# Patient Record
Sex: MALE | Race: WHITE | NOT HISPANIC OR LATINO | Employment: FULL TIME | URBAN - METROPOLITAN AREA
[De-identification: names, ages, dates, MRNs, and addresses within clinical notes are randomized per-mention and may not be internally consistent; named-entity substitution may affect disease eponyms.]

---

## 2017-01-24 ENCOUNTER — ALLSCRIPTS OFFICE VISIT (OUTPATIENT)
Dept: OTHER | Facility: OTHER | Age: 57
End: 2017-01-24

## 2017-02-06 ENCOUNTER — ALLSCRIPTS OFFICE VISIT (OUTPATIENT)
Dept: OTHER | Facility: OTHER | Age: 57
End: 2017-02-06

## 2017-02-27 ENCOUNTER — ALLSCRIPTS OFFICE VISIT (OUTPATIENT)
Dept: OTHER | Facility: OTHER | Age: 57
End: 2017-02-27

## 2017-04-03 DIAGNOSIS — Z12.5 ENCOUNTER FOR SCREENING FOR MALIGNANT NEOPLASM OF PROSTATE: ICD-10-CM

## 2017-04-03 DIAGNOSIS — E78.5 HYPERLIPIDEMIA: ICD-10-CM

## 2017-04-03 DIAGNOSIS — R73.03 PREDIABETES: ICD-10-CM

## 2017-04-13 ENCOUNTER — GENERIC CONVERSION - ENCOUNTER (OUTPATIENT)
Dept: OTHER | Facility: OTHER | Age: 57
End: 2017-04-13

## 2017-05-24 ENCOUNTER — LAB CONVERSION - ENCOUNTER (OUTPATIENT)
Dept: OTHER | Facility: OTHER | Age: 57
End: 2017-05-24

## 2017-05-24 LAB
A/G RATIO (HISTORICAL): 1.7 (CALC) (ref 1–2.5)
ALBUMIN SERPL BCP-MCNC: 4.2 G/DL (ref 3.6–5.1)
ALP SERPL-CCNC: 49 U/L (ref 40–115)
ALT SERPL W P-5'-P-CCNC: 17 U/L (ref 9–46)
AST SERPL W P-5'-P-CCNC: 16 U/L (ref 10–35)
BASOPHILS # BLD AUTO: 0.2 %
BASOPHILS # BLD AUTO: 10 CELLS/UL (ref 0–200)
BILIRUB SERPL-MCNC: 0.8 MG/DL (ref 0.2–1.2)
BILIRUB UR QL STRIP: NEGATIVE
BUN SERPL-MCNC: 16 MG/DL (ref 7–25)
BUN/CREA RATIO (HISTORICAL): ABNORMAL (CALC) (ref 6–22)
CALCIUM SERPL-MCNC: 9.1 MG/DL (ref 8.6–10.3)
CHLORIDE SERPL-SCNC: 105 MMOL/L (ref 98–110)
CHOLEST SERPL-MCNC: 194 MG/DL (ref 125–200)
CHOLEST/HDLC SERPL: 3.1 (CALC)
CO2 SERPL-SCNC: 28 MMOL/L (ref 20–31)
COLOR UR: YELLOW
COMMENT (HISTORICAL): CLEAR
CREAT SERPL-MCNC: 1.01 MG/DL (ref 0.7–1.33)
DEPRECATED RDW RBC AUTO: 13.5 % (ref 11–15)
EGFR AFRICAN AMERICAN (HISTORICAL): 95 ML/MIN/1.73M2
EGFR-AMERICAN CALC (HISTORICAL): 82 ML/MIN/1.73M2
EOSINOPHIL # BLD AUTO: 153 CELLS/UL (ref 15–500)
EOSINOPHIL # BLD AUTO: 3 %
FECAL OCCULT BLOOD DIAGNOSTIC (HISTORICAL): NEGATIVE
GAMMA GLOBULIN (HISTORICAL): 2.5 G/DL (CALC) (ref 1.9–3.7)
GLUCOSE (HISTORICAL): 110 MG/DL (ref 65–99)
GLUCOSE (HISTORICAL): NEGATIVE
HBA1C MFR BLD HPLC: 5.7 % OF TOTAL HGB
HCT VFR BLD AUTO: 44.8 % (ref 38.5–50)
HDLC SERPL-MCNC: 63 MG/DL
HGB BLD-MCNC: 14.7 G/DL (ref 13.2–17.1)
KETONES UR STRIP-MCNC: NEGATIVE MG/DL
LDL CHOLESTEROL (HISTORICAL): 120 MG/DL (CALC)
LEUKOCYTE ESTERASE UR QL STRIP: NEGATIVE
LYMPHOCYTES # BLD AUTO: 1520 CELLS/UL (ref 850–3900)
LYMPHOCYTES # BLD AUTO: 29.8 %
MCH RBC QN AUTO: 30.1 PG (ref 27–33)
MCHC RBC AUTO-ENTMCNC: 32.8 G/DL (ref 32–36)
MCV RBC AUTO: 91.8 FL (ref 80–100)
MONOCYTES # BLD AUTO: 530 CELLS/UL (ref 200–950)
MONOCYTES (HISTORICAL): 10.4 %
NEUTROPHILS # BLD AUTO: 2887 CELLS/UL (ref 1500–7800)
NEUTROPHILS # BLD AUTO: 56.6 %
NITRITE UR QL STRIP: NEGATIVE
NON-HDL-CHOL (CHOL-HDL) (HISTORICAL): 131 MG/DL (CALC)
PH UR STRIP.AUTO: 6.5 [PH] (ref 5–8)
PLATELET # BLD AUTO: 248 THOUSAND/UL (ref 140–400)
PMV BLD AUTO: 8.4 FL (ref 7.5–12.5)
POTASSIUM SERPL-SCNC: 4.2 MMOL/L (ref 3.5–5.3)
PROSTATE SPECIFIC ANTIGEN TOTAL (HISTORICAL): 2.8 NG/ML
PROT UR STRIP-MCNC: NEGATIVE MG/DL
RBC # BLD AUTO: 4.88 MILLION/UL (ref 4.2–5.8)
SODIUM SERPL-SCNC: 138 MMOL/L (ref 135–146)
SP GR UR STRIP.AUTO: 1.02 (ref 1–1.03)
TOTAL PROTEIN (HISTORICAL): 6.7 G/DL (ref 6.1–8.1)
TRIGL SERPL-MCNC: 55 MG/DL
WBC # BLD AUTO: 5.1 THOUSAND/UL (ref 3.8–10.8)

## 2017-11-28 ENCOUNTER — ALLSCRIPTS OFFICE VISIT (OUTPATIENT)
Dept: OTHER | Facility: OTHER | Age: 57
End: 2017-11-28

## 2017-11-28 DIAGNOSIS — R41.3 OTHER AMNESIA: ICD-10-CM

## 2017-11-28 DIAGNOSIS — R53.83 OTHER FATIGUE: ICD-10-CM

## 2017-11-29 NOTE — PROGRESS NOTES
Assessment    1  Fatigue (780 79) (R53 83)   2  Short-term memory loss (780 93) (R41 3)   3  Family history of dementia (V17 2) (Z81 8) : Mother    Plan  FamHx: Family history of dementia, Short-term memory loss    · *1 - [facility]  *  Status: Active  Requested for: [id number]  Care Summary provided  : Yes  Fatigue, Short-term memory loss    · (1) CBC/PLT/DIFF; Status:Active; Requested for:28Nov2017;    · (1) COMPREHENSIVE METABOLIC PANEL; Status:Active; Requested for:28Nov2017;    · (1) RPR; Status:Active; Requested for:28Nov2017;    · (1) TESTOSTERONE; Status:Active; Requested for:28Nov2017;    · (1) TSH WITH FT4 REFLEX; Status:Active; Requested for:28Nov2017;    · (1) VITAMIN B12; Status:Active; Requested for:28Nov2017;    · (1) VITAMIN D 25-HYDROXY; Status:Active; Requested for:28Nov2017;     Discussion/Summary    readily apparent cause of his symptoms including depression, anxiety, WANDA/other sleep disorders  Check additional screening labs  Assuming this is normal, advised neurology follow-up ([address])  Call for new/worsening symptoms in the interim  total time of encounter was 40 minutes-- and-- 25 minutes was spent counseling  The treatment plan was reviewed with the patient/guardian  The patient/guardian understands and agrees with the treatment plan      Chief Complaint  Pt here c/o fatigue, memory issues and stress      History of Present Illness  HPI: with complaints of gradual onset short term memory loss + fatigue over the past 6-12 months  Sleeps sound, for the most part  Gets up once a night to urinate, but often has trouble falling back to sleep  5-7 hours sleep total (baseline x years)  Generally refreshed in the morning  No reported apnea, snoring  Memory loss is short term  Difficulty finding words, finishing sentences  Wife and co-workers have mentioned it  Decreased motivation, focus/concentration, but denies feeling down/depressed   Admits to some stress level d/t parent's health issues (mom with dementia, lives at home with dad), but denies feeling overly stressed  No recent change in appetite  Weight down 5 pounds over the past 6 months  headaches, vision  changes  Occasional brief dizziness  Intermittent sciatica no worse  No joint pains otherwise  No rashes, tick bites, weight loss  No constipation  No decrease in libido  Allergies not particularly bothersome lately  No recent/previous head injuries/concussions  No environmental issues (mold, new carpet, carbon monoxide, etc)  2-3 drinks per week  Former smoker  repeat colonoscopy 4/2017  States that it came back normal, told to repeat in 4 years  routine appointment with eye doctor in 3 weeks  Review of Systems    Over the past 2 weeks, how often have you been bothered by the following problems? 1 ) Little interest or pleasure in doing things? Not at all   2 ) Feeling down, depressed or hopeless? Not at all   3 ) Trouble falling asleep or sleeping too much? Several days  4 ) Feeling tired or having little energy? Several days  5 ) Poor appetite or overeating? Not at all   6 ) Feeling bad about yourself, or that you are a failure, or have let yourself or your family down? Not at all   7 ) Trouble concentrating on things, such as reading a newspaper or watching television? Not at all   8 ) Moving or speaking so slowly that other people could have noticed, or the opposite, moving or speaking faster than usual? Not at all   9 ) Thoughts that you would be better off dead or of hurting yourself in some way? Not at all  Score 2    Constitutional: as noted in HPI-- and-- no fever  Eyes: as noted in HPI   ENT: no sore throat  Cardiovascular: no chest pain-- and-- no palpitations  Respiratory: no shortness of breath-- and-- no cough  Gastrointestinal: no abdominal pain,-- no diarrhea-- and-- no constipation  Genitourinary: no dysuria  Musculoskeletal: no diffuse joint pain  Integumentary and Breasts: no rashes  Neurological: no headache  Psychiatric: as noted in HPI  Endocrine: no hot flashes-- and-- no night sweats  Hematologic and Lymphatic: no swollen glands  Active Problems  1  Allergic rhinitis (477 9) (J30 9)   2  Allergy To Gluten-containing Products (V15 05)   3  History of Benign colon polyp (211 3) (K63 5)   4  Encounter for routine laboratory testing (V72 62) (Z00 00)   5  Glaucoma (365 9) (H40 9)   6  History of hyperlipidemia (V12 29) (Z86 39)   7  Lactase deficiency (271 3) (E73 9)   8  History of Localized rash (782 1) (R21)   9  Prediabetes (790 29) (R73 03)   10  Family history of Prostate Cancer (V16 42)   11  Sciatica (724 3) (M54 30)   12  Screening for lipoid disorders (V77 91) (Z13 220)   13  Special screening examination for neoplasm of prostate (V76 44) (Z12 5)   14  Well adult exam (V70 0) (Z00 00)    Past Medical History  1  History of Benign colon polyp (211 3) (K63 5)   2  History of Diverticulosis (562 10) (K57 90)   3  Encounter for routine laboratory testing (V72 62) (Z00 00)   4  Fatigue (780 79) (R53 83)   5  History of diverticulitis of colon (V12 79) (Z87 19)   6  History of hyperlipidemia (V12 29) (Z86 39)   7  History of renal calculi (V13 01) (Z87 442)   8  History of Laceration Of Finger (883 0)   9  History of Localized rash (782 1) (R21)   10  Prediabetes (790 29) (R73 03)   11  Short-term memory loss (780 93) (R41 3)   12  History of Skin rash (782 1) (R21)   13  Well adult exam (V70 0) (Z00 00)  Active Problems And Past Medical History Reviewed: The active problems and past medical history were reviewed and updated today  Family History  Mother    1  Family history of dementia (V17 2) (Z81 8)   2  Family history of Glaucoma  Father    3  Family history of Glaucoma   4  Family history of Prostate Cancer (V16 42)   5  Family history of Pulmonary Embolism  Brother    6  Family history of Hypertension (V17 49)  Maternal Grandfather    7   Family history of Colon Cancer (V16 0)  Family History Reviewed: The family history was reviewed and updated today  Social History   · Alcohol Use (History)   · Occasional Beer and Wine   · Caffeine Use   · Coffee 3 to 4 cups   · Educational Level   · Some College   · Former smoker (I24 88) (Y01 786)   · Quit 1992   · Marital History - Currently    · Occupation:   ·  [employer]  The social history was reviewed and updated today  The social history was reviewed and is unchanged  Surgical History    1  History of Arthroscopy Knee Right   2  History of Complete Colonoscopy   3  History of Diagnostic Esophagogastroduodenoscopy   4  History of Inguinal Hernia Repair  Surgical History Reviewed: The surgical history was reviewed and updated today  Current Meds   1  Fluticasone Propionate 50 MCG/ACT Nasal Suspension; USE 2 SPRAYS IN EACH NOSTRIL ONCE DAILY; Therapy: 27VRP2517 to (Last Rx:12Pyc4439)  Requested for: 25Tki3463 Ordered   2  Multiple Vitamins Oral Tablet; TAKE 1 TABLET DAILY; Therapy: 54LGT4990 to (Evaluate:47Mkv2052) Recorded   3  Probiotic Oral Capsule; USE AS DIRECTED; Therapy: 46MCT5633 to Recorded    The medication list was reviewed and updated today  Allergies  1  Sulfa Drugs    Vitals   Recorded: 87HAX9314 12:56PM   Temperature 98 2 F, Tympanic   Heart Rate 89   Systolic 845   Diastolic 74   Weight 630 lb    BMI Calculated 25 7   BSA Calculated 1 9   O2 Saturation 98       Physical Exam   Constitutional  General appearance: No acute distress, well appearing and well nourished  Head and Face  Head and face: Normal    Eyes  Conjunctiva and lids: No erythema, swelling or discharge  Pupils and irises: Equal, round, reactive to light  Ears, Nose, Mouth, and Throat  Oropharynx: Normal with no erythema, edema, exudate or lesions  Neck  Neck: Supple, symmetric, trachea midline, no masses  Thyroid: Normal, no thyromegaly     Pulmonary  Respiratory effort: No increased work of breathing or signs of respiratory distress  Auscultation of lungs: Clear to auscultation  Cardiovascular  Auscultation of heart: Normal rate and rhythm, normal S1 and S2, no murmurs  Abdomen  Abdomen: Non-tender, no masses  Liver and spleen: No hepatomegaly or splenomegaly  Lymphatic  Palpation of lymph nodes in neck: No lymphadenopathy  Musculoskeletal  Gait and station: Normal    Neurologic  Reflexes: 2+ and symmetric  Psychiatric  Judgment and insight: Normal    Orientation to person, place and time: Normal    Recent and remote memory: Intact  Mood and affect: Normal        Results/Data  PHQ-2 Adult Depression Screening 28Nov2017 01:57PM Lizzy Speaker     Test Name Result Flag Reference   PHQ-2 Adult Depression Score 0       Over the last two weeks, how often have you been bothered by any of the following problems?  Little interest or pleasure in doing things: Not at all - 0 Feeling down, depressed, or hopeless: Not at all - 0   PHQ-2 Adult Depression Screening Negative           Signatures   Electronically signed by : HERBERT Holt; Nov 28 2017  1:56PM EST                       (Author)    Electronically signed by : Laila Graf DO; Nov 28 2017  1:59PM EST                       (Author)

## 2017-12-14 ENCOUNTER — LAB CONVERSION - ENCOUNTER (OUTPATIENT)
Dept: OTHER | Facility: OTHER | Age: 57
End: 2017-12-14

## 2017-12-14 LAB
25(OH)D3 SERPL-MCNC: 27 NG/ML (ref 30–100)
A/G RATIO (HISTORICAL): 1.7 (CALC) (ref 1–2.5)
ALBUMIN SERPL BCP-MCNC: 4.3 G/DL (ref 3.6–5.1)
ALP SERPL-CCNC: 63 U/L (ref 40–115)
ALT SERPL W P-5'-P-CCNC: 45 U/L (ref 9–46)
AST SERPL W P-5'-P-CCNC: 37 U/L (ref 10–35)
BASOPHILS # BLD AUTO: 0.6 %
BASOPHILS # BLD AUTO: 32 CELLS/UL (ref 0–200)
BILIRUB SERPL-MCNC: 0.3 MG/DL (ref 0.2–1.2)
BUN SERPL-MCNC: 21 MG/DL (ref 7–25)
BUN/CREA RATIO (HISTORICAL): ABNORMAL (CALC) (ref 6–22)
CALCIUM SERPL-MCNC: 9 MG/DL (ref 8.6–10.3)
CHLORIDE SERPL-SCNC: 107 MMOL/L (ref 98–110)
CO2 SERPL-SCNC: 24 MMOL/L (ref 20–31)
CREAT SERPL-MCNC: 0.9 MG/DL (ref 0.7–1.33)
DEPRECATED RDW RBC AUTO: 12 % (ref 11–15)
EGFR AFRICAN AMERICAN (HISTORICAL): 109 ML/MIN/1.73M2
EGFR-AMERICAN CALC (HISTORICAL): 94 ML/MIN/1.73M2
EOSINOPHIL # BLD AUTO: 180 CELLS/UL (ref 15–500)
EOSINOPHIL # BLD AUTO: 3.4 %
GAMMA GLOBULIN (HISTORICAL): 2.5 G/DL (CALC) (ref 1.9–3.7)
GLUCOSE (HISTORICAL): 122 MG/DL (ref 65–99)
HCT VFR BLD AUTO: 46.5 % (ref 38.5–50)
HGB BLD-MCNC: 15.5 G/DL (ref 13.2–17.1)
LYMPHOCYTES # BLD AUTO: 1738 CELLS/UL (ref 850–3900)
LYMPHOCYTES # BLD AUTO: 32.8 %
MCH RBC QN AUTO: 30.4 PG (ref 27–33)
MCHC RBC AUTO-ENTMCNC: 33.3 G/DL (ref 32–36)
MCV RBC AUTO: 91.2 FL (ref 80–100)
MONOCYTES # BLD AUTO: 594 CELLS/UL (ref 200–950)
MONOCYTES (HISTORICAL): 11.2 %
NEUTROPHILS # BLD AUTO: 2756 CELLS/UL (ref 1500–7800)
NEUTROPHILS # BLD AUTO: 52 %
PLATELET # BLD AUTO: 269 THOUSAND/UL (ref 140–400)
PMV BLD AUTO: 10.5 FL (ref 7.5–12.5)
POTASSIUM SERPL-SCNC: 4.2 MMOL/L (ref 3.5–5.3)
RBC # BLD AUTO: 5.1 MILLION/UL (ref 4.2–5.8)
RPR SCREEN (HISTORICAL): NORMAL
SODIUM SERPL-SCNC: 140 MMOL/L (ref 135–146)
TESTOSTERONE TOTAL (HISTORICAL): 378 NG/DL (ref 250–827)
TOTAL PROTEIN (HISTORICAL): 6.8 G/DL (ref 6.1–8.1)
TSH SERPL DL<=0.05 MIU/L-ACNC: 2.19 MIU/L (ref 0.4–4.5)
VIT B12 SERPL-MCNC: 499 PG/ML (ref 200–1100)
WBC # BLD AUTO: 5.3 THOUSAND/UL (ref 3.8–10.8)

## 2017-12-15 ENCOUNTER — GENERIC CONVERSION - ENCOUNTER (OUTPATIENT)
Dept: OTHER | Facility: OTHER | Age: 57
End: 2017-12-15

## 2017-12-21 ENCOUNTER — GENERIC CONVERSION - ENCOUNTER (OUTPATIENT)
Dept: OTHER | Facility: OTHER | Age: 57
End: 2017-12-21

## 2018-01-11 NOTE — RESULT NOTES
Discussion/Summary      Normal blood work results including PSA (prostate level) and pretty good cholesterol numbers--improved from previous! Kendall Wright Average blood sugar level (A1C) remains borderline elevated (= borderline "prediabetes")  Continuing to watch diet and maintain healthy weight will help with this  Let me know if you have any questions--Sukhjinder      Verified Results  (1) CBC/PLT/DIFF [id number] 07:13AM Dwain Cheatham     Test Name Result Flag Reference   WHITE BLOOD CELL COUNT 5 1 Thousand/uL  3 8-10 8   RED BLOOD CELL COUNT 4 88 Million/uL  4 20-5 80   HEMOGLOBIN 14 7 g/dL  13 2-17 1   HEMATOCRIT 44 8 %  38 5-50 0   MCV 91 8 fL  80 0-100 0   MCH 30 1 pg  27 0-33 0   MCHC 32 8 g/dL  32 0-36 0   RDW 13 5 %  11 0-15 0   PLATELET COUNT 957 Thousand/uL  140-400   ABSOLUTE NEUTROPHILS 2887 cells/uL  3509-6927   ABSOLUTE LYMPHOCYTES 1520 cells/uL  850-3900   ABSOLUTE MONOCYTES 530 cells/uL  200-950   ABSOLUTE EOSINOPHILS 153 cells/uL     ABSOLUTE BASOPHILS 10 cells/uL  0-200   NEUTROPHILS 56 6 %     LYMPHOCYTES 29 8 %     MONOCYTES 10 4 %     EOSINOPHILS 3 0 %     BASOPHILS 0 2 %     MPV 8 4 fL  7 5-12 5     (1) COMPREHENSIVE METABOLIC PANEL [id number] 05:29SC Dwain Cheatham     Test Name Result Flag Reference   GLUCOSE 110 mg/dL H 65-99   Fasting reference interval     For someone without known diabetes, a glucose value  between 100 and 125 mg/dL is consistent with  prediabetes and should be confirmed with a  follow-up test    UREA NITROGEN (BUN) 16 mg/dL  7-25   CREATININE 1 01 mg/dL  0 70-1 33   For patients >52years of age, the reference limit  for Creatinine is approximately 13% higher for people  identified as -American  eGFR NON-AFR   AMERICAN 82 mL/min/1 73m2  > OR = 60   eGFR AFRICAN AMERICAN 95 mL/min/1 73m2  > OR = 60   BUN/CREATININE RATIO   1-13   NOT APPLICABLE (calc)   SODIUM 138 mmol/L  135-146   POTASSIUM 4 2 mmol/L  3 5-5 3   CHLORIDE 105 mmol/L     CARBON DIOXIDE 28 mmol/L 20-31   CALCIUM 9 1 mg/dL  8 6-10 3   PROTEIN, TOTAL 6 7 g/dL  6 1-8 1   ALBUMIN 4 2 g/dL  3 6-5 1   GLOBULIN 2 5 g/dL (calc)  1 9-3 7   ALBUMIN/GLOBULIN RATIO 1 7 (calc)  1 0-2 5   BILIRUBIN, TOTAL 0 8 mg/dL  0 2-1 2   ALKALINE PHOSPHATASE 49 U/L     AST 16 U/L  10-35   ALT 17 U/L  9-46     (1) PSA (SCREEN) (Dx V76 44 Screen for Prostate Cancer) [id number] 07:13AM [employer]     Test Name Result Flag Reference   PSA, TOTAL 2 8 ng/mL  < OR = 4 0   This test was performed using the Siemens  chemiluminescent method  Values obtained from  different assay methods cannot be used  interchangeably  PSA levels, regardless of  value, should not be interpreted as absolute  evidence of the presence or absence of disease  (Q) LIPID PANEL WITH REFLEX TO DIRECT LDL [id number] 07:13AM [employer]     Test Name Result Flag Reference   CHOLESTEROL, TOTAL 194 mg/dL  125-200   HDL CHOLESTEROL 63 mg/dL  > OR = 40   TRIGLICERIDES 55 mg/dL  <965   LDL-CHOLESTEROL 120 mg/dL (calc)  <130   Desirable range <100 mg/dL for patients with CHD or  diabetes and <70 mg/dL for diabetic patients with  known heart disease  CHOL/HDLC RATIO 3 1 (calc)  < OR = 5 0   NON HDL CHOLESTEROL 131 mg/dL (calc)     Target for non-HDL cholesterol is 30 mg/dL higher than   LDL cholesterol target  (Q) HEMOGLOBIN A1c [id number] 07:13AM [employer]     Test Name Result Flag Reference   HEMOGLOBIN A1c 5 7 % of total Hgb H <5 7   For someone without known diabetes, a hemoglobin   A1c value between 5 7% and 6 4% is consistent with  prediabetes and should be confirmed with a   follow-up test      For someone with known diabetes, a value <7%  indicates that their diabetes is well controlled  A1c  targets should be individualized based on duration of  diabetes, age, comorbid conditions, and other  considerations  This assay result is consistent with an increased risk  of diabetes       Currently, no consensus exists regarding use of  hemoglobin A1c for diagnosis of diabetes for children       (Q) URINALYSIS REFLEX [id number] 07:13AM [facility]   REPORT COMMENT:  FASTING:YES     Test Name Result Flag Reference   COLOR YELLOW  YELLOW   APPEARANCE CLEAR  CLEAR   SPECIFIC GRAVITY 1 022  1 001-1 035   PH 6 5  5 0-8 0   GLUCOSE NEGATIVE  NEGATIVE   BILIRUBIN NEGATIVE  NEGATIVE   KETONES NEGATIVE  NEGATIVE   OCCULT BLOOD NEGATIVE  NEGATIVE   PROTEIN NEGATIVE  NEGATIVE   NITRITE NEGATIVE  NEGATIVE   LEUKOCYTE ESTERASE NEGATIVE  NEGATIVE

## 2018-01-14 VITALS
BODY MASS INDEX: 26.37 KG/M2 | WEIGHT: 174 LBS | RESPIRATION RATE: 17 BRPM | OXYGEN SATURATION: 97 % | HEIGHT: 68 IN | HEART RATE: 80 BPM | TEMPERATURE: 98.6 F | SYSTOLIC BLOOD PRESSURE: 138 MMHG | DIASTOLIC BLOOD PRESSURE: 88 MMHG

## 2018-01-14 VITALS
TEMPERATURE: 98.2 F | HEART RATE: 89 BPM | SYSTOLIC BLOOD PRESSURE: 122 MMHG | DIASTOLIC BLOOD PRESSURE: 74 MMHG | OXYGEN SATURATION: 98 % | BODY MASS INDEX: 24.96 KG/M2 | WEIGHT: 169 LBS

## 2018-01-14 VITALS
HEIGHT: 68 IN | SYSTOLIC BLOOD PRESSURE: 130 MMHG | HEART RATE: 78 BPM | BODY MASS INDEX: 26.52 KG/M2 | OXYGEN SATURATION: 98 % | RESPIRATION RATE: 16 BRPM | TEMPERATURE: 97.8 F | WEIGHT: 175 LBS | DIASTOLIC BLOOD PRESSURE: 80 MMHG

## 2018-01-15 NOTE — RESULT NOTES
Message   Can we mail copy of labs along with slip to recheck blood work  Thanks     Verified Results  (1) CBC/PLT/DIFF [id number] 06:50AM Thania Webster     Test Name Result Flag Reference   WHITE BLOOD CELL COUNT 5 4 Thousand/uL  3 8-10 8   RED BLOOD CELL COUNT 5 22 Million/uL  4 20-5 80   HEMOGLOBIN 15 8 g/dL  13 2-17 1   HEMATOCRIT 48 3 %  38 5-50 0   MCV 92 5 fL  80 0-100 0   MCH 30 3 pg  27 0-33 0   MCHC 32 8 g/dL  32 0-36 0   RDW 13 5 %  11 0-15 0   PLATELET COUNT 951 Thousand/uL  140-400   MPV 8 3 fL  7 5-11 5   ABSOLUTE NEUTROPHILS 2808 cells/uL  0184-7167   ABSOLUTE LYMPHOCYTES 1760 cells/uL  850-3900   ABSOLUTE MONOCYTES 589 cells/uL  200-950   ABSOLUTE EOSINOPHILS 221 cells/uL     ABSOLUTE BASOPHILS 22 cells/uL  0-200   NEUTROPHILS 52 0 %     LYMPHOCYTES 32 6 %     MONOCYTES 10 9 %     EOSINOPHILS 4 1 %     BASOPHILS 0 4 %       (1) COMPREHENSIVE METABOLIC PANEL [id number] 13:72LC Thania Webster     Test Name Result Flag Reference   GLUCOSE 113 mg/dL H 65-99   Fasting reference interval   UREA NITROGEN (BUN) 15 mg/dL  7-25   CREATININE 1 01 mg/dL  0 70-1 33   For patients >52years of age, the reference limit  for Creatinine is approximately 13% higher for people  identified as -American  eGFR NON-AFR   AMERICAN 83 mL/min/1 73m2  > OR = 60   eGFR AFRICAN AMERICAN 96 mL/min/1 73m2  > OR = 60   BUN/CREATININE RATIO   8-03   NOT APPLICABLE (calc)   SODIUM 140 mmol/L  135-146   POTASSIUM 4 4 mmol/L  3 5-5 3   CHLORIDE 103 mmol/L     CARBON DIOXIDE 25 mmol/L  19-30   CALCIUM 9 4 mg/dL  8 6-10 3   PROTEIN, TOTAL 6 9 g/dL  6 1-8 1   ALBUMIN 4 3 g/dL  3 6-5 1   GLOBULIN 2 6 g/dL (calc)  1 9-3 7   ALBUMIN/GLOBULIN RATIO 1 7 (calc)  1 0-2 5   BILIRUBIN, TOTAL 0 7 mg/dL  0 2-1 2   ALKALINE PHOSPHATASE 47 U/L     AST 20 U/L  10-35   ALT 27 U/L  9-46     (1) PSA (SCREEN) (Dx V76 44 Screen for Prostate Cancer) [id number] 06:50AM Thania Webster   REPORT COMMENT:  FASTING:YES     Test Name Result Flag Reference   PSA, TOTAL 2 5 ng/mL  < OR = 4 0   This test was performed using the Siemens  chemiluminescent method  Values obtained from  different assay methods cannot be used  interchangeably  PSA levels, regardless of  value, should not be interpreted as absolute  evidence of the presence or absence of disease  (1) URINALYSIS w URINE C/S REFLEX (will reflex a microscopy if leukocytes, occult blood, or nitrites are not within normal limits) [id number] 06:50AM Aung Gonzalez Dry     Test Name Result Flag Reference   COLOR YELLOW  YELLOW   APPEARANCE CLEAR  CLEAR   SPECIFIC GRAVITY 1 025  1 001-1 035   PH 5 0  5 0-8 0   GLUCOSE NEGATIVE  NEGATIVE   BILIRUBIN NEGATIVE  NEGATIVE   KETONES NEGATIVE  NEGATIVE   OCCULT BLOOD NEGATIVE  NEGATIVE   PROTEIN NEGATIVE  NEGATIVE   NITRITE NEGATIVE  NEGATIVE   LEUKOCYTE ESTERASE NEGATIVE  NEGATIVE   WBC 0-5 /HPF  < OR = 5   RBC NONE SEEN /HPF  < OR = 2   SQUAMOUS EPITHELIAL CELLS NONE SEEN /HPF  < OR = 5   BACTERIA NONE SEEN /HPF  NONE SEEN   HYALINE CAST NONE SEEN /LPF  NONE SEEN   REFLEXIVE URINE CULTURE NO CULTURE INDICATED       (Q) LIPID PANEL WITH REFLEX TO DIRECT LDL [id number] 06:50AM Jaimie Collins     Test Name Result Flag Reference   CHOLESTEROL, TOTAL 218 mg/dL H 125-200   HDL CHOLESTEROL 60 mg/dL  > OR = 40   TRIGLICERIDES 92 mg/dL  <509   LDL-CHOLESTEROL 140 mg/dL (calc) H <130   Desirable range <100 mg/dL for patients with CHD or  diabetes and <70 mg/dL for diabetic patients with  known heart disease  CHOL/HDLC RATIO 3 6 (calc)  < OR = 5 0   NON HDL CHOLESTEROL 158 mg/dL (calc)     Target for non-HDL cholesterol is 30 mg/dL higher than   LDL cholesterol target  Plan  Hyperlipidemia, Prediabetes    · (1) BASIC METABOLIC PROFILE; Status:Active; Requested for:10Oct2016;    · (1) HEMOGLOBIN A1C; Status:Active; Requested for:10Oct2016;    · (1) LIPID PANEL FASTING W DIRECT LDL REFLEX; Status:Active;  Requested  for:10Oct2016;     Discussion/Summary   Normal blood work results except for somewhat elevated total + LDL ("bad") cholesterol, up from previous  Fasting blood sugar is also elevated--"prediabetes" range  Keeping weight down, exercise, minimizing sugars/carbs/"bad" fats in diet will help with this, as will getting more fiber  Will mail slip to recheck in 6 months or so (prior to next physical)    Call if any questions--Sukhjinder

## 2018-01-16 NOTE — PROGRESS NOTES
Assessment    1  Well adult exam (V70 0) (Z00 00)   2  Encounter for routine laboratory testing (V72 62) (Z00 00)   3  Allergic rhinitis (477 9) (J30 9)   4  Benign colon polyp (211 3) (K63 5)   5  Sciatica (724 3) (M54 30)   6  Screening for lipoid disorders (V77 91) (Z13 220)   7  Special screening examination for neoplasm of prostate (V76 44) (Z12 5)    Plan  Encounter for routine laboratory testing, Screening for lipoid disorders, Special screening  examination for neoplasm of prostate    · (1) CBC/PLT/DIFF; Status:Active; Requested for:14Mar2016;    · (1) COMPREHENSIVE METABOLIC PANEL; Status:Active; Requested for:14Mar2016;    · (1) LIPID PANEL FASTING W DIRECT LDL REFLEX; Status:Active; Requested  for:14Mar2016;    · (1) PSA (SCREEN) (Dx V76 44 Screen for Prostate Cancer); Status:Active; Requested  for:14Mar2016;    · (1) URINALYSIS w URINE C/S REFLEX (will reflex a microscopy if leukocytes, occult  blood, or nitrites are not within normal limits); Status:Active; Requested for:14Mar2016;   Well adult exam    · Call [phone] if: You have any warning signs of skin cancer ; Status:Complete;    Done: 61CPC0184   · Call 911 if: You experience a new kind of chest pain (angina) or pressure ;  Status:Complete;   Done: 48JID7838   · Always use a seat belt and shoulder strap when riding or driving a motor vehicle ;  Status:Complete;   Done: 42WKJ9376   · Begin a limited exercise program ; Status:Complete;   Done: 88TKC1572   · Begin or continue regular aerobic exercise   Gradually work up to at least 3 sessions of 30  minutes of exercise a week ; Status:Complete;   Done: 18NUC4469   · Brush your teeth freq1 and floss at least once a day ; Status:Complete;   Done:  85RPW7548   · Decreasing the stress in your life may help your condition improve ; Status:Complete;    Done: 11DQT3076   · Eat a normal well-balanced diet ; Status:Complete;   Done: 55CFZ0634   · Limit your use of alcohol to 2 drinks or cans of beer a day ; Status:Complete;   Done:  54ZAH5870   · Stretch and warm up your muscles during the first 10 minutes , then cool down your  muscles for the last 10 minutes of exercise ; Status:Complete;   Done: 78PNW4648   · Use a sun block product with an SPF of 15 or more ; Status:Complete;   Done:  58TIE5993   · Vitamins can help you get daily requirements that your diet may not be giving you ;  Status:Complete;   Done: 79ARA7079   · We recommend routine visits to a dentist ; Status:Complete;   Done: 96RMF9227   · We recommend that you bring your body mass index down to 26 ; Status:Complete;    Done: 73AOC5616   · We recommend that you follow the "Mediterranean diet "; Status:Complete;   Done:  49BHF3460   · We recommend that you follow these rules for gun safety ; Status:Complete;   Done:  31TIE9229    Discussion/Summary  Impression: health maintenance visit  Currently, he eats a healthy diet  Prostate cancer screening: PSA was ordered  Testicular cancer screening: monthly self testicular exam was advised and clinical testicular exam was done today  Colorectal cancer screening: colorectal cancer screening is managed by GI  Screening lab work includes glucose, lipid profile and urinalysis  The immunizations are up to date  Advice and education were given regarding nutrition and aerobic exercise  Patient discussion: discussed with the patient  Preventative: Slip given for yearly PSA (FH prostate cancer) + lipids/fasting glucose (patient request; normal 2014)  UTD with Tdap  Declining flu shot  UTD with eye exam  Due for repeat colonoscopy (hx polyps) which he will be calling to schedule soon  Sciatica: Declines recent worsening or need for additional interventions  Allergic rhinitis: Springtime primarily  Uses Flonase PRN  RTO 1 year  Chief Complaint  Pt presents today for yearly PE      History of Present Illness  HM, Adult Male: The patient is being seen for a health maintenance evaluation   The last health maintenance visit was 1 year(s) ago  Social History: Household members include spouse and adult children  He is   Work status: working full time  The patient is a former cigarette smoker  He reports rare alcohol use  He has never used illicit drugs  General Health: The patient's health since the last visit is described as good  He has regular dental visits  He denies vision problems  He denies hearing loss  Immunizations status: up to date  Lifestyle:  He consumes a diverse and healthy diet  He does not have any weight concerns  He exercises regularly  He does not use tobacco  He denies alcohol use  He denies drug use  Reproductive health:  the patient is sexually active  Screening: cancer screening reviewed and updated  metabolic screening reviewed and updated  risk screening reviewed and updated  HPI:   Here for yearly physical  No acute complaints  Continued intermittent sciatica, L>R side  Mild weakness in legs when first standing, discomfort in lower back at times  Had MRI in the past  Has gotten somewhat better over time  Walks dog regularly, which seems to help  Spring allergies  No symptoms at present  Takes Flonase when needed  , 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  3 adult children, 3 grandchildren who live nearby  Likes his job, no immediate plans to retire  Fairly healthy diet  Lactose and gluten intolerance so needs to be careful with what he eats  Colonoscopy 2012 (Dr Johanne Pendleton)  Due for repeat this year  Hx benign polyps  Eye exam last summer  Takes drops for mild glaucoma  No recent vision changes  Had full body dermatology exam 2013 (Dr Gilberto Martínez)  No concerns  UTD with dental visits  In the process of getting new upper partials  Tdap 2012  Review of Systems    Constitutional: no fever, no recent weight gain, not feeling tired and no recent weight loss     Eyes: as noted in HPI    ENT: as noted in HPI, no sore throat and no hearing loss  Cardiovascular: no chest pain, no palpitations and no extremity edema  Respiratory: no shortness of breath and no cough  Gastrointestinal: no abdominal pain, no nausea, no vomiting, no constipation, no diarrhea and no blood in stools  Genitourinary: no dysuria, no urinary hesitancy, no incontinence and no testicular pain  Musculoskeletal: as noted in HPI  Integumentary: no skin lesions  Neurological: no headache and no dizziness  Psychiatric: no anxiety, no sleep disturbances and no depression  Endocrine: no muscle weakness  Hematologic/Lymphatic: no tendency for easy bleeding  Active Problems    1  Allergic rhinitis (477 9) (J30 9)   2  Allergy To Gluten-containing Products (V15 05)   3  Benign colon polyp (211 3) (K63 5)   4  Diverticulosis (562 10) (K57 90)   5  Glaucoma (365 9) (H40 9)   6  Lactase deficiency (271 3) (E73 9)   7  Sciatica (724 3) (M54 30)   8  Screening for lipoid disorders (V77 91) (Z13 220)   9   Special screening examination for neoplasm of prostate (V76 44) (Z12 5)    Past Medical History    · Encounter for routine laboratory testing (V72 62) (Z00 00)   · History of diverticulitis of colon (V12 79) (Z87 19)   · History of renal calculi (V13 01) (Z87 442)   · History of Laceration Of Finger (883 0)   · History of Skin rash (782 1) (R21)   · Well adult exam (V70 0) (Z00 00)    Surgical History    · History of Arthroscopy Knee Right   · History of Complete Colonoscopy   · History of Diagnostic Esophagogastroduodenoscopy   · History of Inguinal Hernia Repair    Family History    · Family history of Glaucoma    · Family history of Glaucoma   · Family history of Prostate Cancer (V16 42)   · Family history of Pulmonary Embolism    · Family history of Hypertension (V17 49)    · Family history of Colon Cancer (V16 0)    Social History    · Alcohol Use (History)   · Occasional Beer and Wine   · Caffeine Use   · Coffee 3 to 4 cups   · Educational Level   · Some College   · Former smoker (D39 42) (T47 421)   · Quit 1992   · Marital History - Currently    · Occupation:   ·  [employer]  Current Meds   1  Fluticasone Propionate 50 MCG/ACT Nasal Suspension; USE 2 SPRAYS IN EACH   NOSTRIL ONCE DAILY; Therapy: 34DJW0731 to (Last Rx:36Xzl8524)  Requested for: 67Gtx9797 Ordered   2  Lumigan 0 01 % Ophthalmic Solution; Therapy: 42KLJ1144 to (Evaluate:05Nov2014) Recorded   3  Multiple Vitamins Oral Tablet; TAKE 1 TABLET DAILY; Therapy: 01UFE0757 to (Evaluate:12Sep2012) Recorded   4  Probiotic Oral Capsule; USE AS DIRECTED; Therapy: 55BJP2143 to Recorded   5  Psyllium Husk Powder; USE AS DIRECTED; Therapy: 29KZO3027 to Recorded    Allergies    1  Sulfa Drugs    Vitals   Recorded: 14Vam5741 08:08AM   Heart Rate 72   Respiration 16   Systolic 004   Diastolic 80   Height 5 ft 8 in   Weight 175 lb    BMI Calculated 26 61   BSA Calculated 1 94     Physical Exam    Constitutional   General appearance: No acute distress, well appearing and well nourished  Head and Face   Head and face: Normal     Palpation of the face and sinuses: No sinus tenderness  Eyes   Conjunctiva and lids: No erythema, swelling or discharge  Pupils and irises: Equal, round, reactive to light  Ophthalmoscopic examination: Normal fundi and optic discs  Ears, Nose, Mouth, and Throat   External inspection of ears and nose: Normal     Otoscopic examination: Tympanic membranes translucent with normal light reflex  Canals patent without erythema  Nasal mucosa, septum, and turbinates: Normal without edema or erythema  Oropharynx: Normal with no erythema, edema, exudate or lesions  Neck   Neck: Supple, symmetric, trachea midline, no masses  Thyroid: Normal, no thyromegaly  Pulmonary   Respiratory effort: No increased work of breathing or signs of respiratory distress  Auscultation of lungs: Clear to auscultation      Cardiovascular Auscultation of heart: Normal rate and rhythm, normal S1 and S2, no murmurs  Carotid pulses: 2+ bilaterally  Pedal pulses: 2+ bilaterally  Examination of extremities for edema and/or varicosities: Normal     Abdomen   Abdomen: Non-tender, no masses  Liver and spleen: No hepatomegaly or splenomegaly  Examination for hernias: No hernias appreciated  Anus, perineum, and rectum: Normal sphincter tone, no masses, no prolapse  Stool sample for occult blood: Negative  Genitourinary   Scrotal contents: Normal testes, no masses  Penis: Normal, no lesions  Digital rectal exam of prostate: Abnormal   Prostate smooth, nontender, with mild symmetric enlargement, no nodularity  Lymphatic   Palpation of lymph nodes in neck: No lymphadenopathy  Musculoskeletal   Gait and station: Normal     Skin   Skin and subcutaneous tissue: Normal without rashes or lesions  Neurologic   Reflexes: 2+ and symmetric      Psychiatric   Orientation to person, place and time: Normal     Mood and affect: Normal        Results/Data  PHQ-2 Adult Depression Screening 21Bxf1247 09:00AM Fleming Williams     Test Name Result Flag Reference   PHQ-2 Adult Depression Score 0     Q1: 0, Q2: 0   PHQ-2 Adult Depression Screening Negative         Signatures   Electronically signed by : HERBERT Crockett; Feb 29 2016  8:59AM EST                       (Author)    Electronically signed by : HESHAM Eckert ; Feb 29 2016 10:14AM EST                       (Author)

## 2018-01-22 VITALS
TEMPERATURE: 98.2 F | OXYGEN SATURATION: 97 % | HEART RATE: 84 BPM | HEIGHT: 68 IN | SYSTOLIC BLOOD PRESSURE: 118 MMHG | DIASTOLIC BLOOD PRESSURE: 74 MMHG | BODY MASS INDEX: 26.37 KG/M2 | RESPIRATION RATE: 18 BRPM | WEIGHT: 174 LBS

## 2018-01-23 NOTE — RESULT NOTES
Discussion/Summary   NORMAL LABS except for mildly low vitamin D level, which may be contributing a bit to your fatigue, but would not explain the memory loss  Will send in Rx for weekly high dose vitamin D x 8 weeks  You should also start taking a daily OTC vitamin D supplement 4000 IU (except on days when taking weekly pill)  Would recommend seeing neurologist for further evaluation regarding your memory loss, as we discussed  I believe we gave you contact information for this  If not, call the office  Let me know if you have any questions--Sukhjinder      Verified Results  (1) COMPREHENSIVE METABOLIC PANEL 12SOT8927 99:14NK Virginia Alvares     Test Name Result Flag Reference   GLUCOSE 122 mg/dL H 65-99   Fasting reference interval     For someone without known diabetes, a glucose value  between 100 and 125 mg/dL is consistent with  prediabetes and should be confirmed with a  follow-up test    UREA NITROGEN (BUN) 21 mg/dL  7-25   CREATININE 0 90 mg/dL  0 70-1 33   For patients >52years of age, the reference limit  for Creatinine is approximately 13% higher for people  identified as -American  eGFR NON-AFR   AMERICAN 94 mL/min/1 73m2  > OR = 60   eGFR AFRICAN AMERICAN 109 mL/min/1 73m2  > OR = 60   BUN/CREATININE RATIO   2-42   NOT APPLICABLE (calc)   SODIUM 140 mmol/L  135-146   POTASSIUM 4 2 mmol/L  3 5-5 3   CHLORIDE 107 mmol/L     CARBON DIOXIDE 24 mmol/L  20-31   CALCIUM 9 0 mg/dL  8 6-10 3   PROTEIN, TOTAL 6 8 g/dL  6 1-8 1   ALBUMIN 4 3 g/dL  3 6-5 1   GLOBULIN 2 5 g/dL (calc)  1 9-3 7   ALBUMIN/GLOBULIN RATIO 1 7 (calc)  1 0-2 5   BILIRUBIN, TOTAL 0 3 mg/dL  0 2-1 2   ALKALINE PHOSPHATASE 63 U/L     AST 37 U/L H 10-35   ALT 45 U/L  9-46     (1) CBC/PLT/DIFF [id number] 07:57AM Virginia Alvares     Test Name Result Flag Reference   WHITE BLOOD CELL COUNT 5 3 Thousand/uL  3 8-10 8   RED BLOOD CELL COUNT 5 10 Million/uL  4 20-5 80   HEMOGLOBIN 15 5 g/dL  13 2-17 1   HEMATOCRIT 46 5 %  38 5-50 0   MCV 91 2 fL  80 0-100 0   MCH 30 4 pg  27 0-33 0   MCHC 33 3 g/dL  32 0-36 0   RDW 12 0 %  11 0-15 0   PLATELET COUNT 210 Thousand/uL  140-400   ABSOLUTE NEUTROPHILS 2756 cells/uL  1922-5968   ABSOLUTE LYMPHOCYTES 1738 cells/uL  850-3900   ABSOLUTE MONOCYTES 594 cells/uL  200-950   ABSOLUTE EOSINOPHILS 180 cells/uL     ABSOLUTE BASOPHILS 32 cells/uL  0-200   NEUTROPHILS 52 %     LYMPHOCYTES 32 8 %     MONOCYTES 11 2 %     EOSINOPHILS 3 4 %     BASOPHILS 0 6 %     MPV 10 5 fL  7 5-12 5     (Q) TESTOSTERONE,TOTAL,MALES [id number] 07:57AM Corey Villareal     Test Name Result Flag Reference   TESTOSTERONE,TOTAL,MALES 378 ng/dL  582-756     (1) VITAMIN B12 71QJC5609 07:57AM Corey Villareal     Test Name Result Flag Reference   VITAMIN B12 499 pg/mL  200-1100     *(Q) VITAMIN D, 25-HYDROXY, LC/MS/MS [id number] 07:57AM Corey JJS Mediasaniya     Test Name Result Flag Reference   VITAMIN D, 25-OH, TOTAL 27 ng/mL L    Vitamin D Status         25-OH Vitamin D:     Deficiency:                    <20 ng/mL  Insufficiency:             20 - 29 ng/mL  Optimal:                 > or = 30 ng/mL     For 25-OH Vitamin D testing on patients on   D2-supplementation and patients for whom quantitation   of D2 and D3 fractions is required, the QuestAssureD(TM)  25-OH VIT D, (D2,D3), LC/MS/MS is recommended: order   code 13814 (patients >2yrs)  For more information on this test, go to:  [email or web address]/faq/LXI670  (This link is being provided for   informational/educational purposes only )     (Q) TSH, 3RD GENERATION W/REFLEX TO FT4 [id number] 07:57AM Corey Villareal     Test Name Result Flag Reference   TSH W/REFLEX TO FT4 2 19 mIU/L  0 40-4 50     (Q) RPR (DX) W/REFL TITER AND CONFIRMATORY TESTING [id number] 07:57AM Corey Villareal   REPORT COMMENT:  FASTING:YES     Test Name Result Flag Reference   RPR (DX) W/REFL TITER AND$CONFIRMATORY TESTING NON-REACTIVE  NON-REACTIVE       Plan  Vitamin D deficiency    · Vitamin D (Ergocalciferol) 23057 UNIT Oral Capsule; TAKE 1 CAPSULE WEEKLY  X 8 WEEKS

## 2018-01-24 VITALS
SYSTOLIC BLOOD PRESSURE: 140 MMHG | HEIGHT: 68 IN | RESPIRATION RATE: 16 BRPM | WEIGHT: 163 LBS | TEMPERATURE: 98 F | DIASTOLIC BLOOD PRESSURE: 88 MMHG | HEART RATE: 105 BPM | BODY MASS INDEX: 24.71 KG/M2 | OXYGEN SATURATION: 98 %

## 2018-02-08 RX ORDER — ERGOCALCIFEROL 1.25 MG/1
CAPSULE ORAL
Qty: 8 CAPSULE | Refills: 0 | OUTPATIENT
Start: 2018-02-08

## 2018-02-22 ENCOUNTER — OFFICE VISIT (OUTPATIENT)
Dept: NEUROLOGY | Facility: CLINIC | Age: 58
End: 2018-02-22
Payer: COMMERCIAL

## 2018-02-22 VITALS
HEIGHT: 69 IN | RESPIRATION RATE: 14 BRPM | WEIGHT: 170 LBS | DIASTOLIC BLOOD PRESSURE: 80 MMHG | BODY MASS INDEX: 25.18 KG/M2 | SYSTOLIC BLOOD PRESSURE: 130 MMHG | HEART RATE: 80 BPM

## 2018-02-22 DIAGNOSIS — G31.84 MILD COGNITIVE IMPAIRMENT: Primary | ICD-10-CM

## 2018-02-22 DIAGNOSIS — R41.0 CONFUSION AND DISORIENTATION: ICD-10-CM

## 2018-02-22 PROCEDURE — 99245 OFF/OP CONSLTJ NEW/EST HI 55: CPT | Performed by: PSYCHIATRY & NEUROLOGY

## 2018-02-22 RX ORDER — DONEPEZIL HYDROCHLORIDE 5 MG/1
TABLET, FILM COATED ORAL
Qty: 60 TABLET | Refills: 5 | Status: SHIPPED | OUTPATIENT
Start: 2018-02-22 | End: 2018-08-24 | Stop reason: ALTCHOICE

## 2018-02-22 RX ORDER — CHOLECALCIFEROL (VITAMIN D3) 125 MCG
CAPSULE ORAL ONCE
COMMUNITY
Start: 2012-08-13

## 2018-02-22 RX ORDER — MULTIVITAMIN WITH IRON
1 TABLET ORAL DAILY
COMMUNITY
Start: 2012-08-13 | End: 2018-12-26

## 2018-02-22 RX ORDER — ERGOCALCIFEROL 1.25 MG/1
1 CAPSULE ORAL WEEKLY
COMMUNITY
Start: 2017-12-15 | End: 2018-12-26

## 2018-02-22 NOTE — PROGRESS NOTES
Patient ID: Annie Calhoun is a 61 y o  male  Assessment/Plan:       Diagnoses and all orders for this visit:    Mr Timmy Robles is a pleasant 62 yo male presenting with cognitive impairment- concern for Alzheimer's type of dementia vs pseudodementia with hx  Mild cognitive impairment  -     donepezil (ARICEPT) 5 mg tablet; Take 1 tab qhs x 1 week, then 2 tabs nightly  -     EEG awake or drowsy routine; Future  -     MRI brain NeuroQuant wo contrast; Future        -     MOCA 18/30- was nervous testing today- but reports no significant difficulty with his job and driving  Confusion and disorientation  -     donepezil (ARICEPT) 5 mg tablet; Take 1 tab qhs x 1 week, then 2 tabs nightly  -     EEG awake or drowsy routine; Future  -     MRI brain NeuroQuant wo contrast; Future    Discussed the importance of staying active physically, socially cognitively  Subjective:    HPI     Mr Beth Coulter is a pleasant 62 yo male seen in consultation for memory issues started noting it about 2-3 years ago with gradual worsening  He tells me he first noted it when he was talking to his friends and states has trouble thinking of the right word which later comes back to him  States short term memory worsening and now his friends and coworkers notice it  State his coworkers actually notified his wife who recommended he see a neurologist due to gradual worsening memory  He states he has some days where he feels foggy and some days not  States no significant mood changes, depression, anxiety  States no trouble recognizing familiar faces or friends  States no issues with driving, he does not get lost driving to a familiar place, and no issues with reaction time  No hallucinations  States no RBD or vivid dreams  States he sleeps 5-6 hours at a stretch and states does not snore or stop breathing in his sleep per his wife    States he has been working for [employer] and now gradually switching to computerized system and states he is still working and gradually adapting himself to it  Stays active walking his dog  States good balance  States he does get sinus headaches- behind his right eye, has photosensitivity- states pressure like congestion  States no nausea or vomiting  States this occurs 5-6 times a year  States he will take an ASA or aleve and this helps him  Family history of cognitive impairment or maybe dementia noted in [de-identified]  States no inciting event before this started  States no history of Ca, personally  Denies tobacco use now- was intermittent for a few years in the past  Denies history alcohol abuse or drug use  The following portions of the patient's history were reviewed and updated as appropriate: allergies, current medications, past family history, past medical history, past social history, past surgical history and problem list          Objective: There were no vitals taken for this visit  Physical Exam   Constitutional: He appears well-developed and well-nourished  HENT:   Head: Normocephalic and atraumatic  Eyes: Conjunctivae and EOM are normal  Pupils are equal, round, and reactive to light  Neck: Normal range of motion  Neck supple  Cardiovascular: Normal rate and regular rhythm  Pulmonary/Chest: Effort normal    Musculoskeletal: Normal range of motion  Neurological: He has normal strength and normal reflexes  Gait normal    Nursing note and vitals reviewed  Neurological Exam    Mental Status  The patient is alert and oriented to person, place, time, and situation  He draws an abnormal clock and an abnormal drawing  He has no dysarthria  He is able to name object, read and repeat  He has normal attention span and concentration  He follows multi-step commands  He has a normal fund of knowledge    MMSE 18/30- abnormal clock draw, impaired abstract thought with identifying similarities and impaired delayed recall     Cranial Nerves    CN II: The patient's visual acuity and visual fields are normal   CN III, IV, VI: The patient's pupils are equally round and reactive to light and ocular movements are normal   CN V: The patient has normal facial sensation  CN VII:  The patient has symmetric facial movement  CN VIII:  The patient's hearing is normal   CN IX, X: The patient has symmetric palate movement and normal gag reflex  CN XI: The patient's shoulder shrug strength is normal   CN XII: The patient's tongue is midline without atrophy or fasciculations  Motor  The patient has normal muscle bulk throughout  His overall muscle tone is normal throughout  His strength is 5/5 throughout all four extremities  Sensory  The patient's sensation is normal in all four extremities to light touch, temperature and vibration  He has no right-sided and no left-sided hemispatial neglect  Reflexes  Deep tendon reflexes are 2+ and symmetric in all four extremities with downgoing toes bilaterally  Gait and Coordination  The patient has normal gait and station  ROS:    Review of Systems   Constitutional: Positive for fatigue  Awaking at night   HENT: Negative  Eyes: Negative  Respiratory: Negative  Cardiovascular: Negative  Gastrointestinal: Negative  Endocrine: Negative  Genitourinary: Negative  Musculoskeletal: Positive for back pain  Joint pain   Skin: Negative  Allergic/Immunologic: Negative  Neurological: Positive for light-headedness  Memory problems  Confusion     Hematological: Negative  Psychiatric/Behavioral: Positive for confusion and sleep disturbance

## 2018-02-26 ENCOUNTER — TELEPHONE (OUTPATIENT)
Dept: NEUROLOGY | Facility: CLINIC | Age: 58
End: 2018-02-26

## 2018-02-26 NOTE — TELEPHONE ENCOUNTER
Amy Waterman from the Neuro diagnostic lab on [address]  Called to inform you that the pt  Did not show for his 2:00pm appt  Today  She did leave him a message

## 2018-03-05 ENCOUNTER — OFFICE VISIT (OUTPATIENT)
Dept: FAMILY MEDICINE CLINIC | Facility: OTHER | Age: 58
End: 2018-03-05
Payer: COMMERCIAL

## 2018-03-05 VITALS
SYSTOLIC BLOOD PRESSURE: 136 MMHG | DIASTOLIC BLOOD PRESSURE: 80 MMHG | OXYGEN SATURATION: 95 % | WEIGHT: 163.5 LBS | TEMPERATURE: 97.9 F | HEIGHT: 68 IN | BODY MASS INDEX: 24.78 KG/M2 | HEART RATE: 83 BPM

## 2018-03-05 DIAGNOSIS — E55.9 VITAMIN D DEFICIENCY: ICD-10-CM

## 2018-03-05 DIAGNOSIS — R41.3 SHORT-TERM MEMORY LOSS: ICD-10-CM

## 2018-03-05 DIAGNOSIS — R03.0 BORDERLINE HYPERTENSION: ICD-10-CM

## 2018-03-05 DIAGNOSIS — J30.9 ALLERGIC RHINITIS, UNSPECIFIED CHRONICITY, UNSPECIFIED SEASONALITY, UNSPECIFIED TRIGGER: ICD-10-CM

## 2018-03-05 DIAGNOSIS — H40.9 GLAUCOMA OF BOTH EYES, UNSPECIFIED GLAUCOMA TYPE: ICD-10-CM

## 2018-03-05 DIAGNOSIS — R73.03 PREDIABETES: ICD-10-CM

## 2018-03-05 DIAGNOSIS — Z13.220 SCREENING FOR HYPERLIPIDEMIA: ICD-10-CM

## 2018-03-05 DIAGNOSIS — Z00.00 WELL ADULT EXAM: Primary | ICD-10-CM

## 2018-03-05 DIAGNOSIS — G31.84 MILD COGNITIVE IMPAIRMENT: ICD-10-CM

## 2018-03-05 DIAGNOSIS — M54.30 SCIATICA, UNSPECIFIED LATERALITY: ICD-10-CM

## 2018-03-05 DIAGNOSIS — Z11.59 NEED FOR HEPATITIS C SCREENING TEST: ICD-10-CM

## 2018-03-05 DIAGNOSIS — Z12.5 SCREENING FOR PROSTATE CANCER: ICD-10-CM

## 2018-03-05 PROCEDURE — 99396 PREV VISIT EST AGE 40-64: CPT | Performed by: NURSE PRACTITIONER

## 2018-03-05 RX ORDER — BIMATOPROST 0.01 %
DROPS OPHTHALMIC (EYE)
COMMUNITY
Start: 2018-03-04

## 2018-03-05 NOTE — PATIENT INSTRUCTIONS
Wellness Visit for Adults   WHAT YOU NEED TO KNOW:   What is a wellness visit? A wellness visit is when you see your healthcare provider to get screened for health problems  You can also get advice on how to stay healthy  Write down your questions so you remember to ask them  Ask your healthcare provider how often you should have a wellness visit  What happens at a wellness visit? Your healthcare provider will ask about your health, and your family history of health problems  This includes high blood pressure, heart disease, and cancer  He or she will ask if you have symptoms that concern you, if you smoke, and about your mood  You may also be asked about your intake of medicines, supplements, food, and alcohol  Any of the following may be done:  · Your weight  will be checked  Your height may also be checked so your body mass index (BMI) can be calculated  Your BMI shows if you are at a healthy weight  · Your blood pressure  and heart rate will be checked  Your temperature may also be checked  · Blood and urine tests  may be done  Blood tests may be done to check your cholesterol levels  Abnormal cholesterol levels increase your risk for heart disease and stroke  You may also need a blood or urine test to check for diabetes if you are at increased risk  Urine tests may be done to look for signs of an infection or kidney disease  · A physical exam  includes checking your heartbeat and lungs with a stethoscope  Your healthcare provider may also check your skin to look for sun damage  · Screening tests  may be recommended  A screening test is done to check for diseases that may not cause symptoms  The screening tests you may need depend on your age, gender, family history, and lifestyle habits  For example, colorectal screening may be recommended if you are 48years old or older  What screening tests do I need if I am a woman? · A Pap smear  is used to screen for cervical cancer   Pap smears are usually done every 3 to 5 years depending on your age  You may need them more often if you have had abnormal Pap smear test results in the past  Ask your healthcare provider how often you should have a Pap smear  · A mammogram  is an x-ray of your breasts to screen for breast cancer  Experts recommend mammograms every 2 years starting at age 48 years  You may need a mammogram at age 52 years or younger if you have an increased risk for breast cancer  Talk to your healthcare provider about when you should start having mammograms and how often you need them  What vaccines might I need? · Get an influenza vaccine  every year  The influenza vaccine protects you from the flu  Several types of viruses cause the flu  The viruses change over time, so new vaccines are made each year  · Get a tetanus-diphtheria (Td) booster vaccine  every 10 years  This vaccine protects you against tetanus and diphtheria  Tetanus is a severe infection that may cause painful muscle spasms and lockjaw  Diphtheria is a severe bacterial infection that causes a thick covering in the back of your mouth and throat  · Get a human papillomavirus (HPV) vaccine  if you are female and aged 23 to 32 or male 23 to 24 and never received it  This vaccine protects you from HPV infection  HPV is the most common infection spread by sexual contact  HPV may also cause vaginal, penile, and anal cancers  · Get a pneumococcal vaccine  if you are aged 72 years or older  The pneumococcal vaccine is an injection given to protect you from pneumococcal disease  Pneumococcal disease is an infection caused by pneumococcal bacteria  The infection may cause pneumonia, meningitis, or an ear infection  · Get a shingles vaccine  if you are aged 61 or older, even if you have had shingles before  The shingles vaccine is an injection to protect you from the varicella-zoster virus  This is the same virus that causes chickenpox   Shingles is a painful rash that develops in people who had chickenpox or have been exposed to the virus  How can I eat healthy? My Plate is a model for planning healthy meals  It shows the types and amounts of foods that should go on your plate  Fruits and vegetables make up about half of your plate, and grains and protein make up the other half  A serving of dairy is included on the side of your plate  The amount of calories and serving sizes you need depends on your age, gender, weight, and height  Examples of healthy foods are listed below:  · Eat a variety of vegetables  such as dark green, red, and orange vegetables  You can also include canned vegetables low in sodium (salt) and frozen vegetables without added butter or sauces  · Eat a variety of fresh fruits , canned fruit in 100% juice, frozen fruit, and dried fruit  · Include whole grains  At least half of the grains you eat should be whole grains  Examples include whole-wheat bread, wheat pasta, brown rice, and whole-grain cereals such as oatmeal     · Eat a variety of protein foods such as seafood (fish and shellfish), lean meat, and poultry without skin (turkey and chicken)  Examples of lean meats include pork leg, shoulder, or tenderloin, and beef round, sirloin, tenderloin, and extra lean ground beef  Other protein foods include eggs and egg substitutes, beans, peas, soy products, nuts, and seeds  · Choose low-fat dairy products such as skim or 1% milk or low-fat yogurt, cheese, and cottage cheese  · Limit unhealthy fats  such as butter, hard margarine, and shortening  How much exercise do I need? Exercise at least 30 minutes per day on most days of the week  Some examples of exercise include walking, biking, dancing, and swimming  You can also fit in more physical activity by taking the stairs instead of the elevator or parking farther away from stores  Include muscle strengthening activities 2 days each week  Regular exercise provides many health benefits  It helps you manage your weight, and decreases your risk for type 2 diabetes, heart disease, stroke, and high blood pressure  Exercise can also help improve your mood  Ask your healthcare provider about the best exercise plan for you  What are some general health and safety guidelines I should follow? · Do not smoke  Nicotine and other chemicals in cigarettes and cigars can cause lung damage  Ask your healthcare provider for information if you currently smoke and need help to quit  E-cigarettes or smokeless tobacco still contain nicotine  Talk to your healthcare provider before you use these products  · Limit alcohol  A drink of alcohol is 12 ounces of beer, 5 ounces of wine, or 1½ ounces of liquor  · Lose weight, if needed  Being overweight increases your risk of certain health conditions  These include heart disease, high blood pressure, type 2 diabetes, and certain types of cancer  · Protect your skin  Do not sunbathe or use tanning beds  Use sunscreen with a SPF 15 or higher  Apply sunscreen at least 15 minutes before you go outside  Reapply sunscreen every 2 hours  Wear protective clothing, hats, and sunglasses when you are outside  · Drive safely  Always wear your seatbelt  Make sure everyone in your car wears a seatbelt  A seatbelt can save your life if you are in an accident  Do not use your cell phone when you are driving  This could distract you and cause an accident  Pull over if you need to make a call or send a text message  · Practice safe sex  Use latex condoms if are sexually active and have more than one partner  Your healthcare provider may recommend screening tests for sexually transmitted infections (STIs)  · Wear helmets, lifejackets, and protective gear  Always wear a helmet when you ride a bike or motorcycle, go skiing, or play sports that could cause a head injury  Wear protective equipment when you play sports   Wear a lifejacket when you are on a boat or doing water sports  CARE AGREEMENT:   You have the right to help plan your care  Learn about your health condition and how it may be treated  Discuss treatment options with your caregivers to decide what care you want to receive  You always have the right to refuse treatment  The above information is an  only  It is not intended as medical advice for individual conditions or treatments  Talk to your doctor, nurse or pharmacist before following any medical regimen to see if it is safe and effective for you  © 2017 [employer] Information is for End User's use only and may not be sold, redistributed or otherwise used for commercial purposes  All illustrations and images included in CareNotes® are the copyrighted property of A D A M [employer] Inc  or Chirag [employer]

## 2018-03-05 NOTE — PROGRESS NOTES
Assessment/Plan:         Diagnoses and all orders for this visit:    Well adult exam  --Slip given for yearly screening labs including PSA (family history of prostate cancer)  One time hepatitis C screening  --Discussed stress management measures  Continued regular exercise program encouraged  Declines need for counselor + Rx, but will let us know if this changes  --UTD with colonoscopy, eye exam, Tdap  --Declines flu shot  Mild cognitive impairment  --Recently seen by neuro  MRI + EEG ordered  --Started on Aricept  --Continue regular physical and mental exercises  Prediabetes (mild)  --Continue dietary measures  --HEMOGLOBIN A1C W/ EAG ESTIMATION; Fu  --Urinalysis with reflex to microscopic; Future    Borderline hypertension  --Continue dietary + lifestyle intervention including relaxation measures  --Advise purchasing home cuff for monitoring  RTO 4-6 months for recheck + review, calling if home readings consistently > 130/80 in the interim  Vitamin D deficiency  --Remains on daily supplement  Vitamin D 25 hydroxy; Future    Sciatica, unspecified laterality  --Denies recent worsening, need for additional interventions  Takes OTC NSAID infrequently  Allergic rhinitis  --Takes OTC antihistamine prn  Glaucoma of both eyes, unspecified glaucoma type  --Remains on drops per ophthalmology  UTD with eye exam      Need for hepatitis C screening test  -     Hepatitis C antibody; Future    Screening for hyperlipidemia  -     CBC and differential; Future  -     Comprehensive metabolic panel; Future  -     Lipid Panel with Direct LDL reflex; Future    Screening for prostate cancer  -     PSA; Future    Other orders  -     LUMIGAN 0 01 % ophthalmic drops;           RTO 4-6 months        Subjective:      Patient ID: Heather Varghese is a 61 y o  male  Here for routine well exam      Still notes periods of short term memory loss + clouded thinking    Not to where it's affecting his work, however  See previous notes for details  Recently seen by neurology  MRI + EEG ordered  Started on Aricept which he is tolerating fine  Not sure if it's helping yet  Remains somewhat stressed due to issues with kids  Coping adequately, however  Mildly depressed at times, but not to any significant degree  Denies need for medication, counseling  Work going fine  Co-workers supportive  Energy level improved at bit  Remains on daily vitamin D supplement  No recent cold symptoms, allergies  Still gets occasional sciatica  Improved overall  Takes OTC NSAIDs infrequently  Gets up to urinate once a night  No dysuria, incontinence, decreased flow  No ED, decreased libido  Fairly healthy diet with plenty of fruits and vegetables  Continues the walk the dog regularly  Colonoscopy 4/2017  Benign polyps  Repeat in 4 years  No recent stool changes  Eye exam last fall  Glasses UTD  No hearing issues  Tdap 2012  No flu shot  The following portions of the patient's history were reviewed and updated as appropriate: allergies, current medications, past family history, past medical history, past social history, past surgical history and problem list     Review of Systems   Constitutional: Negative for fever  HENT: Negative for sore throat  Eyes: Negative for visual disturbance  Respiratory: Negative for cough and shortness of breath  Cardiovascular: Negative for chest pain and palpitations  Gastrointestinal: Negative for abdominal pain, blood in stool, constipation, diarrhea, nausea and vomiting  Genitourinary: Negative for dysuria  Musculoskeletal: Negative for arthralgias  Skin: Negative  Neurological: Negative for dizziness and headaches     Psychiatric/Behavioral:        Per HPI         Objective:      /98 (BP Location: Right arm, Patient Position: Sitting, Cuff Size: Adult)   Pulse 83   Temp 97 9 °F (36 6 °C) (Tympanic)   Ht 5' 8" (1 727 m)   Wt 74 2 kg (163 lb 8 oz)   SpO2 95%   BMI 24 86 kg/m²          Physical Exam   Constitutional: He is oriented to person, place, and time  He appears well-developed and well-nourished  HENT:   Head: Normocephalic and atraumatic  Right Ear: External ear normal    Left Ear: External ear normal    Nose: Nose normal    Mouth/Throat: Oropharynx is clear and moist    Eyes: Conjunctivae are normal  Pupils are equal, round, and reactive to light  Neck: Normal range of motion  Neck supple  No thyromegaly present  Cardiovascular: Normal rate, regular rhythm, normal heart sounds and intact distal pulses  Pulmonary/Chest: Effort normal and breath sounds normal    Abdominal: Soft  Bowel sounds are normal  There is no tenderness  Genitourinary: Prostate normal and penis normal  Rectal exam shows guaiac negative stool  Musculoskeletal: Normal range of motion  Neurological: He is alert and oriented to person, place, and time  He has normal reflexes  Skin: Skin is warm and dry  Psychiatric: He has a normal mood and affect   His behavior is normal  Judgment and thought content normal

## 2018-03-08 ENCOUNTER — HOSPITAL ENCOUNTER (OUTPATIENT)
Dept: MRI IMAGING | Facility: HOSPITAL | Age: 58
Discharge: HOME | End: 2018-03-08
Attending: PSYCHIATRY & NEUROLOGY
Payer: COMMERCIAL

## 2018-03-08 DIAGNOSIS — G31.84 MILD COGNITIVE IMPAIRMENT: ICD-10-CM

## 2018-03-08 DIAGNOSIS — R41.0 CONFUSION AND DISORIENTATION: ICD-10-CM

## 2018-03-08 PROCEDURE — 70551 MRI BRAIN STEM W/O DYE: CPT

## 2018-03-08 PROCEDURE — 76377 3D RENDER W/INTRP POSTPROCES: CPT

## 2018-03-12 ENCOUNTER — DOCUMENTATION (OUTPATIENT)
Dept: NEUROLOGY | Facility: CLINIC | Age: 58
End: 2018-03-12

## 2018-03-12 NOTE — PROGRESS NOTES
Reviewed MRI brain- consistent with a diffuse neurodegenerative process in a colpocephalic pattern  Will notify above to patient- will continue Aricept, consider adding Namenda next visit  Called and left voicemail      ose [facility] Neurology

## 2018-03-13 ENCOUNTER — HOSPITAL ENCOUNTER (OUTPATIENT)
Dept: NEUROLOGY | Facility: AMBULATORY SURGERY CENTER | Age: 58
Discharge: HOME | End: 2018-03-13
Payer: COMMERCIAL

## 2018-03-13 DIAGNOSIS — R41.0 CONFUSION AND DISORIENTATION: ICD-10-CM

## 2018-03-13 DIAGNOSIS — G31.84 MILD COGNITIVE IMPAIRMENT: ICD-10-CM

## 2018-03-13 PROCEDURE — 95816 EEG AWAKE AND DROWSY: CPT

## 2018-03-13 PROCEDURE — 95816 EEG AWAKE AND DROWSY: CPT | Performed by: PSYCHIATRY & NEUROLOGY

## 2018-03-16 DIAGNOSIS — R94.01 ABNORMAL EEG: Primary | ICD-10-CM

## 2018-03-19 ENCOUNTER — TELEPHONE (OUTPATIENT)
Dept: NEUROLOGY | Facility: CLINIC | Age: 58
End: 2018-03-19

## 2018-03-19 NOTE — TELEPHONE ENCOUNTER
----- [facility] sent at 3/16/2018  3:31 PM EDT -----  Regarding: abnormal eeg  Please notify patient this his EEG showed left sided slowing that could contribute to his memory issues but no seizure discharges- I would like this better evaluated with a longer recording- thus 48 hour EEG    Please schedule  Mail this script in to patient    Thanks

## 2018-03-20 DIAGNOSIS — R94.01 ABNORMAL EEG: Primary | ICD-10-CM

## 2018-03-20 NOTE — TELEPHONE ENCOUNTER
Pt called the office back and was advised of his results  He is agreeable to 48hr EEG but he states he has already taken off a lot of time from work so realistically he does not know when he can have this testing done  He states he will call back when he is able to schedule

## 2018-04-04 ENCOUNTER — TELEPHONE (OUTPATIENT)
Dept: NEUROLOGY | Facility: CLINIC | Age: 58
End: 2018-04-04

## 2018-04-04 NOTE — TELEPHONE ENCOUNTER
Lmom for pt  To call back to follow up on Ambulatory EEG 48 hours that was ordered on 3-16-18  Did not see an appt  In [facility], so I called the pt   To follow up and see if he needed assistance in scheduling the test

## 2018-04-09 NOTE — TELEPHONE ENCOUNTER
Lmom for pt  To call back to follow up on the Ambulatory EEG 48 hours that was ordered and to assist in scheduling test with pt  Camila Hsieh

## 2018-04-09 NOTE — TELEPHONE ENCOUNTER
Pt calls back, Im unable to reach Charli this time, pt was transferred to  for assistance w/scheduling

## 2018-04-30 ENCOUNTER — HOSPITAL ENCOUNTER (OUTPATIENT)
Dept: NEUROLOGY | Facility: AMBULATORY SURGERY CENTER | Age: 58
Discharge: HOME | End: 2018-04-30

## 2018-04-30 DIAGNOSIS — R94.01 ABNORMAL EEG: ICD-10-CM

## 2018-05-01 ENCOUNTER — HOSPITAL ENCOUNTER (OUTPATIENT)
Dept: NEUROLOGY | Facility: AMBULATORY SURGERY CENTER | Age: 58
Discharge: HOME | End: 2018-05-01
Payer: COMMERCIAL

## 2018-05-01 DIAGNOSIS — R94.01 ABNORMAL EEG: ICD-10-CM

## 2018-05-01 PROCEDURE — 95953 PR EEG MONITORING/COMPUTER, EA 24 HOURS, UNATTENDED: CPT | Performed by: PSYCHIATRY & NEUROLOGY

## 2018-05-01 PROCEDURE — 95953 HB EEG MONITORING/COMPUTER: CPT

## 2018-05-02 ENCOUNTER — HOSPITAL ENCOUNTER (OUTPATIENT)
Dept: NEUROLOGY | Facility: AMBULATORY SURGERY CENTER | Age: 58
Discharge: HOME | End: 2018-05-02
Payer: COMMERCIAL

## 2018-05-02 DIAGNOSIS — R94.01 ABNORMAL EEG: ICD-10-CM

## 2018-05-02 PROCEDURE — 95953 PR EEG MONITORING/COMPUTER, EA 24 HOURS, UNATTENDED: CPT | Performed by: PSYCHIATRY & NEUROLOGY

## 2018-05-02 PROCEDURE — 95953 HB EEG MONITORING/COMPUTER: CPT

## 2018-05-11 ENCOUNTER — OFFICE VISIT (OUTPATIENT)
Dept: NEUROLOGY | Facility: CLINIC | Age: 58
End: 2018-05-11
Payer: COMMERCIAL

## 2018-05-11 VITALS — DIASTOLIC BLOOD PRESSURE: 80 MMHG | HEART RATE: 86 BPM | SYSTOLIC BLOOD PRESSURE: 120 MMHG

## 2018-05-11 DIAGNOSIS — M54.31 SCIATICA OF RIGHT SIDE: ICD-10-CM

## 2018-05-11 DIAGNOSIS — R94.01 ABNORMAL EEG: ICD-10-CM

## 2018-05-11 DIAGNOSIS — G31.84 MILD COGNITIVE IMPAIRMENT: ICD-10-CM

## 2018-05-11 DIAGNOSIS — G47.22 CIRCADIAN RHYTHM SLEEP DISORDER, ADVANCED SLEEP PHASE TYPE: ICD-10-CM

## 2018-05-11 DIAGNOSIS — G31.9 NEURODEGENERATIVE COGNITIVE IMPAIRMENT (HCC): Primary | ICD-10-CM

## 2018-05-11 PROCEDURE — 99215 OFFICE O/P EST HI 40 MIN: CPT | Performed by: PSYCHIATRY & NEUROLOGY

## 2018-05-11 NOTE — PATIENT INSTRUCTIONS
Neurology    Stay active physically and mentally  [employer]  com- try this for cognitive exercises  Eat a well rounded diet

## 2018-05-11 NOTE — PROGRESS NOTES
Patient ID: Delaney Lopez is a 61 y o  male  Assessment/Plan:    No problem-specific Assessment & Plan notes found for this encounter  Diagnoses and all orders for this visit:    Neurodegenerative cognitive impairment- MOCA testing 18/30 similar to last time  - His MRI neuroquant is concerning for a diffuse neurodegenerative process  - His EEG including routine and ambulatory showed left temporal slowing but no epileptiform discharges  - He continues to do ADLS with no difficulty  - States since starting Aricept has been able to  new technology at work and can function with no trouble as has been noted by his staff  - He is staying active physically socially cognitively with no difficulty with driving, bills finances or other tasks  - C/w Aricept 10 mg no a/e reported   -     Ammonia; Future  -     Protein electrophoresis, serum; Future  -     Immunoglobulin free LT chains blood; Future  -     Vitamin B1, whole blood; Future  -     HODAN Screen w/ Reflex to Titer/Pattern; Future  -     Lyme Antibody Profile with reflex to WB; Future    Mild cognitive impairment    Sciatica of right side  - Chronic  - Neuro exam non focal    Abnormal EEG  - Non epileptiform    Discussed melatonin nightly may help with his waking up early daily         We discussed CSF testing for potential AD- discussed this would help with prognostication and AD diagnosis  States if this is negative- lesser chance of AD  He states he would like to think about this    Discussed continuing staying active    Follow up in 3-4 months time  Subjective:    HPI    Mr  Savannah Rojas is a pleasant 60 yo male seen in consultation for memory issues started noting it about 2-3 years ago with gradual worsening  He tells me he first noted it when he was talking to his friends and states has trouble thinking of the right word which later comes back to him  States short term memory worsening and now his friends and coworkers notice it   State his coworkers actually notified his wife who recommended he see a neurologist due to gradual worsening memory  He states he has some days where he feels foggy and some days not  States no significant mood changes, depression, anxiety  States no trouble recognizing familiar faces or friends  States no issues with driving, he does not get lost driving to a familiar place, and no issues with reaction time  No hallucinations  States no RBD or vivid dreams  States he sleeps 5-6 hours at a stretch and states does not snore or stop breathing in his sleep per his wife  States he has been working for [employer] and now gradually switching to [email or web address] and states he is still working and gradually adapting himself to it  Stays active walking his dog  States good balance  States he does get sinus headaches- behind his right eye, has photosensitivity- states pressure like congestion  States no nausea or vomiting  States this occurs 5-6 times a year  States he will take an ASA or aleve and this helps him         Family history of cognitive impairment or maybe dementia noted in [de-identified]  States no inciting event before this started  States no history of Ca, personally  Denies tobacco use now- was intermittent for a few years in the past  Denies history alcohol abuse or drug use      Since last seen:  He tells me memory some what better  He can  new things at work better- they changed the system and he has not issues  States his friends/co-workers are not noticing this any longer  States he has some diarrhea but does have gluten insensitivity  States unsure if this is the Aricept or his gluten insensitivity, and does not want to discontinue Aricept at this time as he has found this helpful in regards to his memory  Tells me he has been tired all the time but attributes this to always waking up earlier- states his whole family wakes up early  He works for [email or web address]  States he has picked up the new system, with no issues  States when he saw me last he was going through more grief- and was in a worse place due to his mom passing away around that period  States better now  No issues with driving  Denies issues with doing his bills and finances  Denies history significant head trauma recently and denies significant hx head trauma  States mom with hx questionable dementia in [de-identified]  Takes a multivitamin daily  Take probiotic  States history alcohol use daily, potentially excessive in the past decades ago but no longer     The following portions of the patient's history were reviewed and updated as appropriate: allergies, current medications, past family history, past medical history, past social history, past surgical history and problem list          Objective:    Blood pressure 120/80, pulse 86  Physical Exam   Constitutional: He appears well-developed and well-nourished  HENT:   Head: Normocephalic and atraumatic  Eyes: Conjunctivae and EOM are normal  Pupils are equal, round, and reactive to light  Neck: Normal range of motion  Neck supple  Cardiovascular: Normal rate and regular rhythm  Pulmonary/Chest: Effort normal    Musculoskeletal: Normal range of motion  Neurological: He has normal strength and normal reflexes  Gait and coordination normal    Nursing note and vitals reviewed  Neurological Exam    Mental Status  The patient is alert and oriented to person, place, time, and situation  He draws an abnormal clock  He has no dysarthria  He is able to name object, read and repeat  He has poor concentration  He follows multi-step commands  He has a normal fund of knowledge    18/30 MOCA     Cranial Nerves    CN II: The patient's visual acuity and visual fields are normal   CN III, IV, VI: The patient's pupils are equally round and reactive to light and ocular movements are normal   CN V: The patient has normal facial sensation  CN VII:  The patient has symmetric facial movement  CN VIII:  The patient's hearing is normal   CN IX, X: The patient has symmetric palate movement and normal gag reflex  CN XI: The patient's shoulder shrug strength is normal   CN XII: The patient's tongue is midline without atrophy or fasciculations  Motor  The patient has normal muscle bulk throughout  His overall muscle tone is normal throughout  His strength is 5/5 throughout all four extremities  Sensory  The patient's sensation is normal in all four extremities to light touch, temperature and vibration  He has no right-sided and no left-sided hemispatial neglect  Reflexes  Deep tendon reflexes are 2+ and symmetric in all four extremities with downgoing toes bilaterally  Gait and Coordination  The patient has normal gait and station  He has normal tandem gait  Romberg's sign is negative  He has normal coordination bilaterally  ROS:    Review of Systems   Constitutional: Positive for fatigue  Trouble falling asleep  Waking up at night    HENT: Negative  Eyes: Negative  Respiratory: Negative  Cardiovascular: Negative  Gastrointestinal: Negative  Endocrine: Negative  Genitourinary: Negative  Musculoskeletal: Negative  Skin: Negative  Allergic/Immunologic: Negative  Neurological: Negative  Hematological: Negative  Psychiatric/Behavioral: Positive for sleep disturbance

## 2018-06-18 DIAGNOSIS — Z12.5 ENCOUNTER FOR SCREENING FOR MALIGNANT NEOPLASM OF PROSTATE: ICD-10-CM

## 2018-06-18 DIAGNOSIS — E55.9 VITAMIN D DEFICIENCY: ICD-10-CM

## 2018-06-18 DIAGNOSIS — R73.03 PREDIABETES: ICD-10-CM

## 2018-06-27 LAB
25(OH)D3 SERPL-MCNC: 47 NG/ML (ref 30–100)
ALBUMIN SERPL-MCNC: 4.1 G/DL (ref 3.6–5.1)
ALBUMIN/GLOB SERPL: 1.6 (CALC) (ref 1–2.5)
ALP SERPL-CCNC: 55 U/L (ref 40–115)
ALT SERPL-CCNC: 20 U/L (ref 9–46)
APPEARANCE UR: CLEAR
AST SERPL-CCNC: 15 U/L (ref 10–35)
BASOPHILS # BLD AUTO: 39 CELLS/UL (ref 0–200)
BASOPHILS NFR BLD AUTO: 0.7 %
BILIRUB SERPL-MCNC: 0.8 MG/DL (ref 0.2–1.2)
BILIRUB UR QL STRIP: NEGATIVE
BUN SERPL-MCNC: 13 MG/DL (ref 7–25)
BUN/CREAT SERPL: ABNORMAL (CALC) (ref 6–22)
CALCIUM SERPL-MCNC: 9.4 MG/DL (ref 8.6–10.3)
CHLORIDE SERPL-SCNC: 105 MMOL/L (ref 98–110)
CHOLEST SERPL-MCNC: 197 MG/DL
CHOLEST/HDLC SERPL: 2.8 (CALC)
CO2 SERPL-SCNC: 28 MMOL/L (ref 20–31)
COLOR UR: NORMAL
CREAT SERPL-MCNC: 0.92 MG/DL (ref 0.7–1.33)
EOSINOPHIL # BLD AUTO: 263 CELLS/UL (ref 15–500)
EOSINOPHIL NFR BLD AUTO: 4.7 %
ERYTHROCYTE [DISTWIDTH] IN BLOOD BY AUTOMATED COUNT: 12.2 % (ref 11–15)
EST. AVERAGE GLUCOSE BLD GHB EST-MCNC: 111 (CALC)
EST. AVERAGE GLUCOSE BLD GHB EST-SCNC: 6.2 (CALC)
GLOBULIN SER CALC-MCNC: 2.5 G/DL (CALC) (ref 1.9–3.7)
GLUCOSE SERPL-MCNC: 114 MG/DL (ref 65–99)
GLUCOSE UR QL STRIP: NEGATIVE
HBA1C MFR BLD: 5.5 % OF TOTAL HGB
HCT VFR BLD AUTO: 45.6 % (ref 38.5–50)
HCV AB S/CO SERPL IA: 0.01
HCV AB SERPL QL IA: NORMAL
HDLC SERPL-MCNC: 70 MG/DL
HGB BLD-MCNC: 15.1 G/DL (ref 13.2–17.1)
HGB UR QL STRIP: NEGATIVE
KETONES UR QL STRIP: NEGATIVE
LDLC SERPL CALC-MCNC: 110 MG/DL (CALC)
LEUKOCYTE ESTERASE UR QL STRIP: NEGATIVE
LYMPHOCYTES # BLD AUTO: 1798 CELLS/UL (ref 850–3900)
LYMPHOCYTES NFR BLD AUTO: 32.1 %
MCH RBC QN AUTO: 30.8 PG (ref 27–33)
MCHC RBC AUTO-ENTMCNC: 33.1 G/DL (ref 32–36)
MCV RBC AUTO: 93.1 FL (ref 80–100)
MONOCYTES # BLD AUTO: 655 CELLS/UL (ref 200–950)
MONOCYTES NFR BLD AUTO: 11.7 %
NEUTROPHILS # BLD AUTO: 2845 CELLS/UL (ref 1500–7800)
NEUTROPHILS NFR BLD AUTO: 50.8 %
NITRITE UR QL STRIP: NEGATIVE
NONHDLC SERPL-MCNC: 127 MG/DL (CALC)
PH UR STRIP: 5.5 [PH] (ref 5–8)
PLATELET # BLD AUTO: 298 THOUSAND/UL (ref 140–400)
PMV BLD REES-ECKER: 10 FL (ref 7.5–12.5)
POTASSIUM SERPL-SCNC: 4.4 MMOL/L (ref 3.5–5.3)
PROT SERPL-MCNC: 6.6 G/DL (ref 6.1–8.1)
PROT UR QL STRIP: NEGATIVE
PSA SERPL-MCNC: 3.6 NG/ML
RBC # BLD AUTO: 4.9 MILLION/UL (ref 4.2–5.8)
SL AMB EGFR AFRICAN AMERICAN: 106 ML/MIN/1.73M2
SL AMB EGFR NON AFRICAN AMERICAN: 91 ML/MIN/1.73M2
SODIUM SERPL-SCNC: 141 MMOL/L (ref 135–146)
SP GR UR STRIP: 1.03 (ref 1–1.03)
TRIGL SERPL-MCNC: 77 MG/DL
WBC # BLD AUTO: 5.6 THOUSAND/UL (ref 3.8–10.8)

## 2018-06-28 ENCOUNTER — TELEPHONE (OUTPATIENT)
Dept: FAMILY MEDICINE CLINIC | Facility: OTHER | Age: 58
End: 2018-06-28

## 2018-06-28 DIAGNOSIS — R97.20 INCREASED PROSTATE SPECIFIC ANTIGEN (PSA) VELOCITY: Primary | ICD-10-CM

## 2018-06-28 PROBLEM — E55.9 VITAMIN D DEFICIENCY: Status: RESOLVED | Noted: 2017-12-15 | Resolved: 2018-06-28

## 2018-06-28 NOTE — TELEPHONE ENCOUNTER
I left message for pt to call back     ----- Message from Ada Castrejon, [address] sent at 6/28/2018  1:03 PM EDT -----  Can we call Willie to notify of blood work results:  --Very good cholesterol numbers, average blood sugar level (A1C) is now normal (no further prediabetes)  Keep up the good work! --Vitamin D level now well within normal range  Can continue to take daily supplement as preventative, especially once we get into the fall/winter months   --PSA (prostate level) still in normal range, but increased a bit compared with a year ago  Would recommend rechecking in 6 months as a precaution (slip printed)  Call if any urinary issues in the interim       Thanks

## 2018-07-31 ENCOUNTER — TELEPHONE (OUTPATIENT)
Dept: NEUROLOGY | Facility: CLINIC | Age: 58
End: 2018-07-31

## 2018-07-31 DIAGNOSIS — G31.84 MILD COGNITIVE IMPAIRMENT: Primary | ICD-10-CM

## 2018-07-31 NOTE — TELEPHONE ENCOUNTER
Pt called re: bloodwork (ammonia)  He states that on the script it's noted that "this test can only be collected at a hospital campus"  Pt goes to [facility] for his blood work   "Can I get this done at [facility]?" He is requesting updated script so he can get this done at [address] [facility] team will mail script to pt's home address per pt's request              [phone]

## 2018-08-02 ENCOUNTER — OFFICE VISIT (OUTPATIENT)
Dept: FAMILY MEDICINE CLINIC | Facility: OTHER | Age: 58
End: 2018-08-02
Payer: COMMERCIAL

## 2018-08-02 VITALS
SYSTOLIC BLOOD PRESSURE: 118 MMHG | WEIGHT: 164.6 LBS | DIASTOLIC BLOOD PRESSURE: 72 MMHG | BODY MASS INDEX: 24.95 KG/M2 | HEIGHT: 68 IN | HEART RATE: 100 BPM | OXYGEN SATURATION: 98 % | TEMPERATURE: 97.4 F

## 2018-08-02 DIAGNOSIS — J06.9 VIRAL URI WITH COUGH: Primary | ICD-10-CM

## 2018-08-02 PROCEDURE — 99213 OFFICE O/P EST LOW 20 MIN: CPT | Performed by: FAMILY MEDICINE

## 2018-08-02 PROCEDURE — 3008F BODY MASS INDEX DOCD: CPT | Performed by: FAMILY MEDICINE

## 2018-08-02 NOTE — PROGRESS NOTES
Subjective:      Patient ID: Dixie Antonio is a 60 y o  male  Cough   This is a new problem  Episode onset: 3 days ago  The problem has been gradually worsening  The problem occurs constantly  The cough is productive of sputum  Associated symptoms include ear congestion, nasal congestion and postnasal drip  Pertinent negatives include no chest pain, chills, ear pain, eye redness, fever, headaches, heartburn, hemoptysis, myalgias, rash, rhinorrhea, sore throat, shortness of breath, sweats, weight loss or wheezing  Nothing aggravates the symptoms  He has tried OTC cough suppressant for the symptoms  The treatment provided mild relief  There is no history of asthma, bronchiectasis, bronchitis, COPD, emphysema, environmental allergies or pneumonia  The following portions of the patient's history were reviewed and updated as appropriate: allergies, current medications, past family history, past medical history, past social history, past surgical history and problem list       Current Outpatient Prescriptions:     donepezil (ARICEPT) 5 mg tablet, Take 1 tab qhs x 1 week, then 2 tabs nightly, Disp: 60 tablet, Rfl: 5    ergocalciferol (VITAMIN D2) 50,000 units, Take 1 capsule by mouth once a week, Disp: , Rfl:     Lactobacillus (PROBIOTIC ACIDOPHILUS) CAPS, Take by mouth once, Disp: , Rfl:     LUMIGAN 0 01 % ophthalmic drops, , Disp: , Rfl:     Multiple Vitamins tablet, Take 1 tablet by mouth daily, Disp: , Rfl:         Review of Systems   Constitutional: Positive for fatigue  Negative for appetite change, chills, fever and weight loss  HENT: Positive for congestion and postnasal drip  Negative for drooling, ear pain, hearing loss, rhinorrhea, sinus pain, sinus pressure, sore throat and tinnitus  Eyes: Negative for pain, discharge, redness and itching  Respiratory: Positive for cough  Negative for hemoptysis, chest tightness, shortness of breath and wheezing      Cardiovascular: Negative for chest pain, palpitations and leg swelling  Gastrointestinal: Negative for abdominal pain, diarrhea, heartburn, nausea and vomiting  Genitourinary: Negative for dysuria and flank pain  Musculoskeletal: Negative for arthralgias and myalgias  Skin: Negative for rash  Allergic/Immunologic: Negative for environmental allergies  Neurological: Negative for dizziness and headaches  Hematological: Negative for adenopathy  Psychiatric/Behavioral: Negative for confusion and dysphoric mood  The patient is not nervous/anxious  Objective:      /72 (BP Location: Left arm, Patient Position: Sitting, Cuff Size: Adult)   Pulse 100   Temp (!) 97 4 °F (36 3 °C) (Tympanic)   Ht 5' 8" (1 727 m)   Wt 74 7 kg (164 lb 9 6 oz)   SpO2 98%   BMI 25 03 kg/m²          Physical Exam   Constitutional: He is oriented to person, place, and time  He appears well-developed and well-nourished  No distress  HENT:   Head: Normocephalic and atraumatic  Right Ear: Tympanic membrane is retracted  Left Ear: Tympanic membrane is retracted  Nose: Mucosal edema present  Right sinus exhibits no maxillary sinus tenderness and no frontal sinus tenderness  Left sinus exhibits no maxillary sinus tenderness and no frontal sinus tenderness  Mouth/Throat: Uvula is midline  Oropharyngeal exudate (PND/cobblestoning) and posterior oropharyngeal erythema present  Eyes: Conjunctivae and EOM are normal  Pupils are equal, round, and reactive to light  Right eye exhibits no discharge  Left eye exhibits no discharge  No scleral icterus  Neck: Normal range of motion  Neck supple  No thyromegaly present  Cardiovascular: Normal rate and regular rhythm  No murmur heard  Pulmonary/Chest: Effort normal and breath sounds normal  No respiratory distress  He has no wheezes  Abdominal: Soft  Bowel sounds are normal  There is no tenderness  Musculoskeletal: Normal range of motion  He exhibits no edema     Lymphadenopathy:     He has no cervical adenopathy  Neurological: He is alert and oriented to person, place, and time  He has normal reflexes  Skin: Skin is warm and dry  No rash noted  Psychiatric: He has a normal mood and affect  His behavior is normal            Assessment/Plan:   Diagnoses and all orders for this visit:    Viral URI with cough        -     Recommend supportive care with saltwater gargles and saline nasal spray        -     Try OTC oral antihistamine and nasal steroid spray for symptom relief        76-year-old male presents with viral upper respiratory symptoms  Reviewed supportive care measures and over-the-counter medications that may be helpful  Patient will call on Monday or Tuesday if his symptoms do not appear to be improving--I would consider empirically treating him with an antibiotic for suspected sinusitis at that time  RTO as needed    The patient indicates understanding of these issues and agrees with the plan          Jocy Lowry DO

## 2018-08-24 ENCOUNTER — OFFICE VISIT (OUTPATIENT)
Dept: NEUROLOGY | Facility: CLINIC | Age: 58
End: 2018-08-24
Payer: COMMERCIAL

## 2018-08-24 VITALS
BODY MASS INDEX: 24.86 KG/M2 | HEART RATE: 86 BPM | RESPIRATION RATE: 14 BRPM | DIASTOLIC BLOOD PRESSURE: 78 MMHG | HEIGHT: 68 IN | WEIGHT: 164 LBS | SYSTOLIC BLOOD PRESSURE: 110 MMHG

## 2018-08-24 DIAGNOSIS — M54.16 RADICULOPATHY, LUMBAR REGION: ICD-10-CM

## 2018-08-24 DIAGNOSIS — G31.84 MILD COGNITIVE IMPAIRMENT: ICD-10-CM

## 2018-08-24 DIAGNOSIS — G30.9 ALZHEIMER'S DEMENTIA WITHOUT BEHAVIORAL DISTURBANCE, UNSPECIFIED TIMING OF DEMENTIA ONSET (HCC): Primary | ICD-10-CM

## 2018-08-24 DIAGNOSIS — R29.898 RIGHT LEG WEAKNESS: ICD-10-CM

## 2018-08-24 DIAGNOSIS — F02.80 ALZHEIMER'S DEMENTIA WITHOUT BEHAVIORAL DISTURBANCE, UNSPECIFIED TIMING OF DEMENTIA ONSET (HCC): Primary | ICD-10-CM

## 2018-08-24 PROCEDURE — 99215 OFFICE O/P EST HI 40 MIN: CPT | Performed by: PSYCHIATRY & NEUROLOGY

## 2018-08-24 RX ORDER — PREDNISONE 1 MG/1
TABLET ORAL
Qty: 12 TABLET | Refills: 0 | Status: SHIPPED | OUTPATIENT
Start: 2018-08-24 | End: 2018-12-26

## 2018-08-24 RX ORDER — RIVASTIGMINE 9.5 MG/24H
1 PATCH, EXTENDED RELEASE TRANSDERMAL DAILY
Qty: 30 PATCH | Refills: 0 | Status: SHIPPED | OUTPATIENT
Start: 2018-08-24 | End: 2018-09-24 | Stop reason: SDUPTHER

## 2018-08-24 NOTE — PROGRESS NOTES
Patient ID: Yaima Klein is a 60 y o  male  Assessment/Plan:    No problem-specific Assessment & Plan notes found for this encounter  Diagnoses and all orders for this visit:    Radiculopathy, lumbar region  -     predniSONE 5 mg tablet; Take 3 tabs x 2 days, 2 tabs x 2 days, 1 tab x 2 days, then stop  Take AM with food  -     MRI lumbar spine without contrast; Future  - Will consider PT for low back pending response to steroids  Mild cognitive impairment with MRI showing :   there is significant volume loss of the hippocampus and secondary enlargement of the lateral ventricles, there is more diffuse ventriculomegaly in a colpocephalic pattern  Findings more consistent with a   diffuse neurodegenerative process, rather than selectively affecting the inferior medial temporal lobes  with his clinical presentation concerning for AD at this time   - Neuropsych testing for further diagnostic evaluation    -     rivastigmine (EXELON) 9 5 mg/24 hr TD 24 hr patch; Place 1 patch on the skin daily, in place of aricept due to diarrhea he is experiencing     Right leg weakness  -     MRI lumbar spine without contrast; Future       Follow up in three to four months time    Subjective:    HPI         Mr  Nilson eG is a pleasant 60 yo male seen in follow up for gradual mild cognitive decline noted by friends and family with more short term memory loss, word finding difficulty and difficulty with complex tasks at work with new computer system        Family history of cognitive impairment or maybe dementia noted in [de-identified]  States no inciting event before this started  States no history of Ca, personally  Denies tobacco use now- was intermittent for a few years in the past  Denies history alcohol abuse or drug use        Follow up today:     He works for [employer]   States he has picked up the new system, with no issues  States when he saw me last he was going through more grief- and was in a worse place due to his mom passing away around that period  States better now  No issues with driving  Denies issues with doing his bills and finances  States history alcohol use daily, potentially excessive in the past decades ago but no longer    States he continues to do well, states cognitively he is doing well working and no issues with ADLs and stays physically and socially active  Denies significant depression or anxiety  Wife present here states easily flustered when someone corrects him in regards to his memory loss  He states he is doing better with this also and she agrees  Short term memory loss is his most concerning issue  States with Aricept this has definitely stabilized and he no longer has word finding issues and trouble picking up new things at work  States his coworkers are supportive  He does have diarrhea and prefers to try another alternative if able for aricept    States he is having worsening pain going down from his low back to the right lateral thigh till the knee  States has occasional numbness in the left side  States with excessive activity he is worse  States a few years ago he had this to a milder degree but now worsening  States with his work there is a lot of outdoors physical activity  States mild LE weakness more pain related with exertion but no falls  No UE symptoms    States they are going on a vacation and he is excited about this after a busy work summer  The following portions of the patient's history were reviewed and updated as appropriate: allergies, current medications, past family history, past medical history, past social history, past surgical history and problem list          Objective:    Blood pressure 110/78, pulse 86, resp  rate 14, height 5' 8" (1 727 m), weight 74 4 kg (164 lb)  Physical Exam   Constitutional: He appears well-developed and well-nourished  HENT:   Head: Normocephalic and atraumatic     Eyes: Conjunctivae and EOM are normal  Pupils are equal, round, and reactive to light  Neck: Normal range of motion  Neck supple  Cardiovascular: Normal rate and regular rhythm  Pulmonary/Chest: Effort normal    Musculoskeletal: Normal range of motion  Neurological: He has normal reflexes  Nursing note and vitals reviewed  Neurological Exam    Mental Status  The patient is alert and oriented to person, place, time, and situation  He recalls 3 of 3 objects immediately  He draws an abnormal clock  He has no dysarthria  He is able to name object, read and repeat  He has poor concentration  He is unable to perform simple calculations  Follows 2/3 complex commands  0/3 recall      Cranial Nerves    CN II: The patient's visual acuity and visual fields are normal   CN III, IV, VI: The patient's pupils are equally round and reactive to light and ocular movements are normal   CN V: The patient has normal facial sensation  CN VII:  The patient has symmetric facial movement  CN VIII:  The patient's hearing is normal   CN IX, X: The patient has symmetric palate movement and normal gag reflex  CN XI: The patient's shoulder shrug strength is normal   CN XII: The patient's tongue is midline without atrophy or fasciculations  Motor  The patient has normal muscle bulk throughout  His overall muscle tone is normal throughout  His strength is 5/5 in all four extremities except as noted  RLE 4+/5 proximal (? Pain limited)     Sensory  The patient's sensation is normal in all four extremities to light touch, temperature and vibration  He has no right-sided and no left-sided hemispatial neglect  Reflexes  Deep tendon reflexes are 2+ and symmetric in all four extremities with downgoing toes bilaterally  Gait and Coordination    Romberg's sign is negative  Antalgic gait with RLE         ROS:    Review of Systems   Constitutional: Negative  Negative for appetite change and fever  HENT: Negative    Negative for hearing loss, tinnitus, trouble swallowing and voice change  Eyes: Negative  Negative for photophobia and pain  Respiratory: Negative  Negative for shortness of breath  Cardiovascular: Negative  Negative for palpitations  Gastrointestinal: Positive for diarrhea  Negative for nausea and vomiting  Endocrine: Negative  Negative for cold intolerance and heat intolerance  Genitourinary: Negative  Negative for dysuria, frequency and urgency  Musculoskeletal: Positive for back pain  Negative for myalgias and neck pain  Pain while walking      Skin: Negative  Negative for rash  Neurological: Positive for numbness  Negative for dizziness, tremors, seizures, syncope, facial asymmetry, speech difficulty, weakness, light-headedness and headaches  Difficulty walking     Hematological: Negative  Does not bruise/bleed easily  Psychiatric/Behavioral: Negative  Negative for confusion, hallucinations and sleep disturbance

## 2018-08-29 LAB
ALBUMIN SERPL ELPH-MCNC: 4.1 G/DL (ref 3.8–4.8)
ALPHA1 GLOB SERPL ELPH-MCNC: 0.3 G/DL (ref 0.2–0.3)
ALPHA2 GLOB SERPL ELPH-MCNC: 0.8 G/DL (ref 0.5–0.9)
AMMONIA PLAS-SCNC: 47 UMOL/L
ANA SER QL IF: NEGATIVE
B BURGDOR AB SER IA-ACNC: <0.9 INDEX
BETA1 GLOB SERPL ELPH-MCNC: 0.4 G/DL (ref 0.4–0.6)
BETA2 GLOB SERPL ELPH-MCNC: 0.4 G/DL (ref 0.2–0.5)
GAMMA GLOB SERPL ELPH-MCNC: 0.9 G/DL (ref 0.8–1.7)
KAPPA LC FREE SER-MCNC: 17.2 MG/L (ref 3.3–19.4)
KAPPA LC FREE/LAMBDA FREE SER: 1.26 {RATIO} (ref 0.26–1.65)
LAMBDA LC FREE SERPL-MCNC: 13.6 MG/L (ref 5.7–26.3)
PROT PATTERN SERPL ELPH-IMP: NORMAL
PROT SERPL-MCNC: 6.7 G/DL (ref 6.1–8.1)
VIT B1 BLD-SCNC: 160 NMOL/L (ref 78–185)

## 2018-09-07 DIAGNOSIS — G31.84 MILD COGNITIVE IMPAIRMENT: ICD-10-CM

## 2018-09-07 DIAGNOSIS — R41.0 CONFUSION AND DISORIENTATION: ICD-10-CM

## 2018-09-11 RX ORDER — DONEPEZIL HYDROCHLORIDE 5 MG/1
TABLET, FILM COATED ORAL
Qty: 60 TABLET | Refills: 5 | OUTPATIENT
Start: 2018-09-11

## 2018-09-17 ENCOUNTER — HOSPITAL ENCOUNTER (OUTPATIENT)
Dept: MRI IMAGING | Facility: HOSPITAL | Age: 58
Discharge: HOME | End: 2018-09-17
Attending: PSYCHIATRY & NEUROLOGY
Payer: COMMERCIAL

## 2018-09-17 DIAGNOSIS — R29.898 RIGHT LEG WEAKNESS: ICD-10-CM

## 2018-09-17 DIAGNOSIS — M54.16 RADICULOPATHY, LUMBAR REGION: ICD-10-CM

## 2018-09-17 PROCEDURE — 72148 MRI LUMBAR SPINE W/O DYE: CPT

## 2018-09-18 ENCOUNTER — TELEPHONE (OUTPATIENT)
Dept: NEUROLOGY | Facility: CLINIC | Age: 58
End: 2018-09-18

## 2018-09-24 DIAGNOSIS — G31.84 MILD COGNITIVE IMPAIRMENT: ICD-10-CM

## 2018-09-25 RX ORDER — RIVASTIGMINE 9.5 MG/24H
PATCH, EXTENDED RELEASE TRANSDERMAL
Qty: 30 PATCH | Refills: 0 | Status: SHIPPED | OUTPATIENT
Start: 2018-09-25 | End: 2018-10-11

## 2018-09-26 ENCOUNTER — TELEPHONE (OUTPATIENT)
Dept: NEUROLOGY | Facility: CLINIC | Age: 58
End: 2018-09-26

## 2018-09-26 NOTE — TELEPHONE ENCOUNTER
----- Message from [address]ibbs, DO sent at 9/25/2018 10:50 AM EDT -----  Regarding: L spine results  Please let the patient know that he has a disc protrusion affecting L3 nerve root and significant arthritic changes L5-S1  We can certainly treat this conservatively to start  And recommend PT- more gentle maneuvers to see if this helps  I would also recommend EMG of his LE if he is okay with this to see if any active nerve damage coming from the low back in which case surgery to help that would be an option      Please let me know and I will order PT and/or EMG LE if he is okay with this    Thanks

## 2018-10-10 DIAGNOSIS — G30.0 EARLY ONSET ALZHEIMER'S DEMENTIA WITHOUT BEHAVIORAL DISTURBANCE (HCC): Primary | ICD-10-CM

## 2018-10-10 DIAGNOSIS — F02.80 EARLY ONSET ALZHEIMER'S DEMENTIA WITHOUT BEHAVIORAL DISTURBANCE (HCC): Primary | ICD-10-CM

## 2018-10-10 NOTE — TELEPHONE ENCOUNTER
ptr requesting to go back to aricept & d/c exelon patches  States patches do not stay on well  States he did c/o loose bowels w/aricept but it wasn't that bad, prefers the aricept    I did change rx to 10mg, 1tab Qam for ease of admin (previous rx was 5mg, 2tabs Qam)  If agreeable please sign off

## 2018-10-11 ENCOUNTER — TELEPHONE (OUTPATIENT)
Dept: NEUROLOGY | Facility: CLINIC | Age: 58
End: 2018-10-11

## 2018-10-11 RX ORDER — DONEPEZIL HYDROCHLORIDE 10 MG/1
10 TABLET, FILM COATED ORAL DAILY
Qty: 30 TABLET | Refills: 5 | Status: SHIPPED | OUTPATIENT
Start: 2018-10-11 | End: 2019-02-11 | Stop reason: SDUPTHER

## 2018-10-11 NOTE — TELEPHONE ENCOUNTER
Called and left a message on pt's answering machine for a call back      ----- Message from [facility], DO sent at 10/11/2018  9:23 AM EDT -----  Regarding: aricept  I have refilled his 10 mg dose  Please let him know if he has the 5mg tabs also, to start 5 mg for one week and then second week start taking the 10 mg tablets to potentially help reduce GI s/e  Take with food      If he does not have the 5mg tabs please let me know, will send him one weeks supply    Thanks

## 2018-10-25 NOTE — TELEPHONE ENCOUNTER
Pt called and advised pt of all of the below (repeated x 3)  He verbalized clear understanding of all instructions

## 2018-11-26 ENCOUNTER — OFFICE VISIT (OUTPATIENT)
Dept: NEUROLOGY | Facility: CLINIC | Age: 58
End: 2018-11-26
Payer: COMMERCIAL

## 2018-11-26 VITALS — HEART RATE: 85 BPM | SYSTOLIC BLOOD PRESSURE: 108 MMHG | DIASTOLIC BLOOD PRESSURE: 72 MMHG

## 2018-11-26 DIAGNOSIS — R29.898 BILATERAL LEG WEAKNESS: ICD-10-CM

## 2018-11-26 DIAGNOSIS — R41.3 SHORT-TERM MEMORY LOSS: ICD-10-CM

## 2018-11-26 DIAGNOSIS — G31.84 MILD COGNITIVE IMPAIRMENT: ICD-10-CM

## 2018-11-26 DIAGNOSIS — M51.37 DEGENERATIVE DISC DISEASE AT L5-S1 LEVEL: ICD-10-CM

## 2018-11-26 DIAGNOSIS — M54.16 RADICULOPATHY, LUMBAR REGION: Primary | ICD-10-CM

## 2018-11-26 PROCEDURE — 99214 OFFICE O/P EST MOD 30 MIN: CPT | Performed by: PSYCHIATRY & NEUROLOGY

## 2018-11-26 RX ORDER — GABAPENTIN 300 MG/1
CAPSULE ORAL
Qty: 30 CAPSULE | Refills: 1 | Status: SHIPPED | OUTPATIENT
Start: 2018-11-26 | End: 2019-01-02 | Stop reason: SDUPTHER

## 2018-11-26 RX ORDER — MELATONIN
4000 DAILY
COMMUNITY
End: 2021-07-29 | Stop reason: HOSPADM

## 2018-11-26 NOTE — PROGRESS NOTES
Patient ID: Tyrese Armendariz is a 60 y o  male  Assessment/Plan:    No problem-specific Assessment & Plan notes found for this encounter  Diagnoses and all orders for this visit:    Radiculopathy, lumbar region  -     gabapentin (NEURONTIN) 300 mg capsule; Take 1 tab nightly x 1 wk and increase to 1 tab BID as tolerated  Discussed potential med a/e especially with his MCI - amnestic type often seen preceding Alzheimer's dementia, discussed with him if any worse cognition overall, to let me know/ stop gabapentin  -     Ambulatory referral to Neurosurgery; Future  -     EMG 1 Limb; Future  - Will consider PM referral pending clinical course, NSx recs  Bilateral leg weakness  -     Ambulatory referral to Neurosurgery; Future  -     EMG 1 Limb; Future    Degenerative disc disease at L5-S1 level and L3 as well- reviewed MR L spine  -     EMG 1 Limb; Future    Mild cognitive impairment- amnestic type  - Stable on Aricept (okay with mild diarrhea a/e with this per him)  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  - MRI 3/2018 neuroquant with findings consistent with a diffuse neurodegenerative process  - Exelon with patch falling off on the field thus ineffective for him    Other orders  -     cholecalciferol (VITAMIN D3) 1,000 units tablet; Take 4,000 Units by mouth daily       Holding off therapy as she did this 2 years ago when symptoms less severe with no benefit/worsening  Subjective:     HPI      Mr Rashad Lam is a pleasant 61 yo male seen in follow up for gradual mild cognitive decline noted by friends and family with more short term memory loss, word finding difficulty and difficulty with complex tasks at work with new computer system          Family history of cognitive impairment or maybe dementia noted in [de-identified]  States no inciting event before this started  States no history of Ca, personally    Denies tobacco use now- was intermittent for a few years in the past  Denies history alcohol abuse or drug use         Follow up today:     Since last seen tells me back pain radiating to his RLE continues to worsen causing him to limp, sometimes worse with just moving a certain way  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 and has been noting more low back pain and weakness  States is more limited now in what he lifts and relies on his coworkers to help him  Does not lift more than 10-20 lbs and not regularly  States he has pain every day for years but much worse over several months and he is getting tired of it  States right worse than left  Short course of steroids not helpful  States saw chiropractor - Marshall Mathis and no significant benefit with maneuvers  Denies falls  Did PT about two years ago and states was not helpful then  Denies balance issues falls    He continues with Aricept with stability in his memory with no significant trouble with ADLs or daily work or driving  Delayed recall: 1/3  No significant depression or anxiety  He works for [employer]  States he has picked up the new system, with no issues  States when he saw me last he was going through more grief- and was in a worse place due to his mom passing away around that period  States better now  No issues with driving  Denies issues with doing his bills and finances  States history alcohol use daily, potentially excessive in the past decades ago but no longer            The following portions of the patient's history were reviewed and updated as appropriate: allergies, current medications, past family history, past medical history, past social history, past surgical history and problem list     MRI L spine  Left foraminal protrusion impinging on the exiting left L3 nerve root  Moderate to severe left foraminal stenosis  2   Severe right L5-S1 facet arthropathy without foraminal stenosis  3   Left central disc protrusion at T12-L1 with mild spinal stenosis      The following portions of the patient's history were reviewed and updated as appropriate: allergies, current medications, past family history, past medical history, past social history, past surgical history and problem list          Objective:    Blood pressure 108/72, pulse 85  Physical Exam   Constitutional: He appears well-developed and well-nourished  HENT:   Head: Normocephalic and atraumatic  Eyes: Pupils are equal, round, and reactive to light  Conjunctivae are normal    Neck: Normal range of motion  Neck supple  Cardiovascular: Normal rate and regular rhythm  Pulmonary/Chest: Effort normal    Musculoskeletal: Normal range of motion  Neurological: He has normal reflexes  Coordination normal    Nursing note and vitals reviewed  Neurological Exam  Mental Status   Oriented to person, place, time and situation  Recent and remote memory are intact  Recalls 3 of 3 objects immediately  At 10 minutes recalls 1 of 3 objects  no dysarthria present  Language is fluent with no aphasia  Unable to repeat  Attention and concentration are normal   Cannot add up a quarter dime and chau  However can do subtractions backwards from 100 if he takes his time/focuses  Can spell WORLD backwards  Can follow three step commands  Can tell me similarities between various objects  Cranial Nerves  CN II: Visual fields full to confrontation  CN III, IV, VI: Extraocular movements intact bilaterally  Pupils equal round and reactive to light bilaterally  CN V: Facial sensation is normal   CN VII: Full and symmetric facial movement  CN VIII: Hearing is normal   CN IX, X: Palate elevates symmetrically  Normal gag reflex  CN XI: Shoulder shrug strength is normal   CN XII: Tongue midline without atrophy or fasciculations  Motor   Normal muscle tone  Strength is 5/5 in all four extremities except as noted  4/5 RLE and 4+/5 LLE with pain limited component  Sensory  Light touch is normal in upper and lower extremities   Pinprick abnormality: Temperature abnormality: Vibration is normal in upper and lower extremities  No right-sided hemispatial neglect  No left-sided hemispatial neglect  Reduced sensation to PP/Temp RLE posterior calf, lateral calf and posterior thigh to a milder degree (L5-S1 distribution)  Reflexes  Deep tendon reflexes are 2+ and symmetric in all four extremities with downgoing toes bilaterally  Coordination  Finger-to-nose, rapid alternating movements and heel-to-shin normal bilaterally without dysmetria  Gait  Wide based antalgic gait  ROS:    Review of Systems   HENT: Negative  Eyes: Negative  Respiratory: Negative  Cardiovascular: Negative  Gastrointestinal: Negative  Endocrine: Negative  Genitourinary: Negative  Musculoskeletal: Positive for back pain and neck pain  Skin: Negative  Allergic/Immunologic: Negative  Neurological: Positive for weakness (low extremities ) and numbness  Memory problem   Hematological: Negative  Psychiatric/Behavioral: Positive for sleep disturbance

## 2018-12-26 ENCOUNTER — OFFICE VISIT (OUTPATIENT)
Dept: NEUROSURGERY | Facility: CLINIC | Age: 58
End: 2018-12-26
Payer: COMMERCIAL

## 2018-12-26 VITALS
HEART RATE: 70 BPM | SYSTOLIC BLOOD PRESSURE: 130 MMHG | HEIGHT: 68 IN | BODY MASS INDEX: 25.64 KG/M2 | WEIGHT: 169.2 LBS | DIASTOLIC BLOOD PRESSURE: 78 MMHG | TEMPERATURE: 98 F

## 2018-12-26 DIAGNOSIS — M51.26 PROTRUSION OF LUMBAR INTERVERTEBRAL DISC: ICD-10-CM

## 2018-12-26 DIAGNOSIS — M47.816 LUMBAR SPONDYLOSIS: Primary | ICD-10-CM

## 2018-12-26 DIAGNOSIS — M54.16 RADICULOPATHY, LUMBAR REGION: ICD-10-CM

## 2018-12-26 PROCEDURE — 99243 OFF/OP CNSLTJ NEW/EST LOW 30: CPT | Performed by: PHYSICIAN ASSISTANT

## 2018-12-26 RX ORDER — NAPROXEN SODIUM 220 MG
440 TABLET ORAL DAILY PRN
COMMUNITY
End: 2021-07-29 | Stop reason: HOSPADM

## 2018-12-26 NOTE — PATIENT INSTRUCTIONS
Contact our office or present to hospital Emergency Room if experience worsening or new back/leg pain, sensory or motor change, bladder or bowel dysfunction, or other neurological change

## 2018-12-26 NOTE — LETTER
December 28, 2018     Marni Anderson, [address] [address]    Patient: Annie Calhoun   YOB: 1960   Date of Visit: 12/26/2018       Dear Dr Vince Santiago:    Thank you for referring Cristina Colin to me for evaluation  Below are my notes for this consultation  If you have questions, please do not hesitate to call me  I look forward to following your patient along with you  Sincerely,        Britton Olmos PA-C        CC: HERBERT Gagnon PA-C  12/28/2018  3:54 AM  Sign at close encounter  Assessment/Plan:    Lumbar spondylosis  -     XR spine lumbar complete w bending minimum 6 views; Future  -     Ambulatory referral to Pain Management; Future    Radiculopathy, lumbar region  -     Ambulatory referral to Neurosurgery  -     XR spine lumbar complete w bending minimum 6 views; Future  -     Ambulatory referral to Pain Management; Future    Protrusion of lumbar intervertebral disc  -     XR spine lumbar complete w bending minimum 6 views; Future  -     Ambulatory referral to Pain Management; Future      Discussion / Summary: This is a 60 y o  male who presents for neurosurgical consultation with c/o of LBP and right lower extremity pain  Lumbar spine MRI ( 9/17/18) reports severe right L5-S1 facet arthropathy without foraminal stenosis  Mild disc bulge L4-L5 and L3-L4 with moderate to severe left foraminal stenosis at L3-L4  The left sided disc protrusion should not be be etiology of right leg pain  Lumbar flex/ext xray has been requested to evaluate for dynamic stability  He is to follow up in office after completion of L-spine xray  Referral to Pain Management has been provided  If his pain becomes better controlled he may tolerate course of Physical therapy  Patient was inquiring if hip etiology may contribute to his pain   He is advised to f/u with Primary Care Provider to discussed this further / discuss with PCP consideration for Orthopedic evaluation   SNEHA test of hips was negative on examination  Patient is to contact our office or present to hospital Emergency Room if experience worsening or new back pain, sensory or motor change, bladder or bowel dysfunction, or other neurological change  Patient expressed understanding and agreement     _________________________________________________________________________________    Subjective:      Patient ID: Jefm Arnav is a 60 y o  male with c/o of LBP and right lower extremity pain  His pain has worsened over the last 1 year  He is s/p L-spine MRI 9/17/18  Mr  Eloise Cuba is accompanied with wife  HPI  Patient reports history of "sciatica" diagnosed a few years ago with RLE pain and numbness distal left leg  He reports he participated in physical therapy at [place] [address]  around 2015  He reports his RLE pain eased and numbness left lower leg essentially resolved with only occasional rare occurrence  However, over the last 1 year he reports RLE pain has worsened  Wife reports she has noticed him limping ~ last 3 months although patient indicates this has been going on longer  He follows with Dr Dylan Bojorquez of Neurology and underwent L-spine MRI and was referred for EMG/NCS of RLE  He reports EMG/NCS of RLE is scheduled for 3/2019  He reports very little discomfort of his low back  Patient reports he was informed by a Chiropractor that his "hips are shot"  He reports pain that extends from right hip down lateral tight thigh to right knee region  He reports the right lower extremity pain comes and goes and waxes / wanes in degree  He currently rated RLE pain a 6/10 on pain scale  Walking aggravates his pain  He takes dog for walks  He reports walking limited to about 3 blocks  Gabapentin provides some relief  He denies numbness / tingling in RLE    He reported history of numbness in left lower leg a few years ago which essentially resolved besides an occasional rare occurrence  He reports weakness of right knee region for about 1 year  He ambulates independent  Reports he underwent physical therapy around 2015 but denies more recent PT  Patient reports he underwent chiropractic treatment weekly in September and November 2018 but indicated this aggravated his pain  Reports taking gabapentin and an occasional Aleve which which provides some pain relief  He denies seeing a pain management provider or undergoing low back injections  He denies bladder or bowel incontinence of saddle paresthesias  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  The following portions of the patient's history were reviewed and updated as appropriate: allergies, current medications, past family history, past medical history, past social history and past surgical history  Review of Systems   Constitutional: Negative for fever  Respiratory: Negative for shortness of breath  Gastrointestinal:        Denies bowel dysfunction    Genitourinary: Negative for difficulty urinating  Musculoskeletal:        See HPI   Neurological:        See HPI  Psychiatric/Behavioral: Negative for agitation  Objective:      /78 (BP Location: Right arm, Patient Position: Sitting, Cuff Size: Standard)   Pulse 70   Temp 98 °F (36 7 °C) (Temporal)   Ht 5' 8" (1 727 m)   Wt 76 7 kg (169 lb 3 2 oz)   BMI 25 73 kg/m²           Physical Exam   Constitutional: He appears well-developed and well-nourished  No distress  Eyes: Conjunctivae are normal    Pulmonary/Chest: Effort normal    Musculoskeletal:        Lumbar back: He exhibits no tenderness and no deformity  No tenderness on palpation lateral right thigh  SLR negative bilaterally  SNEHA test b/l hips negative  Neurological: He is alert  Skin: Skin is warm and dry  Psychiatric: He has a normal mood and affect   His speech is normal  Neurologic Exam     Mental Status   Speech: speech is normal   Level of consciousness: alert    Motor Exam     Motor:  Shoulder abduction 5/5 bilaterally  Elbow flexion/extension 5/5 bilaterally  Wrist flexion/extension 5/5 bilaterally  Finger  / abduction 5/5 bilaterally  Hip flexion/abduction/adduction 5/5 bilaterally (pain right knee when complete right hip flexion)  Knee flexion/extension 5/5 bilaterally  Ankle DF/PF 5/5 bilaterally  Great toe DF 5/5 bilaterally  Sensory Exam     Sensation to pinprick intact b/l upper extremities, torso, and b/l lower extremities  JPS and Vibratory sense intact b/l thumbs and great toes  Gait, Coordination, and Reflexes     Gait  Gait: (subtle right sided limp  Indpenendent )    Reflexes   Right plantar: normal  Left plantar: normal  Right ankle clonus: absent  Left ankle clonus: absent  Hyporeflexic b/l biceps, triceps, brachioradialis, patellar, and Achilles reflexes  Imaging study:    9/17/18 [facility] L-spine MRI -- per report: " MRI LUMBAR SPINE WITHOUT CONTRAST     INDICATION: M54 16: Radiculopathy, lumbar region  R29 898: Other symptoms and signs involving the musculoskeletal system  Chronic low back pain     COMPARISON:  CT abdomen and pelvis study from June 24, 2014      TECHNIQUE:  Sagittal T1, sagittal T2, sagittal inversion recovery, axial T1 and axial T2, coronal T2        IMAGE QUALITY:  Diagnostic     FINDINGS:     ALIGNMENT:  There is a mild dextroscoliotic curvature to the lumbar spine with the apex at the L4-5 level      MARROW SIGNAL:  Normal marrow signal is identified within the visualized bony structures  No discrete marrow lesion      DISTAL CORD AND CONUS:  There is mild ventral indentation on the conus  There is no associated cord signal abnormality  This is secondary to mild degenerative disease noted at the T12-L1 level    The conus ends at the superior L1 vertebral body level      PARASPINAL SOFT TISSUES: Paraspinal soft tissues are unremarkable  Partially visualized left parapelvic cysts      SACRUM:  Normal signal within the sacrum  No evidence of insufficiency or stress fracture      LOWER THORACIC DISC SPACES:  There is disc space narrowing and desiccation at the T12-L1 level  A broad left central protrusion is suspected at this level with suggestion of mild ventral indentation on the conus without conus/cord signal abnormality  Mild spinal stenosis is noted      LUMBAR DISC SPACES:  There is disc desiccation and disc height loss at L3-L4 and L4-L5      L1-L2:  Normal      L2-L3:  Normal      L3-L4:  Mild circumferential disc bulge with a left foraminal protrusion which is impinging upon the exiting left L3 nerve root  There is moderate to severe left foraminal stenosis  The right neural foramina remains patent  Mild spinal stenosis is   noted secondary to the disc bulge      L4-L5:  Circumferential disc bulge with mild ventral indentation on the thecal sac  The spinal canal and neural foramina remains patent         L5-S1:  No disc bulge or herniation to result in spinal canal stenosis  Marked right facet arthropathy      IMPRESSION:     1  Left foraminal protrusion impinging on the exiting left L3 nerve root  Moderate to severe left foraminal stenosis  2   Severe right L5-S1 facet arthropathy without foraminal stenosis  3   Left central disc protrusion at T12-L1 with mild spinal stenosis          Workstation performed: [id number] "

## 2018-12-26 NOTE — PROGRESS NOTES
Assessment/Plan:    Lumbar spondylosis  -     XR spine lumbar complete w bending minimum 6 views; Future  -     Ambulatory referral to Pain Management; Future    Radiculopathy, lumbar region  -     Ambulatory referral to Neurosurgery  -     XR spine lumbar complete w bending minimum 6 views; Future  -     Ambulatory referral to Pain Management; Future    Protrusion of lumbar intervertebral disc  -     XR spine lumbar complete w bending minimum 6 views; Future  -     Ambulatory referral to Pain Management; Future      Discussion / Summary: This is a 60 y o  male who presents for neurosurgical consultation with c/o of LBP and right lower extremity pain  Lumbar spine MRI ( 9/17/18) reports severe right L5-S1 facet arthropathy without foraminal stenosis  Mild disc bulge L4-L5 and L3-L4 with moderate to severe left foraminal stenosis at L3-L4  The left sided disc protrusion should not be be etiology of right leg pain  Lumbar flex/ext xray has been requested to evaluate for dynamic stability  He is to follow up in office after completion of L-spine xray  Referral to Pain Management has been provided  If his pain becomes better controlled he may tolerate course of Physical therapy  Patient was inquiring if hip etiology may contribute to his pain  He is advised to f/u with Primary Care Provider to discussed this further / discuss with PCP consideration for Orthopedic evaluation   SNEHA test of hips was negative on examination  Patient is to contact our office or present to hospital Emergency Room if experience worsening or new back pain, sensory or motor change, bladder or bowel dysfunction, or other neurological change  Patient expressed understanding and agreement     _________________________________________________________________________________    Subjective:      Patient ID: Tami Crain is a 60 y o  male with c/o of LBP and right lower extremity pain   His pain has worsened over the last 1 year  He is s/p L-spine MRI 9/17/18  Mr Santi Izaguirre is accompanied with wife  HPI  Patient reports history of "sciatica" diagnosed a few years ago with RLE pain and numbness distal left leg  He reports he participated in physical therapy at [place] [address]  around 2015  He reports his RLE pain eased and numbness left lower leg essentially resolved with only occasional rare occurrence  However, over the last 1 year he reports RLE pain has worsened  Wife reports she has noticed him limping ~ last 3 months although patient indicates this has been going on longer  He follows with Dr Lilibeth Akhtar of Neurology and underwent L-spine MRI and was referred for EMG/NCS of RLE  He reports EMG/NCS of RLE is scheduled for 3/2019  He reports very little discomfort of his low back  Patient reports he was informed by a Chiropractor that his "hips are shot"  He reports pain that extends from right hip down lateral tight thigh to right knee region  He reports the right lower extremity pain comes and goes and waxes / wanes in degree  He currently rated RLE pain a 6/10 on pain scale  Walking aggravates his pain  He takes dog for walks  He reports walking limited to about 3 blocks  Gabapentin provides some relief  He denies numbness / tingling in RLE  He reported history of numbness in left lower leg a few years ago which essentially resolved besides an occasional rare occurrence  He reports weakness of right knee region for about 1 year  He ambulates independent  Reports he underwent physical therapy around 2015 but denies more recent PT  Patient reports he underwent chiropractic treatment weekly in September and November 2018 but indicated this aggravated his pain  Reports taking gabapentin and an occasional Aleve which which provides some pain relief  He denies seeing a pain management provider or undergoing low back injections      He denies bladder or bowel incontinence of saddle paresthesias  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  The following portions of the patient's history were reviewed and updated as appropriate: allergies, current medications, past family history, past medical history, past social history and past surgical history  Review of Systems   Constitutional: Negative for fever  Respiratory: Negative for shortness of breath  Gastrointestinal:        Denies bowel dysfunction    Genitourinary: Negative for difficulty urinating  Musculoskeletal:        See HPI   Neurological:        See HPI  Psychiatric/Behavioral: Negative for agitation  Objective:      /78 (BP Location: Right arm, Patient Position: Sitting, Cuff Size: Standard)   Pulse 70   Temp 98 °F (36 7 °C) (Temporal)   Ht 5' 8" (1 727 m)   Wt 76 7 kg (169 lb 3 2 oz)   BMI 25 73 kg/m²          Physical Exam   Constitutional: He appears well-developed and well-nourished  No distress  Eyes: Conjunctivae are normal    Pulmonary/Chest: Effort normal    Musculoskeletal:        Lumbar back: He exhibits no tenderness and no deformity  No tenderness on palpation lateral right thigh  SLR negative bilaterally  SNEHA test b/l hips negative  Neurological: He is alert  Skin: Skin is warm and dry  Psychiatric: He has a normal mood and affect  His speech is normal        Neurologic Exam     Mental Status   Speech: speech is normal   Level of consciousness: alert    Motor Exam     Motor:  Shoulder abduction 5/5 bilaterally  Elbow flexion/extension 5/5 bilaterally  Wrist flexion/extension 5/5 bilaterally  Finger  / abduction 5/5 bilaterally  Hip flexion/abduction/adduction 5/5 bilaterally (pain right knee when complete right hip flexion)  Knee flexion/extension 5/5 bilaterally  Ankle DF/PF 5/5 bilaterally  Great toe DF 5/5 bilaterally          Sensory Exam     Sensation to pinprick intact b/l upper extremities, torso, and b/l lower extremities  JPS and Vibratory sense intact b/l thumbs and great toes  Gait, Coordination, and Reflexes     Gait  Gait: (subtle right sided limp  Indpenendent )    Reflexes   Right plantar: normal  Left plantar: normal  Right ankle clonus: absent  Left ankle clonus: absent  Hyporeflexic b/l biceps, triceps, brachioradialis, patellar, and Achilles reflexes  Imaging study:    9/17/18 [facility] L-spine MRI -- per report: " MRI LUMBAR SPINE WITHOUT CONTRAST     INDICATION: M54 16: Radiculopathy, lumbar region  R29 898: Other symptoms and signs involving the musculoskeletal system  Chronic low back pain     COMPARISON:  CT abdomen and pelvis study from June 24, 2014      TECHNIQUE:  Sagittal T1, sagittal T2, sagittal inversion recovery, axial T1 and axial T2, coronal T2        IMAGE QUALITY:  Diagnostic     FINDINGS:     ALIGNMENT:  There is a mild dextroscoliotic curvature to the lumbar spine with the apex at the L4-5 level      MARROW SIGNAL:  Normal marrow signal is identified within the visualized bony structures  No discrete marrow lesion      DISTAL CORD AND CONUS:  There is mild ventral indentation on the conus  There is no associated cord signal abnormality  This is secondary to mild degenerative disease noted at the T12-L1 level  The conus ends at the superior L1 vertebral body level      PARASPINAL SOFT TISSUES:  Paraspinal soft tissues are unremarkable  Partially visualized left parapelvic cysts      SACRUM:  Normal signal within the sacrum  No evidence of insufficiency or stress fracture      LOWER THORACIC DISC SPACES:  There is disc space narrowing and desiccation at the T12-L1 level  A broad left central protrusion is suspected at this level with suggestion of mild ventral indentation on the conus without conus/cord signal abnormality      Mild spinal stenosis is noted      LUMBAR DISC SPACES:  There is disc desiccation and disc height loss at L3-L4 and L4-L5      L1-L2: Normal      L2-L3:  Normal      L3-L4:  Mild circumferential disc bulge with a left foraminal protrusion which is impinging upon the exiting left L3 nerve root  There is moderate to severe left foraminal stenosis  The right neural foramina remains patent  Mild spinal stenosis is   noted secondary to the disc bulge      L4-L5:  Circumferential disc bulge with mild ventral indentation on the thecal sac  The spinal canal and neural foramina remains patent         L5-S1:  No disc bulge or herniation to result in spinal canal stenosis  Marked right facet arthropathy      IMPRESSION:     1  Left foraminal protrusion impinging on the exiting left L3 nerve root  Moderate to severe left foraminal stenosis  2   Severe right L5-S1 facet arthropathy without foraminal stenosis  3   Left central disc protrusion at T12-L1 with mild spinal stenosis          Workstation performed: [id number] "

## 2019-01-02 ENCOUNTER — OFFICE VISIT (OUTPATIENT)
Dept: FAMILY MEDICINE CLINIC | Facility: OTHER | Age: 59
End: 2019-01-02
Payer: COMMERCIAL

## 2019-01-02 ENCOUNTER — TELEPHONE (OUTPATIENT)
Dept: NEUROLOGY | Facility: CLINIC | Age: 59
End: 2019-01-02

## 2019-01-02 VITALS
SYSTOLIC BLOOD PRESSURE: 110 MMHG | OXYGEN SATURATION: 97 % | TEMPERATURE: 97 F | DIASTOLIC BLOOD PRESSURE: 68 MMHG | WEIGHT: 166.8 LBS | HEIGHT: 68 IN | HEART RATE: 74 BPM | BODY MASS INDEX: 25.28 KG/M2

## 2019-01-02 DIAGNOSIS — Z28.21 REFUSED INFLUENZA VACCINE: Primary | ICD-10-CM

## 2019-01-02 DIAGNOSIS — M54.16 RADICULOPATHY, LUMBAR REGION: ICD-10-CM

## 2019-01-02 DIAGNOSIS — J01.00 ACUTE NON-RECURRENT MAXILLARY SINUSITIS: ICD-10-CM

## 2019-01-02 PROCEDURE — 99214 OFFICE O/P EST MOD 30 MIN: CPT | Performed by: FAMILY MEDICINE

## 2019-01-02 RX ORDER — AZITHROMYCIN 250 MG/1
TABLET, FILM COATED ORAL
Qty: 6 TABLET | Refills: 0 | Status: SHIPPED | OUTPATIENT
Start: 2019-01-02 | End: 2019-01-06

## 2019-01-02 RX ORDER — GABAPENTIN 300 MG/1
CAPSULE ORAL
Qty: 30 CAPSULE | Refills: 1 | Status: CANCELLED | OUTPATIENT
Start: 2019-01-02

## 2019-01-02 RX ORDER — GABAPENTIN 300 MG/1
CAPSULE ORAL
Qty: 60 CAPSULE | Refills: 3 | Status: SHIPPED | OUTPATIENT
Start: 2019-01-02 | End: 2019-03-27 | Stop reason: SDUPTHER

## 2019-01-02 NOTE — PATIENT INSTRUCTIONS
Rhinosinusitis   AMBULATORY CARE:   Rhinosinusitis (RS)  is inflammation of your nose and sinuses  It commonly begins as a virus, often as a common cold  Viruses usually last 7 to 10 days and do not need treatment  When the virus does not get better on its own, you may have bacterial RS  This means that bacteria have begun to grow inside your sinuses  Acute RS lasts less than 4 weeks  Chronic RS lasts 12 weeks or more  Recurrent RS is when you have 4 or more episodes of RS in one year  Your signs and symptoms  may be worse when you lie on your back or try to sleep  You may have any of the following:  · Stuffy nose and reduced sense of smell     · Runny nose with thick yellow or green mucus     · Pressure or pain on your face or a headache     · Pain in your teeth or bad breath     · Ear pain or pressure     · Fever or cough     · Tiredness  Seek care immediately if:   · You have double vision or you cannot see  · You have a stiff neck, a fever, or a bad headache  · Your eyeball bulges out or you cannot move your eye  · Your eye and eyelid are red, swollen, and painful  · You cannot open your eye  · You are more sleepy than normal, or you notice changes in your ability to think, move, or talk  · You have swelling of your forehead or scalp  Contact your healthcare provider if:   · Your symptoms are worse or do not improve after 3 to 5 days of treatment  · You have questions or concerns about your condition or care  Treatment for rhinosinusitis  may include any of the following:  · Acetaminophen  decreases pain and fever  It is available without a doctor's order  Ask how much to take and how often to take it  Follow directions  Acetaminophen can cause liver damage if not taken correctly  · NSAIDs , such as ibuprofen, help decrease swelling, pain, and fever  This medicine is available with or without a doctor's order   NSAIDs can cause stomach bleeding or kidney problems in certain people  If you take blood thinner medicine, always ask your healthcare provider if NSAIDs are safe for you  Always read the medicine label and follow directions  · Nasal steroid sprays  decrease inflammation in your nose and sinuses  · Decongestants  reduce swelling and drain mucus in the nose and sinuses  They may help you breathe easier  · Antihistamines  dry mucus in the nose and relieve sneezing  · Antibiotics  treat a bacterial infection and may be needed if your symptoms do not improve or they get worse  · Take your medicine as directed  Contact your healthcare provider if you think your medicine is not helping or if you have side effects  Tell him or her if you are allergic to any medicine  Keep a list of the medicines, vitamins, and herbs you take  Include the amounts, and when and why you take them  Bring the list or the pill bottles to follow-up visits  Carry your medicine list with you in case of an emergency  Self-care:   · Rinse your sinuses  Use a sinus rinse device to rinse your nasal passages with a saline (salt water) solution  This will help thin the mucus in your nose and rinse away pollen and dirt  It will also help reduce swelling so you can breathe normally  Ask your healthcare provider how often to do this  · Breathe in steam   Heat a bowl of water until you see steam  Lean over the bowl and make a tent over your head with a large towel  Breathe deeply for about 20 minutes  Be careful not to get too close to the steam or burn yourself  Do this 3 times a day  You can also breathe deeply when you take a hot shower  · Sleep with your head elevated  Place an extra pillow under your head before you go to sleep to help your sinuses drain  · Drink liquids as directed  Ask your healthcare provider how much liquid to drink each day and which liquids are best for you  Liquids will thin the mucus in your nose and help it drain   Avoid drinks that contain alcohol or caffeine  · Do not smoke, and avoid secondhand smoke  Nicotine and other chemicals in cigarettes and cigars can make your symptoms worse  Ask your healthcare provider for information if you currently smoke and need help to quit  E-cigarettes or smokeless tobacco still contain nicotine  Talk to your healthcare provider before you use these products  Follow up with your healthcare provider as directed: Follow up if your symptoms are worse or not better after 3 to 5 days of treatment  Write down your questions so you remember to ask them during your visits  © 2017 [employer] Information is for End User's use only and may not be sold, redistributed or otherwise used for commercial purposes  All illustrations and images included in CareNotes® are the copyrighted property of A [employer] M [employer]  or [employer]  The above information is an  only  It is not intended as medical advice for individual conditions or treatments  Talk to your doctor, nurse or pharmacist before following any medical regimen to see if it is safe and effective for you

## 2019-01-02 NOTE — TELEPHONE ENCOUNTER
Patient called [facility] front desk requesting a refill on his gabapentin 300 mg prescription  States he only has 1 pill left   Dr Matti Watkins could you look into this please  Thank you

## 2019-01-02 NOTE — PROGRESS NOTES
Assessment/Plan:           Problem List Items Addressed This Visit     None      Visit Diagnoses     Refused influenza vaccine    -  Primary    Relevant Orders    PREFERRED: influenza vaccine, [id number], quadrivalent, recombinant, PF, 0 5 mL, for patients 18 yr+ ([facility])    Acute non-recurrent maxillary sinusitis        Relevant Medications    azithromycin (ZITHROMAX) 250 mg tablet  Take allergy medication daily  Use sinus rinse daily and use humidifier  FU if any persistence or worsening of symptoms  Subjective:      Patient ID: Becki Mclain is a 60 y o  male  Patient is here for acute visit due to cough and nasal congestion for several days  Almost one week he has been coughing on and off  The cough is worse at nights and getting worse for the past 2 nights  Today he noted he sneezed hard and had blood in the nasal discharge  He has no CP or SOB or PHAM  He is not wheezing  He feels nose is congested and feels sinuses are congested as well  Feels ear pressure as well but no hearing change  Patient is having no fever or chills and he is tolerating diet well  He is not smoker and doesn't have history of smoking in the past either  He says their house has a heating unit attached and it can make the room dry  He does have allergies but hasn't used any allergy medication lately  The following portions of the patient's history were reviewed and updated as appropriate: allergies, current medications, past family history, past medical history, past social history, past surgical history and problem list     Review of Systems   Constitutional: Negative for appetite change, chills, diaphoresis and fever  HENT: Positive for congestion, ear pain, nosebleeds, postnasal drip, rhinorrhea, sinus pressure and sneezing  Negative for hearing loss and trouble swallowing  Eyes: Negative for visual disturbance  Respiratory: Positive for cough   Negative for shortness of breath and wheezing  Cardiovascular: Negative for chest pain, palpitations and leg swelling  Gastrointestinal: Negative for abdominal pain, nausea and vomiting  Genitourinary: Negative for dysuria and flank pain  Musculoskeletal: Negative for arthralgias and myalgias  Skin: Negative for pallor and rash  Neurological: Negative for tremors, weakness and headaches  Objective:      /68 (BP Location: Left arm, Patient Position: Sitting, Cuff Size: Adult)   Pulse 74   Temp (!) 97 °F (36 1 °C) (Tympanic)   Ht 5' 7 75" (1 721 m)   Wt 75 7 kg (166 lb 12 8 oz)   SpO2 97%   BMI 25 55 kg/m²          Physical Exam   Constitutional: He is oriented to person, place, and time  He appears well-developed and well-nourished  No distress  HENT:   Head: Normocephalic and atraumatic  Right Ear: External ear normal    Left Ear: External ear normal    Mouth/Throat: Oropharynx is clear and moist  No oropharyngeal exudate  Nasal mucosa is erythematous and edematous  Eyes: Right eye exhibits no discharge  Left eye exhibits no discharge  No scleral icterus  Neck: Normal range of motion  Neck supple  Cardiovascular: Normal rate, regular rhythm, normal heart sounds and intact distal pulses  No murmur heard  Pulmonary/Chest: Effort normal and breath sounds normal  No respiratory distress  He has no wheezes  Lymphadenopathy:     He has no cervical adenopathy  Neurological: He is alert and oriented to person, place, and time  Skin: Skin is warm and dry  No rash noted  He is not diaphoretic  No erythema  No pallor  Psychiatric: He has a normal mood and affect  His behavior is normal    Nursing note and vitals reviewed

## 2019-01-03 ENCOUNTER — TELEPHONE (OUTPATIENT)
Dept: NEUROSURGERY | Facility: CLINIC | Age: 59
End: 2019-01-03

## 2019-01-07 ENCOUNTER — HOSPITAL ENCOUNTER (OUTPATIENT)
Dept: RADIOLOGY | Facility: HOSPITAL | Age: 59
Discharge: HOME | End: 2019-01-07
Payer: COMMERCIAL

## 2019-01-07 DIAGNOSIS — M51.26 PROTRUSION OF LUMBAR INTERVERTEBRAL DISC: ICD-10-CM

## 2019-01-07 DIAGNOSIS — M54.16 RADICULOPATHY, LUMBAR REGION: ICD-10-CM

## 2019-01-07 DIAGNOSIS — M47.816 LUMBAR SPONDYLOSIS: ICD-10-CM

## 2019-01-07 PROCEDURE — 72114 X-RAY EXAM L-S SPINE BENDING: CPT

## 2019-01-09 ENCOUNTER — OFFICE VISIT (OUTPATIENT)
Dept: PAIN MEDICINE | Facility: CLINIC | Age: 59
End: 2019-01-09
Payer: COMMERCIAL

## 2019-01-09 ENCOUNTER — APPOINTMENT (OUTPATIENT)
Dept: RADIOLOGY | Facility: CLINIC | Age: 59
End: 2019-01-09
Payer: COMMERCIAL

## 2019-01-09 VITALS
HEART RATE: 74 BPM | SYSTOLIC BLOOD PRESSURE: 116 MMHG | DIASTOLIC BLOOD PRESSURE: 68 MMHG | TEMPERATURE: 98.1 F | WEIGHT: 167 LBS | BODY MASS INDEX: 25.58 KG/M2

## 2019-01-09 DIAGNOSIS — M51.26 PROTRUSION OF LUMBAR INTERVERTEBRAL DISC: ICD-10-CM

## 2019-01-09 DIAGNOSIS — M25.551 RIGHT HIP PAIN: Primary | ICD-10-CM

## 2019-01-09 DIAGNOSIS — M54.16 RADICULOPATHY, LUMBAR REGION: ICD-10-CM

## 2019-01-09 DIAGNOSIS — M25.551 RIGHT HIP PAIN: ICD-10-CM

## 2019-01-09 DIAGNOSIS — M47.816 LUMBAR SPONDYLOSIS: ICD-10-CM

## 2019-01-09 PROCEDURE — 73502 X-RAY EXAM HIP UNI 2-3 VIEWS: CPT

## 2019-01-09 PROCEDURE — 99244 OFF/OP CNSLTJ NEW/EST MOD 40: CPT | Performed by: ANESTHESIOLOGY

## 2019-01-09 NOTE — PROGRESS NOTES
Assessment:  1  Right hip pain    2  Lumbar spondylosis    3  Radiculopathy, lumbar region    4  Protrusion of lumbar intervertebral disc        Plan:  The patient's symptoms, history/physical are consistent with pain that is multifactorial in origin but predominantly the result of likely right hip osteoarthritis which is leading to his right thigh symptoms  While he does have a disc herniation at L3-4 with some mild stenosis this does not seem to be pain generator for him  At this time, I will order x-rays of the right hip and pelvis to evaluate further  Depending on what that shows, I discussed performing a right hip intra-articular steroid injection which would act to decrease swelling and inflammation of the joint  I advised him we will call with the results of the x-rays and discuss treatment at that time  Now, he may continue with the gabapentin and naproxen as needed since the combination is helpful  My impressions and treatment recommendations were discussed in detail with the patient who verbalized understanding and had no further questions  Discharge instructions were provided  I personally saw and examined the patient and I agree with the above discussed plan of care  Orders Placed This Encounter   Procedures    XR hip/pelv 2-3 vws right if performed     Standing Status:   Future     Standing Expiration Date:   1/9/2023     Scheduling Instructions:      Bring along any outside films relating to this procedure  Order Specific Question:   Reason for Exam:     Answer:   right hip pain     No orders of the defined types were placed in this encounter  History of Present Illness:    Austen Browne is a 60 y o  male who presents for consultation in regards to right-sided thigh pain  Symptoms have been present for 2 years without any precipitating injury or trauma  He does get symptoms in the left thigh but the right side is worse     Symptoms are moderate to severe rated 6-8/10 on a numeric rating scale and felt nearly constantly  Symptoms are described to be sharp and shooting in the right thigh  Pain is aggravated with standing, bending, walking, exercise, getting into and out of a car  There is no change with coughing, sneezing or bowel movements  He feels weakness of the right leg at times and limps  Treatment history has included use of gabapentin 300 mg which is providing moderate relief  He also takes naproxen which provides moderate relief  I have personally reviewed and/or updated the patient's past medical history, past surgical history, family history, social history, current medications, allergies, and vital signs today  Review of Systems:    Review of Systems   Constitutional: Negative for fever and unexpected weight change  HENT: Negative for trouble swallowing  Eyes: Negative for visual disturbance  Respiratory: Negative for shortness of breath and wheezing  Cardiovascular: Negative for chest pain and palpitations  Gastrointestinal: Negative for constipation, diarrhea, nausea and vomiting  Endocrine: Negative for cold intolerance, heat intolerance and polydipsia  Genitourinary: Negative for frequency  Musculoskeletal: Positive for gait problem  Negative for arthralgias, joint swelling and myalgias  Skin: Negative for rash  Neurological: Negative for dizziness, seizures, syncope, weakness and headaches  Hematological: Does not bruise/bleed easily  Psychiatric/Behavioral: Negative for dysphoric mood  All other systems reviewed and are negative        Patient Active Problem List   Diagnosis    Allergic rhinitis    Glaucoma    Sciatica    Short-term memory loss    Mild cognitive impairment    Abnormal EEG    Increased prostate specific antigen (PSA) velocity    Right hip pain    Lumbar spondylosis       Past Medical History:   Diagnosis Date    Benign colon polyp     Diverticulitis, colon     Diverticulosis     Hyperlipidemia     Prediabetes     Renal calculi     Short-term memory loss     Vitamin D deficiency        Past Surgical History:   Procedure Laterality Date    COLONOSCOPY      ESOPHAGOGASTRODUODENOSCOPY      HERNIA REPAIR      KNEE SURGERY         Family History   Problem Relation Age of Onset    Dementia Mother     Glaucoma Mother     Glaucoma Father     Prostate cancer Father     Pulmonary embolism Father     Hypertension Brother     Colon cancer Maternal Grandfather        Social History     Occupational History          Social History Main Topics    Smoking status: Former Smoker     Quit date: 1992    Smokeless tobacco: Never Used    Alcohol use Yes      Comment: occasional beer and wine (history)    Drug use: No    Sexual activity: Not on file       Current Outpatient Prescriptions on File Prior to Visit   Medication Sig    cholecalciferol (VITAMIN D3) 1,000 units tablet Take 4,000 Units by mouth daily    donepezil (ARICEPT) 10 mg tablet Take 1 tablet (10 mg total) by mouth daily    gabapentin (NEURONTIN) 300 mg capsule Take 1 tab nightly x 1 wk and increase to 1 tab BID as tolerated    Lactobacillus (PROBIOTIC ACIDOPHILUS) CAPS Take by mouth once    LUMIGAN 0 01 % ophthalmic drops     naproxen sodium (ALEVE) 220 MG tablet Take 440 mg by mouth daily as needed for mild pain    Omega-3 Fatty Acids (FISH OIL PO) Take by mouth daily    TURMERIC PO Take by mouth daily     No current facility-administered medications on file prior to visit  Allergies   Allergen Reactions    Sulfa Antibiotics        Physical Exam:    /68   Pulse 74   Temp 98 1 °F (36 7 °C) (Oral)   Wt 75 8 kg (167 lb)   BMI 25 58 kg/m²     Constitutional: normal, well developed, well nourished, alert, in no distress and non-toxic and no overt pain behavior    Eyes: anicteric  HEENT: grossly intact  Neck: supple, symmetric, trachea midline and no masses   Pulmonary:even and unlabored  Cardiovascular:No edema or pitting edema present  Skin:Normal without rashes or lesions and well hydrated  Psychiatric:Mood and affect appropriate  Neurologic:Cranial Nerves II-XII grossly intact  Musculoskeletal:normal     Lumbar Spine Exam  Appearance:  Normal lordosis  Palpation/Tenderness:  no tenderness or spasm  Sensory:  no sensory deficits noted  Range of Motion:  Full range of motion with no pain or limitations in flexion, extension, lateral flexion and rotation  Motor Strength:  Left hip flexion:  5/5  Left hip extension:  5/5  Right hip flexion:  5/5  Right hip extension:  5/5  Left knee flexion:  5/5  Left knee extension:  5/5  Right knee flexion:  5/5  Right knee extension:  5/5  Left foot dorsiflexion:  5/5  Left foot plantar flexion:  5/5  Right foot dorsiflexion:  5/5  Right foot plantar flexion:  5/5  Reflexes:  Left Patellar:  1+   Right Patellar:  1+   Left Achilles:  1+   Right Achilles:  1+   Special Tests:  Left Straight Leg Test:  negative  Right Straight Leg Test:  negative   Left SNEHA: positive  Right SNEHA: positive    Imaging    MRI LUMBAR SPINE WITHOUT CONTRAST (9/17/2018)     INDICATION: M54 16: Radiculopathy, lumbar region  R29 898: Other symptoms and signs involving the musculoskeletal system  Chronic low back pain     COMPARISON:  CT abdomen and pelvis study from June 24, 2014      TECHNIQUE:  Sagittal T1, sagittal T2, sagittal inversion recovery, axial T1 and axial T2, coronal T2        IMAGE QUALITY:  Diagnostic     FINDINGS:     ALIGNMENT:  There is a mild dextroscoliotic curvature to the lumbar spine with the apex at the L4-5 level      MARROW SIGNAL:  Normal marrow signal is identified within the visualized bony structures  No discrete marrow lesion      DISTAL CORD AND CONUS:  There is mild ventral indentation on the conus  There is no associated cord signal abnormality  This is secondary to mild degenerative disease noted at the T12-L1 level    The conus ends at the superior L1 vertebral body level      PARASPINAL SOFT TISSUES:  Paraspinal soft tissues are unremarkable  Partially visualized left parapelvic cysts      SACRUM:  Normal signal within the sacrum  No evidence of insufficiency or stress fracture      LOWER THORACIC DISC SPACES:  There is disc space narrowing and desiccation at the T12-L1 level  A broad left central protrusion is suspected at this level with suggestion of mild ventral indentation on the conus without conus/cord signal abnormality  Mild spinal stenosis is noted      LUMBAR DISC SPACES:  There is disc desiccation and disc height loss at L3-L4 and L4-L5      L1-L2:  Normal      L2-L3:  Normal      L3-L4:  Mild circumferential disc bulge with a left foraminal protrusion which is impinging upon the exiting left L3 nerve root  There is moderate to severe left foraminal stenosis  The right neural foramina remains patent  Mild spinal stenosis is   noted secondary to the disc bulge      L4-L5:  Circumferential disc bulge with mild ventral indentation on the thecal sac  The spinal canal and neural foramina remains patent         L5-S1:  No disc bulge or herniation to result in spinal canal stenosis  Marked right facet arthropathy      IMPRESSION:     1  Left foraminal protrusion impinging on the exiting left L3 nerve root  Moderate to severe left foraminal stenosis  2   Severe right L5-S1 facet arthropathy without foraminal stenosis  3   Left central disc protrusion at T12-L1 with mild spinal stenosis

## 2019-01-14 ENCOUNTER — TELEPHONE (OUTPATIENT)
Dept: PAIN MEDICINE | Facility: CLINIC | Age: 59
End: 2019-01-14

## 2019-01-14 DIAGNOSIS — M16.11 PRIMARY LOCALIZED OSTEOARTHRITIS OF RIGHT HIP: Primary | ICD-10-CM

## 2019-01-14 NOTE — TELEPHONE ENCOUNTER
Please let patient know that x-rays of the hips show severe OA of both hips worse on the right which is likely etiology for his pain complaints  At this time would recommend proceeding with right hip injection as discussed at [facility]  Order placed

## 2019-01-14 NOTE — TELEPHONE ENCOUNTER
Tried reaching pt on his home #, left vm that I would try his work #  Left detailed vm on the work # of the same as stated in FQ's previous task  I stated our  [facility] will be contacting him to set up an appt for this injection

## 2019-01-15 ENCOUNTER — OFFICE VISIT (OUTPATIENT)
Dept: NEUROSURGERY | Facility: CLINIC | Age: 59
End: 2019-01-15
Payer: COMMERCIAL

## 2019-01-15 VITALS
WEIGHT: 166.4 LBS | HEIGHT: 68 IN | HEART RATE: 70 BPM | SYSTOLIC BLOOD PRESSURE: 120 MMHG | TEMPERATURE: 98 F | BODY MASS INDEX: 25.22 KG/M2 | DIASTOLIC BLOOD PRESSURE: 78 MMHG

## 2019-01-15 DIAGNOSIS — M47.816 LUMBAR SPONDYLOSIS: Primary | ICD-10-CM

## 2019-01-15 DIAGNOSIS — M79.604 LEG PAIN, RIGHT: ICD-10-CM

## 2019-01-15 DIAGNOSIS — M41.9 SCOLIOSIS OF LUMBAR SPINE, UNSPECIFIED SCOLIOSIS TYPE: ICD-10-CM

## 2019-01-15 DIAGNOSIS — M16.0 ARTHRITIS OF BOTH HIPS: ICD-10-CM

## 2019-01-15 DIAGNOSIS — M51.36 DDD (DEGENERATIVE DISC DISEASE), LUMBAR: ICD-10-CM

## 2019-01-15 DIAGNOSIS — M51.26 PROTRUSION OF LUMBAR INTERVERTEBRAL DISC: ICD-10-CM

## 2019-01-15 PROCEDURE — 99213 OFFICE O/P EST LOW 20 MIN: CPT | Performed by: PHYSICIAN ASSISTANT

## 2019-01-15 NOTE — LETTER
January 16, 2019     Endy Dempsey, [address] [place], [place] [id number]    Patient: Farrah Wayne   YOB: 1960   Date of Visit: 1/15/2019       Dear Dr Jorgito De Souza: Thank you for referring Nino Stafford to me for evaluation  Below are my notes for this consultation  If you have questions, please do not hesitate to call me  I look forward to following your patient along with you  Sincerely,        Lucina Meigs, PA-C        CC: Wilhelmenia Passer, MD Lucina Meigs, PA-C  1/16/2019  4:50 AM  Sign at close encounter  Assessment/Plan:    Lumbar spondylosis    Scoliosis of lumbar spine, unspecified scoliosis type    Protrusion of lumbar intervertebral disc    DDD (degenerative disc disease), lumbar    Leg pain, right    Arthritis of both hips      Discussion / Summary: This is a 60 y o  male with proximal right leg pain who presents for follow up s/p lumbar flexion/extension xray  Lumbar spine MRI (9/17/18 ) and Lumbar spine xray ( 1/7/19 )  was reviewed in detail by Dr Irma Gonzalez  There is right facet arthropathy L5-S1 but no significant stenosis  There is mild disc bulge L4-L5 and L3-L4  There is left foraminal stenosis at L3-L4  Lumbar xray demonstrates dextroscoliosis of lumbar spine but not of severe degree  There is minimal retrolisthesis present at L4-L5 of approximately 3mm but this is unchanged during flexion and extension  There is DDD at L3-L4 and L4-L5  There is reports of severe arthritis of both hips  Dr Irma Gonzalez does not recommend neurosurgical intervention at this juncture  Conservative management is recommended at this juncture  Mr Radha Rossi has established care with Dr Kaylyn Gomez of Pain Management  Recent xray of hips demonstrated severe osteoarthritis of both hips and there is suspicion that his proximal right leg pain is associated with his hip arthritis    Patient reports Dr Kaylyn Gomez has suggested a right hip injection which patient reports he is planning to schedule with Dr Alfredo Rao  Advised Mr Montes to discuss with Dr Alfredo Rao about considering seeing an Orthopedic provider for arthritis of hips  Further neurosurgical follow-up with our office may be on an as-needed basis from neurosurgical standpoint  Patient advised to contact our office or present to hospital Emergency Room if experience worsening or new back pain, sensory or motor change, bladder or bowel dysfunction, or other neurological change  These findings and recommendations were discussed in detail with patient  Patient expressed understanding and agreement     ____________________________________________________________________________    Subjective:      Patient ID: Madhav Klein is a 60 y o  male with proximal right leg pain for about 1 year  HPI  Please refer to neurosurgery consultation note 12/26/18 for detailed history  Mr Nino Duran returns s/p requested L-spine xray  In interval since last office visit he has seen Dr Alfredo Rao of Pain Management -- noted suspicion for his pain being related to right hip  Patient underwent xray of hips which noted severe bilateral hip osteoarthritis  Patient reports Dr Alfredo Rao has suggested a right hip injection which patient reports he is planning to schedule with Dr Alfredo Rao  Patient denies low back pain  He reports pain around the right hip region that extends down the proximal right anterior leg stopping above knee -- rates as 7/10 on pain scale  This is aggravated by walking  Occasionally does he experience any pain below the right knee  He reports very little discomfort left hip region  He denies numbness or tingling of his lower extremities  He reports weakness of his legs that he feels occurs when walking up/down steps  He reports he has had this for about 1 and half to 2 years  He ambulates independent       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     Patient reports he underwent chiropractic treatment weekly in September 2018 in November 2018 but indicated this aggravated his pain  Patient reports he underwent physical therapy around 2015  Reports taking gabapentin 300 mg in a m  and 300 mg in p m  occasional Aleve  He denies undergone low back injections  He denies bladder or bowel dysfunction or saddle paresthesias  The following portions of the patient's history were reviewed and updated as appropriate: allergies, current medications, past family history, past medical history, past social history and past surgical history  Review of Systems   Constitutional: Negative for chills and fever  HENT: Positive for congestion  Eyes: Negative for visual disturbance  Wear glasses    Respiratory: Negative for shortness of breath  Cardiovascular: Negative for chest pain  Gastrointestinal: Negative for nausea and vomiting  Denies bowel dysfunction    Genitourinary: Negative for difficulty urinating  Musculoskeletal: Negative for back pain  Skin: Negative for rash  Neurological: Negative for dizziness and numbness  See HPI   Psychiatric/Behavioral: Negative for agitation  Objective:      /78 (BP Location: Left arm, Patient Position: Sitting, Cuff Size: Standard)   Pulse 70   Temp 98 °F (36 7 °C) (Temporal)   Ht 5' 7 75" (1 721 m)   Wt 75 5 kg (166 lb 6 4 oz)   BMI 25 49 kg/m²           Physical Exam   Constitutional: He appears well-developed and well-nourished  No distress  HENT:   Head: Normocephalic  Eyes: Conjunctivae are normal  No scleral icterus  Cardiovascular: Normal rate  Pulmonary/Chest: Effort normal    Musculoskeletal:        Lumbar back: He exhibits no tenderness and no deformity  SNEHA test right hip elicits proximal right leg pain  SNEHA test left hip elicits minimal discomfort left proximal leg  No tenderness on palpation lateral hips     Neurological: He is alert  Reflex Scores:       Tricep reflexes are 1+ on the right side and 1+ on the left side  Bicep reflexes are 1+ on the right side and 1+ on the left side  Brachioradialis reflexes are 2+ on the right side and 2+ on the left side  Patellar reflexes are 1+ on the right side and 1+ on the left side  Achilles reflexes are 1+ on the right side and 1+ on the left side  Skin: Skin is warm and dry  Psychiatric: He has a normal mood and affect  His speech is normal        Neurologic Exam     Mental Status   Speech: speech is normal   Level of consciousness: alert    Motor Exam   Motor:  Shoulder abduction 5/5 bilaterally  Elbow flexion/extension 5/5 bilaterally  Wrist flexion/extension 5/5 bilaterally  Finger  / abduction 5/5 bilaterally  Hip flexion left 5/5 and right 4+/5 (pain proximal right leg)  Hip abduction/adduction 5/5 bilaterally  Knee flexion/extension 5/5 bilaterally  Ankle DF/PF 5/5 bilaterally  Great toe DF 5/5 bilaterally  Sensory Exam   Sensation to pinprick intact b/l upper extremities, torso, and b/l lower extremities  JPS and Vibratory sense intact b/l thumbs and great toes  Gait, Coordination, and Reflexes     Gait  Gait: (Independent  Mildly antalgic appearing  No buckling or giveaway of lowrer extremities )    Tremor   Resting tremor: absent    Reflexes   Right brachioradialis: 2+  Left brachioradialis: 2+  Right biceps: 1+  Left biceps: 1+  Right triceps: 1+  Left triceps: 1+  Right patellar: 1+  Left patellar: 1+  Right achilles: 1+  Left achilles: 1+  Right plantar: normal  Left plantar: normal  Right ankle clonus: absent  Left ankle clonus: absent         Imaging study:    1/7/19 [facility] L-spine xray -- per report:  " LUMBAR SPINE     INDICATION:   M47 816: Spondylosis without myelopathy or radiculopathy, lumbar region  M54 16: Radiculopathy, lumbar region  M51 26:  Other intervertebral disc displacement, lumbar region      COMPARISON: None     VIEWS:  XR SPINE LUMBAR COMPLETE W BENDING MINIMUM 6 VIEWS        FINDINGS:     There is no evidence of acute fracture or destructive osseous lesion      There is dextroscoliosis of the lumbar spine  Minimal retrolisthesis at L4-L5 of approximately 3 mm noted, this is unchanged during flexion and extension      There is degenerative disc disease of the lumbar spine with narrowing present at L3-L4 and L4-L5  Also noted is severe arthritis of both hips  The pedicles appear intact      Soft tissues are unremarkable      IMPRESSION:     1  There is dextroscoliosis of the lumbar spine  There is minimal retrolisthesis present at L4-L5 without abnormal motion during flexion and extension  2  There is no compression fracture  3   There is severe osteoarthritis of the hips           Workstation performed: [id number] "

## 2019-01-15 NOTE — PROGRESS NOTES
Assessment/Plan:    Lumbar spondylosis    Scoliosis of lumbar spine, unspecified scoliosis type    Protrusion of lumbar intervertebral disc    DDD (degenerative disc disease), lumbar    Leg pain, right    Arthritis of both hips      Discussion / Summary: This is a 60 y o  male with proximal right leg pain who presents for follow up s/p lumbar flexion/extension xray  Lumbar spine MRI (9/17/18 ) and Lumbar spine xray ( 1/7/19 )  was reviewed in detail by Dr John Rivera  There is right facet arthropathy L5-S1 but no significant stenosis  There is mild disc bulge L4-L5 and L3-L4  There is left foraminal stenosis at L3-L4  Lumbar xray demonstrates dextroscoliosis of lumbar spine but not of severe degree  There is minimal retrolisthesis present at L4-L5 of approximately 3mm but this is unchanged during flexion and extension  There is DDD at L3-L4 and L4-L5  There is reports of severe arthritis of both hips  Dr John Rivera does not recommend neurosurgical intervention at this juncture  Conservative management is recommended at this juncture  Mr Nino Duran has established care with Dr Alfredo Rao of Pain Management  Recent xray of hips demonstrated severe osteoarthritis of both hips and there is suspicion that his proximal right leg pain is associated with his hip arthritis  Patient reports Dr Alfredo Rao has suggested a right hip injection which patient reports he is planning to schedule with Dr Alfredo Rao  Advised Mr Montes to discuss with Dr Alfredo Rao about considering seeing an Orthopedic provider for arthritis of hips  Further neurosurgical follow-up with our office may be on an as-needed basis from neurosurgical standpoint  Patient advised to contact our office or present to hospital Emergency Room if experience worsening or new back pain, sensory or motor change, bladder or bowel dysfunction, or other neurological change      These findings and recommendations were discussed in detail with patient  Patient expressed understanding and agreement     ____________________________________________________________________________    Subjective:      Patient ID: Ghislaine North is a 60 y o  male with proximal right leg pain for about 1 year  HPI  Please refer to neurosurgery consultation note 12/26/18 for detailed history  Mr Greg Raymond returns s/p requested L-spine xray  In interval since last office visit he has seen Dr Jason Armendariz of Pain Management -- noted suspicion for his pain being related to right hip  Patient underwent xray of hips which noted severe bilateral hip osteoarthritis  Patient reports Dr Jason Armendariz has suggested a right hip injection which patient reports he is planning to schedule with Dr Jason Armendariz  Patient denies low back pain  He reports pain around the right hip region that extends down the proximal right anterior leg stopping above knee -- rates as 7/10 on pain scale  This is aggravated by walking  Occasionally does he experience any pain below the right knee  He reports very little discomfort left hip region  He denies numbness or tingling of his lower extremities  He reports weakness of his legs that he feels occurs when walking up/down steps  He reports he has had this for about 1 and half to 2 years  He ambulates independent  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  Patient reports he underwent chiropractic treatment weekly in September 2018 in November 2018 but indicated this aggravated his pain  Patient reports he underwent physical therapy around 2015  Reports taking gabapentin 300 mg in a m  and 300 mg in p m  occasional Aleve  He denies undergone low back injections  He denies bladder or bowel dysfunction or saddle paresthesias      The following portions of the patient's history were reviewed and updated as appropriate: allergies, current medications, past family history, past medical history, past social history and past surgical history  Review of Systems   Constitutional: Negative for chills and fever  HENT: Positive for congestion  Eyes: Negative for visual disturbance  Wear glasses    Respiratory: Negative for shortness of breath  Cardiovascular: Negative for chest pain  Gastrointestinal: Negative for nausea and vomiting  Denies bowel dysfunction    Genitourinary: Negative for difficulty urinating  Musculoskeletal: Negative for back pain  Skin: Negative for rash  Neurological: Negative for dizziness and numbness  See HPI   Psychiatric/Behavioral: Negative for agitation  Objective:      /78 (BP Location: Left arm, Patient Position: Sitting, Cuff Size: Standard)   Pulse 70   Temp 98 °F (36 7 °C) (Temporal)   Ht 5' 7 75" (1 721 m)   Wt 75 5 kg (166 lb 6 4 oz)   BMI 25 49 kg/m²          Physical Exam   Constitutional: He appears well-developed and well-nourished  No distress  HENT:   Head: Normocephalic  Eyes: Conjunctivae are normal  No scleral icterus  Cardiovascular: Normal rate  Pulmonary/Chest: Effort normal    Musculoskeletal:        Lumbar back: He exhibits no tenderness and no deformity  SNEHA test right hip elicits proximal right leg pain  SNEHA test left hip elicits minimal discomfort left proximal leg  No tenderness on palpation lateral hips  Neurological: He is alert  Reflex Scores:       Tricep reflexes are 1+ on the right side and 1+ on the left side  Bicep reflexes are 1+ on the right side and 1+ on the left side  Brachioradialis reflexes are 2+ on the right side and 2+ on the left side  Patellar reflexes are 1+ on the right side and 1+ on the left side  Achilles reflexes are 1+ on the right side and 1+ on the left side  Skin: Skin is warm and dry  Psychiatric: He has a normal mood and affect   His speech is normal        Neurologic Exam     Mental Status   Speech: speech is normal Level of consciousness: alert    Motor Exam   Motor:  Shoulder abduction 5/5 bilaterally  Elbow flexion/extension 5/5 bilaterally  Wrist flexion/extension 5/5 bilaterally  Finger  / abduction 5/5 bilaterally  Hip flexion left 5/5 and right 4+/5 (pain proximal right leg)  Hip abduction/adduction 5/5 bilaterally  Knee flexion/extension 5/5 bilaterally  Ankle DF/PF 5/5 bilaterally  Great toe DF 5/5 bilaterally  Sensory Exam   Sensation to pinprick intact b/l upper extremities, torso, and b/l lower extremities  JPS and Vibratory sense intact b/l thumbs and great toes  Gait, Coordination, and Reflexes     Gait  Gait: (Independent  Mildly antalgic appearing  No buckling or giveaway of lowrer extremities )    Tremor   Resting tremor: absent    Reflexes   Right brachioradialis: 2+  Left brachioradialis: 2+  Right biceps: 1+  Left biceps: 1+  Right triceps: 1+  Left triceps: 1+  Right patellar: 1+  Left patellar: 1+  Right achilles: 1+  Left achilles: 1+  Right plantar: normal  Left plantar: normal  Right ankle clonus: absent  Left ankle clonus: absent         Imaging study:    1/7/19 [facility] L-spine xray -- per report:  " LUMBAR SPINE     INDICATION:   M47 816: Spondylosis without myelopathy or radiculopathy, lumbar region  M54 16: Radiculopathy, lumbar region  M51 26: Other intervertebral disc displacement, lumbar region      COMPARISON:  None     VIEWS:  XR SPINE LUMBAR COMPLETE W BENDING MINIMUM 6 VIEWS        FINDINGS:     There is no evidence of acute fracture or destructive osseous lesion      There is dextroscoliosis of the lumbar spine  Minimal retrolisthesis at L4-L5 of approximately 3 mm noted, this is unchanged during flexion and extension      There is degenerative disc disease of the lumbar spine with narrowing present at L3-L4 and L4-L5  Also noted is severe arthritis of both hips  The pedicles appear intact      Soft tissues are unremarkable      IMPRESSION:     1   There is dextroscoliosis of the lumbar spine  There is minimal retrolisthesis present at L4-L5 without abnormal motion during flexion and extension  2  There is no compression fracture  3   There is severe osteoarthritis of the hips           Workstation performed: [id number] "

## 2019-01-15 NOTE — PATIENT INSTRUCTIONS
Recommend you continue Pain Management follow up with Dr Lou Ma  Recommend you discuss with Dr Lou Ma about considering seeing an Orthopedic provider for arthritis of hips  Contact our office or present to hospital Emergency Room if experience worsening or new back pain, sensory or motor change, bladder or bowel dysfunction, or other neurological change

## 2019-01-29 ENCOUNTER — HOSPITAL ENCOUNTER (OUTPATIENT)
Dept: RADIOLOGY | Facility: CLINIC | Age: 59
Discharge: HOME | End: 2019-01-29
Attending: ANESTHESIOLOGY | Admitting: ANESTHESIOLOGY
Payer: COMMERCIAL

## 2019-01-29 VITALS
RESPIRATION RATE: 16 BRPM | SYSTOLIC BLOOD PRESSURE: 137 MMHG | DIASTOLIC BLOOD PRESSURE: 88 MMHG | TEMPERATURE: 98.2 F | HEART RATE: 71 BPM | OXYGEN SATURATION: 97 %

## 2019-01-29 DIAGNOSIS — M16.11 PRIMARY LOCALIZED OSTEOARTHRITIS OF RIGHT HIP: ICD-10-CM

## 2019-01-29 PROCEDURE — 77002 NEEDLE LOCALIZATION BY XRAY: CPT

## 2019-01-29 PROCEDURE — 77002 NEEDLE LOCALIZATION BY XRAY: CPT | Performed by: ANESTHESIOLOGY

## 2019-01-29 PROCEDURE — 20610 DRAIN/INJ JOINT/BURSA W/O US: CPT | Performed by: ANESTHESIOLOGY

## 2019-01-29 RX ORDER — 0.9 % SODIUM CHLORIDE 0.9 %
10 VIAL (ML) INJECTION ONCE
Status: COMPLETED | OUTPATIENT
Start: 2019-01-29 | End: 2019-01-29

## 2019-01-29 RX ORDER — METHYLPREDNISOLONE ACETATE 80 MG/ML
80 INJECTION, SUSPENSION INTRA-ARTICULAR; INTRALESIONAL; INTRAMUSCULAR; PARENTERAL; SOFT TISSUE ONCE
Status: COMPLETED | OUTPATIENT
Start: 2019-01-29 | End: 2019-01-29

## 2019-01-29 RX ORDER — BUPIVACAINE HCL/PF 2.5 MG/ML
30 VIAL (ML) INJECTION ONCE
Status: COMPLETED | OUTPATIENT
Start: 2019-01-29 | End: 2019-01-29

## 2019-01-29 RX ADMIN — Medication 4 ML: at 14:56

## 2019-01-29 RX ADMIN — IOHEXOL 1 ML: 300 INJECTION, SOLUTION INTRAVENOUS at 14:50

## 2019-01-29 RX ADMIN — SODIUM CHLORIDE 3 ML: 9 INJECTION, SOLUTION INTRAMUSCULAR; INTRAVENOUS; SUBCUTANEOUS at 14:57

## 2019-01-29 RX ADMIN — METHYLPREDNISOLONE ACETATE 80 MG: 80 INJECTION, SUSPENSION INTRA-ARTICULAR; INTRALESIONAL; INTRAMUSCULAR; PARENTERAL; SOFT TISSUE at 14:51

## 2019-01-29 RX ADMIN — Medication 3 ML: at 14:56

## 2019-01-29 NOTE — DISCHARGE INSTR - LAB
Steroid Joint Injection   WHAT YOU NEED TO KNOW:   A steroid joint injection is a procedure to inject steroid medicine into a joint  Steroid medicine decreases pain and inflammation  The injection may also contain an anesthetic (numbing medicine) to decrease pain  It may be done to treat conditions such as arthritis, gout, or carpal tunnel syndrome  The injections may be given in your knee, ankle, shoulder, elbow, wrist, ankle or sacroiliac joint  1  Do not apply heat to any area that is numb  If you have discomfort or soreness at the injection site, you may apply ice today, 20 minutes on and 20 minutes off  Tomorrow you may use ice or warm, moist heat  Do not apply ice or heat directly to the skin  2  You may have an increase or change in the discomfort for 36-48 hours after your treatment  Apply ice and continue with any pain medicine you have been prescribed  3  Do not do anything strenuous today  You may shower, but no tub baths or hot tubs today  You may resume your normal activities tomorrow, but do not overdo it  Resume normal activities slowly when you are feeling better  4  If you experience redness, drainage or swelling at the injection site, or if you develop a fever above 100 degrees, please call [facility] at [phone] or go to the Emergency Room  5  Continue to take all routine medicines prescribed by your primary care physician unless otherwise instructed by our staff  Most blood thinners should be started again according to your regularly scheduled dosing  If you have any questions, please give our office a call  If you have a problem specifically related to your procedure, please call our office at [phone]  Problems not related to your procedure should be directed to your primary care physician

## 2019-01-29 NOTE — H&P
History of Present Illness: The patient is a 60 y o  male who presents with complaints of right hip pain secondary to hip osteoarthritis and is here today for right hip injection      Patient Active Problem List   Diagnosis    Allergic rhinitis    Glaucoma    Sciatica    Short-term memory loss    Mild cognitive impairment    Abnormal EEG    Increased prostate specific antigen (PSA) velocity    Right hip pain    Lumbar spondylosis    Primary localized osteoarthritis of right hip       Past Medical History:   Diagnosis Date    Benign colon polyp     Diverticulitis, colon     Diverticulosis     Hyperlipidemia     Prediabetes     Renal calculi     Short-term memory loss     Vitamin D deficiency        Past Surgical History:   Procedure Laterality Date    COLONOSCOPY      ESOPHAGOGASTRODUODENOSCOPY      HERNIA REPAIR      KNEE SURGERY           Current Outpatient Prescriptions:     Cetirizine HCl (ZYRTEC ALLERGY PO), Take 1 tablet by mouth daily as needed, Disp: , Rfl:     cholecalciferol (VITAMIN D3) 1,000 units tablet, Take 4,000 Units by mouth daily, Disp: , Rfl:     donepezil (ARICEPT) 10 mg tablet, Take 1 tablet (10 mg total) by mouth daily, Disp: 30 tablet, Rfl: 5    gabapentin (NEURONTIN) 300 mg capsule, Take 1 tab nightly x 1 wk and increase to 1 tab BID as tolerated (Patient taking differently: Take 300 mg by mouth 2 (two) times a day Take 1 tab nightly x 1 wk and increase to 1 tab BID as tolerated ), Disp: 60 capsule, Rfl: 3    Lactobacillus (PROBIOTIC ACIDOPHILUS) CAPS, Take by mouth once, Disp: , Rfl:     LUMIGAN 0 01 % ophthalmic drops, , Disp: , Rfl:     naproxen sodium (ALEVE) 220 MG tablet, Take 440 mg by mouth daily as needed for mild pain, Disp: , Rfl:     TURMERIC PO, Take by mouth daily, Disp: , Rfl:     Current Facility-Administered Medications:     bupivacaine (PF) (MARCAINE) 0 25 % injection 30 mL, 30 mL, Intra-articular, Once, Nika Galeano MD    iohexol (OMNIPAQUE) 300 mg/mL injection 50 mL, 50 mL, Intra-articular, Once, Cale Sen MD    lidocaine (PF) (XYLOCAINE-MPF) 2 % injection 5 mL, 5 mL, Infiltration, Once, Cale Sen MD    methylPREDNISolone acetate (DEPO-MEDROL) injection 80 mg, 80 mg, Intra-articular, Once, Cale Sen MD    sodium chloride (PF) 0 9 % injection 10 mL, 10 mL, Infiltration, Once, Cale Sen MD    Allergies   Allergen Reactions    Sulfa Antibiotics        Physical Exam:   Vitals:    01/29/19 1439   BP: 128/79   Pulse: 69   Resp: 20   Temp: 98 2 °F (36 8 °C)   SpO2: 96%     General: Awake, Alert, Oriented x 3, Mood and affect appropriate  Respiratory: Respirations even and unlabored  Cardiovascular: Peripheral pulses intact; no edema  Musculoskeletal Exam:   Right hip pain    ASA Score: 2    Patient/Chart Verification  Patient ID Verified: Verbal  Consents Confirmed: Procedural, To be obtained in the Pre-Procedure area  H&P( within 30 days) Verified: To be obtained in the Pre-Procedure area  Allergies Reviewed: Yes  Anticoag/NSAID held?: NA  Currently on antibiotics?: No    Assessment:   1   Primary localized osteoarthritis of right hip        Plan: Right Hip Intra-articular Steroid Injection

## 2019-02-05 ENCOUNTER — TELEPHONE (OUTPATIENT)
Dept: PAIN MEDICINE | Facility: CLINIC | Age: 59
End: 2019-02-05

## 2019-02-11 DIAGNOSIS — F02.80 EARLY ONSET ALZHEIMER'S DEMENTIA WITHOUT BEHAVIORAL DISTURBANCE (HCC): ICD-10-CM

## 2019-02-11 DIAGNOSIS — G30.0 EARLY ONSET ALZHEIMER'S DEMENTIA WITHOUT BEHAVIORAL DISTURBANCE (HCC): ICD-10-CM

## 2019-02-11 RX ORDER — DONEPEZIL HYDROCHLORIDE 10 MG/1
10 TABLET, FILM COATED ORAL DAILY
Qty: 90 TABLET | Refills: 1 | Status: SHIPPED | OUTPATIENT
Start: 2019-02-11 | End: 2019-08-26 | Stop reason: SDUPTHER

## 2019-02-11 NOTE — TELEPHONE ENCOUNTER
Patient will talk with his wife and call us back  Patient stated that after talking to Dr Alfredo Rao he might just need surgery  Patient might want to move fwd with that

## 2019-02-18 ENCOUNTER — TELEPHONE (OUTPATIENT)
Dept: UROLOGY | Facility: AMBULATORY SURGERY CENTER | Age: 59
End: 2019-02-18

## 2019-02-18 ENCOUNTER — OFFICE VISIT (OUTPATIENT)
Dept: FAMILY MEDICINE CLINIC | Facility: OTHER | Age: 59
End: 2019-02-18
Payer: COMMERCIAL

## 2019-02-18 VITALS
HEART RATE: 76 BPM | WEIGHT: 166.5 LBS | TEMPERATURE: 97.5 F | HEIGHT: 68 IN | DIASTOLIC BLOOD PRESSURE: 80 MMHG | BODY MASS INDEX: 25.23 KG/M2 | SYSTOLIC BLOOD PRESSURE: 114 MMHG | OXYGEN SATURATION: 99 %

## 2019-02-18 DIAGNOSIS — M25.551 RIGHT HIP PAIN: ICD-10-CM

## 2019-02-18 DIAGNOSIS — R97.20 INCREASED PROSTATE SPECIFIC ANTIGEN (PSA) VELOCITY: ICD-10-CM

## 2019-02-18 DIAGNOSIS — M16.11 PRIMARY LOCALIZED OSTEOARTHRITIS OF RIGHT HIP: ICD-10-CM

## 2019-02-18 DIAGNOSIS — Z23 IMMUNIZATION DUE: ICD-10-CM

## 2019-02-18 DIAGNOSIS — Z12.5 SCREENING FOR PROSTATE CANCER: ICD-10-CM

## 2019-02-18 DIAGNOSIS — Z13.1 SCREENING FOR DIABETES MELLITUS: ICD-10-CM

## 2019-02-18 DIAGNOSIS — N40.0 ENLARGED PROSTATE: ICD-10-CM

## 2019-02-18 DIAGNOSIS — R41.3 SHORT-TERM MEMORY LOSS: Primary | ICD-10-CM

## 2019-02-18 DIAGNOSIS — Z13.220 SCREENING FOR HYPERLIPIDEMIA: ICD-10-CM

## 2019-02-18 DIAGNOSIS — J30.9 ALLERGIC RHINITIS, UNSPECIFIED SEASONALITY, UNSPECIFIED TRIGGER: ICD-10-CM

## 2019-02-18 DIAGNOSIS — M54.31 SCIATICA OF RIGHT SIDE: ICD-10-CM

## 2019-02-18 DIAGNOSIS — G31.84 MILD COGNITIVE IMPAIRMENT: ICD-10-CM

## 2019-02-18 PROBLEM — E78.5 HYPERLIPIDEMIA: Status: RESOLVED | Noted: 2019-02-18 | Resolved: 2019-02-18

## 2019-02-18 PROCEDURE — 99214 OFFICE O/P EST MOD 30 MIN: CPT | Performed by: NURSE PRACTITIONER

## 2019-02-18 RX ORDER — BRIMONIDINE TARTRATE 1.5 MG/ML
SOLUTION/ DROPS OPHTHALMIC
COMMUNITY
Start: 2019-01-28

## 2019-02-18 NOTE — TELEPHONE ENCOUNTER
Reason for appointment/Complaint/Diagnosis : Increased prostate specific antigen (PSA) velocity  Enlarged prostate  Screening for prostate cancer    Insurance: [facility]    History of Cancer? no                       If yes, what kind? Previous urologist?     None                  Records requested/where? In [address] [place] [address] Rd    Outside testing/where? In [facility]    Location Preference for office visit?  [place] [facility] with Dr Sunshine Mccormick

## 2019-02-18 NOTE — PROGRESS NOTES
Assessment/Plan:         Problem List Items Addressed This Visit     Nervous and Auditory    Mild cognitive impairment   --Stable on Aricept  --Followed by [address] [place] [address] neurology  --Encouraged to continue regular walking, reading, staying socially active  Musculoskeletal and Integument    Primary localized osteoarthritis of right hip  Sciatica  --Remains on gabapentin with occasional use of Aleve (which he knows to avoid in excess)    Turmuric also  --Followed by ortho, pain management, neurosurgery  --He is considering THR sometime in the next year  Other    Enlarged prostate + Increased prostate specific antigen (PSA) velocity + FH prostate cancer  --Never got previously ordered repeat PSA done, which he was urged to do in the next 2 weeks  --Minmally symptomatic, but given enlarged prostate on exam, strong FH (father, brother), will refer to urology for further evaluation    Relevant Orders    PSA, Total Screen    Ambulatory referral to Urology    Respiratory   Allergic rhinitis  --Controlled with Zyrtec  Other Visit Diagnoses     Immunization due        Relevant Medications    Zoster Vac Recomb Adjuvanted ([address]) 48 MCG/0 5ML SUSR    Screening for diabetes mellitus        Relevant Orders    CBC and differential    Comprehensive metabolic panel    Hemoglobin A1C    TSH, 3rd generation with Free T4 reflex    Urinalysis with reflex to microscopic    Screening for hyperlipidemia        Relevant Orders    Lipid Panel with Direct LDL reflex    Screening for prostate cancer        Relevant Orders    PSA, Total Screen    Ambulatory referral to Urology          UTD with colonoscopy, eye exam    Living will/advanced directive packet given for both him and his wife  Encouraged to complete  Written Rx given for Shingrix  Declines flu shot  RTO 1 year  Subjective:      Patient ID: Antonio Prince is a 60 y o  male        Here for routine well exam vs  follow-up (not sure about insurance coverage for repeat physical)  Would like slip for routine blood work  No acute complaints or issues  Had sinus infection 2 months ago  No further sinus pain, but ongoing intermittent nasal congestion which he attributes to allergies  Taking Zyrtec  No nasal spray  Ongoing bilateral hip pain, mainly right sided  Felt lower back also  Followed by ortho, pain management for this  Has also seen neurosurgery  Had recent right hip injection which helped a bit  Was told he probably needs bilateral THR  He is in a wedding in April  Holding off on any surgery until afterwards  Gabapentin helps somewhat with pain  Takes Aleve on occasional basis only  Still able to work and walk the dog 2-3 times a day  No acute weakness, falls  No worsening of MCI, mild short term memory loss  Remains on Aricept  Followed by neuro with upcoming appointment  Still able to perform his job adequately  Stays active with his mind, reads regularly  Adequate support system (coworkers, family)  Denies feeling down/depressed  MRI with neuroquant 3/2018 showed diffuse neurodegenerative process  Fairly healthy diet with regular fruits and vegetables  Adequate fiber  Tends to avoid fried, sugary, overly processed foods  Occasional social alcohol  No drug use  Former smoker--quit 1992  No cough, dyspnea  No recent worsening of urination  Goes 1-2 times a night on averaged  Occasional frequency during the day, but no dysuria, hematuria, decreased flow/stream, hesitancy, incontinence  Positive FH prostate cancer (father, brother)  Last PSA (6/2018) showed PSA 3 6, increased from previous  Was supposed to have repeat in 6 months which he never got done  Colonoscopy 2017  Benign polyps  Repeat in 4 years  No recent stool changes including C/D, melena, hematochezia  Eye glasses UTD  Has exam scheduled this summer  Tdap 2012  Declines flu shot  Would like Shingles vaccine, however     No advanced directive  The following portions of the patient's history were reviewed and updated as appropriate: current medications, past family history, past medical history, past social history, past surgical history and problem list     Review of Systems   Constitutional: Negative for fatigue and fever  HENT: Positive for rhinorrhea  Negative for sore throat  Eyes: Negative for visual disturbance  Respiratory: Negative for cough and shortness of breath  Cardiovascular: Negative for chest pain and palpitations  Gastrointestinal: Negative for abdominal pain, constipation, diarrhea and vomiting  Genitourinary: Negative for difficulty urinating and dysuria  Musculoskeletal:        Per HPI   Skin: Negative for rash  Neurological: Negative for dizziness and headaches  Psychiatric/Behavioral: Negative for dysphoric mood  Objective:      /80 (BP Location: Right arm, Patient Position: Sitting, Cuff Size: Adult)   Pulse 76   Temp 97 5 °F (36 4 °C) (Tympanic)   Ht 5' 7 75" (1 721 m)   Wt 75 5 kg (166 lb 8 oz)   SpO2 99%   BMI 25 50 kg/m²          Physical Exam   Constitutional: He is oriented to person, place, and time  He appears well-developed and well-nourished  HENT:   Head: Normocephalic and atraumatic  Right Ear: External ear normal    Left Ear: External ear normal    Nose: Nose normal    Mouth/Throat: Oropharynx is clear and moist    Eyes: Pupils are equal, round, and reactive to light  Conjunctivae are normal    Neck: Normal range of motion  Neck supple  Cardiovascular: Normal rate, regular rhythm, normal heart sounds and intact distal pulses  No carotid bruit  Pulmonary/Chest: Effort normal and breath sounds normal    Abdominal: Soft  Bowel sounds are normal  There is no tenderness  Genitourinary: Rectum normal and penis normal  Rectal exam shows guaiac negative stool  No penile tenderness     Genitourinary Comments: Prostate firm, non-tender with moderate symmetric enlargement  No nodularity noted  Lymphadenopathy:     He has no cervical adenopathy  Neurological: He is alert and oriented to person, place, and time  He has normal reflexes  Skin: Skin is warm and dry  Psychiatric: He has a normal mood and affect

## 2019-02-19 ENCOUNTER — TELEPHONE (OUTPATIENT)
Dept: NEUROLOGY | Facility: CLINIC | Age: 59
End: 2019-02-19

## 2019-02-19 NOTE — TELEPHONE ENCOUNTER
Called patient- lmom awaiting a call back  Patient is scheduled with Dr Judi Rodriguez at 80 am on Thursday 2/21/19  I was calling to see if the patient can come in at 9am instead  When patient calls back please see if this is okay      Thank you

## 2019-02-21 ENCOUNTER — OFFICE VISIT (OUTPATIENT)
Dept: NEUROLOGY | Facility: CLINIC | Age: 59
End: 2019-02-21
Payer: COMMERCIAL

## 2019-02-21 VITALS
HEIGHT: 68 IN | SYSTOLIC BLOOD PRESSURE: 110 MMHG | RESPIRATION RATE: 14 BRPM | HEART RATE: 81 BPM | DIASTOLIC BLOOD PRESSURE: 68 MMHG | WEIGHT: 166 LBS | BODY MASS INDEX: 25.16 KG/M2

## 2019-02-21 DIAGNOSIS — M16.0 OSTEOARTHRITIS OF BOTH HIPS, UNSPECIFIED OSTEOARTHRITIS TYPE: ICD-10-CM

## 2019-02-21 DIAGNOSIS — G31.84 MILD COGNITIVE IMPAIRMENT: Primary | ICD-10-CM

## 2019-02-21 DIAGNOSIS — M47.816 LUMBAR SPONDYLOSIS: ICD-10-CM

## 2019-02-21 PROCEDURE — 99214 OFFICE O/P EST MOD 30 MIN: CPT | Performed by: PSYCHIATRY & NEUROLOGY

## 2019-02-21 NOTE — TELEPHONE ENCOUNTER
Patient called back at this time, I reviewed original message and to disregard  Patient verbalized understanding

## 2019-02-21 NOTE — PROGRESS NOTES
Patient ID: Tyrese Armendariz is a 60 y o  male  Assessment/Plan:    No problem-specific Assessment & Plan notes found for this encounter  Diagnoses and all orders for this visit:    Mild cognitive impairment- amnestic MCI thus increased risk of Alzheimer's type dementia  He is doing all his ADLs and continues to work at [employer] the same he has for the last 37 years with no difficulty  - Aricept 10 mg daily continue  - MOCA today 20/30 vs 18/30 May 2018  - No significant depression or anxiety or mood lability, no driving issues  - Discussed staying active cognitively socially physically to help slow down cognitive decline     Osteoarthritis of both hips, unspecified osteoarthritis type- severe OA b/l hips per recent Xray with right worse than left pain and antalgic gait  -     Ambulatory referral to Orthopedic Surgery; Future    Lumbar spondylosis- seen neurosurgery conservative care from their stand point  No myelopathic signs, strength sensation intact  - Gabapentin 300 BID helpful with pain- continue  No s/e     Follow up in four months time      Subjective:    HPI     Mr Rashad Lam is a pleasant 61 yo male seen in follow up for gradual mild cognitive decline noted by friends and family with more short term memory loss, word finding difficulty and difficulty with complex tasks at work with new computer system- when I first saw him about a year ago at this time  Tells me he is doing well at work and no one at work has made any comments on him not doing well in terms of task completion etc, despite him being the second oldest steph there  States no cognitive decline since last seen- states stable  Taking Aricept daily with no side effects and memory and mood stabilization  States gabapentin 300 BID with benefit to low back pain  Denies side effects with this dosing  Has seen neurosurgery and cleared from their standpoint with his lumbar DDD    States he has a dog that keeps him going even when he is tired after coming home and he walks with him/runs with him etc   Denies driving issues or trouble with complex tasks  No sleep difficulty  Denies falls  States he is physically active at work all day every day as 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 etc     States he is likely due for bilateral hip surgery- with severe OA in both hips per recent 1/9/19 Xray and daily worsening pain  States he keeps moving with his nature of work but the daily pain is getting exhausting  He has not seen anyone for this and requests a consultation and thus referred him to orthopedic surgery today      Family history of cognitive impairment or maybe dementia noted in [de-identified]  States no inciting event before this started  States no history of Ca, personally  Denies tobacco use now- was intermittent for a few years in the past  Denies history alcohol abuse or drug use  The following portions of the patient's history were reviewed and updated as appropriate: allergies, current medications, past family history, past medical history, past social history, past surgical history and problem list and ROS         Objective:    Blood pressure 110/68, pulse 81, resp  rate 14, height 5' 7 75" (1 721 m), weight 75 3 kg (166 lb)  Physical Exam   Constitutional: He appears well-developed and well-nourished  HENT:   Head: Normocephalic and atraumatic  Eyes: Pupils are equal, round, and reactive to light  Conjunctivae and EOM are normal    Neck: Normal range of motion  Neck supple  Cardiovascular: Normal rate and regular rhythm  Pulmonary/Chest: Effort normal    Musculoskeletal: Normal range of motion  Neurological: He is alert  He has normal strength and normal reflexes  Coordination normal    Nursing note and vitals reviewed  Neurological Exam  Mental Status  Alert  Oriented to person, place, time and situation  Able to copy figure   Clock drawing is abnormal  no dysarthria present  Language is fluent with no aphasia  Unable to perform serial calculations  Fund of knowledge is appropriate for level of education  MOCA 20/30 for 0/5 recall and abnormal subtractions  Abnormal clock draw with hands  Cranial Nerves  CN II: Visual fields full to confrontation  CN III, IV, VI: Extraocular movements intact bilaterally  Pupils equal round and reactive to light bilaterally  CN V: Facial sensation is normal   CN VII: Full and symmetric facial movement  CN VIII: Hearing is normal   CN IX, X: Palate elevates symmetrically  Normal gag reflex  CN XI: Shoulder shrug strength is normal   CN XII: Tongue midline without atrophy or fasciculations  Motor   Normal muscle tone  Strength is 5/5 throughout all four extremities  Sensory  Sensation is intact to light touch, pinprick, vibration and proprioception in all four extremities  Light touch is normal in upper and lower extremities  Temperature is normal in upper and lower extremities  Vibration is normal in upper and lower extremities  No right-sided hemispatial neglect  No left-sided hemispatial neglect  Reflexes  Deep tendon reflexes are 2+ and symmetric in all four extremities with downgoing toes bilaterally  Coordination  Finger-to-nose, rapid alternating movements and heel-to-shin normal bilaterally without dysmetria  Gait  Wide based antalgic gait- no ataxia  ROS:    Review of Systems   Constitutional: Negative  Negative for appetite change and fever  HENT: Negative  Negative for hearing loss, tinnitus, trouble swallowing and voice change  Eyes: Negative  Negative for photophobia and pain  Respiratory: Negative  Negative for shortness of breath  Cardiovascular: Negative  Negative for palpitations  Gastrointestinal: Negative  Negative for nausea and vomiting  Endocrine: Negative  Negative for cold intolerance and heat intolerance  Genitourinary: Negative    Negative for dysuria, frequency and urgency  Musculoskeletal: Negative  Negative for myalgias and neck pain  Joint pain     Skin: Negative  Negative for rash  Neurological: Negative  Negative for dizziness, tremors, seizures, syncope, facial asymmetry, speech difficulty, weakness, light-headedness, numbness and headaches  Hematological: Negative  Does not bruise/bleed easily  Psychiatric/Behavioral: Negative  Negative for confusion, hallucinations and sleep disturbance

## 2019-02-25 ENCOUNTER — OFFICE VISIT (OUTPATIENT)
Dept: UROLOGY | Facility: CLINIC | Age: 59
End: 2019-02-25
Payer: COMMERCIAL

## 2019-02-25 VITALS
HEART RATE: 80 BPM | SYSTOLIC BLOOD PRESSURE: 150 MMHG | HEIGHT: 68 IN | WEIGHT: 165 LBS | BODY MASS INDEX: 25.01 KG/M2 | DIASTOLIC BLOOD PRESSURE: 82 MMHG

## 2019-02-25 DIAGNOSIS — R97.20 INCREASED PROSTATE SPECIFIC ANTIGEN (PSA) VELOCITY: ICD-10-CM

## 2019-02-25 DIAGNOSIS — Z12.5 SCREENING FOR PROSTATE CANCER: ICD-10-CM

## 2019-02-25 DIAGNOSIS — N40.0 ENLARGED PROSTATE: ICD-10-CM

## 2019-02-25 PROCEDURE — 99244 OFF/OP CNSLTJ NEW/EST MOD 40: CPT | Performed by: UROLOGY

## 2019-03-07 ENCOUNTER — APPOINTMENT (OUTPATIENT)
Dept: LAB | Facility: CLINIC | Age: 59
End: 2019-03-07
Payer: COMMERCIAL

## 2019-03-07 DIAGNOSIS — Z12.5 SCREENING FOR PROSTATE CANCER: ICD-10-CM

## 2019-03-07 DIAGNOSIS — Z13.1 SCREENING FOR DIABETES MELLITUS: ICD-10-CM

## 2019-03-07 DIAGNOSIS — Z13.220 SCREENING FOR HYPERLIPIDEMIA: ICD-10-CM

## 2019-03-07 DIAGNOSIS — N40.0 ENLARGED PROSTATE: ICD-10-CM

## 2019-03-07 DIAGNOSIS — R97.20 INCREASED PROSTATE SPECIFIC ANTIGEN (PSA) VELOCITY: ICD-10-CM

## 2019-03-07 LAB
ALBUMIN SERPL BCP-MCNC: 3.9 G/DL (ref 3.5–5)
ALP SERPL-CCNC: 56 U/L (ref 46–116)
ALT SERPL W P-5'-P-CCNC: 35 U/L (ref 12–78)
ANION GAP SERPL CALCULATED.3IONS-SCNC: 9 MMOL/L (ref 4–13)
AST SERPL W P-5'-P-CCNC: 16 U/L (ref 5–45)
BASOPHILS # BLD AUTO: 0.04 THOUSANDS/ΜL (ref 0–0.1)
BASOPHILS NFR BLD AUTO: 1 % (ref 0–1)
BILIRUB SERPL-MCNC: 0.7 MG/DL (ref 0.2–1)
BILIRUB UR QL STRIP: NEGATIVE
BUN SERPL-MCNC: 12 MG/DL (ref 5–25)
CALCIUM SERPL-MCNC: 9.1 MG/DL (ref 8.3–10.1)
CHLORIDE SERPL-SCNC: 105 MMOL/L (ref 100–108)
CHOLEST SERPL-MCNC: 205 MG/DL (ref 50–200)
CLARITY UR: CLEAR
CO2 SERPL-SCNC: 28 MMOL/L (ref 21–32)
COLOR UR: YELLOW
CREAT SERPL-MCNC: 0.92 MG/DL (ref 0.6–1.3)
EOSINOPHIL # BLD AUTO: 0.19 THOUSAND/ΜL (ref 0–0.61)
EOSINOPHIL NFR BLD AUTO: 4 % (ref 0–6)
ERYTHROCYTE [DISTWIDTH] IN BLOOD BY AUTOMATED COUNT: 13 % (ref 11.6–15.1)
GFR SERPL CREATININE-BSD FRML MDRD: 91 ML/MIN/1.73SQ M
GLUCOSE P FAST SERPL-MCNC: 107 MG/DL (ref 65–99)
GLUCOSE UR STRIP-MCNC: NEGATIVE MG/DL
HCT VFR BLD AUTO: 45.9 % (ref 36.5–49.3)
HDLC SERPL-MCNC: 75 MG/DL (ref 40–60)
HGB BLD-MCNC: 15.2 G/DL (ref 12–17)
HGB UR QL STRIP.AUTO: NEGATIVE
IMM GRANULOCYTES # BLD AUTO: 0.02 THOUSAND/UL (ref 0–0.2)
IMM GRANULOCYTES NFR BLD AUTO: 0 % (ref 0–2)
KETONES UR STRIP-MCNC: NEGATIVE MG/DL
LDLC SERPL CALC-MCNC: 117 MG/DL (ref 0–100)
LEUKOCYTE ESTERASE UR QL STRIP: NEGATIVE
LYMPHOCYTES # BLD AUTO: 1.57 THOUSANDS/ΜL (ref 0.6–4.47)
LYMPHOCYTES NFR BLD AUTO: 30 % (ref 14–44)
MCH RBC QN AUTO: 30.3 PG (ref 26.8–34.3)
MCHC RBC AUTO-ENTMCNC: 33.1 G/DL (ref 31.4–37.4)
MCV RBC AUTO: 92 FL (ref 82–98)
MONOCYTES # BLD AUTO: 0.5 THOUSAND/ΜL (ref 0.17–1.22)
MONOCYTES NFR BLD AUTO: 10 % (ref 4–12)
NEUTROPHILS # BLD AUTO: 2.93 THOUSANDS/ΜL (ref 1.85–7.62)
NEUTS SEG NFR BLD AUTO: 55 % (ref 43–75)
NITRITE UR QL STRIP: NEGATIVE
NRBC BLD AUTO-RTO: 0 /100 WBCS
PH UR STRIP.AUTO: 6.5 [PH]
PLATELET # BLD AUTO: 266 THOUSANDS/UL (ref 149–390)
PMV BLD AUTO: 9.6 FL (ref 8.9–12.7)
POTASSIUM SERPL-SCNC: 3.8 MMOL/L (ref 3.5–5.3)
PROT SERPL-MCNC: 7.5 G/DL (ref 6.4–8.2)
PROT UR STRIP-MCNC: NEGATIVE MG/DL
PSA SERPL-MCNC: 2.6 NG/ML (ref 0–4)
RBC # BLD AUTO: 5.01 MILLION/UL (ref 3.88–5.62)
SODIUM SERPL-SCNC: 142 MMOL/L (ref 136–145)
SP GR UR STRIP.AUTO: 1.02 (ref 1–1.03)
TRIGL SERPL-MCNC: 63 MG/DL
TSH SERPL DL<=0.05 MIU/L-ACNC: 1.78 UIU/ML (ref 0.36–3.74)
UROBILINOGEN UR QL STRIP.AUTO: 0.2 E.U./DL
WBC # BLD AUTO: 5.25 THOUSAND/UL (ref 4.31–10.16)

## 2019-03-07 PROCEDURE — 80061 LIPID PANEL: CPT

## 2019-03-07 PROCEDURE — 36415 COLL VENOUS BLD VENIPUNCTURE: CPT

## 2019-03-07 PROCEDURE — 84443 ASSAY THYROID STIM HORMONE: CPT

## 2019-03-07 PROCEDURE — 85025 COMPLETE CBC W/AUTO DIFF WBC: CPT

## 2019-03-07 PROCEDURE — 81003 URINALYSIS AUTO W/O SCOPE: CPT

## 2019-03-07 PROCEDURE — 80053 COMPREHEN METABOLIC PANEL: CPT

## 2019-03-07 PROCEDURE — G0103 PSA SCREENING: HCPCS

## 2019-03-07 PROCEDURE — 83036 HEMOGLOBIN GLYCOSYLATED A1C: CPT

## 2019-03-08 LAB
EST. AVERAGE GLUCOSE BLD GHB EST-MCNC: 126 MG/DL
HBA1C MFR BLD: 6 % (ref 4.2–6.3)

## 2019-03-11 ENCOUNTER — HOSPITAL ENCOUNTER (OUTPATIENT)
Dept: NEUROLOGY | Facility: AMBULATORY SURGERY CENTER | Age: 59
Discharge: HOME | End: 2019-03-11
Payer: COMMERCIAL

## 2019-03-11 DIAGNOSIS — M51.37 DEGENERATIVE DISC DISEASE AT L5-S1 LEVEL: ICD-10-CM

## 2019-03-11 DIAGNOSIS — M54.16 RADICULOPATHY, LUMBAR REGION: ICD-10-CM

## 2019-03-11 DIAGNOSIS — R29.898 BILATERAL LEG WEAKNESS: ICD-10-CM

## 2019-03-11 PROCEDURE — 95908 NRV CNDJ TST 3-4 STUDIES: CPT | Performed by: PSYCHIATRY & NEUROLOGY

## 2019-03-11 PROCEDURE — 95886 MUSC TEST DONE W/N TEST COMP: CPT | Performed by: PSYCHIATRY & NEUROLOGY

## 2019-03-15 ENCOUNTER — TELEPHONE (OUTPATIENT)
Dept: OTHER | Facility: OTHER | Age: 59
End: 2019-03-15

## 2019-03-27 DIAGNOSIS — M54.16 RADICULOPATHY, LUMBAR REGION: ICD-10-CM

## 2019-03-28 RX ORDER — GABAPENTIN 300 MG/1
CAPSULE ORAL
Qty: 60 CAPSULE | Refills: 0 | Status: SHIPPED | OUTPATIENT
Start: 2019-03-28 | End: 2019-04-22 | Stop reason: SDUPTHER

## 2019-04-16 ENCOUNTER — EVALUATION (OUTPATIENT)
Dept: PHYSICAL THERAPY | Facility: REHABILITATION | Age: 59
End: 2019-04-16
Payer: COMMERCIAL

## 2019-04-16 DIAGNOSIS — M25.551 RIGHT HIP PAIN: ICD-10-CM

## 2019-04-16 DIAGNOSIS — M16.11 PRIMARY OSTEOARTHRITIS OF RIGHT HIP: Primary | ICD-10-CM

## 2019-04-16 PROCEDURE — 97161 PT EVAL LOW COMPLEX 20 MIN: CPT | Performed by: PHYSICAL THERAPIST

## 2019-04-16 PROCEDURE — 97110 THERAPEUTIC EXERCISES: CPT | Performed by: PHYSICAL THERAPIST

## 2019-04-17 ENCOUNTER — TRANSCRIBE ORDERS (OUTPATIENT)
Dept: PHYSICAL THERAPY | Facility: REHABILITATION | Age: 59
End: 2019-04-17

## 2019-04-17 DIAGNOSIS — M16.11 PRIMARY OSTEOARTHRITIS OF RIGHT HIP: Primary | ICD-10-CM

## 2019-04-22 DIAGNOSIS — M54.16 RADICULOPATHY, LUMBAR REGION: ICD-10-CM

## 2019-04-23 RX ORDER — GABAPENTIN 300 MG/1
CAPSULE ORAL
Qty: 60 CAPSULE | Refills: 2 | Status: SHIPPED | OUTPATIENT
Start: 2019-04-23 | End: 2020-01-06

## 2019-05-03 ENCOUNTER — CONSULT (OUTPATIENT)
Dept: FAMILY MEDICINE CLINIC | Facility: OTHER | Age: 59
End: 2019-05-03
Payer: COMMERCIAL

## 2019-05-03 VITALS
SYSTOLIC BLOOD PRESSURE: 110 MMHG | HEART RATE: 70 BPM | OXYGEN SATURATION: 98 % | DIASTOLIC BLOOD PRESSURE: 78 MMHG | WEIGHT: 167 LBS | TEMPERATURE: 97.2 F | BODY MASS INDEX: 25.31 KG/M2 | HEIGHT: 68 IN

## 2019-05-03 DIAGNOSIS — M16.11 PRIMARY LOCALIZED OSTEOARTHRITIS OF RIGHT HIP: ICD-10-CM

## 2019-05-03 DIAGNOSIS — Z01.818 PRE-OP EXAMINATION: Primary | ICD-10-CM

## 2019-05-03 PROCEDURE — 99214 OFFICE O/P EST MOD 30 MIN: CPT | Performed by: NURSE PRACTITIONER

## 2019-05-21 ENCOUNTER — OFFICE VISIT (OUTPATIENT)
Dept: PHYSICAL THERAPY | Facility: REHABILITATION | Age: 59
End: 2019-05-21
Payer: COMMERCIAL

## 2019-05-21 ENCOUNTER — TRANSCRIBE ORDERS (OUTPATIENT)
Dept: PHYSICAL THERAPY | Facility: REHABILITATION | Age: 59
End: 2019-05-21

## 2019-05-21 DIAGNOSIS — Z96.641 STATUS POST TOTAL HIP REPLACEMENT, RIGHT: Primary | ICD-10-CM

## 2019-05-21 DIAGNOSIS — M25.551 RIGHT HIP PAIN: ICD-10-CM

## 2019-05-21 DIAGNOSIS — Z96.641 PRESENCE OF RIGHT ARTIFICIAL HIP JOINT: Primary | ICD-10-CM

## 2019-05-21 PROCEDURE — 97164 PT RE-EVAL EST PLAN CARE: CPT | Performed by: PHYSICAL THERAPIST

## 2019-05-21 PROCEDURE — 97110 THERAPEUTIC EXERCISES: CPT | Performed by: PHYSICAL THERAPIST

## 2019-05-23 ENCOUNTER — OFFICE VISIT (OUTPATIENT)
Dept: PHYSICAL THERAPY | Facility: REHABILITATION | Age: 59
End: 2019-05-23
Payer: COMMERCIAL

## 2019-05-23 DIAGNOSIS — Z96.641 STATUS POST TOTAL HIP REPLACEMENT, RIGHT: Primary | ICD-10-CM

## 2019-05-23 DIAGNOSIS — M25.551 RIGHT HIP PAIN: ICD-10-CM

## 2019-05-23 PROCEDURE — 97110 THERAPEUTIC EXERCISES: CPT | Performed by: PHYSICAL THERAPIST

## 2019-05-23 PROCEDURE — 97112 NEUROMUSCULAR REEDUCATION: CPT | Performed by: PHYSICAL THERAPIST

## 2019-05-28 ENCOUNTER — OFFICE VISIT (OUTPATIENT)
Dept: PHYSICAL THERAPY | Facility: REHABILITATION | Age: 59
End: 2019-05-28
Payer: COMMERCIAL

## 2019-05-28 DIAGNOSIS — M25.551 RIGHT HIP PAIN: ICD-10-CM

## 2019-05-28 DIAGNOSIS — Z96.641 STATUS POST TOTAL HIP REPLACEMENT, RIGHT: Primary | ICD-10-CM

## 2019-05-28 PROCEDURE — 97110 THERAPEUTIC EXERCISES: CPT | Performed by: PHYSICAL THERAPIST

## 2019-05-28 PROCEDURE — 97112 NEUROMUSCULAR REEDUCATION: CPT | Performed by: PHYSICAL THERAPIST

## 2019-05-30 ENCOUNTER — OFFICE VISIT (OUTPATIENT)
Dept: PHYSICAL THERAPY | Facility: REHABILITATION | Age: 59
End: 2019-05-30
Payer: COMMERCIAL

## 2019-05-30 DIAGNOSIS — M25.551 RIGHT HIP PAIN: ICD-10-CM

## 2019-05-30 DIAGNOSIS — Z96.641 STATUS POST TOTAL HIP REPLACEMENT, RIGHT: Primary | ICD-10-CM

## 2019-05-30 PROCEDURE — 97530 THERAPEUTIC ACTIVITIES: CPT

## 2019-05-30 PROCEDURE — 97110 THERAPEUTIC EXERCISES: CPT

## 2019-05-30 PROCEDURE — 97112 NEUROMUSCULAR REEDUCATION: CPT

## 2019-06-04 ENCOUNTER — TRANSCRIBE ORDERS (OUTPATIENT)
Dept: PHYSICAL THERAPY | Facility: REHABILITATION | Age: 59
End: 2019-06-04

## 2019-06-04 ENCOUNTER — OFFICE VISIT (OUTPATIENT)
Dept: PHYSICAL THERAPY | Facility: REHABILITATION | Age: 59
End: 2019-06-04
Payer: COMMERCIAL

## 2019-06-04 DIAGNOSIS — Z96.641 STATUS POST TOTAL HIP REPLACEMENT, RIGHT: Primary | ICD-10-CM

## 2019-06-04 DIAGNOSIS — M25.551 RIGHT HIP PAIN: ICD-10-CM

## 2019-06-04 DIAGNOSIS — Z96.641 PRESENCE OF RIGHT ARTIFICIAL HIP JOINT: Primary | ICD-10-CM

## 2019-06-04 PROCEDURE — 97110 THERAPEUTIC EXERCISES: CPT | Performed by: PHYSICAL THERAPIST

## 2019-06-04 PROCEDURE — 97530 THERAPEUTIC ACTIVITIES: CPT | Performed by: PHYSICAL THERAPIST

## 2019-06-04 PROCEDURE — 97112 NEUROMUSCULAR REEDUCATION: CPT | Performed by: PHYSICAL THERAPIST

## 2019-06-06 ENCOUNTER — OFFICE VISIT (OUTPATIENT)
Dept: PHYSICAL THERAPY | Facility: REHABILITATION | Age: 59
End: 2019-06-06
Payer: COMMERCIAL

## 2019-06-06 DIAGNOSIS — M25.551 RIGHT HIP PAIN: ICD-10-CM

## 2019-06-06 DIAGNOSIS — Z96.641 STATUS POST TOTAL HIP REPLACEMENT, RIGHT: Primary | ICD-10-CM

## 2019-06-06 PROCEDURE — 97530 THERAPEUTIC ACTIVITIES: CPT

## 2019-06-06 PROCEDURE — 97110 THERAPEUTIC EXERCISES: CPT

## 2019-06-06 PROCEDURE — 97112 NEUROMUSCULAR REEDUCATION: CPT

## 2019-06-11 ENCOUNTER — OFFICE VISIT (OUTPATIENT)
Dept: PHYSICAL THERAPY | Facility: REHABILITATION | Age: 59
End: 2019-06-11
Payer: COMMERCIAL

## 2019-06-11 DIAGNOSIS — M25.551 RIGHT HIP PAIN: ICD-10-CM

## 2019-06-11 DIAGNOSIS — Z96.641 STATUS POST TOTAL HIP REPLACEMENT, RIGHT: Primary | ICD-10-CM

## 2019-06-11 PROCEDURE — 97530 THERAPEUTIC ACTIVITIES: CPT | Performed by: PHYSICAL THERAPIST

## 2019-06-11 PROCEDURE — 97112 NEUROMUSCULAR REEDUCATION: CPT | Performed by: PHYSICAL THERAPIST

## 2019-06-11 PROCEDURE — 97110 THERAPEUTIC EXERCISES: CPT | Performed by: PHYSICAL THERAPIST

## 2019-06-13 ENCOUNTER — OFFICE VISIT (OUTPATIENT)
Dept: PHYSICAL THERAPY | Facility: REHABILITATION | Age: 59
End: 2019-06-13
Payer: COMMERCIAL

## 2019-06-13 DIAGNOSIS — M25.551 RIGHT HIP PAIN: ICD-10-CM

## 2019-06-13 DIAGNOSIS — Z96.641 STATUS POST TOTAL HIP REPLACEMENT, RIGHT: Primary | ICD-10-CM

## 2019-06-13 PROCEDURE — 97110 THERAPEUTIC EXERCISES: CPT

## 2019-06-13 PROCEDURE — 97530 THERAPEUTIC ACTIVITIES: CPT

## 2019-06-13 PROCEDURE — 97112 NEUROMUSCULAR REEDUCATION: CPT

## 2019-06-18 ENCOUNTER — OFFICE VISIT (OUTPATIENT)
Dept: PHYSICAL THERAPY | Facility: REHABILITATION | Age: 59
End: 2019-06-18
Payer: COMMERCIAL

## 2019-06-18 DIAGNOSIS — M25.551 RIGHT HIP PAIN: ICD-10-CM

## 2019-06-18 DIAGNOSIS — Z96.641 STATUS POST TOTAL HIP REPLACEMENT, RIGHT: Primary | ICD-10-CM

## 2019-06-18 PROCEDURE — 97110 THERAPEUTIC EXERCISES: CPT | Performed by: PHYSICAL THERAPIST

## 2019-06-18 PROCEDURE — 97530 THERAPEUTIC ACTIVITIES: CPT | Performed by: PHYSICAL THERAPIST

## 2019-06-18 PROCEDURE — 97112 NEUROMUSCULAR REEDUCATION: CPT | Performed by: PHYSICAL THERAPIST

## 2019-06-20 ENCOUNTER — OFFICE VISIT (OUTPATIENT)
Dept: PHYSICAL THERAPY | Facility: REHABILITATION | Age: 59
End: 2019-06-20
Payer: COMMERCIAL

## 2019-06-20 DIAGNOSIS — M25.551 RIGHT HIP PAIN: ICD-10-CM

## 2019-06-20 DIAGNOSIS — Z96.641 STATUS POST TOTAL HIP REPLACEMENT, RIGHT: Primary | ICD-10-CM

## 2019-06-20 PROCEDURE — 97530 THERAPEUTIC ACTIVITIES: CPT

## 2019-06-20 PROCEDURE — 97112 NEUROMUSCULAR REEDUCATION: CPT

## 2019-06-20 PROCEDURE — 97110 THERAPEUTIC EXERCISES: CPT

## 2019-06-24 ENCOUNTER — OFFICE VISIT (OUTPATIENT)
Dept: NEUROLOGY | Facility: CLINIC | Age: 59
End: 2019-06-24
Payer: COMMERCIAL

## 2019-06-24 VITALS
HEART RATE: 94 BPM | RESPIRATION RATE: 16 BRPM | SYSTOLIC BLOOD PRESSURE: 110 MMHG | DIASTOLIC BLOOD PRESSURE: 70 MMHG | HEIGHT: 68 IN | WEIGHT: 167 LBS | BODY MASS INDEX: 25.31 KG/M2

## 2019-06-24 DIAGNOSIS — R41.3 SHORT-TERM MEMORY LOSS: ICD-10-CM

## 2019-06-24 DIAGNOSIS — G31.84 MILD COGNITIVE IMPAIRMENT: Primary | ICD-10-CM

## 2019-06-24 DIAGNOSIS — M51.36 DDD (DEGENERATIVE DISC DISEASE), LUMBAR: ICD-10-CM

## 2019-06-24 PROCEDURE — 99214 OFFICE O/P EST MOD 30 MIN: CPT | Performed by: PSYCHIATRY & NEUROLOGY

## 2019-06-25 ENCOUNTER — OFFICE VISIT (OUTPATIENT)
Dept: PHYSICAL THERAPY | Facility: REHABILITATION | Age: 59
End: 2019-06-25
Payer: COMMERCIAL

## 2019-06-25 DIAGNOSIS — Z96.641 STATUS POST TOTAL HIP REPLACEMENT, RIGHT: Primary | ICD-10-CM

## 2019-06-25 DIAGNOSIS — M25.551 RIGHT HIP PAIN: ICD-10-CM

## 2019-06-25 PROCEDURE — 97112 NEUROMUSCULAR REEDUCATION: CPT

## 2019-06-25 PROCEDURE — 97530 THERAPEUTIC ACTIVITIES: CPT

## 2019-06-25 PROCEDURE — 97110 THERAPEUTIC EXERCISES: CPT

## 2019-06-27 ENCOUNTER — OFFICE VISIT (OUTPATIENT)
Dept: PHYSICAL THERAPY | Facility: REHABILITATION | Age: 59
End: 2019-06-27
Payer: COMMERCIAL

## 2019-06-27 DIAGNOSIS — Z96.641 STATUS POST TOTAL HIP REPLACEMENT, RIGHT: Primary | ICD-10-CM

## 2019-06-27 DIAGNOSIS — M25.551 RIGHT HIP PAIN: ICD-10-CM

## 2019-06-27 PROCEDURE — 97530 THERAPEUTIC ACTIVITIES: CPT

## 2019-06-27 PROCEDURE — 97110 THERAPEUTIC EXERCISES: CPT

## 2019-06-27 PROCEDURE — 97112 NEUROMUSCULAR REEDUCATION: CPT

## 2019-08-07 ENCOUNTER — APPOINTMENT (OUTPATIENT)
Dept: LAB | Facility: CLINIC | Age: 59
End: 2019-08-07
Payer: COMMERCIAL

## 2019-08-07 ENCOUNTER — TRANSCRIBE ORDERS (OUTPATIENT)
Dept: LAB | Facility: CLINIC | Age: 59
End: 2019-08-07

## 2019-08-07 DIAGNOSIS — Z01.89 RADIOLOGICAL EXAMINATION, NOT ELSEWHERE CLASSIFIED: ICD-10-CM

## 2019-08-07 DIAGNOSIS — Z01.89 RADIOLOGICAL EXAMINATION, NOT ELSEWHERE CLASSIFIED: Primary | ICD-10-CM

## 2019-08-07 LAB
ALBUMIN SERPL BCP-MCNC: 3.8 G/DL (ref 3.5–5)
ALP SERPL-CCNC: 68 U/L (ref 46–116)
ALT SERPL W P-5'-P-CCNC: 25 U/L (ref 12–78)
ANION GAP SERPL CALCULATED.3IONS-SCNC: 7 MMOL/L (ref 4–13)
AST SERPL W P-5'-P-CCNC: 18 U/L (ref 5–45)
BASOPHILS # BLD AUTO: 0.05 THOUSANDS/ΜL (ref 0–0.1)
BASOPHILS NFR BLD AUTO: 1 % (ref 0–1)
BILIRUB SERPL-MCNC: 0.4 MG/DL (ref 0.2–1)
BILIRUB UR QL STRIP: NEGATIVE
BUN SERPL-MCNC: 17 MG/DL (ref 5–25)
CALCIUM SERPL-MCNC: 9.4 MG/DL (ref 8.3–10.1)
CHLORIDE SERPL-SCNC: 105 MMOL/L (ref 100–108)
CLARITY UR: CLEAR
CO2 SERPL-SCNC: 30 MMOL/L (ref 21–32)
COLOR UR: YELLOW
CREAT SERPL-MCNC: 1.15 MG/DL (ref 0.6–1.3)
EOSINOPHIL # BLD AUTO: 0.22 THOUSAND/ΜL (ref 0–0.61)
EOSINOPHIL NFR BLD AUTO: 3 % (ref 0–6)
ERYTHROCYTE [DISTWIDTH] IN BLOOD BY AUTOMATED COUNT: 12.9 % (ref 11.6–15.1)
GFR SERPL CREATININE-BSD FRML MDRD: 69 ML/MIN/1.73SQ M
GLUCOSE SERPL-MCNC: 113 MG/DL (ref 65–140)
GLUCOSE UR STRIP-MCNC: NEGATIVE MG/DL
HCT VFR BLD AUTO: 49.7 % (ref 36.5–49.3)
HGB BLD-MCNC: 15.8 G/DL (ref 12–17)
HGB UR QL STRIP.AUTO: NEGATIVE
IMM GRANULOCYTES # BLD AUTO: 0.04 THOUSAND/UL (ref 0–0.2)
IMM GRANULOCYTES NFR BLD AUTO: 1 % (ref 0–2)
KETONES UR STRIP-MCNC: NEGATIVE MG/DL
LEUKOCYTE ESTERASE UR QL STRIP: NEGATIVE
LYMPHOCYTES # BLD AUTO: 1.51 THOUSANDS/ΜL (ref 0.6–4.47)
LYMPHOCYTES NFR BLD AUTO: 23 % (ref 14–44)
MCH RBC QN AUTO: 30.1 PG (ref 26.8–34.3)
MCHC RBC AUTO-ENTMCNC: 31.8 G/DL (ref 31.4–37.4)
MCV RBC AUTO: 95 FL (ref 82–98)
MONOCYTES # BLD AUTO: 0.63 THOUSAND/ΜL (ref 0.17–1.22)
MONOCYTES NFR BLD AUTO: 10 % (ref 4–12)
NEUTROPHILS # BLD AUTO: 4.13 THOUSANDS/ΜL (ref 1.85–7.62)
NEUTS SEG NFR BLD AUTO: 62 % (ref 43–75)
NITRITE UR QL STRIP: NEGATIVE
NRBC BLD AUTO-RTO: 0 /100 WBCS
PH UR STRIP.AUTO: 6.5 [PH]
PLATELET # BLD AUTO: 258 THOUSANDS/UL (ref 149–390)
PMV BLD AUTO: 8.9 FL (ref 8.9–12.7)
POTASSIUM SERPL-SCNC: 4.4 MMOL/L (ref 3.5–5.3)
PROT SERPL-MCNC: 7.7 G/DL (ref 6.4–8.2)
PROT UR STRIP-MCNC: NEGATIVE MG/DL
RBC # BLD AUTO: 5.25 MILLION/UL (ref 3.88–5.62)
SODIUM SERPL-SCNC: 142 MMOL/L (ref 136–145)
SP GR UR STRIP.AUTO: 1.02 (ref 1–1.03)
UROBILINOGEN UR QL STRIP.AUTO: 2 E.U./DL
WBC # BLD AUTO: 6.58 THOUSAND/UL (ref 4.31–10.16)

## 2019-08-07 PROCEDURE — 85025 COMPLETE CBC W/AUTO DIFF WBC: CPT

## 2019-08-07 PROCEDURE — 36415 COLL VENOUS BLD VENIPUNCTURE: CPT

## 2019-08-07 PROCEDURE — 81003 URINALYSIS AUTO W/O SCOPE: CPT | Performed by: ORTHOPAEDIC SURGERY

## 2019-08-07 PROCEDURE — 80053 COMPREHEN METABOLIC PANEL: CPT

## 2019-08-08 ENCOUNTER — EVALUATION (OUTPATIENT)
Dept: PHYSICAL THERAPY | Facility: REHABILITATION | Age: 59
End: 2019-08-08
Payer: COMMERCIAL

## 2019-08-08 DIAGNOSIS — M16.12 PRIMARY OSTEOARTHRITIS OF LEFT HIP: Primary | ICD-10-CM

## 2019-08-08 DIAGNOSIS — M25.552 LEFT HIP PAIN: ICD-10-CM

## 2019-08-08 PROCEDURE — 97161 PT EVAL LOW COMPLEX 20 MIN: CPT | Performed by: PHYSICAL THERAPIST

## 2019-08-08 PROCEDURE — 97110 THERAPEUTIC EXERCISES: CPT | Performed by: PHYSICAL THERAPIST

## 2019-08-08 NOTE — PROGRESS NOTES
PT Evaluation     Today's date: 2019  Patient name: Denver Puff  : 1960  MRN: [id number]  Referring provider: Ga Parker MD  Dx:   Encounter Diagnosis     ICD-10-CM    1  Primary osteoarthritis of left hip M16 12    2  Left hip pain M25 552        Start Time: 1650  Stop Time: 1725  Total time in clinic (min): 35 minutes    Assessment  Assessment details: Denver Puff is a 60 y o  male who presents with pain, decreased strength, decreased ROM, decreased joint mobility and ambulatory dysfunction  Due to these impairments, Patient has difficulty performing a/iadls, recreational activities, work-related activities and engaging in social activities  Patient's clinical presentation is consistent with their referring diagnosis of Primary osteoarthritis of left hip  (primary encounter diagnosis)    Left hip pain  Patient will be treated for 2 visits prior to his total hip arthroplasty scheduled for 19 after which he will be re-evaluated  Impairments: abnormal gait, abnormal muscle firing, abnormal or restricted ROM, activity intolerance, impaired balance, impaired physical strength, lacks appropriate home exercise program, pain with function and weight-bearing intolerance  Functional limitations: difficulty with prolonged standing or sitting, community ambulation, stair negotiation, Understanding of Dx/Px/POC: good   Prognosis: good    Goals  Short-Term Goals:   1  Increase knee flexion range of motion to 100* 4-6 weeks  2  Increase strength 5/5 throughout  3  Increase quad control to perform straight leg raise without quad lag 6 weeks    Long-Term Goals:   1  Pt able to ambulate community distances w/o AD 6-8 weeks  2  Pt able to negotiate steps 6 weeks  3  Patient independent with HEP at time of discharge  4  Increase FOTO score to 70 by end of treatment      Plan  Plan details: Thank you for opportunity to participate in the care of Denver Puff      Patient would benefit from: skilled PT  Planned modality interventions: cryotherapy, unattended electrical stimulation, TENS and thermotherapy: hydrocollator packs  Planned therapy interventions: joint mobilization, manual therapy, patient education, postural training, activity modification, abdominal trunk stabilization, body mechanics training, flexibility, functional ROM exercises, graded exercise, home exercise program, neuromuscular re-education, strengthening, stretching, therapeutic activities and therapeutic exercise  Frequency: 2x week  Duration in weeks: 10  Plan of Care beginning date: 2019  Plan of Care expiration date: 10/17/2019  Treatment plan discussed with: patient        Subjective Evaluation    History of Present Illness  Mechanism of injury: Walter Pierre is a 60 y o  male  presenting with left hip pain and pre  L total hip arthroplasty  to be performed by Dr Charles Berrios on   Reports his right hip has been feeling great after his 19 total hip arthroplasty of right hip  Currently having difficulty with ambulation and tasks during work, step and ladder negotiation  "I tried to do the exercises for my right hip on the left but those hurt " Steps to enter home3 steps  First floor set upNo  Number of steps to second floor 6 - 10 steps  Rolling [employer] and [employer]  Assist at home wife  Pre op TUG time: 6 5"      Pain  Current pain ratin  At worst pain ratin  Location: L hip   Quality: dull ache and sharp  Relieving factors: rest and relaxation  Aggravating factors: walking, standing, stair climbing, running and lifting  Progression: no change    Social Support    Employment status: working ([employer] [place] [employer] worker)    Diagnostic Tests  X-ray: abnormal  Treatments  Previous treatment: physical therapy  Current treatment: physical therapy  Patient Goals  Patient goals for therapy: decreased pain, improved balance, increased motion, increased strength, return to work and independence with ADLs/IADLs          Objective     Neurological Testing     Sensation     Hip   Left Hip   Intact: light touch    Right Hip   Intact: light touch    Additional Neurological Details  Reports some numbness within left foot intermittently    Active Range of Motion   Left Hip   Flexion: 100 degrees     Passive Range of Motion   Left Hip   Flexion: 120 degrees   External rotation (90/90): 35 degrees   Internal rotation (90/90): 15 degrees with pain    Strength/Myotome Testing     Left Hip   Planes of Motion   Flexion: 3+  Extension: 3+  Abduction: 3+    Right Hip   Planes of Motion   Flexion: 4+  Extension: 4+  Abduction: 5    Tests     Left Hip   Positive FADIR and scour  90/90 SLR: Positive  Hip flexion: 90     SLR: Knee extension: 65          Precautions: R total hip arthroplasty performed 5/17/19, L hip OA     Daily Treatment Diary      Manual 8/8          Hip flexion                                    Knee Exercises           bike nv          Quad set* 10x10"          SLR abd* nv          Clams           Bridges w TB* 3x10          SLR flx* 3x10 nv                     stairs           ROM           Heel slides w straps*                       Flexibility           HS stretch           Quad stretch                                   TA/Closed Chain           Heel raises*           TG Squats           Monster walks            Band walks           Step ups (involved LE up, uninvolved down)           Lateral Step ups           Step downs           Step overs           Mini squats w ue support           STS from chair/low mat                       Goblet squats            Gait training with SPC           Balance           Lida step overs           Lateral lida step overs           SL           Skaters           Sharkies            STAR 12 & 3 o'clock           * = on hep

## 2019-08-08 NOTE — LETTER
[id number]    Ander De Anda MD  [facility] [address] [place] [address]    Patient: Say Rivera   YOB: 1960   Date of Visit: 2019     Encounter Diagnosis     ICD-10-CM    1  Primary osteoarthritis of left hip M16 12    2  Left hip pain M25 552        Dear Dr Marjan Duncan: Thank you for your recent referral of Say Rivera  Please review the attached evaluation summary from Nilson's recent visit  Please verify that you agree with the plan of care by signing the attached order  If you have any questions or concerns, please do not hesitate to call  I sincerely appreciate the opportunity to share in the care of one of your patients and hope to have another opportunity to work with you in the near future  Sincerely,    Marshall Black, PT      Referring Provider:      I certify that I have read the below Plan of Care and certify the need for these services furnished under this plan of treatment while under my care  Ander De Anda MD  [facility]: [phone]          PT Evaluation     Today's date: 2019  Patient name: Say Rivera  : 1960  MRN: [id number]  Referring provider: Maryjane De Oliveira MD  Dx:   Encounter Diagnosis     ICD-10-CM    1  Primary osteoarthritis of left hip M16 12    2  Left hip pain M25 552        Start Time: 1650  Stop Time: 1725  Total time in clinic (min): 35 minutes    Assessment  Assessment details: Say Rivera is a 60 y o  male who presents with pain, decreased strength, decreased ROM, decreased joint mobility and ambulatory dysfunction  Due to these impairments, Patient has difficulty performing a/iadls, recreational activities, work-related activities and engaging in social activities   Patient's clinical presentation is consistent with their referring diagnosis of Primary osteoarthritis of left hip  (primary encounter diagnosis)    Left hip pain  Patient will be treated for 2 visits prior to his total hip arthroplasty scheduled for 8/20/19 after which he will be re-evaluated  Impairments: abnormal gait, abnormal muscle firing, abnormal or restricted ROM, activity intolerance, impaired balance, impaired physical strength, lacks appropriate home exercise program, pain with function and weight-bearing intolerance  Functional limitations: difficulty with prolonged standing or sitting, community ambulation, stair negotiation, Understanding of Dx/Px/POC: good   Prognosis: good    Goals  Short-Term Goals:   1  Increase knee flexion range of motion to 100* 4-6 weeks  2  Increase strength 5/5 throughout  3  Increase quad control to perform straight leg raise without quad lag 6 weeks    Long-Term Goals:   1  Pt able to ambulate community distances w/o AD 6-8 weeks  2  Pt able to negotiate steps 6 weeks  3  Patient independent with HEP at time of discharge  4  Increase FOTO score to 70 by end of treatment      Plan  Plan details: Thank you for opportunity to participate in the care of Camille Davidson      Patient would benefit from: skilled PT  Planned modality interventions: cryotherapy, unattended electrical stimulation, TENS and thermotherapy: hydrocollator packs  Planned therapy interventions: joint mobilization, manual therapy, patient education, postural training, activity modification, abdominal trunk stabilization, body mechanics training, flexibility, functional ROM exercises, graded exercise, home exercise program, neuromuscular re-education, strengthening, stretching, therapeutic activities and therapeutic exercise  Frequency: 2x week  Duration in weeks: 10  Plan of Care beginning date: 8/8/2019  Plan of Care expiration date: 10/17/2019  Treatment plan discussed with: patient        Subjective Evaluation    History of Present Illness  Mechanism of injury: Camille Davidson is a 60 y o  male  presenting with left hip pain and pre  L total hip arthroplasty  to be performed by Dr Devi Dixon on   Reports his right hip has been feeling great after his 19 total hip arthroplasty of right hip  Currently having difficulty with ambulation and tasks during work, step and ladder negotiation  "I tried to do the exercises for my right hip on the left but those hurt " Steps to enter home3 steps  First floor set upNo  Number of steps to second floor 6 - 10 steps  [employer]  Assist at home wife  Pre op TUG time: 6 5"  Pain  Current pain ratin  At worst pain ratin  Location: L hip   Quality: dull ache and sharp  Relieving factors: rest and relaxation  Aggravating factors: walking, standing, stair climbing, running and lifting  Progression: no change    Social Support    Employment status: working ([employer] worker)    Diagnostic Tests  X-ray: abnormal  Treatments  Previous treatment: physical therapy  Current treatment: physical therapy  Patient Goals  Patient goals for therapy: decreased pain, improved balance, increased motion, increased strength, return to work and independence with ADLs/IADLs          Objective     Neurological Testing     Sensation     Hip   Left Hip   Intact: light touch    Right Hip   Intact: light touch    Additional Neurological Details  Reports some numbness within left foot intermittently    Active Range of Motion   Left Hip   Flexion: 100 degrees     Passive Range of Motion   Left Hip   Flexion: 120 degrees   External rotation (90/90): 35 degrees   Internal rotation (90/90): 15 degrees with pain    Strength/Myotome Testing     Left Hip   Planes of Motion   Flexion: 3+  Extension: 3+  Abduction: 3+    Right Hip   Planes of Motion   Flexion: 4+  Extension: 4+  Abduction: 5    Tests     Left Hip   Positive FADIR and scour  90/90 SLR: Positive  Hip flexion: 90     SLR: Knee extension: 65          Precautions: R total hip arthroplasty performed 19, L hip OA     Daily Treatment Diary      Manual 8/8          Hip flexion                                    Knee Exercises           bike nv          Quad set* 10x10"          SLR abd* nv          Clams           Bridges w TB* 3x10          SLR flx* 3x10 nv                     stairs           ROM           Heel slides w straps*                       Flexibility           HS stretch           Quad stretch                                   TA/Closed Chain           Heel raises*           TG Squats           Monster walks            Band walks           Step ups (involved LE up, uninvolved down)           Lateral Step ups           Step downs           Step overs           Mini squats w ue support           STS from chair/low mat                       Goblet squats            Gait training with SPC           Balance           Lida step overs           Lateral lida step overs           SL           Skaters           Sharkies            STAR 12 & 3 o'clock           * = on hep

## 2019-08-09 ENCOUNTER — TRANSCRIBE ORDERS (OUTPATIENT)
Dept: PHYSICAL THERAPY | Facility: REHABILITATION | Age: 59
End: 2019-08-09

## 2019-08-09 DIAGNOSIS — M16.12 PRIMARY OSTEOARTHRITIS OF LEFT HIP: Primary | ICD-10-CM

## 2019-08-12 ENCOUNTER — OFFICE VISIT (OUTPATIENT)
Dept: PHYSICAL THERAPY | Facility: REHABILITATION | Age: 59
End: 2019-08-12
Payer: COMMERCIAL

## 2019-08-12 ENCOUNTER — OFFICE VISIT (OUTPATIENT)
Dept: FAMILY MEDICINE CLINIC | Facility: OTHER | Age: 59
End: 2019-08-12
Payer: COMMERCIAL

## 2019-08-12 VITALS
HEART RATE: 74 BPM | TEMPERATURE: 98.2 F | SYSTOLIC BLOOD PRESSURE: 118 MMHG | BODY MASS INDEX: 25.61 KG/M2 | WEIGHT: 169 LBS | OXYGEN SATURATION: 98 % | HEIGHT: 68 IN | DIASTOLIC BLOOD PRESSURE: 70 MMHG

## 2019-08-12 DIAGNOSIS — M25.552 LEFT HIP PAIN: ICD-10-CM

## 2019-08-12 DIAGNOSIS — M16.12 PRIMARY OSTEOARTHRITIS OF LEFT HIP: Primary | ICD-10-CM

## 2019-08-12 DIAGNOSIS — Z01.818 PRE-OP EXAMINATION: Primary | ICD-10-CM

## 2019-08-12 PROCEDURE — 99214 OFFICE O/P EST MOD 30 MIN: CPT | Performed by: NURSE PRACTITIONER

## 2019-08-12 PROCEDURE — 97112 NEUROMUSCULAR REEDUCATION: CPT

## 2019-08-12 PROCEDURE — 3008F BODY MASS INDEX DOCD: CPT | Performed by: NURSE PRACTITIONER

## 2019-08-12 PROCEDURE — 97110 THERAPEUTIC EXERCISES: CPT

## 2019-08-12 RX ORDER — CELECOXIB 200 MG/1
CAPSULE ORAL
COMMUNITY
End: 2020-01-06

## 2019-08-12 NOTE — PROGRESS NOTES
Daily Note     Today's date: 2019  Patient name: Geno Frances  : 1960  MRN: [id number]  Referring provider: Suzie Riley MD  Dx:   Encounter Diagnosis     ICD-10-CM    1  Primary osteoarthritis of left hip M16 12    2  Left hip pain M25 552        Start Time: 1700  Stop Time: 1740  Total time in clinic (min): 40 minutes    Subjective:  Patient reports minimal left hip soreness prior to session today stating "it twinges, it hurts, just want to get it taken care of "       Objective: See treatment diary below      Assessment: Tolerated treatment well  Patient demonstrated fatigue post treatment, exhibited good technique with therapeutic exercises and would benefit from continued PT Patient given updated HEP this session with patient reporting understanding  Plan: Continue per plan of care  Progress treatment as tolerated         Precautions: R total hip arthroplasty performed 19, L hip OA     Daily Treatment Diary      Manual          Hip flexion                                    Knee Exercises           bike nv 5 min         Quad set* 10x10" 10x10''         SLR abd* nv 3x10 bw         Clams  OTB 3x10          Bridges w TB* 3x10 3x12          SLR flx* 3x10 nv 3x10 bw                    stairs           ROM           Heel slides w straps*                       Flexibility           HS stretch           Quad stretch                                   TA/Closed Chain           Heel raises*           TG Squats           Monster walks            Band walks           Step ups (involved LE up, uninvolved down)           Lateral Step ups           Step downs           Step overs           Mini squats w ue support           STS from chair/low mat                       Goblet squats            Gait training with SPC           Balance           Lida step overs           Lateral lida step overs           SL           Skaters           Sharkies            STAR 12 & 3 o'clock           * = on hep

## 2019-08-12 NOTE — PROGRESS NOTES
INTERNAL MEDICINE PRE-OPERATIVE EVALUATION  [facility] - [facility]    NAME: Laila Berg  AGE: 60 y o  SEX: male  : 1960     DATE: 2019     Internal Medicine Pre-Operative Evaluation:     Chief Complaint: Pre-operative Evaluation     Surgery: Left THR  Anticipated Date of Surgery: 19  Referring Provider: No ref  provider found        History of Present Illness:     Laila Berg is a 60 y o  male who presents to the office today for a preoperative consultation at the request of surgeon, Dr Mayi Esteves, who plans on performing left THR on 19  Planned anesthesia is spinal  Patient has a bleeding risk of: no recent abnormal bleeding  Patient does not have objections to receiving blood products if needed  Current anti-platelet/anti-coagulation medications that the patient is prescribed includes: None  Had [facility] 2019  Some upset stomach, nausea from post-op opioids, went fine otherwise with no anesthesia reactions  PT/rehab has gone well  Left hip has become more bothersome  Rates pain 3/10 at present  Off gabapentin  Stopped Aleve  Celebrex only (OK per surgeon)    No cardiac conditions  No CP, palpitations, cough, fever  Normal EKG 2019  Normal CBC, CMP 19  PSH reviewed--no issues  Denies history of clotting, bleeding disorders  Occasional social alcohol  Quit smoking     Assessment of Chronic Conditions:   - All chronic conditions are stable        Assessment of Cardiac Risk:  · Denies unstable or severe angina or MI in the last 6 weeks or history of stent placement in the last year   · Denies decompensated heart failure (e g  New onset heart failure, NYHA functional class IV heart failure, or worsening existing heart failure)  · Denies significant arrhythmias such as high grade AV block, symptomatic ventricular arrhythmia, newly recognized ventricular tachycardia, supraventricular tachycardia with resting heart rate >100, or symptomatic bradycardia  · Denies severe heart valve disease including aortic stenosis or symptomatic mitral stenosis     Exercise Capacity:  · Able to walk 4 blocks without symptoms?: Yes  · Able to walk 2 flights without symptoms?: Yes    Prior Anesthesia Reactions: No     Personal history of venous thromboembolic disease? No    History of steroid use for >2 weeks within last year? No    STOP-BANG Sleep Apnea Screening Questionnaire:      Do you SNORE loudly (louder than talking or loud enough to be heard through closed doors)? No = 0 point   Do you often feel TIRED, fatigued, or sleepy during daytime? No = 0 point   Has anyone OBSERVED you stop breathing during your sleep? No = 0 point   Do you have or are you being treated for high blood pressure? No = 0 point   BMI more than 35 kg/m2? No = 0 point   AGE over 48years old? Yes = 1 point   NECK circumference > 16 inches (40 cm)? No = 0 point   Male GENDER? Yes = 1 point   TOTAL SCORE 2 = LOW risk of WANDA       Review of Systems:     Review of Systems   Constitutional: Negative for fatigue and fever  HENT: Negative for sore throat  Eyes: Negative for visual disturbance  Respiratory: Negative for cough and shortness of breath  Cardiovascular: Negative for chest pain and palpitations  Gastrointestinal: Negative for abdominal pain, constipation, diarrhea and vomiting  Genitourinary: Negative for difficulty urinating and dysuria  Musculoskeletal:        Per  HPI   Skin: Negative for rash  Neurological: Negative for dizziness and headaches  Psychiatric/Behavioral: Negative for dysphoric mood          Problem List:     Patient Active Problem List   Diagnosis    Allergic rhinitis    Glaucoma    Prediabetes    Sciatica    Short-term memory loss    Mild cognitive impairment    Abnormal EEG    Increased prostate specific antigen (PSA) velocity    Right hip pain    Lumbar spondylosis    Primary localized osteoarthritis of right hip    Enlarged prostate    Screening for prostate cancer    Bilateral leg weakness        Allergies: Allergies   Allergen Reactions    Sulfa Antibiotics         Current Medications:       Current Outpatient Medications:     ALPHAGAN P 0 15 % ophthalmic solution, , Disp: , Rfl:     celecoxib (CELEBREX) 200 mg capsule, Celebrex 200 mg capsule  Take 1 capsule daily for 3 days prior to surgery  DO NOT TAKE ON DAY OF SURGERY  Take 1 capsule daily for 30 days post operatively  , Disp: , Rfl:     Cetirizine HCl (ZYRTEC ALLERGY PO), Take 1 tablet by mouth daily as needed, Disp: , Rfl:     cholecalciferol (VITAMIN D3) 1,000 units tablet, Take 4,000 Units by mouth daily , Disp: , Rfl:     donepezil (ARICEPT) 10 mg tablet, Take 1 tablet (10 mg total) by mouth daily, Disp: 90 tablet, Rfl: 1    Lactobacillus (PROBIOTIC ACIDOPHILUS) CAPS, Take by mouth once, Disp: , Rfl:     LUMIGAN 0 01 % ophthalmic drops, , Disp: , Rfl:     gabapentin (NEURONTIN) 300 mg capsule, 1 cap BID (Patient not taking: Reported on 6/24/2019), Disp: 60 capsule, Rfl: 2    naproxen sodium (ALEVE) 220 MG tablet, Take 440 mg by mouth daily as needed for mild pain, Disp: , Rfl:     TURMERIC PO, Take by mouth daily, Disp: , Rfl:     Zoster Vac Recomb Adjuvanted (SHINGRIX) 50 MCG/0 5ML SUSR, Inject 0 5 mL into a muscle as needed (Take once now    Repeat once in 2-6 months) (Patient not taking: Reported on 8/12/2019), Disp: 2 each, Rfl: 0     Past History:     Past Medical History:   Diagnosis Date    Benign colon polyp     Diverticulitis, colon     Diverticulosis     Hyperlipidemia     Prediabetes     Renal calculi     Short-term memory loss     Vitamin D deficiency         Past Surgical History:   Procedure Laterality Date    COLONOSCOPY      ESOPHAGOGASTRODUODENOSCOPY      HERNIA REPAIR      KNEE SURGERY      TOTAL HIP ARTHROPLASTY Right 05/17/2019        Family History   Problem Relation Age of Onset    Dementia Mother Nadia Awan Glaucoma Mother     Glaucoma Father     Prostate cancer Father     Pulmonary embolism Father     Hypertension Brother     Prostate cancer Brother     Colon cancer Maternal Grandfather         Social History     Socioeconomic History    Marital status: /Civil Union     Spouse name: Not on file    Number of children: Not on file    Years of education: Not on file    Highest education level: Not on file   Occupational History    Occupation:    Social Needs    Financial resource strain: Not on file    Food insecurity:     Worry: Not on file     Inability: Not on file    Transportation needs:     Medical: Not on file     Non-medical: Not on file   Tobacco Use    Smoking status: Former Smoker     Last attempt to quit:      Years since quittin 6    Smokeless tobacco: Never Used   Substance and Sexual Activity    Alcohol use: Yes     Comment: occasional beer and wine (history)    Drug use: No    Sexual activity: Not on file   Lifestyle    Physical activity:     Days per week: Not on file     Minutes per session: Not on file    Stress: Not on file   Relationships    Social connections:     Talks on phone: Not on file     Gets together: Not on file     Attends [employer] service: Not on file     Active member of club or organization: Not on file     Attends meetings of clubs or organizations: Not on file     Relationship status: Not on file    Intimate partner violence:     Fear of current or ex partner: Not on file     Emotionally abused: Not on file     Physically abused: Not on file     Forced sexual activity: Not on file   Other Topics Concern    Not on file   Social History Narrative    Daily caffeine consumption- coffee, 3-4 cups    Education level- some college        Physical Exam:      /70 (BP Location: Left arm, Patient Position: Sitting, Cuff Size: Adult)   Pulse 74   Temp 98 2 °F (36 8 °C) (Tympanic)   Ht 5' 8" (1 727 m)   Wt 76 7 kg (169 lb)   SpO2 98%   BMI 25 70 kg/m²     Physical Exam   Constitutional: He is oriented to person, place, and time  He appears well-developed and well-nourished  HENT:   Head: Normocephalic and atraumatic  Right Ear: External ear normal    Left Ear: External ear normal    Nose: Nose normal    Mouth/Throat: Oropharynx is clear and moist    Eyes: Pupils are equal, round, and reactive to light  Conjunctivae are normal    Neck: Normal range of motion  Neck supple  Cardiovascular: Normal rate, regular rhythm, normal heart sounds and intact distal pulses  Pulmonary/Chest: Effort normal and breath sounds normal    Abdominal: Soft  Bowel sounds are normal  There is no tenderness  Lymphadenopathy:     He has no cervical adenopathy  Neurological: He is alert and oriented to person, place, and time  He has normal reflexes  Skin: Skin is warm and dry  Psychiatric: He has a normal mood and affect  Data:     Pre-operative work-up    Laboratory Results: I have personally reviewed the pertinent laboratory results/reports  and WNL    EKG: I have personally reviewed pertinent reports  and WNL (from 4/2019)    Chest x-ray: N/A    Previous cardiopulmonary studies within the past year:  · Echocardiogram: N/A  · Cardiac Catheterization: N/A  · Stress Test: N/A  · Pulmonary Function Testing: N/A       Assessment:     No diagnosis found  Plan:     60 y o  male with planned surgery: Left THR  Cardiac Risk Estimation: per the Revised Cardiac Risk Index (Circ  100:1043, 1999), the patient's risk factors for cardiac complications include None, putting him in: RCI RISK CLASS I (0 risk factors, risk of major cardiac compl  appr  0 5%)  1  Further preoperative workup as follows:   - None; no further preoperative work-up is required    2   Medication Management/Recommendations:   - Patient has been instructed to avoid aspirin containing medications or non-steroidal anti-inflammatory drugs for the week preceding surgery  3  Prophylaxis for cardiac events with perioperative beta-blockers: not indicated  4  Patient requires further consultation with: None    Clearance  Patient is CLEARED for surgery without any additional cardiac testing       Jeremy Roper, [address] [address]  Phone#  [phone]  Fax#  [phone]

## 2019-08-12 NOTE — PROGRESS NOTES
Assessment/Plan:    No problem-specific Assessment & Plan notes found for this encounter  {Assess/PlanSmartLinks:01997}      Subjective:      Patient ID: Huy Mendoza is a 60 y o  male      HPI    {Common ambulatory SmartLinks:88242}    Review of Systems      Objective:      /70 (BP Location: Left arm, Patient Position: Sitting, Cuff Size: Adult)   Pulse 74   Temp 98 2 °F (36 8 °C) (Tympanic)   Ht 5' 8" (1 727 m)   Wt 76 7 kg (169 lb)   SpO2 98%   BMI 25 70 kg/m²          Physical Exam

## 2019-08-23 ENCOUNTER — OFFICE VISIT (OUTPATIENT)
Dept: PHYSICAL THERAPY | Facility: REHABILITATION | Age: 59
End: 2019-08-23
Payer: COMMERCIAL

## 2019-08-23 DIAGNOSIS — M16.12 PRIMARY OSTEOARTHRITIS OF LEFT HIP: Primary | ICD-10-CM

## 2019-08-23 DIAGNOSIS — M25.552 LEFT HIP PAIN: ICD-10-CM

## 2019-08-23 DIAGNOSIS — Z96.642 STATUS POST LEFT HIP REPLACEMENT: ICD-10-CM

## 2019-08-23 PROCEDURE — 97161 PT EVAL LOW COMPLEX 20 MIN: CPT | Performed by: PHYSICAL THERAPIST

## 2019-08-23 PROCEDURE — 97112 NEUROMUSCULAR REEDUCATION: CPT | Performed by: PHYSICAL THERAPIST

## 2019-08-23 RX ORDER — OXYCODONE HYDROCHLORIDE 5 MG/1
5 TABLET ORAL EVERY 4 HOURS PRN
COMMUNITY
End: 2020-01-06

## 2019-08-23 NOTE — PROGRESS NOTES
PT Evaluation     Today's date: 2019  Patient name: Camille Davidson  : 1960  MRN: [id number]  Referring provider: Shaina Reyes MD  Dx:   Encounter Diagnosis     ICD-10-CM    1  Primary osteoarthritis of left hip M16 12    2  Left hip pain M25 552    3  Status post left hip replacement Z96 642        Start Time: 1115  Stop Time: 1150  Total time in clinic (min): 35 minutes    Assessment  Assessment details: Camille Davidson is a 60 y o  male present with:   Primary osteoarthritis of left hip  (primary encounter diagnosis)  Left hip pain  Status post left hip replacement    Michelle Peña has the above listed impairments and will benefit from skilled Physical Therapist management to improve deficits to return to prior level of function  Impairments: abnormal gait, abnormal muscle firing, abnormal or restricted ROM, activity intolerance, impaired balance, impaired physical strength, lacks appropriate home exercise program, pain with function and weight-bearing intolerance  Functional limitations: difficulty with prolonged standing or sitting, community ambulation, stair negotiation, Understanding of Dx/Px/POC: good   Prognosis: good    Goals  Short-Term Goals:   1  Increase knee flexion range of motion to 130* 4-6 weeks  2  Increase strength 5/5 throughout  3  Increase quad control to perform straight leg raise without quad lag 6 weeks    Long-Term Goals:   1  Pt able to ambulate community distances w/o AD 6-8 weeks  2  Pt able to negotiate steps 6 weeks  3  Patient independent with HEP at time of discharge  4  Increase FOTO score to 70 by end of treatment      Plan  Plan details: Thank you for opportunity to participate in the care of Camille Davisdon      Patient would benefit from: skilled PT  Planned modality interventions: cryotherapy, unattended electrical stimulation, TENS and thermotherapy: hydrocollator packs  Planned therapy interventions: joint mobilization, manual therapy, patient education, postural training, activity modification, abdominal trunk stabilization, body mechanics training, flexibility, functional ROM exercises, graded exercise, home exercise program, neuromuscular re-education, strengthening, stretching, therapeutic activities and therapeutic exercise  Frequency: 2x week  Duration in weeks: 8  Plan of Care beginning date: 2019  Plan of Care expiration date: 10/18/2019  Treatment plan discussed with: patient        Subjective Evaluation    History of Present Illness  Mechanism of injury:  Geno Frances is a 60 y o  male patient presenting with L hip pain s/p L total hip arthroplasty  performed by Dr Galindo Izaguirre on 85  Kaden Regan currently having difficulty with ambulation, pain, stairs, prolonged standing or sitting and activities of daily living  Pain is relieved or reduced with rest   Pt would like to return to being able to Returning to work, "walking my dog without pain"  Next follow up with the physician is 19  Pain  Current pain ratin  At best pain ratin  At worst pain ratin  Location: L hip   Quality: dull ache and sharp  Relieving factors: rest and relaxation  Aggravating factors: walking, standing, stair climbing, running and lifting  Progression: no change    Social Support    Employment status: working ([employer] worker)    Diagnostic Tests  X-ray: abnormal  Treatments  Previous treatment: physical therapy  Current treatment: physical therapy  Patient Goals  Patient goals for therapy: decreased pain, improved balance, increased motion, increased strength, return to work and independence with ADLs/IADLs          Objective     Observations   Left Hip  Positive for incision  Additional Observation Details  PT reviewed precautions and protocol with Kaden Regan for surgery of left total hip arthroplasty with verbalized understanding and compliance       Neurological Testing     Sensation     Hip   Left Hip   Intact: light touch    Active Range of Motion   Left Hip   Flexion: 50 degrees with pain  Extension: 0 degrees   Left Knee   Flexion: 115 degrees   Extension: 0 degrees     Passive Range of Motion   Left Hip   Flexion: 70 degrees with pain  Extension: 0 degrees   Left Knee   Flexion: 120 degrees   Extension: 0 degrees     Strength/Myotome Testing     Left Knee   Flexion: 3+  Extension: 3+  Quadriceps contraction: fair    Precautions: R total hip arthroplasty performed 5/17/19, L hip total hip arthroplasty 8/20/19     Daily Treatment Diary      Manual 8/23          Hip flexion  nv           hip ER/abd nv                      Knee Exercises           bike nv          Quad set* 10x10"          SLR abd*           Clams           Bridges w TB*           SLR flx* nv                     stairs           ROM           Heel slides w straps* 20x5"                      Flexibility           HS stretch 3x30"          Quad stretch                                   TA/Closed Chain           Heel raises* 3x15          TG Squats           Monster walks            Band walks           Step ups (involved LE up, uninvolved down)           Lateral Step ups           Step downs           Step overs           Mini squats w ue support           STS from chair/low mat                       Goblet squats            Gait training with SPC           Balance           Lida step overs           Lateral lida step overs           SL           Skaters           Sharkies            STAR 12 & 3 o'clock           * = on hep       Flowsheet Rows      Most Recent Value   PT/OT G-Codes   Current Score  31   Projected Score  55

## 2019-08-23 NOTE — LETTER
[id number]    Chetna Monge MD  [address] [place] [address]    Patient: Camille Davidson   YOB: 1960   Date of Visit: 2019     Encounter Diagnosis     ICD-10-CM    1  Primary osteoarthritis of left hip M16 12    2  Left hip pain M25 552    3  Status post left hip replacement J01 100        Dear Dr Prudencio Walls: Thank you for your recent referral of Camille Davidson  Please review the attached evaluation summary from Nilson's recent visit  Please verify that you agree with the plan of care by signing the attached order  If you have any questions or concerns, please do not hesitate to call  I sincerely appreciate the opportunity to share in the care of one of your patients and hope to have another opportunity to work with you in the near future  Sincerely,    Sergio Gaytan, PT      Referring Provider:      I certify that I have read the below Plan of Care and certify the need for these services furnished under this plan of treatment while under my care  Chetna Monge MD  [facility] 31: [phone]          PT Evaluation     Today's date: 2019  Patient name: Camille Davidson  : 1960  MRN: [id number]  Referring provider: Shaina Reyes MD  Dx:   Encounter Diagnosis     ICD-10-CM    1  Primary osteoarthritis of left hip M16 12    2  Left hip pain M25 552    3  Status post left hip replacement Z96 642        Start Time: 1115  Stop Time: 1150  Total time in clinic (min): 35 minutes    Assessment  Assessment details: Camille Davidson is a 60 y o  male present with:   Primary osteoarthritis of left hip  (primary encounter diagnosis)  Left hip pain  Status post left hip replacement    Michelle Peña has the above listed impairments and will benefit from skilled Physical Therapist management to improve deficits to return to prior level of function      Impairments: abnormal gait, abnormal muscle firing, abnormal or restricted ROM, activity intolerance, impaired balance, impaired physical strength, lacks appropriate home exercise program, pain with function and weight-bearing intolerance  Functional limitations: difficulty with prolonged standing or sitting, community ambulation, stair negotiation, Understanding of Dx/Px/POC: good   Prognosis: good    Goals  Short-Term Goals:   1  Increase knee flexion range of motion to 130* 4-6 weeks  2  Increase strength 5/5 throughout  3  Increase quad control to perform straight leg raise without quad lag 6 weeks    Long-Term Goals:   1  Pt able to ambulate community distances w/o AD 6-8 weeks  2  Pt able to negotiate steps 6 weeks  3  Patient independent with HEP at time of discharge  4  Increase FOTO score to 70 by end of treatment      Plan  Plan details: Thank you for opportunity to participate in the care of Orion Leach  Patient would benefit from: skilled PT  Planned modality interventions: cryotherapy, unattended electrical stimulation, TENS and thermotherapy: hydrocollator packs  Planned therapy interventions: joint mobilization, manual therapy, patient education, postural training, activity modification, abdominal trunk stabilization, body mechanics training, flexibility, functional ROM exercises, graded exercise, home exercise program, neuromuscular re-education, strengthening, stretching, therapeutic activities and therapeutic exercise  Frequency: 2x week  Duration in weeks: 8  Plan of Care beginning date: 8/23/2019  Plan of Care expiration date: 10/18/2019  Treatment plan discussed with: patient        Subjective Evaluation    History of Present Illness  Mechanism of injury:  Orion Leach is a 60 y o  male patient presenting with L hip pain s/p L total hip arthroplasty  performed by Dr Ansley Kearns on 8/30/37    Philomenajan Quinteroer currently having difficulty with ambulation, pain, stairs, prolonged standing or sitting and activities of daily living  Pain is relieved or reduced with rest   Pt would like to return to being able to Returning to work, "walking my dog without pain"  Next follow up with the physician is 19  Pain  Current pain ratin  At best pain ratin  At worst pain ratin  Location: L hip   Quality: dull ache and sharp  Relieving factors: rest and relaxation  Aggravating factors: walking, standing, stair climbing, running and lifting  Progression: no change    Social Support    Employment status: working ([employer] worker)    Diagnostic Tests  X-ray: abnormal  Treatments  Previous treatment: physical therapy  Current treatment: physical therapy  Patient Goals  Patient goals for therapy: decreased pain, improved balance, increased motion, increased strength, return to work and independence with ADLs/IADLs          Objective     Observations   Left Hip  Positive for incision  Additional Observation Details  PT reviewed precautions and protocol with Michelle Peña for surgery of left total hip arthroplasty with verbalized understanding and compliance       Neurological Testing     Sensation     Hip   Left Hip   Intact: light touch    Active Range of Motion   Left Hip   Flexion: 50 degrees with pain  Extension: 0 degrees   Left Knee   Flexion: 115 degrees   Extension: 0 degrees     Passive Range of Motion   Left Hip   Flexion: 70 degrees with pain  Extension: 0 degrees   Left Knee   Flexion: 120 degrees   Extension: 0 degrees     Strength/Myotome Testing     Left Knee   Flexion: 3+  Extension: 3+  Quadriceps contraction: fair    Precautions: R total hip arthroplasty performed 19, L hip total hip arthroplasty 19     Daily Treatment Diary      Manual           Hip flexion  nv           hip ER/abd nv                      Knee Exercises           bike nv          Quad set* 10x10"          SLR abd*           Clams           Bridges w TB*           SLR flx* nv                     stairs           ROM Heel slides w straps* 20x5"                      Flexibility           HS stretch 3x30"          Quad stretch                                   TA/Closed Chain           Heel raises* 3x15          TG Squats           Monster walks            Band walks           Step ups (involved LE up, uninvolved down)           Lateral Step ups           Step downs           Step overs           Mini squats w ue support           STS from chair/low mat                       Goblet squats            Gait training with SPC           Balance           Lida step overs           Lateral lida step overs           SL           Skaters           Sharkies            STAR 12 & 3 o'clock           * = on hep       Flowsheet Rows      Most Recent Value   PT/OT G-Codes   Current Score  31   Projected Score  55

## 2019-08-26 ENCOUNTER — TRANSCRIBE ORDERS (OUTPATIENT)
Dept: PHYSICAL THERAPY | Facility: REHABILITATION | Age: 59
End: 2019-08-26

## 2019-08-26 ENCOUNTER — TELEPHONE (OUTPATIENT)
Dept: UROLOGY | Facility: MEDICAL CENTER | Age: 59
End: 2019-08-26

## 2019-08-26 DIAGNOSIS — M16.12 PRIMARY OSTEOARTHRITIS OF LEFT HIP: Primary | ICD-10-CM

## 2019-08-26 DIAGNOSIS — G30.0 EARLY ONSET ALZHEIMER'S DEMENTIA WITHOUT BEHAVIORAL DISTURBANCE (HCC): ICD-10-CM

## 2019-08-26 DIAGNOSIS — F02.80 EARLY ONSET ALZHEIMER'S DEMENTIA WITHOUT BEHAVIORAL DISTURBANCE (HCC): ICD-10-CM

## 2019-08-26 RX ORDER — DONEPEZIL HYDROCHLORIDE 10 MG/1
TABLET, FILM COATED ORAL
Qty: 90 TABLET | Refills: 1 | Status: SHIPPED | OUTPATIENT
Start: 2019-08-26 | End: 2020-03-02

## 2019-08-27 ENCOUNTER — OFFICE VISIT (OUTPATIENT)
Dept: PHYSICAL THERAPY | Facility: REHABILITATION | Age: 59
End: 2019-08-27
Payer: COMMERCIAL

## 2019-08-27 DIAGNOSIS — M16.12 PRIMARY OSTEOARTHRITIS OF LEFT HIP: ICD-10-CM

## 2019-08-27 DIAGNOSIS — Z96.642 STATUS POST LEFT HIP REPLACEMENT: Primary | ICD-10-CM

## 2019-08-27 DIAGNOSIS — M25.552 LEFT HIP PAIN: ICD-10-CM

## 2019-08-27 PROCEDURE — 97140 MANUAL THERAPY 1/> REGIONS: CPT

## 2019-08-27 PROCEDURE — 97112 NEUROMUSCULAR REEDUCATION: CPT

## 2019-08-27 PROCEDURE — 97110 THERAPEUTIC EXERCISES: CPT

## 2019-08-27 NOTE — PROGRESS NOTES
Daily Note     Today's date: 2019  Patient name: Huy Mendoza  : 1960  MRN: [id number]  Referring provider: Dago Tuttle MD  Dx:   Encounter Diagnosis     ICD-10-CM    1  Status post left hip replacement Z96 642    2  Left hip pain M25 552    3  Primary osteoarthritis of left hip M16 12        Start Time: 1445  Stop Time: 1530  Total time in clinic (min): 45 minutes    Subjective: Yas Martin reports that his hip is "okay" prior to session today reporting "it's sore but I'm dealing with it " He reports compliance with HEP  Objective: See treatment diary below      Assessment: Tolerated treatment fair  Patient demonstrated fatigue post treatment and would benefit from continued PT Guarding noted with manuals, cues for relaxation required, little carry over  Patient denies any pain with any TE performed, only fatigue/soreness  Cues required for proper form with most TE  Plan: Continue per plan of care  Progress treatment as tolerated          Precautions: R total hip arthroplasty performed 19, L hip total hip arthroplasty 19     Daily Treatment Diary      Manual          Hip flexion  nv 10' total          hip ER/abd nv Done                      Knee Exercises           bike nv 5 min         Quad set* 10x10" 10x10''         SLR abd*           Clams  3x10 bw         Bridges w TB*           SLR flx* nv 3x8 bw                    stairs           ROM           Heel slides w straps* 20x5" 20x5''                     Flexibility           HS stretch 3x30" 3x30''         Quad stretch                                   TA/Closed Chain           Heel raises* 3x15 3x15          TG Squats           Monster walks            Band walks           Step ups (involved LE up, uninvolved down)           Lateral Step ups           Step downs           Step overs           Mini squats w ue support           STS from chair/low mat                       Goblet squats            Gait training with SPC           Balance           Lida step overs           Lateral lida step overs           SL           Skaters           Sharkies            STAR 12 & 3 o'clock           * = on hep

## 2019-08-29 ENCOUNTER — OFFICE VISIT (OUTPATIENT)
Dept: PHYSICAL THERAPY | Facility: REHABILITATION | Age: 59
End: 2019-08-29
Payer: COMMERCIAL

## 2019-08-29 ENCOUNTER — TELEPHONE (OUTPATIENT)
Dept: UROLOGY | Facility: AMBULATORY SURGERY CENTER | Age: 59
End: 2019-08-29

## 2019-08-29 DIAGNOSIS — M25.551 RIGHT HIP PAIN: ICD-10-CM

## 2019-08-29 DIAGNOSIS — Z96.641 STATUS POST TOTAL HIP REPLACEMENT, RIGHT: ICD-10-CM

## 2019-08-29 DIAGNOSIS — Z96.642 STATUS POST LEFT HIP REPLACEMENT: Primary | ICD-10-CM

## 2019-08-29 DIAGNOSIS — M16.12 PRIMARY OSTEOARTHRITIS OF LEFT HIP: ICD-10-CM

## 2019-08-29 DIAGNOSIS — M25.552 LEFT HIP PAIN: ICD-10-CM

## 2019-08-29 PROCEDURE — 97112 NEUROMUSCULAR REEDUCATION: CPT

## 2019-08-29 PROCEDURE — 97140 MANUAL THERAPY 1/> REGIONS: CPT

## 2019-08-29 PROCEDURE — 97110 THERAPEUTIC EXERCISES: CPT

## 2019-08-29 NOTE — TELEPHONE ENCOUNTER
Patient canceled his 8-30-19 appointment with Atif Pearson because he just had his hip replaced and needs some time to recover and be able to drive himself  He will have PSA done and call to reschedule appointment at that time

## 2019-08-29 NOTE — PROGRESS NOTES
Daily Note     Today's date: 2019  Patient name: Ozzie Oshea  : 1960  MRN: [id number]  Referring provider: Michell Aschoff, MD  Dx:   Encounter Diagnosis     ICD-10-CM    1  Status post left hip replacement Z96 642    2  Right hip pain M25 551    3  Left hip pain M25 552    4  Primary osteoarthritis of left hip M16 12    5  Status post total hip replacement, right Z96 641        Start Time: 1515  Stop Time: 1600  Total time in clinic (min): 45 minutes    Subjective: Lacie Majano reports that pain fluctuates in the morning depending on his sleeping position  Objective: See treatment diary below      Assessment: Tolerated treatment fair  Advanced to stepsups and mini squatts  No increase in pain post intervention  Patient demonstrated fatigue post treatment and would benefit from continued PT        Plan: Continue per plan of care  Progress treatment as tolerated          Precautions: R total hip arthroplasty performed 19, L hip total hip arthroplasty 19     Daily Treatment Diary      Manual         Hip flexion  nv 10' total 10'         hip ER/abd nv Done   4'                    Knee Exercises           bike nv 5 min  7 min        Quad set* 10x10" 10x10'' 10 x 10"        SLR abd*           Clams  3x10 bw  3 x 10        Bridges w TB*           SLR flx* nv 3x8 bw 3 x 8                    stairs           ROM           Heel slides w straps* 20x5" 20x5''  20 x 5"                    Flexibility           HS stretch 3x30" 3x30'' 5 x 20" AA        Quad stretch                                   TA/Closed Chain           Heel raises* 3x15 3x15   3 x 15        TG Squats           Monster walks            Band walks           Step ups (involved LE up, uninvolved down)   2 x 10        Lateral Step ups           Step downs           Step overs           Mini squats w ue support    2 x 10        STS from chair/low mat                       Goblet squats            Gait training with SPC Balance           Lida step overs           Lateral lida step overs           SL           Skaters           Sharkies            STAR 12 & 3 o'clock           * = on hep

## 2019-09-03 ENCOUNTER — OFFICE VISIT (OUTPATIENT)
Dept: PHYSICAL THERAPY | Facility: REHABILITATION | Age: 59
End: 2019-09-03
Payer: COMMERCIAL

## 2019-09-03 DIAGNOSIS — Z96.642 STATUS POST LEFT HIP REPLACEMENT: Primary | ICD-10-CM

## 2019-09-03 PROCEDURE — 97112 NEUROMUSCULAR REEDUCATION: CPT | Performed by: PHYSICAL THERAPIST

## 2019-09-03 PROCEDURE — 97530 THERAPEUTIC ACTIVITIES: CPT | Performed by: PHYSICAL THERAPIST

## 2019-09-03 PROCEDURE — 97110 THERAPEUTIC EXERCISES: CPT | Performed by: PHYSICAL THERAPIST

## 2019-09-03 NOTE — PROGRESS NOTES
Daily Note     Today's date: 9/3/2019  Patient name: Denver Puff  : 1960  MRN: [id number]  Referring provider: Ga Parker MD  Dx:   Encounter Diagnosis     ICD-10-CM    1  Status post left hip replacement Z96 642        Start Time:   Stop Time: 151  Total time in clinic (min): 45 minutes    Subjective: Rena Hood reports that his hip is feeling good today  Objective: See treatment diary below      Assessment: Tolerated treatment well  Patient demonstrated fatigue post treatment, exhibited good technique with therapeutic exercises and would benefit from continued PT  Trialed lateral step ups this session with patient reporting no pain, only fatigue stating "can feel the muscles more in the hip "     Plan: Continue per plan of care  Progress treatment as tolerated              Precautions: R total hip arthroplasty performed 19, L hip total hip arthroplasty 19     Daily Treatment Diary      Manual  9/3       Hip flexion  nv 10' total 10' 5'        hip ER/abd nv Done   4' 5'                   Knee Exercises           bike nv 5 min  7 min 8 min       Quad set* 10x10" 10x10'' 10 x 10" np       SLR abd*           Clams  3x10 bw  3 x 10 3x10 OTB       Bridges w TB*    3x12 OTB       SLR flx* nv 3x8 bw 3 x 8  3x10                  stairs           ROM           Heel slides w straps* 20x5" 20x5''  20 x 5" dc                   Flexibility           HS stretch 3x30" 3x30'' 5 x 20" AA 5x20"       Quad stretch                                   TA/Closed Chain           Heel raises* 3x15 3x15   3 x 15 3x15       TG Squats           Monster walks            Band walks           Step ups (involved LE up, uninvolved down)   2 x 10 3x10 6"       Lateral Step ups    3x10 6''       Step downs           Step overs           Mini squats w ue support    2 x 10 3x10        STS from chair/low mat                       Goblet squats            Gait training with SPC           Balance Lida step overs           Lateral lida step overs           SL           Skaters           Sharkies            STAR 12 & 3 o'clock           * = on hep

## 2019-09-05 ENCOUNTER — OFFICE VISIT (OUTPATIENT)
Dept: PHYSICAL THERAPY | Facility: REHABILITATION | Age: 59
End: 2019-09-05
Payer: COMMERCIAL

## 2019-09-05 DIAGNOSIS — M16.12 PRIMARY OSTEOARTHRITIS OF LEFT HIP: ICD-10-CM

## 2019-09-05 DIAGNOSIS — Z96.642 STATUS POST LEFT HIP REPLACEMENT: Primary | ICD-10-CM

## 2019-09-05 DIAGNOSIS — M25.552 LEFT HIP PAIN: ICD-10-CM

## 2019-09-05 PROCEDURE — 97530 THERAPEUTIC ACTIVITIES: CPT | Performed by: PHYSICAL THERAPIST

## 2019-09-05 PROCEDURE — 97140 MANUAL THERAPY 1/> REGIONS: CPT | Performed by: PHYSICAL THERAPIST

## 2019-09-05 PROCEDURE — 97110 THERAPEUTIC EXERCISES: CPT | Performed by: PHYSICAL THERAPIST

## 2019-09-05 NOTE — PROGRESS NOTES
Daily Note     Today's date: 2019  Patient name: Laila Berg  : 1960  MRN: [id number]  Referring provider: Jaclyn Magaña MD  Dx:   Encounter Diagnosis     ICD-10-CM    1  Status post left hip replacement Z96 642    2  Left hip pain M25 552    3  Primary osteoarthritis of left hip M16 12        Start Time: 7694  Stop Time: 1515  Total time in clinic (min): 42 minutes    Subjective: [facility] reports that his hip has been feeling good, continues to require [facility] for ambulation  Objective: See treatment diary below    Assessment: Tolerated treatment well  Patient demonstrated fatigue post treatment, exhibited good technique with therapeutic exercises and would benefit from continued PT  Tolerated additional TA very well, exhibits excellent control of hip stability during balancing  Plan: Continue per plan of care        Precautions: R total hip arthroplasty performed 19, L hip total hip arthroplasty 19     Daily Treatment Diary      Manual 8/23 8/27   8/29 9/3 9      Hip flexion  nv 10' total 10' 5' 5'       hip ER/abd nv Done   4' 5'        SL quad stretch     5'      Knee Exercises           bike nv 5 min  7 min 8 min 5 min      Quad set* 10x10" 10x10'' 10 x 10" np       SLR abd*           Clams  3x10 bw  3 x 10 3x10 OTB 3x12 OTB       Bridges w TB*    3x12 OTB 3x12 OTB      SLR flx* nv 3x8 bw 3 x 8  3x10 3x12                  Flexibility           HS stretch 3x30" 3x30'' 5 x 20" AA 5x20" 5x20"      Quad stretch                                   TA/Closed Chain           Heel raises* 3x15 3x15   3 x 15 3x15 3x15      TG Squats     L 22 3x12      Monster walks            Band walks           Step ups (involved LE up, uninvolved down)   2 x 10 3x10 6" 3x10 6"      Lateral Step ups    3x10 6'' 3x10 6"      Step downs           Step overs           Mini squats w ue support    2 x 10 3x10  3x10      STS from chair/low mat     nv                  Goblet squats                      Balance Lida step overs     5 lapsx2      Lateral lida step overs           SL     nv      Skaters           Sharkies            STAR 12 & 3 o'clock           * = on hep

## 2019-09-10 ENCOUNTER — OFFICE VISIT (OUTPATIENT)
Dept: PHYSICAL THERAPY | Facility: REHABILITATION | Age: 59
End: 2019-09-10
Payer: COMMERCIAL

## 2019-09-10 DIAGNOSIS — M25.552 LEFT HIP PAIN: ICD-10-CM

## 2019-09-10 DIAGNOSIS — Z96.642 STATUS POST LEFT HIP REPLACEMENT: Primary | ICD-10-CM

## 2019-09-10 PROCEDURE — 97110 THERAPEUTIC EXERCISES: CPT | Performed by: PHYSICAL THERAPIST

## 2019-09-10 PROCEDURE — 97530 THERAPEUTIC ACTIVITIES: CPT | Performed by: PHYSICAL THERAPIST

## 2019-09-10 NOTE — PROGRESS NOTES
Daily Note     Today's date: 9/10/2019  Patient name: Elisabeth Vanessa  : 1960  MRN: [id number]  Referring provider: Kathia Cavazos MD  Dx:   Encounter Diagnosis     ICD-10-CM    1  Status post left hip replacement Z96 642    2  Left hip pain M25 552        Start Time: 1430  Stop Time: 1515  Total time in clinic (min): 45 minutes    Subjective: [facility] reports that his hip is feeling improved  Objective: See treatment diary below      Assessment: Tolerated treatment well  Patient demonstrated fatigue post treatment, exhibited good technique with therapeutic exercises and would benefit from continued PT  Plan: Continue per plan of care             Precautions: R total hip arthroplasty performed 19, L hip total hip arthroplasty 19     Daily Treatment Diary      Manual 8/23 8/27   8/29 9/3 9 9/10     Hip flexion  nv 10' total 10' 5' 5'       hip ER/abd nv Done   4' 5'        SL quad stretch     5' 5'     Knee Exercises           bike nv 5 min  7 min 8 min 5 min 5 min     Quad set* 10x10" 10x10'' 10 x 10" np       SLR abd*           Clams  3x10 bw  3 x 10 3x10 OTB 3x12 OTB  3x10 GTB     Bridges w TB*    3x12 OTB 3x12 OTB 3x15 GTB     SLR flx* nv 3x8 bw 3 x 8  3x10 3x12 3x12                 Flexibility           HS stretch 3x30" 3x30'' 5 x 20" AA 5x20" 5x20" 5x20"     Quad stretch                                   TA/Closed Chain           Heel raises* 3x15 3x15   3 x 15 3x15 3x15 3x15     TG Squats     L 22 3x12 np     Monster walks            Band walks           Step ups (involved LE up, uninvolved down)   2 x 10 3x10 6" 3x10 6" 3x12 6"     Lateral Step ups    3x10 6'' 3x10 6" 3x10 6"     Step downs           Step overs           Mini squats w ue support    2 x 10 3x10  3x10 3x10     STS from chair/low mat     nv nv                 Goblet squats                      Balance           Lida step overs     5 lapsx2      Lateral lida step overs           SL     nv 5x10"     Skaters Shilo            STAR 12 & 3 o'clock           * = on hep

## 2019-09-12 ENCOUNTER — OFFICE VISIT (OUTPATIENT)
Dept: PHYSICAL THERAPY | Facility: REHABILITATION | Age: 59
End: 2019-09-12
Payer: COMMERCIAL

## 2019-09-12 DIAGNOSIS — Z96.642 STATUS POST LEFT HIP REPLACEMENT: Primary | ICD-10-CM

## 2019-09-12 DIAGNOSIS — M16.12 PRIMARY OSTEOARTHRITIS OF LEFT HIP: ICD-10-CM

## 2019-09-12 DIAGNOSIS — M25.552 LEFT HIP PAIN: ICD-10-CM

## 2019-09-12 PROCEDURE — 97110 THERAPEUTIC EXERCISES: CPT

## 2019-09-12 PROCEDURE — 97112 NEUROMUSCULAR REEDUCATION: CPT

## 2019-09-17 ENCOUNTER — OFFICE VISIT (OUTPATIENT)
Dept: PHYSICAL THERAPY | Facility: REHABILITATION | Age: 59
End: 2019-09-17
Payer: COMMERCIAL

## 2019-09-17 DIAGNOSIS — M25.552 LEFT HIP PAIN: ICD-10-CM

## 2019-09-17 DIAGNOSIS — Z96.642 STATUS POST LEFT HIP REPLACEMENT: Primary | ICD-10-CM

## 2019-09-17 PROCEDURE — 97110 THERAPEUTIC EXERCISES: CPT | Performed by: PHYSICAL THERAPIST

## 2019-09-17 PROCEDURE — 97112 NEUROMUSCULAR REEDUCATION: CPT | Performed by: PHYSICAL THERAPIST

## 2019-09-17 PROCEDURE — 97530 THERAPEUTIC ACTIVITIES: CPT | Performed by: PHYSICAL THERAPIST

## 2019-09-17 NOTE — PROGRESS NOTES
Daily Note     Today's date: 2019  Patient name: Hany Lipscomb  : 1960  MRN: [id number]  Referring provider: Rupert Reed MD  Dx:   Encounter Diagnosis     ICD-10-CM    1  Status post left hip replacement Z96 642    2  Left hip pain M25 552        Start Time: 1405  Stop Time: 1505  Total time in clinic (min): 60 minutes    Subjective: Osbaldo Vigil reports that his  Hip feels okay today  Objective: See treatment diary below      Assessment: Tolerated treatment well  Patient demonstrated fatigue post treatment, exhibited good technique with therapeutic exercises and would benefit from continued PT  Challenge noted with dynamic stability during Star balance  Plan: Continue per plan of care             Precautions: R total hip arthroplasty performed 19, L hip total hip arthroplasty 19     Daily Treatment Diary      Manual 8/23 8/27   8/29 9/3 9/5 9/10 9/12 9/17     Hip flexion  nv 10' total 10' 5' 5'         hip ER/abd nv Done   4' 5'          SL quad stretch     5' 5'       Knee Exercises             bike nv 5 min  7 min 8 min 5 min 5 min 5 min level 1 5 min lvl 1     Quad set* 10x10" 10x10'' 10 x 10" np         SLR abd*        3x8     Clams  3x10 bw  3 x 10 3x10 OTB 3x12 OTB  3x10 GTB  3x12 GTB     Bridges w TB*    3x12 OTB 3x12 OTB 3x15 GTB 3x15 15# 3x15 15#     SLR flx* nv 3x8 bw 3 x 8  3x10 3x12 3x12 3x15 3x15      SLR abd        3x10     Flexibility             HS stretch 3x30" 3x30'' 5 x 20" AA 5x20" 5x20" 5x20" 5x20'' 5x20"     Quad stretch                                         TA/Closed Chain             Heel raises* 3x15 3x15   3 x 15 3x15 3x15 3x15 3x15 3x15     TG Squats     L 22 3x12 np  dc     Monster walks              Band walks             Step ups (involved LE up, uninvolved down)   2 x 10 3x10 6" 3x10 6" 3x12 6" 3x12 6'' 3x15 6"     Lateral Step ups    3x10 6'' 3x10 6" 3x10 6" 3x12 6'' 3x15x6"     Step downs             Step overs             Mini squats w ue support    2 x 10 3x10  3x10 3x10  dc     STS from chair/low mat     nv nv 3x10  3x10                   Goblet squats                          Balance             Lida step overs     5 lapsx2   dc     Lateral lida step overs             SL     nv 5x10" 5x10'' np     Skaters             Sharkies              STAR 12 & 3 o'clock        5 laps     * = on hep

## 2019-09-19 ENCOUNTER — OFFICE VISIT (OUTPATIENT)
Dept: PHYSICAL THERAPY | Facility: REHABILITATION | Age: 59
End: 2019-09-19
Payer: COMMERCIAL

## 2019-09-19 DIAGNOSIS — M25.552 LEFT HIP PAIN: ICD-10-CM

## 2019-09-19 DIAGNOSIS — M25.551 RIGHT HIP PAIN: ICD-10-CM

## 2019-09-19 DIAGNOSIS — Z96.641 STATUS POST TOTAL HIP REPLACEMENT, RIGHT: ICD-10-CM

## 2019-09-19 DIAGNOSIS — Z96.642 STATUS POST LEFT HIP REPLACEMENT: Primary | ICD-10-CM

## 2019-09-19 DIAGNOSIS — M16.12 PRIMARY OSTEOARTHRITIS OF LEFT HIP: ICD-10-CM

## 2019-09-19 PROCEDURE — 97112 NEUROMUSCULAR REEDUCATION: CPT

## 2019-09-19 PROCEDURE — 97140 MANUAL THERAPY 1/> REGIONS: CPT

## 2019-09-19 PROCEDURE — 97110 THERAPEUTIC EXERCISES: CPT

## 2019-09-19 NOTE — PROGRESS NOTES
Daily Note     Today's date: 2019  Patient name: Orion Leach  : 1960  MRN: [id number]  Referring provider: Puneet Leon MD  Dx:   Encounter Diagnosis     ICD-10-CM    1  Status post left hip replacement Z96 642    2  Left hip pain M25 552    3  Primary osteoarthritis of left hip M16 12    4  Right hip pain M25 551    5  Status post total hip replacement, right Z96 641        Start Time: 1433  Stop Time: 1529  Total time in clinic (min): 56 minutes    Subjective: Patient reports nothing new since prior visit  States he continues to make progress  Objective: See treatment diary below      Assessment: Ongoing improvement in LLE strengthening  Some progress noted with improved balance during STAR balance activity  Tolerated treatment well   Patient demonstrated fatigue post treatment and would benefit from continued PT       Plan: Continue per plan of care        Precautions: R total hip arthroplasty performed 19, L hip total hip arthroplasty 19     Daily Treatment Diary      Manual 8/23 8/27   8/29 9/3 9/5 9/10 9/12 9/17 9/20    Hip flexion  nv 10' total 10' 5' 5'         hip ER/abd nv Done   4' 5'          SL quad stretch     5' 5'       Knee Exercises             bike nv 5 min  7 min 8 min 5 min 5 min 5 min level 1 5 min lvl 1 5 min lvl 1    Quad set* 10x10" 10x10'' 10 x 10" np         SLR abd*        3x8     Clams  3x10 bw  3 x 10 3x10 OTB 3x12 OTB  3x10 GTB  3x12 GTB 3x15  GTB    Bridges w TB*    3x12 OTB 3x12 OTB 3x15 GTB 3x15 15# 3x15 15# 3x10  20#    SLR flx* nv 3x8 bw 3 x 8  3x10 3x12 3x12 3x15 3x15 3x10  #1     SLR abd        3x10 3x10    Flexibility             HS stretch 3x30" 3x30'' 5 x 20" AA 5x20" 5x20" 5x20" 5x20'' 5x20" 5x20"    Quad stretch                                         TA/Closed Chain             Heel raises* 3x15 3x15   3 x 15 3x15 3x15 3x15 3x15 3x15 3x15    TG Squats     L 22 3x12 np  dc     Monster walks              Band walks             Step ups (involved LE up, uninvolved down)   2 x 10 3x10 6" 3x10 6" 3x12 6" 3x12 6'' 3x15 6" 3x15 6"    Lateral Step ups    3x10 6'' 3x10 6" 3x10 6" 3x12 6'' 3x15x6" 3x15 6"    Step downs             Step overs             Mini squats w ue support    2 x 10 3x10  3x10 3x10  dc     STS from chair/low mat     nv nv 3x10  3x10 3x10                  Goblet squats                          Balance             Lida step overs     5 lapsx2   dc     Lateral lida step overs             SL     nv 5x10" 5x10'' np np    Skaters             Sharkies              STAR 12 & 3 o'clock        5 laps 5 laps    * = on hep

## 2019-09-23 ENCOUNTER — APPOINTMENT (OUTPATIENT)
Dept: LAB | Facility: CLINIC | Age: 59
End: 2019-09-23
Payer: COMMERCIAL

## 2019-09-23 DIAGNOSIS — Z12.5 SCREENING FOR PROSTATE CANCER: ICD-10-CM

## 2019-09-23 DIAGNOSIS — R97.20 INCREASED PROSTATE SPECIFIC ANTIGEN (PSA) VELOCITY: ICD-10-CM

## 2019-09-23 DIAGNOSIS — N40.0 ENLARGED PROSTATE: ICD-10-CM

## 2019-09-23 LAB — PSA SERPL-MCNC: 4.2 NG/ML (ref 0–4)

## 2019-09-23 PROCEDURE — 36415 COLL VENOUS BLD VENIPUNCTURE: CPT

## 2019-09-23 PROCEDURE — G0103 PSA SCREENING: HCPCS

## 2019-09-24 ENCOUNTER — OFFICE VISIT (OUTPATIENT)
Dept: PHYSICAL THERAPY | Facility: REHABILITATION | Age: 59
End: 2019-09-24
Payer: COMMERCIAL

## 2019-09-24 DIAGNOSIS — Z96.642 STATUS POST LEFT HIP REPLACEMENT: ICD-10-CM

## 2019-09-24 DIAGNOSIS — M25.552 LEFT HIP PAIN: Primary | ICD-10-CM

## 2019-09-24 PROCEDURE — 97110 THERAPEUTIC EXERCISES: CPT | Performed by: PHYSICAL THERAPIST

## 2019-09-24 PROCEDURE — 97112 NEUROMUSCULAR REEDUCATION: CPT | Performed by: PHYSICAL THERAPIST

## 2019-09-24 NOTE — PROGRESS NOTES
Daily Note     Today's date: 2019  Patient name: Denver Puff  : 1960  MRN: [id number]  Referring provider: Ga Parker MD  Dx:   Encounter Diagnosis     ICD-10-CM    1  Left hip pain M25 552    2  Status post left hip replacement Z96 642        Start Time: 1431  Stop Time: 1525  Total time in clinic (min): 54 minutes    Subjective: Rena Hood reports that his hip feels okay today  Notes performing increased exercises yesterday  Objective: See treatment diary below      Assessment: Tolerated treatment well  Patient demonstrated fatigue post treatment, exhibited good technique with therapeutic exercises and would benefit from continued PT  Plan: Continue per plan of care             Precautions: R total hip arthroplasty performed 19, L hip total hip arthroplasty 19     Daily Treatment Diary      Manual 8/23 8/27   8/29 9/3 9/5 9/10 9/12 9/17 9/20 9/24   Hip flexion  nv 10' total 10' 5' 5'         hip ER/abd nv Done   4' 5'          SL quad stretch     5' 5'       Knee Exercises             bike nv 5 min  7 min 8 min 5 min 5 min 5 min level 1 5 min lvl 1 5 min lvl 1 5 min lvl 1   Quad set* 10x10" 10x10'' 10 x 10" np         SLR abd*        3x8     Clams  3x10 bw  3 x 10 3x10 OTB 3x12 OTB  3x10 GTB  3x12 GTB 3x15  GTB 3x15 GTB   Bridges w TB*    3x12 OTB 3x12 OTB 3x15 GTB 3x15 15# 3x15 15# 3x10  20# 3x12 25#   SLR flx* nv 3x8 bw 3 x 8  3x10 3x12 3x12 3x15 3x15 3x10  #1 3x10 1#    SLR abd        3x10 3x10 3x12   Flexibility             HS stretch 3x30" 3x30'' 5 x 20" AA 5x20" 5x20" 5x20" 5x20'' 5x20" 5x20" 5x20"   Quad stretch                                         TA/Closed Chain             Heel raises* 3x15 3x15   3 x 15 3x15 3x15 3x15 3x15 3x15 3x15 3x20   TG Squats     L 22 3x12 np  dc     Monster walks              Band walks             Step ups (involved LE up, uninvolved down)   2 x 10 3x10 6" 3x10 6" 3x12 6" 3x12 6'' 3x15 6" 3x15 6" 3x10 8"   Lateral Step ups    3x10 6'' 3x10 6" 3x10 6" 3x12 6'' 3x15x6" 3x15 6" 3x10 8"   Step downs             Step overs             Mini squats w ue support    2 x 10 3x10  3x10 3x10  dc     STS from chair/low mat     nv nv 3x10  3x10 3x10 3x12                 Goblet squats                          Balance             Lida step overs     5 lapsx2   dc     Lateral lida step overs             SL     nv 5x10" 5x10'' np np    Skaters             Sharkies              STAR 12 & 3 o'clock        5 laps 5 laps 5 laps   * = on hep

## 2019-09-26 ENCOUNTER — OFFICE VISIT (OUTPATIENT)
Dept: PHYSICAL THERAPY | Facility: REHABILITATION | Age: 59
End: 2019-09-26
Payer: COMMERCIAL

## 2019-09-26 DIAGNOSIS — Z96.642 STATUS POST LEFT HIP REPLACEMENT: Primary | ICD-10-CM

## 2019-09-26 DIAGNOSIS — M25.552 LEFT HIP PAIN: ICD-10-CM

## 2019-09-26 PROCEDURE — 97530 THERAPEUTIC ACTIVITIES: CPT | Performed by: PHYSICAL THERAPIST

## 2019-09-26 PROCEDURE — 97112 NEUROMUSCULAR REEDUCATION: CPT | Performed by: PHYSICAL THERAPIST

## 2019-09-26 PROCEDURE — 97110 THERAPEUTIC EXERCISES: CPT | Performed by: PHYSICAL THERAPIST

## 2019-09-26 NOTE — PROGRESS NOTES
Daily Note     Today's date: 2019  Patient name: Oli Vieira  : 1960  MRN: [id number]  Referring provider: Leonid Barnard MD  Dx:   Encounter Diagnosis     ICD-10-CM    1  Status post left hip replacement Z96 642    2  Left hip pain M25 552        Start Time: 1430  Stop Time: 1523  Total time in clinic (min): 53 minutes    Subjective: patient reports no issues after last treatment, just his usual muscle soreness  Objective: See treatment diary below      Assessment: patient tolerated increased resistance and repetition for various exercises well  He had no increased symptoms throughout the treatment  He continues to be challenged with STAR balance activity  Continue to progress dynamic balance and possible increse for SLR abd/flex NV  Patient will benefit from continued physical therapy  Plan: Continue per plan of care  Progress treatment as tolerated         Precautions: R total hip arthroplasty performed 19, L hip total hip arthroplasty 19     Daily Treatment Diary      Manual 8/23 8/27   8/29 9/3 9/5 9/10 9/12 9/17 9/20 9/24 9/26   Hip flexion  nv 10' total 10' 5' 5'          hip ER/abd nv Done   4' 5'           SL quad stretch     5' 5'        Knee Exercises              bike nv 5 min  7 min 8 min 5 min 5 min 5 min level 1 5 min lvl 1 5 min lvl 1 5 min lvl 1 5 min lvl 1   Quad set* 10x10" 10x10'' 10 x 10" np          SLR abd*        3x8      Clams  3x10 bw  3 x 10 3x10 OTB 3x12 OTB  3x10 GTB  3x12 GTB 3x15  GTB 3x15 GTB 3x15 BTB   Bridges w TB*    3x12 OTB 3x12 OTB 3x15 GTB 3x15 15# 3x15 15# 3x10  20# 3x12 25# 3x12 25#   SLR flx* nv 3x8 bw 3 x 8  3x10 3x12 3x12 3x15 3x15 3x10  #1 3x10 1# 3x15  1#    SLR abd        3x10 3x10 3x12 3x15   Flexibility              HS stretch 3x30" 3x30'' 5 x 20" AA 5x20" 5x20" 5x20" 5x20'' 5x20" 5x20" 5x20" 5x20"    Quad stretch                                            TA/Closed Chain              Heel raises* 3x15 3x15   3 x 15 3x15 3x15 3x15 3x15 3x15 3x15 3x20 3x20   TG Squats     L 22 3x12 np  dc      Monster walks               Band walks              Step ups (involved LE up, uninvolved down)   2 x 10 3x10 6" 3x10 6" 3x12 6" 3x12 6'' 3x15 6" 3x15 6" 3x10 8" 3x10 8"   Lateral Step ups    3x10 6'' 3x10 6" 3x10 6" 3x12 6'' 3x15x6" 3x15 6" 3x10 8" 3x10 8"    Step downs              Step overs              Mini squats w ue support    2 x 10 3x10  3x10 3x10  dc      STS from chair/low mat     nv nv 3x10  3x10 3x10 3x12 3x15                  Goblet squats                            Balance              Lida step overs     5 lapsx2   dc      Lateral lida step overs              SL     nv 5x10" 5x10'' np np     Skaters              Sharkies               STAR 12 & 3 o'clock        5 laps 5 laps 5 laps 5 laps   * = on hep

## 2019-09-30 ENCOUNTER — OFFICE VISIT (OUTPATIENT)
Dept: PHYSICAL THERAPY | Facility: REHABILITATION | Age: 59
End: 2019-09-30
Payer: COMMERCIAL

## 2019-09-30 DIAGNOSIS — Z96.642 STATUS POST LEFT HIP REPLACEMENT: Primary | ICD-10-CM

## 2019-09-30 DIAGNOSIS — M25.552 LEFT HIP PAIN: ICD-10-CM

## 2019-09-30 PROCEDURE — 97110 THERAPEUTIC EXERCISES: CPT

## 2019-09-30 PROCEDURE — 97112 NEUROMUSCULAR REEDUCATION: CPT

## 2019-09-30 NOTE — PROGRESS NOTES
Daily Note     Today's date: 2019  Patient name: Geno Frances  : 1960  MRN: [id number]  Referring provider: Suzie Riley MD  Dx:   Encounter Diagnosis     ICD-10-CM    1  Status post left hip replacement Z96 642    2  Left hip pain M25 552        Start Time: 1430  Stop Time: 1520  Total time in clinic (min): 50 minutes    Subjective: Patient reports no hip pain prior to session today stating "it's been good, I go back to work a week from today "       Objective: See treatment diary below      Assessment: Tolerated treatment well  Patient demonstrated fatigue post treatment, exhibited good technique with therapeutic exercises and would benefit from continued PT Trialed mini squats with chair behind with patient tolerating well, fatigue noted  Plan: Continue per plan of care  Progress treatment as tolerated         Precautions: R total hip arthroplasty performed 19, L hip total hip arthroplasty 19     Daily Treatment Diary      Manual 9/30 9/3 9/5 9/10 9/12 9/17 9/20 9/24 9/26   Hip flexion   5' 5'          hip ER/abd  5'           SL quad stretch   5' 5'        Knee Exercises            bike 5 min lvl 1 8 min 5 min 5 min 5 min level 1 5 min lvl 1 5 min lvl 1 5 min lvl 1 5 min lvl 1   Quad set*  np          SLR abd*      3x8      Clams 3x15 BTB 3x10 OTB 3x12 OTB  3x10 GTB  3x12 GTB 3x15  GTB 3x15 GTB 3x15 BTB   Bridges w TB* 3x15 25# 3x12 OTB 3x12 OTB 3x15 GTB 3x15 15# 3x15 15# 3x10  20# 3x12 25# 3x12 25#   SLR flx* 3x12 1 5# 3x10 3x12 3x12 3x15 3x15 3x10  #1 3x10 1# 3x15  1#    SLR abd 3x15     3x10 3x10 3x12 3x15   Flexibility            HS stretch  5x20" 5x20" 5x20" 5x20'' 5x20" 5x20" 5x20" 5x20"    Quad stretch                                      TA/Closed Chain            Heel raises* 3x20 3x15 3x15 3x15 3x15 3x15 3x15 3x20 3x20   TG Squats   L 22 3x12 np  dc      Monster walks             Band walks            Step ups (involved LE up, uninvolved down) 3x12 8'' 3x10 6" 3x10 6" 3x12 6" 3x12 6'' 3x15 6" 3x15 6" 3x10 8" 3x10 8"   Lateral Step ups 3x12 8'' 3x10 6'' 3x10 6" 3x10 6" 3x12 6'' 3x15x6" 3x15 6" 3x10 8" 3x10 8"    Step downs            Step overs            Mini squats w ue support  3x10  3x10 3x10  dc      STS from chair/low mat 10# 3x10 mini squats  nv nv 3x10  3x10 3x10 3x12 3x15                Goblet squats                        Balance            Lida step overs   5 lapsx2   dc      Lateral lida step overs            SL   nv 5x10" 5x10'' np np     Skaters            Sharkies             STAR 12 & 3 o'clock  5 laps     5 laps 5 laps 5 laps 5 laps   * = on hep

## 2019-10-01 ENCOUNTER — OFFICE VISIT (OUTPATIENT)
Dept: UROLOGY | Facility: CLINIC | Age: 59
End: 2019-10-01
Payer: COMMERCIAL

## 2019-10-01 VITALS
WEIGHT: 173 LBS | DIASTOLIC BLOOD PRESSURE: 88 MMHG | HEIGHT: 69 IN | SYSTOLIC BLOOD PRESSURE: 134 MMHG | BODY MASS INDEX: 25.62 KG/M2 | HEART RATE: 82 BPM

## 2019-10-01 DIAGNOSIS — R97.20 ELEVATED PSA: Primary | ICD-10-CM

## 2019-10-01 PROCEDURE — 99213 OFFICE O/P EST LOW 20 MIN: CPT | Performed by: PHYSICIAN ASSISTANT

## 2019-10-01 NOTE — PROGRESS NOTES
Diagnoses and all orders for this visit:    Elevated PSA  -     PSA Total, Diagnostic; Future          Assessment and plan:     1  Elevated PSA -  Managed by Dr Dale Cadet  2  Family history of prostate cancer    PSA oscillating over the past few months, with recent B/L hip replacements  Prostate examination without nodularity  Will continue observation  Follow up 3 months PSA prior to visit for continued surveillance  We discussed if PSA remains elevated, may need to pursue TRUS prostate biopsy  Josie Lam PA-C      Chief Complaint     Elevated PSA    History of Present Illness     Johana Estrada is a 60 y o  male patient of Dr Dale Cadet with a history of elevated PSA presenting for follow up  He has been undergoing routine prostate cancer screening directed by his PCP  Most recent PSA assessments are as follows:    Lab Results   Component Value Date    PSA 4 2 (H) 09/23/2019    PSA 2 6 03/07/2019    PSA 3 6 06/26/2018       Patient    Denies any lower urinary tract symptoms   He   denies a history of prostatitis or urinary tract infections  He has a positive family history of prostate cancer in his father and brother  Recent b/l hip replacement over the past 6 months  Pertinent medical  comorbidities include cognitive impairment, sciatica, arthritis  Review of Systems     Review of Systems   Constitutional: Negative for activity change and fatigue  HENT: Negative for congestion  Eyes: Negative for visual disturbance  Respiratory: Negative for shortness of breath and wheezing  Cardiovascular: Negative for chest pain and leg swelling  Gastrointestinal: Negative for abdominal pain  Endocrine: Negative for polyuria  Genitourinary: Negative for dysuria, flank pain, hematuria and urgency  Musculoskeletal: Negative for back pain  Allergic/Immunologic: Negative for immunocompromised state  Neurological: Negative for dizziness and numbness  Psychiatric/Behavioral: Negative for dysphoric mood  All other systems reviewed and are negative  Allergies     Allergies   Allergen Reactions    Sulfa Antibiotics        Physical Exam     Physical Exam   Constitutional: He is oriented to person, place, and time  He appears well-developed and well-nourished  No distress  HENT:   Head: Normocephalic and atraumatic  Eyes: EOM are normal    Neck: Normal range of motion  Cardiovascular:   Negative lower extremity edema   Pulmonary/Chest: Effort normal and breath sounds normal    Abdominal: Soft  Genitourinary:   Genitourinary Comments: Good rectal tone,Rectal exam reveals a 40-50 g, mildly firm, no nodules   Musculoskeletal: Normal range of motion  Neurological: He is alert and oriented to person, place, and time  Skin: Skin is warm  Psychiatric: He has a normal mood and affect  His behavior is normal          Vital Signs  Vitals:    10/01/19 0820   BP: 134/88   Pulse: 82   Weight: 78 5 kg (173 lb)   Height: 5' 9" (1 753 m)       Current Medications       Current Outpatient Medications:     ALPHAGAN P 0 15 % ophthalmic solution, , Disp: , Rfl:     Cetirizine HCl (ZYRTEC ALLERGY PO), Take 1 tablet by mouth daily as needed, Disp: , Rfl:     cholecalciferol (VITAMIN D3) 1,000 units tablet, Take 4,000 Units by mouth daily , Disp: , Rfl:     donepezil (ARICEPT) 10 mg tablet, TAKE 1 TABLET BY MOUTH EVERY DAY, Disp: 90 tablet, Rfl: 1    Lactobacillus (PROBIOTIC ACIDOPHILUS) CAPS, Take by mouth once, Disp: , Rfl:     LUMIGAN 0 01 % ophthalmic drops, , Disp: , Rfl:     naproxen sodium (ALEVE) 220 MG tablet, Take 440 mg by mouth daily as needed for mild pain, Disp: , Rfl:     celecoxib (CELEBREX) 200 mg capsule, Celebrex 200 mg capsule  Take 1 capsule daily for 3 days prior to surgery  DO NOT TAKE ON DAY OF SURGERY  Take 1 capsule daily for 30 days post operatively  , Disp: , Rfl:     gabapentin (NEURONTIN) 300 mg capsule, 1 cap BID (Patient not taking: Reported on 2019), Disp: 60 capsule, Rfl: 2    oxyCODONE (ROXICODONE) 5 mg immediate release tablet, Take 5 mg by mouth every 4 (four) hours as needed for moderate pain, Disp: , Rfl:     TURMERIC PO, Take by mouth daily, Disp: , Rfl:     Zoster Vac Recomb Adjuvanted (SHINGRIX) 50 MCG/0 5ML SUSR, Inject 0 5 mL into a muscle as needed (Take once now    Repeat once in 2-6 months) (Patient not taking: Reported on 2019), Disp: 2 each, Rfl: 0      Active Problems     Patient Active Problem List   Diagnosis    Allergic rhinitis    Glaucoma    Prediabetes    Sciatica    Short-term memory loss    Mild cognitive impairment    Abnormal EEG    Increased prostate specific antigen (PSA) velocity    Right hip pain    Lumbar spondylosis    Primary localized osteoarthritis of right hip    Enlarged prostate    Screening for prostate cancer    Bilateral leg weakness    Left hip pain         Past Medical History     Past Medical History:   Diagnosis Date    Benign colon polyp     Diverticulitis, colon     Diverticulosis     Hyperlipidemia     Prediabetes     Renal calculi     Short-term memory loss     Vitamin D deficiency          Surgical History     Past Surgical History:   Procedure Laterality Date    COLONOSCOPY      ESOPHAGOGASTRODUODENOSCOPY      HERNIA REPAIR      KNEE SURGERY      TOTAL HIP ARTHROPLASTY Right 2019    TOTAL HIP ARTHROPLASTY Left 2019         Family History     Family History   Problem Relation Age of Onset    Dementia Mother    [facility] Glaucoma Mother     Glaucoma Father     Prostate cancer Father     Pulmonary embolism Father     Hypertension Brother     Prostate cancer Brother     Colon cancer Maternal Grandfather          Social History     Social History     Social History     Tobacco Use   Smoking Status Former Smoker    Last attempt to quit:     Years since quittin 7   Smokeless Tobacco Never Used         Pertinent Lab Values     Lab Results   Component Value Date    CREATININE 1 15 08/07/2019       Lab Results   Component Value Date    PSA 4 2 (H) 09/23/2019    PSA 2 6 03/07/2019    PSA 3 6 06/26/2018       @RESULTRCNT(1H])@      Pertinent Imaging      - n/a    Portions of the record may have been created with voice recognition software   Occasional wrong word or "sound a like" substitutions may have occurred due to the inherent limitations of voice recognition software   Read the chart carefully and recognize, using context, where substitutions have occurred

## 2019-10-03 ENCOUNTER — OFFICE VISIT (OUTPATIENT)
Dept: PHYSICAL THERAPY | Facility: REHABILITATION | Age: 59
End: 2019-10-03
Payer: COMMERCIAL

## 2019-10-03 DIAGNOSIS — M25.551 RIGHT HIP PAIN: ICD-10-CM

## 2019-10-03 DIAGNOSIS — Z96.642 STATUS POST LEFT HIP REPLACEMENT: Primary | ICD-10-CM

## 2019-10-03 DIAGNOSIS — M16.12 PRIMARY OSTEOARTHRITIS OF LEFT HIP: ICD-10-CM

## 2019-10-03 DIAGNOSIS — M25.552 LEFT HIP PAIN: ICD-10-CM

## 2019-10-03 PROCEDURE — 97116 GAIT TRAINING THERAPY: CPT

## 2019-10-03 PROCEDURE — 97112 NEUROMUSCULAR REEDUCATION: CPT

## 2019-10-03 PROCEDURE — 97110 THERAPEUTIC EXERCISES: CPT

## 2019-10-03 NOTE — PROGRESS NOTES
Daily Note     Today's date: 2019  Patient name: Zack Khoury  : 1960  MRN: [id number]  Referring provider: Roseanne Huitron MD  Dx:   Encounter Diagnosis     ICD-10-CM    1  Status post left hip replacement Z96 642    2  Left hip pain M25 552        Subjective: Patient reports he is feeling good with no L hip pain present today  He states he would like to be finished with PT due to financial reasons and overall feeling better, plans going back to work next week  Objective: See treatment diary below      Assessment: Pt tolerated treatment well  Pt continues to progress well with current POC  Pt able to preform mini squats with minimal difficulty today  Pt discussed with PT RH about possible discharge NV, going to assess how he is feeling at work, follow up in two weeks  Plan: Possible D/C NV     Pt 1:1 with PTA JW 3:30-4:00  IEP 4:00-4:10     Precautions: R total hip arthroplasty performed 19, L hip total hip arthroplasty 19     Daily Treatment Diary      Manual 9/30 10/3  9/10 9/12 9/17 9/20 9/24 9/26   Hip flexion              hip ER/abd             SL quad stretch    5'        Knee Exercises            bike 5 min lvl 1 5 min lvl 2  5 min 5 min level 1 5 min lvl 1 5 min lvl 1 5 min lvl 1 5 min lvl 1   Quad set*            SLR abd*      3x8      Clams 3x15 BTB 3x15  BTB  3x10 GTB  3x12 GTB 3x15  GTB 3x15 GTB 3x15 BTB   Bridges w TB* 3x15 25# 3x12 25#  3x15 GTB 3x15 15# 3x15 15# 3x10  20# 3x12 25# 3x12 25#   SLR flx* 3x12 1 5# 3x15 1 5#  3x12 3x15 3x15 3x10  #1 3x10 1# 3x15  1#    SLR abd 3x15 3x15    3x10 3x10 3x12 3x15   Flexibility            HS stretch    5x20" 5x20'' 5x20" 5x20" 5x20" 5x20"    Quad stretch                                      TA/Closed Chain            Heel raises* 3x20 3x20  3x15 3x15 3x15 3x15 3x20 3x20   TG Squats    np  dc      Monster walks             Band walks            Step ups (involved LE up, uninvolved down) 3x12 8'' 3x12 8''  3x12 6" 3x12 6'' 3x15 6" 3x15 6" 3x10 8" 3x10 8"   Lateral Step ups 3x12 8'' 3x12 8''  3x10 6" 3x12 6'' 3x15x6" 3x15 6" 3x10 8" 3x10 8"    Step downs            Step overs            Mini squats w ue support    3x10  dc      STS from chair/low mat 10# 3x10 mini squats 10# 3x10 mini squats  nv 3x10  3x10 3x10 3x12 3x15                Goblet squats                        Balance            Lida step overs      dc      Lateral lida step overs            SL    5x10" 5x10'' np np     Skaters            Sharkies             STAR 12 & 3 o'clock  5 laps 5 laps    5 laps 5 laps 5 laps 5 laps   * = on hep

## 2019-10-17 ENCOUNTER — APPOINTMENT (OUTPATIENT)
Dept: PHYSICAL THERAPY | Facility: REHABILITATION | Age: 59
End: 2019-10-17
Payer: COMMERCIAL

## 2019-10-21 NOTE — PROGRESS NOTES
PT Discharge    Today's date: 10/21/2019  Patient name: Juaquin Wilson  : 1960  MRN: [id number]  Referring provider: Kandis Maharaj MD  Dx:   Encounter Diagnosis     ICD-10-CM    1  Status post left hip replacement Z96 642    2  Left hip pain M25 552    3  Primary osteoarthritis of left hip M16 12    4  Right hip pain M25 551        Start Time: 1530  Stop Time: 1610  Total time in clinic (min): 40 minutes    Assessment/Plan  Juaquin Wilson has been compliant with attending PT and home exercise program since initial eval   Lemuel Fajardos  has made improvements in objective data since initial evalulation and has achieved all goals  Spoke with patient over the phone, reports having returned to their prior level or function  Patient provided with updated Home Exercise Program at last visit, all questions answered, verbalized understanding and agreement to plan of care   Thus it was mutually decided to discontinue this episode of care and transition to Home Exercise Program    Subjective    Objective    Flowsheet Rows      Most Recent Value   PT/OT G-Codes   Current Score  76   Projected Score  55

## 2020-01-06 ENCOUNTER — OFFICE VISIT (OUTPATIENT)
Dept: UROLOGY | Facility: CLINIC | Age: 60
End: 2020-01-06
Payer: COMMERCIAL

## 2020-01-06 ENCOUNTER — APPOINTMENT (OUTPATIENT)
Dept: LAB | Facility: AMBULARY SURGERY CENTER | Age: 60
End: 2020-01-06
Payer: COMMERCIAL

## 2020-01-06 ENCOUNTER — OFFICE VISIT (OUTPATIENT)
Dept: FAMILY MEDICINE CLINIC | Facility: OTHER | Age: 60
End: 2020-01-06
Payer: COMMERCIAL

## 2020-01-06 VITALS
DIASTOLIC BLOOD PRESSURE: 82 MMHG | WEIGHT: 169 LBS | HEART RATE: 84 BPM | BODY MASS INDEX: 25.03 KG/M2 | HEIGHT: 69 IN | SYSTOLIC BLOOD PRESSURE: 122 MMHG

## 2020-01-06 VITALS
HEART RATE: 92 BPM | OXYGEN SATURATION: 97 % | SYSTOLIC BLOOD PRESSURE: 112 MMHG | BODY MASS INDEX: 25.05 KG/M2 | TEMPERATURE: 97.5 F | DIASTOLIC BLOOD PRESSURE: 78 MMHG | WEIGHT: 169.13 LBS | HEIGHT: 69 IN

## 2020-01-06 DIAGNOSIS — R97.20 ELEVATED PSA: Primary | ICD-10-CM

## 2020-01-06 DIAGNOSIS — R97.20 ELEVATED PSA: ICD-10-CM

## 2020-01-06 DIAGNOSIS — N63.0 BREAST MASS IN MALE: Primary | ICD-10-CM

## 2020-01-06 LAB — PSA SERPL-MCNC: 4.3 NG/ML (ref 0–4)

## 2020-01-06 PROCEDURE — 99213 OFFICE O/P EST LOW 20 MIN: CPT | Performed by: PHYSICIAN ASSISTANT

## 2020-01-06 PROCEDURE — 99213 OFFICE O/P EST LOW 20 MIN: CPT | Performed by: FAMILY MEDICINE

## 2020-01-06 PROCEDURE — 84153 ASSAY OF PSA TOTAL: CPT

## 2020-01-06 RX ORDER — DOXYCYCLINE HYCLATE 100 MG/1
100 CAPSULE ORAL EVERY 12 HOURS SCHEDULED
Qty: 28 CAPSULE | Refills: 0 | Status: SHIPPED | OUTPATIENT
Start: 2020-01-06 | End: 2020-01-20

## 2020-01-06 RX ORDER — CIPROFLOXACIN 500 MG/1
500 TABLET, FILM COATED ORAL 2 TIMES DAILY
Qty: 2 TABLET | Refills: 0 | Status: SHIPPED | OUTPATIENT
Start: 2020-01-06 | End: 2020-01-07

## 2020-01-06 NOTE — PROGRESS NOTES
Diagnoses and all orders for this visit:    Elevated PSA  -     PSA Total, Diagnostic; Future          Assessment and plan:     1  Elevated PSA -  Managed by Dr Beth Erazo  2  Family history of prostate cancer    PSA oscillating over the past few months  Patient unfortunately did not have his repeat PSA prior to visit for continued surveillance  Prostate examination in the office today which was normal in stable from previous exams  Patient will go for PSA within the next 1 week  If his PSA remains > 4 0, patient will need to undergo a transrectal ultrasound-guided prostate biopsy  If PSA returns < 4 0, will continue to monitor with a repeat PSA in 6 months time  We did review the risks of prostate biopsy including bleeding and infection and did discuss a enema and antibiotic coverage the day of biopsy should this need to be performed  Patient verbalized understanding  All questions answered  Chaz De Luna PA-C      Chief Complaint     Elevated PSA    History of Present Illness     Claudetta Scudder is a 60 y o  male patient of Dr Beth Erazo with a history of elevated PSA presenting for follow up  He has been undergoing routine prostate cancer screening directed by his PCP  Patient's PSA trend had been rising from 3 6 (06/26/2018), 2 6 (03/07/2019), and 4 2 (09/23/2019)  Unfortunately patient did not have his repeat PSA prior to his appointment today  He has a positive family history of prostate cancer in his father and brother  Recent b/l hip replacement in the past year  Pertinent medical  comorbidities include cognitive impairment, sciatica, arthritis  Urinary Incontinence Screening      Most Recent Value   Urinary Incontinence   Urinary Incontinence? No   Incomplete emptying? No   Urinary frequency? No   Urinary urgency? Yes   Urinary hesitancy? No   Dysuria (painful difficult urination)? No   Nocturia (waking up to use the bathroom)?   No   Straining (having to push to go)? No   Weak stream?  No   Intermittent stream?  Yes [sometimes]   Post void dribbling? Yes          Review of Systems     Review of Systems   Constitutional: Negative for activity change and fatigue  HENT: Negative for congestion  Eyes: Negative for visual disturbance  Respiratory: Negative for shortness of breath and wheezing  Cardiovascular: Negative for chest pain and leg swelling  Gastrointestinal: Negative for abdominal pain  Endocrine: Negative for polyuria  Genitourinary: Negative for dysuria, flank pain, hematuria and urgency  Musculoskeletal: Negative for back pain  Allergic/Immunologic: Negative for immunocompromised state  Neurological: Negative for dizziness and numbness  Psychiatric/Behavioral: Negative for dysphoric mood  All other systems reviewed and are negative  Allergies     Allergies   Allergen Reactions    Sulfa Antibiotics        Physical Exam     Physical Exam   Constitutional: He is oriented to person, place, and time  He appears well-developed and well-nourished  No distress  HENT:   Head: Normocephalic and atraumatic  Eyes: EOM are normal    Neck: Normal range of motion  Cardiovascular:   Negative lower extremity edema   Pulmonary/Chest: Effort normal and breath sounds normal    Abdominal: Soft  Genitourinary:   Genitourinary Comments: Good rectal tone,Rectal exam reveals a 40-50 g, mildly firm, no nodules   Musculoskeletal: Normal range of motion  Neurological: He is alert and oriented to person, place, and time  Skin: Skin is warm  Psychiatric: He has a normal mood and affect   His behavior is normal          Vital Signs  Vitals:    01/06/20 0920   BP: 122/82   BP Location: Left arm   Patient Position: Sitting   Cuff Size: Standard   Pulse: 84   Weight: 76 7 kg (169 lb)   Height: 5' 9" (1 753 m)       Current Medications       Current Outpatient Medications:     ALPHAGAN P 0 15 % ophthalmic solution, , Disp: , Rfl:     Cetirizine HCl (ZYRTEC ALLERGY PO), Take 1 tablet by mouth daily as needed, Disp: , Rfl:     cholecalciferol (VITAMIN D3) 1,000 units tablet, Take 4,000 Units by mouth daily , Disp: , Rfl:     donepezil (ARICEPT) 10 mg tablet, TAKE 1 TABLET BY MOUTH EVERY DAY, Disp: 90 tablet, Rfl: 1    gabapentin (NEURONTIN) 300 mg capsule, 1 cap BID, Disp: 60 capsule, Rfl: 2    LUMIGAN 0 01 % ophthalmic drops, , Disp: , Rfl:     naproxen sodium (ALEVE) 220 MG tablet, Take 440 mg by mouth daily as needed for mild pain, Disp: , Rfl:     Zoster Vac Recomb Adjuvanted (SHINGRIX) 50 MCG/0 5ML SUSR, Inject 0 5 mL into a muscle as needed (Take once now  Repeat once in 2-6 months), Disp: 2 each, Rfl: 0    celecoxib (CELEBREX) 200 mg capsule, Celebrex 200 mg capsule  Take 1 capsule daily for 3 days prior to surgery  DO NOT TAKE ON DAY OF SURGERY  Take 1 capsule daily for 30 days post operatively  , Disp: , Rfl:     Lactobacillus (PROBIOTIC ACIDOPHILUS) CAPS, Take by mouth once, Disp: , Rfl:     oxyCODONE (ROXICODONE) 5 mg immediate release tablet, Take 5 mg by mouth every 4 (four) hours as needed for moderate pain, Disp: , Rfl:     TURMERIC PO, Take by mouth daily, Disp: , Rfl:       Active Problems     Patient Active Problem List   Diagnosis    Allergic rhinitis    Glaucoma    Prediabetes    Sciatica    Short-term memory loss    Mild cognitive impairment    Abnormal EEG    Increased prostate specific antigen (PSA) velocity    Right hip pain    Lumbar spondylosis    Primary localized osteoarthritis of right hip    Enlarged prostate    Screening for prostate cancer    Bilateral leg weakness    Left hip pain         Past Medical History     Past Medical History:   Diagnosis Date    Benign colon polyp     Diverticulitis, colon     Diverticulosis     Hyperlipidemia     Prediabetes     Renal calculi     Short-term memory loss     Vitamin D deficiency          Surgical History     Past Surgical History:   Procedure Laterality Date    COLONOSCOPY      ESOPHAGOGASTRODUODENOSCOPY      HERNIA REPAIR      KNEE SURGERY      TOTAL HIP ARTHROPLASTY Right 2019    TOTAL HIP ARTHROPLASTY Left 2019         Family History     Family History   Problem Relation Age of Onset    Dementia Mother    [facility] Glaucoma Mother     Glaucoma Father     Prostate cancer Father     Pulmonary embolism Father     Hypertension Brother     Prostate cancer Brother     Colon cancer Maternal Grandfather          Social History     Social History     Social History     Tobacco Use   Smoking Status Former Smoker    Last attempt to quit:     Years since quittin 0   Smokeless Tobacco Never Used         Pertinent Lab Values     Lab Results   Component Value Date    CREATININE 1 15 2019       Lab Results   Component Value Date    PSA 4 2 (H) 2019    PSA 2 6 2019    PSA 3 6 2018       @RESULTRCNT(1H])@      Pertinent Imaging      - n/a    Portions of the record may have been created with voice recognition software   Occasional wrong word or "sound a like" substitutions may have occurred due to the inherent limitations of voice recognition software   Read the chart carefully and recognize, using context, where substitutions have occurred

## 2020-01-07 ENCOUNTER — TELEPHONE (OUTPATIENT)
Dept: UROLOGY | Facility: CLINIC | Age: 60
End: 2020-01-07

## 2020-01-07 NOTE — TELEPHONE ENCOUNTER
----- Message from Adrian Zamudio PA-C sent at 1/6/2020  3:13 PM EST -----  Please let patient know that his PSA remains elevated  He will need to undergo a transrectal ultrasound-guided prostate biopsy as discussed in the office earlier today  Antibiotic and fleets enema will be sent directly to his pharmacy  Please coordinate  Thank you

## 2020-01-07 NOTE — TELEPHONE ENCOUNTER
----- Message from Bony Urbano PA-C sent at 1/6/2020  3:13 PM EST -----  Please let patient know that his PSA remains elevated  He will need to undergo a transrectal ultrasound-guided prostate biopsy as discussed in the office earlier today  Antibiotic and fleets enema will be sent directly to his pharmacy  Please coordinate  Thank you

## 2020-01-07 NOTE — TELEPHONE ENCOUNTER
Called and spoke to patient  Explained to patient that Per Ranjith Marsh, Please let patient know that his PSA remains elevated  Rayshawn Khan will need to undergo a transrectal ultrasound-guided prostate biopsy as discussed in the office earlier today   Antibiotic and fleets enema will be sent directly to his [facility] you    After speaking with patient, patient is concerned that it is not the right time to do this procedure  Patient stated that his wife just had surgery and he had hip surgery last year plus other medical issues that he has going on  Patient stated how long can we typically wait to have this procedure? Explained to patient that we do not want to push this procedure out far due to the family history  Patient's brother had prostate cancer as well as his dad  Patient was questioning why he needs this if he had a normal MALCOLM  Explained to him that his PSA is still elevated and doing this procedure would tell if prostate cancer is present or not  Patient was working at this time and wants to discuss this with his wife before deciding anything  Patient was given the phone number to the office  Patient stated that he will call back within the week with his decision  Patient had no further questions at this time

## 2020-01-10 NOTE — TELEPHONE ENCOUNTER
Per providers next available prostate biopsy spot is appropriate  Patient scheduled for 3/12/20 at 10am for prostate biopsy and then his follow up for prostate biopsy results is scheduled for 3/30/20 at 4pm  Both of these appointments are with Dr Garrett Sterling at the [facility] office  Please call and confirm with patient

## 2020-01-23 ENCOUNTER — OFFICE VISIT (OUTPATIENT)
Dept: FAMILY MEDICINE CLINIC | Facility: OTHER | Age: 60
End: 2020-01-23
Payer: COMMERCIAL

## 2020-01-23 VITALS
DIASTOLIC BLOOD PRESSURE: 82 MMHG | WEIGHT: 172.8 LBS | SYSTOLIC BLOOD PRESSURE: 126 MMHG | HEART RATE: 74 BPM | OXYGEN SATURATION: 98 % | HEIGHT: 69 IN | TEMPERATURE: 97.8 F | BODY MASS INDEX: 25.59 KG/M2

## 2020-01-23 DIAGNOSIS — Z28.21 REFUSED INFLUENZA VACCINE: ICD-10-CM

## 2020-01-23 DIAGNOSIS — L02.223 FURUNCLE OF CHEST WALL: Primary | ICD-10-CM

## 2020-01-23 DIAGNOSIS — Z23 ENCOUNTER FOR IMMUNIZATION: ICD-10-CM

## 2020-01-23 DIAGNOSIS — Z12.11 SCREENING FOR COLON CANCER: ICD-10-CM

## 2020-01-23 PROCEDURE — 87147 CULTURE TYPE IMMUNOLOGIC: CPT | Performed by: NURSE PRACTITIONER

## 2020-01-23 PROCEDURE — 87077 CULTURE AEROBIC IDENTIFY: CPT | Performed by: NURSE PRACTITIONER

## 2020-01-23 PROCEDURE — 3008F BODY MASS INDEX DOCD: CPT | Performed by: NURSE PRACTITIONER

## 2020-01-23 PROCEDURE — 99213 OFFICE O/P EST LOW 20 MIN: CPT | Performed by: NURSE PRACTITIONER

## 2020-01-23 PROCEDURE — 87205 SMEAR GRAM STAIN: CPT | Performed by: NURSE PRACTITIONER

## 2020-01-23 PROCEDURE — 87070 CULTURE OTHR SPECIMN AEROBIC: CPT | Performed by: NURSE PRACTITIONER

## 2020-01-23 PROCEDURE — 87186 SC STD MICRODIL/AGAR DIL: CPT | Performed by: NURSE PRACTITIONER

## 2020-01-23 NOTE — PROGRESS NOTES
Assessment/Plan:         Problem List Items Addressed This Visit     Furuncle of chest wall  --Minimal improvement noted with recent antibiotic (doxycycline)  No signs of surrounding cellulitis, low likelihood of MRSA, but will send culture as precaution  --Warm compresses  Cover with DSD if continued drainage noted  --Referral to general surgery for I&D/removal  Advised to call if unable to secure appointment in the next 2-3 weeks, and/or if worsening size/redness/tenderness, fever in the interim  --Breast ultrasound ordered previously, but may want to hold on this until seen by surgeon, as no underlying breast mass appreciated at this time and would probably be uncomfortable for him to obtain in current state    Relevant Orders    Ambulatory referral to General Surgery    Wound culture and Gram stain      Other Visit Diagnoses     Screening for colon cancer        Relevant Orders    Ambulatory referral to Gastroenterology    Encounter for immunization        Refused influenza vaccine        Relevant Orders    influenza vaccine, [id number], quadrivalent, recombinant, PF, 0 5 mL, for patients 18 yr+ (FLUBLOK)            Subjective:      Patient ID: Sylvia Hartman is a 60 y o  male  Here as 2-week follow-up to tender, red lump on right chest wall  Started as small "pimple" 4-6 weeks ago  No known bites, trauma  Subsequently grew in size and redness  No drainage or fever noted  Seen here 1/6 by Dr Chuck Barnett  Placed on doxycycline which he completed  No real improvement, but didn't get any worse  Still not draining  Remains somewhat tender  No axillary tenderness or swelling  No warm compresses tried  Breast ultrasound ordered also, which he hasn't gotten done  Upon questioning, patient does not recall any lump in the area noted prior to appearance of "pimple" 4-6 weeks ago  No previous boils/cysts  Denies (known) history of MRSA  No family history of male breast cancer  The following portions of the patient's history were reviewed and updated as appropriate: current medications, past family history, past medical history, past social history, past surgical history and problem list     Review of Systems   Constitutional: Negative for fever  Skin:        Per HPI         Objective:      /82 (BP Location: Left arm, Patient Position: Sitting, Cuff Size: Adult)   Pulse 74   Temp 97 8 °F (36 6 °C) (Tympanic)   Ht 5' 9" (1 753 m)   Wt 78 4 kg (172 lb 12 8 oz)   SpO2 98%   BMI 25 52 kg/m²          Physical Exam   Constitutional: He is oriented to person, place, and time  He appears well-developed  No distress  Pulmonary/Chest: Effort normal    See SKIN for chest exam   Neurological: He is alert and oriented to person, place, and time  Skin: He is not diaphoretic  Right upper chest with 2 5 cm diameter, round, protuberant, erythematous, fluctuant, somewhat tender superficial mass  Overlying scabs/crusting  Able to gently express moderate amount of purulent/serosanguinous drainage from scabbed skin  No surrounding skin changes or underlying breast mass noted  No right axillary tenderness or adenopathy

## 2020-01-24 DIAGNOSIS — Z86.010 HISTORY OF COLON POLYPS: Primary | ICD-10-CM

## 2020-01-27 LAB
BACTERIA WND AEROBE CULT: ABNORMAL
BACTERIA WND AEROBE CULT: ABNORMAL
GRAM STN SPEC: ABNORMAL

## 2020-01-28 PROBLEM — L03.90 CELLULITIS: Status: ACTIVE | Noted: 2020-01-28

## 2020-01-30 ENCOUNTER — TELEPHONE (OUTPATIENT)
Dept: FAMILY MEDICINE CLINIC | Facility: OTHER | Age: 60
End: 2020-01-30

## 2020-01-30 NOTE — TELEPHONE ENCOUNTER
Left message on machine for patient to call back    ---- Message from Tracee Bowers, [address] sent at 1/29/2020  5:39 PM EST -----  I believe that Dr Maritza Parker already informed him of the culture results, but in case he hadn't, can we let Heike Blankenship know that the results came back showing non-MRSA staph (not a concern, and should be susceptible to the antibiotic that Dr Maritza Parker put him on)  Hopefully he is doing better     Thanks

## 2020-03-02 DIAGNOSIS — F02.80 EARLY ONSET ALZHEIMER'S DEMENTIA WITHOUT BEHAVIORAL DISTURBANCE (HCC): ICD-10-CM

## 2020-03-02 DIAGNOSIS — G30.0 EARLY ONSET ALZHEIMER'S DEMENTIA WITHOUT BEHAVIORAL DISTURBANCE (HCC): ICD-10-CM

## 2020-03-02 RX ORDER — DONEPEZIL HYDROCHLORIDE 10 MG/1
TABLET, FILM COATED ORAL
Qty: 90 TABLET | Refills: 1 | Status: SHIPPED | OUTPATIENT
Start: 2020-03-02 | End: 2020-08-25

## 2020-03-17 ENCOUNTER — TELEPHONE (OUTPATIENT)
Dept: UROLOGY | Facility: MEDICAL CENTER | Age: 60
End: 2020-03-17

## 2020-03-17 NOTE — TELEPHONE ENCOUNTER
Patient of Dr Feng Paul at the [facility] office  Patient's wife, Lavern Watkins, called to confirm appointment for biopsy tomorrow  Screening completed- all clear  She did have question in regard to the antibiotic to be ordered and taken prior to procedure  None noted  Please call at [phone] to advise    Thank you

## 2020-03-17 NOTE — TELEPHONE ENCOUNTER
Called and spoke to patient  Patient confirmed appointment for tomorrows procedure  Informed patient to take first antibiotic in the morning and the second one at night  Patient was told to take enema 2 hours prior to procedure  Patient had no further questions  Patient verbalized understanding

## 2020-03-18 ENCOUNTER — PROCEDURE VISIT (OUTPATIENT)
Dept: UROLOGY | Facility: CLINIC | Age: 60
End: 2020-03-18
Payer: COMMERCIAL

## 2020-03-18 VITALS
SYSTOLIC BLOOD PRESSURE: 122 MMHG | WEIGHT: 169 LBS | DIASTOLIC BLOOD PRESSURE: 76 MMHG | HEART RATE: 74 BPM | BODY MASS INDEX: 25.03 KG/M2 | HEIGHT: 69 IN

## 2020-03-18 DIAGNOSIS — R97.20 ELEVATED PSA: Primary | ICD-10-CM

## 2020-03-18 PROCEDURE — G0416 PROSTATE BIOPSY, ANY MTHD: HCPCS | Performed by: PATHOLOGY

## 2020-03-18 PROCEDURE — 96372 THER/PROPH/DIAG INJ SC/IM: CPT

## 2020-03-18 PROCEDURE — 55700 PR BIOPSY OF PROSTATE,NEEDLE/PUNCH: CPT | Performed by: UROLOGY

## 2020-03-18 PROCEDURE — 88344 IMHCHEM/IMCYTCHM EA MLT ANTB: CPT | Performed by: PATHOLOGY

## 2020-03-18 PROCEDURE — 76942 ECHO GUIDE FOR BIOPSY: CPT | Performed by: UROLOGY

## 2020-03-18 RX ORDER — CEFTRIAXONE 1 G/1
1000 INJECTION, POWDER, FOR SOLUTION INTRAMUSCULAR; INTRAVENOUS ONCE
Status: COMPLETED | OUTPATIENT
Start: 2020-03-18 | End: 2020-03-18

## 2020-03-18 RX ADMIN — CEFTRIAXONE 1000 MG: 1 INJECTION, POWDER, FOR SOLUTION INTRAMUSCULAR; INTRAVENOUS at 11:33

## 2020-03-18 NOTE — PROGRESS NOTES
Office TRUS-guided Prostate Biopsy Procedure Note    Indication    Elevated PSA    Informed consent   The risks, benefits and alternatives to TRUS-guided prostate biopsy were conveyed to the patient prior to performing the procedure  A discussion of the risks of the procedure included, but was not limited to: pain, hematuria, hematochezia, hematospermia, infection, and the possibility of a non-diagnostic biopsy  The patient was given the opportunity to have his questions answered and there was no perceived barrier to education  Antibiotic prophylaxis   The patient received the following antibiotics at least 30 minutes prior to undergoing biopsy:   1 g IM M Rocephin    Rectal cleansing  The patient was instructed to perform an evacuating rectal enema 1-2 hours prior to biopsy  Local anesthesia  Topical 2% lidocaine jelly was applied liberally to the anus and rectum and allowed to dwell for at least 5 min prior to starting the procedure  After insertion of the TRUS probe, 10 mL of 2% lidocaine solution was injected with ultrasound guidance at the  junction of the prostate and seminal vesicles  The anesthetic was allowed to dwell for at least 2 minutes prior to biopsy  Transrectal ultrasonography  The patient was placed in the left lateral decubitus position  After an attentive digital rectal examination, a 7 5 mHz sidefire ultrasound probe was gently inserted into the rectum and biplanar imaging of the prostate was done with the findings noted below  Images were taken of any abnormal findings and also to document prostate size      Bladder  The bladder base appeared normal     Prostate  Digital rectal exam findings:  - there is a subtle focal area of firmness noted by the left apex    Ultrasound size measurements:  -Volume:  50 cm3    Ultrasound findings:  -Cysts: None  -Masses: A hypoechoic nodule is noted by the left mid gland and apex concordant with the area of palpable concern on rectal exam  -Median lobe: absent    Clinical stage (assuming a positive biopsy):   -T2a  TRUS-guided needle biopsy  Using an 18 gauge biopsy needle and ultrasound guidance, the following biopsies were taken:    1 core(s) from the left lateral base  1 core(s) from the left lateral mid-gland  1 core(s) from the right middle base  1 core(s) from the right lateral base  1 core(s) from the left lateral mid-gland  1 core(s) from the left middle mid-gland  1 core(s) from the right middle mid-gland  1 core(s) from the right lateral mid-gland  1 core(s) from the left lateral apex  1 core(s) from the left middle apex  1 core(s) from the right middle apex  1 core(s) from the right lateral apex    Total number of cores: 12                Complications  There were no procedural complications  Disposition  The patient was dismissed to home after 30 minutes of observation in stable condition  Post-procedure instructions: Today he underwent an uncomplicated transrectal ultrasound-guided biopsy of the prostate, following a periprosthetic nerve block  I reviewed the normal postprocedure a course including bleeding per recutm, hematuria, and hematospermia  I instructed him to complete his course of antibiotics as prescribed  Instructed him to call with fever greater than 101, chills, nausea, vomiting, poorly controlled pain  His followup was scheduled in approximately 2 weeks' time to review the pathology

## 2020-03-23 ENCOUNTER — TELEPHONE (OUTPATIENT)
Dept: UROLOGY | Facility: CLINIC | Age: 60
End: 2020-03-23

## 2020-03-23 NOTE — TELEPHONE ENCOUNTER
Called and spoke to patient at this time , Advised him due to COVID 19 his in office appointment would be canceled  Advised patient we could have the appointment using the video telehealth, advised the visit would still be billed to their insurance  Patient declining the video telehealth visit, he states "he is not tech savvy"  Patient is requesting to have a regular telephone visit with the provider at the regular appointment time  I advised patient this is something we can do  Advised patient his appointment will still be 3/30/20 at 4pm over the phone, patient does not need to report to the office  Patient states he understands and is agreeable       Number to reach patient - [phone]

## 2020-03-23 NOTE — TELEPHONE ENCOUNTER
Please let the patient know that their upcoming urology visit will need to be cancelled due to medical staff consolidation for COVID-19        Please schedule the patient for a:     Video Telehealth visit with me in approximately 1 week (can keep on the same day if possible)

## 2020-03-26 ENCOUNTER — TELEPHONE (OUTPATIENT)
Dept: UROLOGY | Facility: CLINIC | Age: 60
End: 2020-03-26

## 2020-03-26 NOTE — TELEPHONE ENCOUNTER
Called and spoke to patient, about moving his 4pm appointment to 10am on Monday 3/30  Patient states he works this day and unfortunately has to keep appointment at [facility]  Patient is agreeable to doing the appointment through [facility]  Updated appointment notes to visit on [facility]

## 2020-03-30 ENCOUNTER — TELEMEDICINE (OUTPATIENT)
Dept: UROLOGY | Facility: CLINIC | Age: 60
End: 2020-03-30
Payer: COMMERCIAL

## 2020-03-30 DIAGNOSIS — C61 PROSTATE CANCER (HCC): Primary | ICD-10-CM

## 2020-03-30 NOTE — LETTER
March 30, 2020     Lesly Guajardo, [address] [facility]    Patient: Claudetta Scudder   YOB: 1960   Date of Visit: 3/30/2020       Dear Dr Francisca Armijo: Thank you for referring Katelyn Ireland to me for evaluation  Below are my notes for this consultation  If you have questions, please do not hesitate to call me  I look forward to following your patient along with you  Sincerely,        Royal Chauhan MD        CC: No Recipients  Royal Chauhan MD  3/30/2020  4:28 PM  Sign at close encounter    Virtual Regular Visit    Problem List Items Addressed This Visit     None               Reason for visit is follow-up elevated PSA status post biopsy    Encounter provider Royal Chauhan MD    Provider located at [address] [place] [address] [id number] [facility] [address]      Recent Visits  Date Type Provider Dept   03/26/20 Telephone Wan Perkins RN [facility] [place]   03/23/20 [place] [address] [facility] Hanson recent visits within past 7 days and meeting all other requirements     Today's Visits  Date Type Provider Dept   03/30/20 Telemedicine Royal Chauhan MD [facility] today's visits and meeting all other requirements     Future Appointments  No visits were found meeting these conditions  Showing future appointments within next 150 days and meeting all other requirements        The patient was identified by name and date of birth  Claudetta Scudder was informed that this is a telemedicine visit and that the visit is being conducted through [facility] and patient was informed that this is not a secure, HIPAA-complaint platform  he agrees to proceed     My office door was closed  No one else was in the room  He acknowledged consent and understanding of privacy and security of the video platform   The patient has agreed to participate and understands they can discontinue the visit at any time  Patient is aware this is a billable service  Subjective  Tram Roach is a 60 y o  male with newly diagnosed very low risk prostate cancer  His staging at the present time is:    CT1c, Soheila 3+3=6, 2 of 12 total positive cores, less than 5 percent core volume, pretreatment PSA 4 3  We discussed the natural history of prostate cancer, the Soheila grading system, and the patient's current staging  He was provided with a copy of his biopsy report  We discussed the options for managing newly diagnosed prostate cancer including active surveillance for low risk disease, radical prostatectomy, and pelvic radiation therapy  We discussed each of these management strategies in detail, with particular emphasis to how they do or do not apply to the patient's current staging  A discussion was guided using criteria for [address] [place] [address] (NCCN) to stratify patient's according to low, intermediate, or high-risk prostate cancer  We discussed the goals of care as #1 being cancer cure, and #2 being preservation of urinary control and erectile function  Given the patient's low risk prostate cancer, active surveillance was discussed as an option, and particularly recommended as the standard of care    Risk and benefits of active surveillance for his prostate cancer were discussed and reviewed at length in the office today  We also reviewed all options for treatment of the prostate cancer including radical prostatectomy, radiation therapy, androgen deprivation therapy  We discussed the goal of active surveillance is to remain poised to intervene with a curative intent if the patient's low risk prostate cancer demonstrates any signs of disease progression over time      We discussed standard protocol for active surveillance which would include serial PSA assessment, rectal exam, repeat biopsy in approximately 1 year, as well as the emerging role of multiparametric prostate MRI  The need for close follow-up was stressed  I discussed with the patient that up to 30% of patients on active surveillance protocol will ultimately require definitive treatment for the prostate cancer  The patient understands the risks of active surveillance and wishes to proceed at this time  Patient will follow up in 4 months with 1 of our advanced practitioners with a PSA and a multiparametric MRI prior to the visit  We will plan for PSA assessments Q 4 months and a repeat biopsy approximately 1 year from the date of diagnosis              Past Medical History:   Diagnosis Date    Benign colon polyp     Diverticulitis, colon     Diverticulosis     Hyperlipidemia     Prediabetes     Renal calculi     Short-term memory loss     Vitamin D deficiency        Past Surgical History:   Procedure Laterality Date    COLONOSCOPY      ESOPHAGOGASTRODUODENOSCOPY      HERNIA REPAIR      KNEE SURGERY      PROSTATE BIOPSY      TOTAL HIP ARTHROPLASTY Right 05/17/2019    TOTAL HIP ARTHROPLASTY Left 08/20/2019       Current Outpatient Medications   Medication Sig Dispense Refill    ALPHAGAN P 0 15 % ophthalmic solution       bisacodyl (FLEET) 10 MG/30ML ENEM Insert 30 mL (10 mg total) into the rectum once for 1 dose Perform morning of prostate biopsy 30 mL 0    Cetirizine HCl (ZYRTEC ALLERGY PO) Take 1 tablet by mouth daily as needed      cholecalciferol (VITAMIN D3) 1,000 units tablet Take 4,000 Units by mouth daily       donepezil (ARICEPT) 10 mg tablet TAKE 1 TABLET BY MOUTH EVERY DAY 90 tablet 1    Lactobacillus (PROBIOTIC ACIDOPHILUS) CAPS Take by mouth once      LUMIGAN 0 01 % ophthalmic drops       naproxen sodium (ALEVE) 220 MG tablet Take 440 mg by mouth daily as needed for mild pain       No current facility-administered medications for this visit           Allergies   Allergen Reactions    Sulfa Antibiotics        Review of Systems   Constitutional: Negative for activity change and fatigue  HENT: Negative for congestion  Eyes: Negative for visual disturbance  Respiratory: Negative for shortness of breath and wheezing  Cardiovascular: Negative for chest pain and leg swelling  Gastrointestinal: Negative for abdominal pain  Endocrine: Negative for polyuria  Genitourinary: Negative for dysuria, flank pain, hematuria and urgency  Musculoskeletal: Negative for back pain  Allergic/Immunologic: Negative for immunocompromised state  Neurological: Negative for dizziness and numbness  Psychiatric/Behavioral: Negative for dysphoric mood  All other systems reviewed and are negative  Physical Exam   Constitutional: He is oriented to person, place, and time  He appears well-developed and well-nourished  No distress  HENT:   Head: Normocephalic and atraumatic  Eyes: EOM are normal    Neck: Normal range of motion  Pulmonary/Chest: Effort normal    Musculoskeletal: Normal range of motion  Neurological: He is alert and oriented to person, place, and time  Skin: No pallor  Psychiatric: He has a normal mood and affect  His behavior is normal         I spent 22 minutes with the patient during this visit

## 2020-03-30 NOTE — LETTER
March 30, 2020     Ted Medinaker, [address] [facility]    Patient: Ana Dewey   YOB: 1960   Date of Visit: 3/30/2020       Dear Dr Susana Cotto: Thank you for referring Flako Medrano to me for evaluation  Below are my notes for this consultation  If you have questions, please do not hesitate to call me  I look forward to following your patient along with you  Sincerely,        Mena Pathak MD        CC: No Recipients  Mena Pathak MD  3/30/2020  4:28 PM  Sign at close encounter    Virtual Regular Visit    Problem List Items Addressed This Visit     None               Reason for visit is follow-up elevated PSA status post biopsy    Encounter provider Mena Pathak MD    Provider located at [address] [place] [address] [phone] [facility] [address] [address]      Recent Visits  Date Type Provider Dept   03/26/20 Telephone Aly Miller RN [facility] [place]   03/23/20 Telephone Mena Pathak [place] [address] [facility] [place] Hagan recent visits within past 7 days and meeting all other requirements     Today's Visits  Date Type Provider Dept   03/30/20 Telemedicine Mena Pathak MD [facility] [place] [place] today's visits and meeting all other requirements     Future Appointments  No visits were found meeting these conditions  Showing future appointments within next 150 days and meeting all other requirements        The patient was identified by name and date of birth  Ana Dewey was informed that this is a telemedicine visit and that the visit is being conducted through [address] [place] [address] and patient was informed that this is not a secure, HIPAA-complaint platform  he agrees to proceed     My office door was closed  No one else was in the room  He acknowledged consent and understanding of privacy and security of the video platform   The patient has agreed to participate and understands they can discontinue the visit at any time  Patient is aware this is a billable service  Subjective  Ector Ferreira is a 60 y o  male with newly diagnosed very low risk prostate cancer  His staging at the present time is:    CT1c, Soheila 3+3=6, 2 of 12 total positive cores, less than 5 percent core volume, pretreatment PSA 4 3  We discussed the natural history of prostate cancer, the Soheila grading system, and the patient's current staging  He was provided with a copy of his biopsy report  We discussed the options for managing newly diagnosed prostate cancer including active surveillance for low risk disease, radical prostatectomy, and pelvic radiation therapy  We discussed each of these management strategies in detail, with particular emphasis to how they do or do not apply to the patient's current staging  A discussion was guided using criteria for [address] [place] [address] (NCCN) to stratify patient's according to low, intermediate, or high-risk prostate cancer  We discussed the goals of care as #1 being cancer cure, and #2 being preservation of urinary control and erectile function  Given the patient's low risk prostate cancer, active surveillance was discussed as an option, and particularly recommended as the standard of care    Risk and benefits of active surveillance for his prostate cancer were discussed and reviewed at length in the office today  We also reviewed all options for treatment of the prostate cancer including radical prostatectomy, radiation therapy, androgen deprivation therapy  We discussed the goal of active surveillance is to remain poised to intervene with a curative intent if the patient's low risk prostate cancer demonstrates any signs of disease progression over time      We discussed standard protocol for active surveillance which would include serial PSA assessment, rectal exam, repeat biopsy in approximately 1 year, as well as the emerging role of multiparametric prostate MRI  The need for close follow-up was stressed  I discussed with the patient that up to 30% of patients on active surveillance protocol will ultimately require definitive treatment for the prostate cancer  The patient understands the risks of active surveillance and wishes to proceed at this time  Patient will follow up in 4 months with 1 of our advanced practitioners with a PSA and a multiparametric MRI prior to the visit  We will plan for PSA assessments Q 4 months and a repeat biopsy approximately 1 year from the date of diagnosis              Past Medical History:   Diagnosis Date    Benign colon polyp     Diverticulitis, colon     Diverticulosis     Hyperlipidemia     Prediabetes     Renal calculi     Short-term memory loss     Vitamin D deficiency        Past Surgical History:   Procedure Laterality Date    COLONOSCOPY      ESOPHAGOGASTRODUODENOSCOPY      HERNIA REPAIR      KNEE SURGERY      PROSTATE BIOPSY      TOTAL HIP ARTHROPLASTY Right 05/17/2019    TOTAL HIP ARTHROPLASTY Left 08/20/2019       Current Outpatient Medications   Medication Sig Dispense Refill    ALPHAGAN P 0 15 % ophthalmic solution       bisacodyl (FLEET) 10 MG/30ML ENEM Insert 30 mL (10 mg total) into the rectum once for 1 dose Perform morning of prostate biopsy 30 mL 0    Cetirizine HCl (ZYRTEC ALLERGY PO) Take 1 tablet by mouth daily as needed      cholecalciferol (VITAMIN D3) 1,000 units tablet Take 4,000 Units by mouth daily       donepezil (ARICEPT) 10 mg tablet TAKE 1 TABLET BY MOUTH EVERY DAY 90 tablet 1    Lactobacillus (PROBIOTIC ACIDOPHILUS) CAPS Take by mouth once      LUMIGAN 0 01 % ophthalmic drops       naproxen sodium (ALEVE) 220 MG tablet Take 440 mg by mouth daily as needed for mild pain       No current facility-administered medications for this visit           Allergies   Allergen Reactions    Sulfa Antibiotics        Review of Systems   Constitutional: Negative for activity change and fatigue  HENT: Negative for congestion  Eyes: Negative for visual disturbance  Respiratory: Negative for shortness of breath and wheezing  Cardiovascular: Negative for chest pain and leg swelling  Gastrointestinal: Negative for abdominal pain  Endocrine: Negative for polyuria  Genitourinary: Negative for dysuria, flank pain, hematuria and urgency  Musculoskeletal: Negative for back pain  Allergic/Immunologic: Negative for immunocompromised state  Neurological: Negative for dizziness and numbness  Psychiatric/Behavioral: Negative for dysphoric mood  All other systems reviewed and are negative  Physical Exam   Constitutional: He is oriented to person, place, and time  He appears well-developed and well-nourished  No distress  HENT:   Head: Normocephalic and atraumatic  Eyes: EOM are normal    Neck: Normal range of motion  Pulmonary/Chest: Effort normal    Musculoskeletal: Normal range of motion  Neurological: He is alert and oriented to person, place, and time  Skin: No pallor  Psychiatric: He has a normal mood and affect  His behavior is normal         I spent *** minutes with the patient during this visit

## 2020-03-30 NOTE — PROGRESS NOTES
Virtual Regular Visit    Problem List Items Addressed This Visit     None               Reason for visit is follow-up elevated PSA status post biopsy    Encounter provider Brian Flores MD    Provider located at [address] [place] [address] [address] [address]      Recent Visits  Date Type Provider Dept   03/26/20 Telephone Luh Ríos RN [facility]   03/23/20 Telephone Brian Flores MD [id number] [facility]   Showing recent visits within past 7 days and meeting all other requirements     Today's Visits  Date Type Provider Dept   03/30/20 Telemedicine Brian Flores MD [facility] Theodore today's visits and meeting all other requirements     Future Appointments  No visits were found meeting these conditions  Showing future appointments within next 150 days and meeting all other requirements        The patient was identified by name and date of birth  Giovanna Zurita was informed that this is a telemedicine visit and that the visit is being conducted through [facility] and patient was informed that this is not a secure, HIPAA-complaint platform  he agrees to proceed     My office door was closed  No one else was in the room  He acknowledged consent and understanding of privacy and security of the video platform  The patient has agreed to participate and understands they can discontinue the visit at any time  Patient is aware this is a billable service  Subjective  Giovanna Zurita is a 60 y o  male with newly diagnosed very low risk prostate cancer  His staging at the present time is:    CT1c, Soheila 3+3=6, 2 of 12 total positive cores, less than 5 percent core volume, pretreatment PSA 4 3  We discussed the natural history of prostate cancer, the [place] grading system, and the patient's current staging  He was provided with a copy of his biopsy report   We discussed the options for managing newly diagnosed prostate cancer including active surveillance for low risk disease, radical prostatectomy, and pelvic radiation therapy  We discussed each of these management strategies in detail, with particular emphasis to how they do or do not apply to the patient's current staging  A discussion was guided using criteria for [address] [place] [address] (NCCN) to stratify patient's according to low, intermediate, or high-risk prostate cancer  We discussed the goals of care as #1 being cancer cure, and #2 being preservation of urinary control and erectile function  Given the patient's low risk prostate cancer, active surveillance was discussed as an option, and particularly recommended as the standard of care    Risk and benefits of active surveillance for his prostate cancer were discussed and reviewed at length in the office today  We also reviewed all options for treatment of the prostate cancer including radical prostatectomy, radiation therapy, androgen deprivation therapy  We discussed the goal of active surveillance is to remain poised to intervene with a curative intent if the patient's low risk prostate cancer demonstrates any signs of disease progression over time  We discussed standard protocol for active surveillance which would include serial PSA assessment, rectal exam, repeat biopsy in approximately 1 year, as well as the emerging role of multiparametric prostate MRI  The need for close follow-up was stressed  I discussed with the patient that up to 30% of patients on active surveillance protocol will ultimately require definitive treatment for the prostate cancer  The patient understands the risks of active surveillance and wishes to proceed at this time  Patient will follow up in 4 months with 1 of our advanced practitioners with a PSA and a multiparametric MRI prior to the visit    We will plan for PSA assessments Q 4 months and a repeat biopsy approximately 1 year from the date of diagnosis              Past Medical History:   Diagnosis Date    Benign colon polyp     Diverticulitis, colon     Diverticulosis     Hyperlipidemia     Prediabetes     Renal calculi     Short-term memory loss     Vitamin D deficiency        Past Surgical History:   Procedure Laterality Date    COLONOSCOPY      ESOPHAGOGASTRODUODENOSCOPY      HERNIA REPAIR      KNEE SURGERY      PROSTATE BIOPSY      TOTAL HIP ARTHROPLASTY Right 05/17/2019    TOTAL HIP ARTHROPLASTY Left 08/20/2019       Current Outpatient Medications   Medication Sig Dispense Refill    ALPHAGAN P 0 15 % ophthalmic solution       bisacodyl (FLEET) 10 MG/30ML ENEM Insert 30 mL (10 mg total) into the rectum once for 1 dose Perform morning of prostate biopsy 30 mL 0    Cetirizine HCl (ZYRTEC ALLERGY PO) Take 1 tablet by mouth daily as needed      cholecalciferol (VITAMIN D3) 1,000 units tablet Take 4,000 Units by mouth daily       donepezil (ARICEPT) 10 mg tablet TAKE 1 TABLET BY MOUTH EVERY DAY 90 tablet 1    Lactobacillus (PROBIOTIC ACIDOPHILUS) CAPS Take by mouth once      LUMIGAN 0 01 % ophthalmic drops       naproxen sodium (ALEVE) 220 MG tablet Take 440 mg by mouth daily as needed for mild pain       No current facility-administered medications for this visit  Allergies   Allergen Reactions    Sulfa Antibiotics        Review of Systems   Constitutional: Negative for activity change and fatigue  HENT: Negative for congestion  Eyes: Negative for visual disturbance  Respiratory: Negative for shortness of breath and wheezing  Cardiovascular: Negative for chest pain and leg swelling  Gastrointestinal: Negative for abdominal pain  Endocrine: Negative for polyuria  Genitourinary: Negative for dysuria, flank pain, hematuria and urgency  Musculoskeletal: Negative for back pain  Allergic/Immunologic: Negative for immunocompromised state     Neurological: Negative for dizziness and numbness  Psychiatric/Behavioral: Negative for dysphoric mood  All other systems reviewed and are negative  Physical Exam   Constitutional: He is oriented to person, place, and time  He appears well-developed and well-nourished  No distress  HENT:   Head: Normocephalic and atraumatic  Eyes: EOM are normal    Neck: Normal range of motion  Pulmonary/Chest: Effort normal    Musculoskeletal: Normal range of motion  Neurological: He is alert and oriented to person, place, and time  Skin: No pallor  Psychiatric: He has a normal mood and affect  His behavior is normal         I spent 22 minutes with the patient during this visit

## 2020-03-31 ENCOUNTER — TELEPHONE (OUTPATIENT)
Dept: UROLOGY | Facility: CLINIC | Age: 60
End: 2020-03-31

## 2020-03-31 NOTE — TELEPHONE ENCOUNTER
I called pt before I left yesterday, after virtual virtual visit with Dr Inocencia Badillo, to check pt out  We scheduled f/u appt for 8/3 with [place]  Mailed PSA and MRI orders as well as AVS to pt  Provided pt with Central Scheduling number and he will schedule MRI 1-2 weeks before returning to us in August   Pts wife got on the phone and I repeated everything to her as she stated pt has memory difficulty        Both pt and wife understood

## 2020-04-21 ENCOUNTER — DOCUMENTATION (OUTPATIENT)
Dept: UROLOGY | Facility: CLINIC | Age: 60
End: 2020-04-21

## 2020-06-01 ENCOUNTER — TELEPHONE (OUTPATIENT)
Dept: NEUROLOGY | Facility: CLINIC | Age: 60
End: 2020-06-01

## 2020-07-21 ENCOUNTER — HOSPITAL ENCOUNTER (OUTPATIENT)
Dept: RADIOLOGY | Age: 60
Discharge: HOME | End: 2020-07-21
Payer: COMMERCIAL

## 2020-07-21 DIAGNOSIS — C61 PROSTATE CANCER (HCC): ICD-10-CM

## 2020-07-21 PROCEDURE — 72197 MRI PELVIS W/O & W/DYE: CPT

## 2020-07-21 PROCEDURE — 76377 3D RENDER W/INTRP POSTPROCES: CPT

## 2020-07-21 PROCEDURE — A9585 GADOBUTROL INJECTION: HCPCS | Performed by: UROLOGY

## 2020-07-21 RX ADMIN — GADOBUTROL 8 ML: 604.72 INJECTION INTRAVENOUS at 08:08

## 2020-07-28 ENCOUNTER — TRANSCRIBE ORDERS (OUTPATIENT)
Dept: LAB | Facility: CLINIC | Age: 60
End: 2020-07-28

## 2020-07-28 ENCOUNTER — APPOINTMENT (OUTPATIENT)
Dept: LAB | Facility: CLINIC | Age: 60
End: 2020-07-28
Payer: COMMERCIAL

## 2020-07-28 DIAGNOSIS — C61 PROSTATE CANCER (HCC): ICD-10-CM

## 2020-07-28 LAB
CREAT SERPL-MCNC: 0.94 MG/DL (ref 0.6–1.3)
GFR SERPL CREATININE-BSD FRML MDRD: 87 ML/MIN/1.73SQ M
PSA SERPL-MCNC: 3.9 NG/ML (ref 0–4)

## 2020-07-28 PROCEDURE — 82565 ASSAY OF CREATININE: CPT

## 2020-07-28 PROCEDURE — G0103 PSA SCREENING: HCPCS

## 2020-07-28 PROCEDURE — 36415 COLL VENOUS BLD VENIPUNCTURE: CPT

## 2020-07-29 ENCOUNTER — TELEPHONE (OUTPATIENT)
Dept: NEUROLOGY | Facility: CLINIC | Age: 60
End: 2020-07-29

## 2020-07-29 NOTE — TELEPHONE ENCOUNTER
Phil Dominguez calling to say that she was calling because she was told to reschedule her 8/10 appointment because of maternity leave  I did not see anything in the chart regarding rescheduling this appointment  Will leave it as is  She states that she is going to be going back to work and will not be able take patient to another appointment right away

## 2020-08-03 ENCOUNTER — OFFICE VISIT (OUTPATIENT)
Dept: UROLOGY | Facility: CLINIC | Age: 60
End: 2020-08-03
Payer: COMMERCIAL

## 2020-08-03 VITALS
SYSTOLIC BLOOD PRESSURE: 126 MMHG | TEMPERATURE: 97.9 F | DIASTOLIC BLOOD PRESSURE: 84 MMHG | WEIGHT: 167 LBS | HEART RATE: 83 BPM | HEIGHT: 69 IN | BODY MASS INDEX: 24.73 KG/M2

## 2020-08-03 DIAGNOSIS — C61 PROSTATE CANCER (HCC): Primary | ICD-10-CM

## 2020-08-03 PROCEDURE — 3008F BODY MASS INDEX DOCD: CPT | Performed by: PHYSICIAN ASSISTANT

## 2020-08-03 PROCEDURE — 99213 OFFICE O/P EST LOW 20 MIN: CPT | Performed by: PHYSICIAN ASSISTANT

## 2020-08-03 PROCEDURE — 1036F TOBACCO NON-USER: CPT | Performed by: PHYSICIAN ASSISTANT

## 2020-08-03 NOTE — PROGRESS NOTES
1  Prostate cancer ([facility] [address] )  PSA Total, Diagnostic         Assessment and plan:       1  Timpson 6 prostate cancer on active surveillance - managed by Dr Jaiden Wright   - PSA remains stable with normal digital rectal examination in the office  - will follow up in 4 months for PSA prior to visit for continued surveillance  - will be due for his repeat prostate biopsy in March of 2021  Melissa Wright PA-C      Chief Complaint     Prostate cancer follow-up    History of Present Illness     Bella Ching is a 63 y o  male patient of Dr Jaiden Wright with a new diagnosis of Timpson 6 prostate cancer on active surveillance presenting for follow-up  Patient had been noted to have a rising PSA up to 4 3  Underwent a transrectal ultrasound-guided prostate biopsy on 03/18/2020  This revealed Timpson 6 (3+3) prostate cancer in 2/12 cores, less than 5% core volume  Patient's most recent PSA is 3 9 (07/28/2020)  Patient had a recent multiparametric prostate MRI (07/21/2020) which was unfortunately limited to due to bilateral hip prostheses and motion artifact  Calculated prostate volume of 55cc  Laboratory     Lab Results   Component Value Date    CREATININE 0 94 07/28/2020       Lab Results   Component Value Date    PSA 3 9 07/28/2020    PSA 4 3 (H) 01/06/2020    PSA 4 2 (H) 09/23/2019       Review of Systems     Review of Systems   Constitutional: Negative for activity change, appetite change, chills, diaphoresis, fatigue, fever and unexpected weight change  Respiratory: Negative for chest tightness and shortness of breath  Cardiovascular: Negative for chest pain, palpitations and leg swelling  Gastrointestinal: Negative for abdominal distention, abdominal pain, constipation, diarrhea, nausea and vomiting  Genitourinary: Negative for decreased urine volume, difficulty urinating, dysuria, enuresis, flank pain, frequency, genital sores, hematuria and urgency     Musculoskeletal: Negative for back pain, gait problem and myalgias  Skin: Negative for color change, pallor, rash and wound  Psychiatric/Behavioral: Negative for behavioral problems  The patient is not nervous/anxious  Allergies     Allergies   Allergen Reactions    Sulfa Antibiotics        Physical Exam     Physical Exam  Constitutional:       Appearance: Normal appearance  HENT:      Head: Normocephalic and atraumatic  Nose: Nose normal    Eyes:      General:         Right eye: No discharge  Left eye: No discharge  Pulmonary:      Effort: Pulmonary effort is normal  No respiratory distress  Genitourinary:     Comments: Good rectal tone  Prostate 40g, firm, no nodule  Musculoskeletal:         General: No deformity  Skin:     General: Skin is warm and dry  Neurological:      General: No focal deficit present  Mental Status: He is alert and oriented to person, place, and time     Psychiatric:         Mood and Affect: Mood normal          Behavior: Behavior normal            Vital Signs     Vitals:    08/03/20 1025   BP: 126/84   BP Location: Left arm   Patient Position: Sitting   Cuff Size: Standard   Pulse: 83   Temp: 97 9 °F (36 6 °C)   TempSrc: Temporal   Weight: 75 8 kg (167 lb)   Height: 5' 9"         Current Medications       Current Outpatient Medications:     ALPHAGAN P 0 15 % ophthalmic solution, , Disp: , Rfl:     Cetirizine HCl (ZYRTEC ALLERGY PO), Take 1 tablet by mouth daily as needed, Disp: , Rfl:     cholecalciferol (VITAMIN D3) 1,000 units tablet, Take 4,000 Units by mouth daily , Disp: , Rfl:     donepezil (ARICEPT) 10 mg tablet, TAKE 1 TABLET BY MOUTH EVERY DAY, Disp: 90 tablet, Rfl: 1    Lactobacillus (PROBIOTIC ACIDOPHILUS) CAPS, Take by mouth once, Disp: , Rfl:     LUMIGAN 0 01 % ophthalmic drops, , Disp: , Rfl:     naproxen sodium (ALEVE) 220 MG tablet, Take 440 mg by mouth daily as needed for mild pain, Disp: , Rfl:     bisacodyl (FLEET) 10 MG/30ML ENEM, Insert 30 mL (10 mg total) into the rectum once for 1 dose Perform morning of prostate biopsy (Patient not taking: Reported on 8/3/2020), Disp: 30 mL, Rfl: 0      Active Problems     Patient Active Problem List   Diagnosis    Allergic rhinitis    Glaucoma    Prediabetes    Sciatica    Short-term memory loss    Mild cognitive impairment    Abnormal EEG    Increased prostate specific antigen (PSA) velocity    Right hip pain    Lumbar spondylosis    Primary localized osteoarthritis of right hip    Enlarged prostate    Screening for prostate cancer    Bilateral leg weakness    Left hip pain    Breast mass in male    Furuncle of chest wall    Cellulitis         Past Medical History     Past Medical History:   Diagnosis Date    Benign colon polyp     Diverticulitis, colon     Diverticulosis     Hyperlipidemia     Prediabetes     Renal calculi     Short-term memory loss     Vitamin D deficiency          Surgical History     Past Surgical History:   Procedure Laterality Date    COLONOSCOPY      ESOPHAGOGASTRODUODENOSCOPY      HERNIA REPAIR      KNEE SURGERY      PROSTATE BIOPSY      TOTAL HIP ARTHROPLASTY Right 05/17/2019    TOTAL HIP ARTHROPLASTY Left 08/20/2019         Family History     Family History   Problem Relation Age of Onset    Dementia Mother    Renetta Clunes Glaucoma Mother     Glaucoma Father     Prostate cancer Father     Pulmonary embolism Father     Hypertension Brother     Prostate cancer Brother     Colon cancer Maternal Grandfather          Social History     Social History       Radiology

## 2020-08-04 ENCOUNTER — TELEPHONE (OUTPATIENT)
Dept: NEUROLOGY | Facility: CLINIC | Age: 60
End: 2020-08-04

## 2020-08-07 ENCOUNTER — TELEPHONE (OUTPATIENT)
Dept: NEUROLOGY | Facility: CLINIC | Age: 60
End: 2020-08-07

## 2020-08-07 NOTE — TELEPHONE ENCOUNTER
FYI-I left to message one confirming 8/10/20 and the 12:44 message to let patient know appt has to be rescheduled due to provider is not in the office on 8/10/20  Please assist with rescheduling  Thanks

## 2020-08-25 DIAGNOSIS — F02.80 EARLY ONSET ALZHEIMER'S DEMENTIA WITHOUT BEHAVIORAL DISTURBANCE (HCC): ICD-10-CM

## 2020-08-25 DIAGNOSIS — G30.0 EARLY ONSET ALZHEIMER'S DEMENTIA WITHOUT BEHAVIORAL DISTURBANCE (HCC): ICD-10-CM

## 2020-08-25 RX ORDER — DONEPEZIL HYDROCHLORIDE 10 MG/1
TABLET, FILM COATED ORAL
Qty: 90 TABLET | Refills: 0 | Status: SHIPPED | OUTPATIENT
Start: 2020-08-25 | End: 2020-12-08 | Stop reason: SDUPTHER

## 2020-10-16 ENCOUNTER — OFFICE VISIT (OUTPATIENT)
Dept: FAMILY MEDICINE CLINIC | Facility: OTHER | Age: 60
End: 2020-10-16
Payer: COMMERCIAL

## 2020-10-16 VITALS
HEIGHT: 68 IN | SYSTOLIC BLOOD PRESSURE: 124 MMHG | OXYGEN SATURATION: 95 % | TEMPERATURE: 97.6 F | BODY MASS INDEX: 24.78 KG/M2 | HEART RATE: 79 BPM | DIASTOLIC BLOOD PRESSURE: 76 MMHG | WEIGHT: 163.5 LBS

## 2020-10-16 DIAGNOSIS — Z13.6 SCREENING FOR CARDIOVASCULAR CONDITION: ICD-10-CM

## 2020-10-16 DIAGNOSIS — Z11.4 SCREENING FOR HIV (HUMAN IMMUNODEFICIENCY VIRUS): ICD-10-CM

## 2020-10-16 DIAGNOSIS — Z86.010 PERSONAL HISTORY OF COLONIC POLYPS: ICD-10-CM

## 2020-10-16 DIAGNOSIS — Z00.00 ANNUAL PHYSICAL EXAM: Primary | ICD-10-CM

## 2020-10-16 DIAGNOSIS — Z12.11 COLON CANCER SCREENING: ICD-10-CM

## 2020-10-16 DIAGNOSIS — Z13.1 SCREENING FOR DIABETES MELLITUS: ICD-10-CM

## 2020-10-16 PROCEDURE — 1036F TOBACCO NON-USER: CPT | Performed by: FAMILY MEDICINE

## 2020-10-16 PROCEDURE — 3725F SCREEN DEPRESSION PERFORMED: CPT | Performed by: FAMILY MEDICINE

## 2020-10-16 PROCEDURE — 99396 PREV VISIT EST AGE 40-64: CPT | Performed by: FAMILY MEDICINE

## 2020-10-28 ENCOUNTER — LAB (OUTPATIENT)
Dept: LAB | Facility: CLINIC | Age: 60
End: 2020-10-28
Payer: COMMERCIAL

## 2020-10-28 DIAGNOSIS — Z13.1 SCREENING FOR DIABETES MELLITUS: ICD-10-CM

## 2020-10-28 DIAGNOSIS — Z11.4 SCREENING FOR HIV (HUMAN IMMUNODEFICIENCY VIRUS): ICD-10-CM

## 2020-10-28 LAB
ALBUMIN SERPL BCP-MCNC: 3.6 G/DL (ref 3.5–5)
ALP SERPL-CCNC: 54 U/L (ref 46–116)
ALT SERPL W P-5'-P-CCNC: 26 U/L (ref 12–78)
ANION GAP SERPL CALCULATED.3IONS-SCNC: 12 MMOL/L (ref 4–13)
AST SERPL W P-5'-P-CCNC: 15 U/L (ref 5–45)
BILIRUB SERPL-MCNC: 0.47 MG/DL (ref 0.2–1)
BUN SERPL-MCNC: 17 MG/DL (ref 5–25)
CALCIUM SERPL-MCNC: 9.1 MG/DL (ref 8.3–10.1)
CHLORIDE SERPL-SCNC: 104 MMOL/L (ref 100–108)
CO2 SERPL-SCNC: 24 MMOL/L (ref 21–32)
CREAT SERPL-MCNC: 1.03 MG/DL (ref 0.6–1.3)
GFR SERPL CREATININE-BSD FRML MDRD: 78 ML/MIN/1.73SQ M
GLUCOSE SERPL-MCNC: 155 MG/DL (ref 65–140)
POTASSIUM SERPL-SCNC: 3.6 MMOL/L (ref 3.5–5.3)
PROT SERPL-MCNC: 7.3 G/DL (ref 6.4–8.2)
SODIUM SERPL-SCNC: 140 MMOL/L (ref 136–145)

## 2020-10-28 PROCEDURE — 80053 COMPREHEN METABOLIC PANEL: CPT

## 2020-10-28 PROCEDURE — 83036 HEMOGLOBIN GLYCOSYLATED A1C: CPT

## 2020-10-28 PROCEDURE — 36415 COLL VENOUS BLD VENIPUNCTURE: CPT

## 2020-10-28 PROCEDURE — 87389 HIV-1 AG W/HIV-1&-2 AB AG IA: CPT

## 2020-10-29 LAB
EST. AVERAGE GLUCOSE BLD GHB EST-MCNC: 117 MG/DL
HBA1C MFR BLD: 5.7 %
HIV 1+2 AB+HIV1 P24 AG SERPL QL IA: NORMAL

## 2020-11-11 ENCOUNTER — LAB (OUTPATIENT)
Dept: LAB | Facility: CLINIC | Age: 60
End: 2020-11-11
Payer: COMMERCIAL

## 2020-11-11 DIAGNOSIS — Z13.6 SCREENING FOR CARDIOVASCULAR CONDITION: ICD-10-CM

## 2020-11-11 LAB
CHOLEST SERPL-MCNC: 214 MG/DL (ref 50–200)
HDLC SERPL-MCNC: 76 MG/DL
LDLC SERPL CALC-MCNC: 126 MG/DL (ref 0–100)
TRIGL SERPL-MCNC: 59 MG/DL

## 2020-11-11 PROCEDURE — 36415 COLL VENOUS BLD VENIPUNCTURE: CPT

## 2020-11-11 PROCEDURE — 80061 LIPID PANEL: CPT

## 2020-12-07 ENCOUNTER — LAB (OUTPATIENT)
Dept: LAB | Facility: CLINIC | Age: 60
End: 2020-12-07
Payer: COMMERCIAL

## 2020-12-07 DIAGNOSIS — C61 PROSTATE CANCER (HCC): ICD-10-CM

## 2020-12-07 PROCEDURE — 84153 ASSAY OF PSA TOTAL: CPT

## 2020-12-08 ENCOUNTER — TELEPHONE (OUTPATIENT)
Dept: NEUROLOGY | Facility: CLINIC | Age: 60
End: 2020-12-08

## 2020-12-08 ENCOUNTER — TELEMEDICINE (OUTPATIENT)
Dept: NEUROLOGY | Facility: CLINIC | Age: 60
End: 2020-12-08
Payer: COMMERCIAL

## 2020-12-08 ENCOUNTER — OFFICE VISIT (OUTPATIENT)
Dept: UROLOGY | Facility: CLINIC | Age: 60
End: 2020-12-08
Payer: COMMERCIAL

## 2020-12-08 VITALS
DIASTOLIC BLOOD PRESSURE: 70 MMHG | SYSTOLIC BLOOD PRESSURE: 124 MMHG | HEIGHT: 67 IN | WEIGHT: 165 LBS | BODY MASS INDEX: 25.9 KG/M2 | HEART RATE: 103 BPM

## 2020-12-08 DIAGNOSIS — G30.0 EARLY ONSET ALZHEIMER'S DEMENTIA WITHOUT BEHAVIORAL DISTURBANCE (HCC): ICD-10-CM

## 2020-12-08 DIAGNOSIS — F02.80 EARLY ONSET ALZHEIMER'S DEMENTIA WITHOUT BEHAVIORAL DISTURBANCE (HCC): ICD-10-CM

## 2020-12-08 DIAGNOSIS — C61 PROSTATE CANCER (HCC): Primary | ICD-10-CM

## 2020-12-08 LAB — PSA SERPL-MCNC: 5.6 NG/ML (ref 0–4)

## 2020-12-08 PROCEDURE — 99214 OFFICE O/P EST MOD 30 MIN: CPT | Performed by: PSYCHIATRY & NEUROLOGY

## 2020-12-08 PROCEDURE — 1036F TOBACCO NON-USER: CPT | Performed by: PSYCHIATRY & NEUROLOGY

## 2020-12-08 PROCEDURE — 3008F BODY MASS INDEX DOCD: CPT | Performed by: PSYCHIATRY & NEUROLOGY

## 2020-12-08 PROCEDURE — 99213 OFFICE O/P EST LOW 20 MIN: CPT | Performed by: PHYSICIAN ASSISTANT

## 2020-12-08 RX ORDER — CIPROFLOXACIN 500 MG/1
500 TABLET, FILM COATED ORAL 2 TIMES DAILY
Qty: 2 TABLET | Refills: 0 | Status: SHIPPED | OUTPATIENT
Start: 2020-12-08 | End: 2020-12-09

## 2020-12-08 RX ORDER — DONEPEZIL HYDROCHLORIDE 10 MG/1
10 TABLET, FILM COATED ORAL DAILY
Qty: 90 TABLET | Refills: 3 | Status: SHIPPED | OUTPATIENT
Start: 2020-12-08 | End: 2021-02-08 | Stop reason: SDUPTHER

## 2020-12-15 ENCOUNTER — CLINICAL SUPPORT (OUTPATIENT)
Dept: FAMILY MEDICINE CLINIC | Facility: OTHER | Age: 60
End: 2020-12-15
Payer: COMMERCIAL

## 2020-12-15 DIAGNOSIS — Z23 ENCOUNTER FOR IMMUNIZATION: Primary | ICD-10-CM

## 2020-12-15 PROCEDURE — 90471 IMMUNIZATION ADMIN: CPT

## 2020-12-15 PROCEDURE — 90715 TDAP VACCINE 7 YRS/> IM: CPT

## 2021-01-14 ENCOUNTER — TELEPHONE (OUTPATIENT)
Dept: NEUROLOGY | Facility: CLINIC | Age: 61
End: 2021-01-14

## 2021-01-14 NOTE — TELEPHONE ENCOUNTER
Pt's wife [place] called and states that she noticed that pt is getting more confused  Pt has been on aricept 10 mg daily  June Wharton per tele visit dated 12/8/20-Patient was advised a trial of memantine 10 mg twice a day which he declines and since his wife was not available he is advised to have his wife call us for further discussion on his cognitive state as well as addition of memantine to Aricept to prevent further progression  Nguyen verbalized understanding and agreeable to add  memantine  Requesting script be sent to [facility] pharmacy on file  Dana Puckett states that pt seems to be more agitated when he can't remember things  No combative behavior  Pt is not taking any med for agitation        [phone] ok to leave detailed message

## 2021-01-15 NOTE — TELEPHONE ENCOUNTER
Pt's wife Britt Pagan called returning Dr Patterson's call  Spoke w/ Dr Beni Clemons  He is currently seeing pt  Pls call Britt Pagan back at [phone]

## 2021-01-18 NOTE — TELEPHONE ENCOUNTER
Called patient's wife Summer Suresh, advised to increase the dose of memantine to 10 mg twice a day his behavioral symptoms have settled down again most likely was secondary to the addition of memantine    She was again explained that the increase in the dose may cause some behavioral symptoms which may be transient and if she has any problems to give us a call

## 2021-01-22 DIAGNOSIS — F02.80 EARLY ONSET ALZHEIMER'S DEMENTIA WITHOUT BEHAVIORAL DISTURBANCE (HCC): Primary | ICD-10-CM

## 2021-01-22 DIAGNOSIS — G30.0 EARLY ONSET ALZHEIMER'S DEMENTIA WITHOUT BEHAVIORAL DISTURBANCE (HCC): Primary | ICD-10-CM

## 2021-01-22 RX ORDER — MEMANTINE HYDROCHLORIDE 5 MG/1
5 TABLET ORAL 2 TIMES DAILY
Qty: 60 TABLET | Refills: 0 | Status: SHIPPED | OUTPATIENT
Start: 2021-01-22 | End: 2021-03-01 | Stop reason: SDUPTHER

## 2021-01-22 NOTE — TELEPHONE ENCOUNTER
Pt's wife Robbin Rachel called and states that she spoke w/ Dr Jarad Henson last week and he was suppose to send a new prescription to the pharmacy  Chart reviewed: per your conversation w/ Robbin Rachel last week, you said increase memantine to 10 mg bid  Pt is not taking memantine, he is taking donepezil 10 mg daily  Do you want pt to switch to memantine 10 mg or increase donepezil 10 mg to bid?

## 2021-01-22 NOTE — TELEPHONE ENCOUNTER
Called patient's wife, at her last tele visit it was my understanding that the patient was started on memantine 5 mg however patient is not on memantine as per nurse's note  She was explained not to increase the dose of Aricept to twice a day and continue Aricept 10 mg daily and I will now send the prescription for memantine 5 mg twice a day to the pharmacy  She is also advised to call us if she has any questions on Monday

## 2021-02-08 DIAGNOSIS — G30.0 EARLY ONSET ALZHEIMER'S DEMENTIA WITHOUT BEHAVIORAL DISTURBANCE (HCC): ICD-10-CM

## 2021-02-08 DIAGNOSIS — F02.80 EARLY ONSET ALZHEIMER'S DEMENTIA WITHOUT BEHAVIORAL DISTURBANCE (HCC): ICD-10-CM

## 2021-02-08 RX ORDER — DONEPEZIL HYDROCHLORIDE 10 MG/1
10 TABLET, FILM COATED ORAL DAILY
Qty: 90 TABLET | Refills: 3 | Status: SHIPPED | OUTPATIENT
Start: 2021-02-08 | End: 2021-07-09 | Stop reason: SDUPTHER

## 2021-02-08 NOTE — TELEPHONE ENCOUNTER
Medication refill check list    Correct patient? yes   Correct medication name, dose, and pill size? yes   Correct provider? yes   Last and Next appt  scheduled? Yes, last date 12/8/20 & next date no follow up    Right pharmacy listed? yes   Correct quantity for 30 or 90 days? yes   Is the patient out of refills? When was it last prescribed? Yes, last date 12/8/20   Directions match what the patient says they are taking?  yes   Enough refills? (none for controlled substances, 1 year for routine medications) yes     I called and left a voicemail message for patient to call and schedule a follow up appointment

## 2021-03-01 DIAGNOSIS — F02.80 EARLY ONSET ALZHEIMER'S DEMENTIA WITHOUT BEHAVIORAL DISTURBANCE (HCC): ICD-10-CM

## 2021-03-01 DIAGNOSIS — G30.0 EARLY ONSET ALZHEIMER'S DEMENTIA WITHOUT BEHAVIORAL DISTURBANCE (HCC): ICD-10-CM

## 2021-03-01 RX ORDER — MEMANTINE HYDROCHLORIDE 5 MG/1
5 TABLET ORAL 2 TIMES DAILY
Qty: 60 TABLET | Refills: 3 | Status: SHIPPED | OUTPATIENT
Start: 2021-03-01 | End: 2021-06-14 | Stop reason: SDUPTHER

## 2021-03-24 ENCOUNTER — PROCEDURE VISIT (OUTPATIENT)
Dept: UROLOGY | Facility: CLINIC | Age: 61
End: 2021-03-24
Payer: COMMERCIAL

## 2021-03-24 VITALS
WEIGHT: 164 LBS | SYSTOLIC BLOOD PRESSURE: 144 MMHG | HEART RATE: 92 BPM | BODY MASS INDEX: 25.74 KG/M2 | HEIGHT: 67 IN | DIASTOLIC BLOOD PRESSURE: 78 MMHG

## 2021-03-24 DIAGNOSIS — R97.20 ELEVATED PSA: Primary | ICD-10-CM

## 2021-03-24 DIAGNOSIS — C61 PROSTATE CANCER (HCC): ICD-10-CM

## 2021-03-24 PROCEDURE — G0416 PROSTATE BIOPSY, ANY MTHD: HCPCS | Performed by: PATHOLOGY

## 2021-03-24 PROCEDURE — 76942 ECHO GUIDE FOR BIOPSY: CPT | Performed by: UROLOGY

## 2021-03-24 PROCEDURE — 88344 IMHCHEM/IMCYTCHM EA MLT ANTB: CPT | Performed by: PATHOLOGY

## 2021-03-24 PROCEDURE — 55700 PR BIOPSY OF PROSTATE,NEEDLE/PUNCH: CPT | Performed by: UROLOGY

## 2021-03-24 RX ORDER — CEFTRIAXONE SODIUM 250 MG/1
250 INJECTION, POWDER, FOR SOLUTION INTRAMUSCULAR; INTRAVENOUS ONCE
Status: COMPLETED | OUTPATIENT
Start: 2021-03-24 | End: 2021-03-24

## 2021-03-24 RX ORDER — CEFTRIAXONE 1 G/1
1000 INJECTION, POWDER, FOR SOLUTION INTRAMUSCULAR; INTRAVENOUS ONCE
Status: DISCONTINUED | OUTPATIENT
Start: 2021-03-24 | End: 2021-03-24

## 2021-03-24 RX ADMIN — CEFTRIAXONE SODIUM 250 MG: 250 INJECTION, POWDER, FOR SOLUTION INTRAMUSCULAR; INTRAVENOUS at 16:14

## 2021-03-24 NOTE — PROGRESS NOTES
Office TRUS-guided Prostate Biopsy Procedure Note    Indication    Active surveillance of prostate cancer    Informed consent   The risks, benefits and alternatives to TRUS-guided prostate biopsy were conveyed to the patient prior to performing the procedure  A discussion of the risks of the procedure included, but was not limited to: pain, hematuria, hematochezia, hematospermia, infection, and the possibility of a non-diagnostic biopsy  The patient was given the opportunity to have his questions answered and there was no perceived barrier to education  Antibiotic prophylaxis   The patient received the following antibiotics at least 30 minutes prior to undergoing biopsy: Ciprofloxacin 500 mg PO x2   1 g intramuscular Rocephin    Rectal cleansing  The patient was instructed to perform an evacuating rectal enema 1-2 hours prior to biopsy  Local anesthesia  Topical 2% lidocaine jelly was applied liberally to the anus and rectum and allowed to dwell for at least 5 min prior to starting the procedure  After insertion of the TRUS probe, 10 mL of 2% lidocaine solution was injected with ultrasound guidance at the  junction of the prostate and seminal vesicles  The anesthetic was allowed to dwell for at least 2 minutes prior to biopsy  Transrectal ultrasonography  The patient was placed in the left lateral decubitus position  After an attentive digital rectal examination, a 7 5 mHz sidefire ultrasound probe was gently inserted into the rectum and biplanar imaging of the prostate was done with the findings noted below  Images were taken of any abnormal findings and also to document prostate size      Bladder  The bladder base appeared normal     Prostate  Digital rectal exam findings:  - No palpable disease    Ultrasound size measurements:  -Volume:   40 g cm3    Ultrasound findings:  -Cysts: None  -Masses: None  -Median lobe: absent    Clinical stage (assuming a positive biopsy):   -T1c     TRUS-guided needle biopsy  Using an 18 gauge biopsy needle and ultrasound guidance, the following biopsies were taken:    1 core(s) from the left lateral base  1 core(s) from the left lateral mid-gland  1 core(s) from the right middle base  1 core(s) from the right lateral base  1 core(s) from the left lateral mid-gland  1 core(s) from the left middle mid-gland  1 core(s) from the right middle mid-gland  1 core(s) from the right lateral mid-gland  1 core(s) from the left lateral apex  1 core(s) from the left middle apex  1 core(s) from the right middle apex  1 core(s) from the right lateral apex    Total number of cores: 12                Complications  There were no procedural complications  Disposition  The patient was dismissed to home after 30 minutes of observation in stable condition  Post-procedure instructions: Today he underwent an uncomplicated transrectal ultrasound-guided biopsy of the prostate, following a periprosthetic nerve block  I reviewed the normal postprocedure a course including bleeding per recutm, hematuria, and hematospermia  I instructed him to complete his course of antibiotics as prescribed  Instructed him to call with fever greater than 101, chills, nausea, vomiting, poorly controlled pain  His followup was scheduled in approximately 2 weeks' time to review the pathology

## 2021-04-05 ENCOUNTER — TELEPHONE (OUTPATIENT)
Dept: UROLOGY | Facility: CLINIC | Age: 61
End: 2021-04-05

## 2021-04-06 ENCOUNTER — OFFICE VISIT (OUTPATIENT)
Dept: UROLOGY | Facility: CLINIC | Age: 61
End: 2021-04-06
Payer: COMMERCIAL

## 2021-04-06 VITALS
SYSTOLIC BLOOD PRESSURE: 122 MMHG | BODY MASS INDEX: 25.46 KG/M2 | HEIGHT: 67 IN | DIASTOLIC BLOOD PRESSURE: 60 MMHG | WEIGHT: 162.2 LBS | HEART RATE: 91 BPM

## 2021-04-06 DIAGNOSIS — C61 PROSTATE CANCER (HCC): Primary | ICD-10-CM

## 2021-04-06 PROCEDURE — 99215 OFFICE O/P EST HI 40 MIN: CPT | Performed by: UROLOGY

## 2021-04-06 PROCEDURE — 3008F BODY MASS INDEX DOCD: CPT | Performed by: UROLOGY

## 2021-04-06 PROCEDURE — 1036F TOBACCO NON-USER: CPT | Performed by: UROLOGY

## 2021-04-06 RX ORDER — ACETAMINOPHEN 325 MG/1
975 TABLET ORAL ONCE
Status: CANCELLED | OUTPATIENT
Start: 2021-04-06 | End: 2021-04-06

## 2021-04-06 RX ORDER — GABAPENTIN 100 MG/1
300 CAPSULE ORAL ONCE
Status: CANCELLED | OUTPATIENT
Start: 2021-04-06 | End: 2021-04-06

## 2021-04-06 NOTE — PROGRESS NOTES
Referring Physician: Alejandro Faith MD  A copy of this note was sent to the referring physician  Diagnoses and all orders for this visit:    Prostate cancer [facility])  -     Case request operating room: PROSTATECTOMY RADICAL AND PELVIC LYMPH NODE DISSECTION LAPAROSCOPIC W/ ROBOTICS; Standing  -     Case request operating room: PROSTATECTOMY RADICAL AND PELVIC LYMPH NODE DISSECTION LAPAROSCOPIC W/ ROBOTICS    Other orders  -     Diet NPO; Sips with meds; Standing  -     Place sequential compression device; Standing  -     acetaminophen (TYLENOL) tablet 975 mg  -     gabapentin (NEURONTIN) capsule 300 mg  -     ceFAZolin (ANCEF) 1,000 mg in dextrose 5 % 100 mL IVPB            Assessment and plan:       1  Prostate cancer with disease progression on active surveillance  - cT1c  [place] 4+3=7, 2 of 12 total cores, intraductal phenotype is noted ( pathology reviewed by Dr Dean Antunez)  -  Originally diagnosed with Soheila 6 disease, in 2 of 12 cores, April 2020  -  Multiparametric MRI July 2020:  No radiographically detectable disease   -pretreatment PSA:  5 6      We discussed the natural history of prostate cancer, the Soheila grading system, and the patient's current staging  He was provided with a copy of his biopsy report  We discussed the options for managing newly diagnosed prostate cancer including active surveillance for low risk disease, radical prostatectomy, and pelvic radiation therapy  We discussed each of these management strategies in detail, with particular emphasis to how they do or do not apply to the patient's current staging  A discussion was guided using criteria for  [address] (NCCN) to stratify patient's according to low, intermediate, or high-risk prostate cancer  We discussed the goals of care as #1 being cancer cure, and #2 being preservation of urinary control and erectile function      With regards to surgical resection, we discussed the benefits and risks, including but not limited to bleeding which may or may not require blood transfusion, infection, injury to other structures (bladder, ureters, rectum, nerves, & vascular /neural structures), ileus, DVT/PE, MI, CVA, urinary incontinence, impotence, failure to completely eradicate the tumor/positive margins or the need for further therapy, and death  Risks of severe urinary incontinence necessitating an AUS 1-3% and minor stress  urinary incontinence requiring pads about 10-15% at 1 year were also  discussed  The risk of impotence was also discussed in great detail  After  surgery, his libido should be unchanged, he may or may not have normal  spontaneous erections but that he will have a dry orgasm  Post operative  problems including the need for adjuvant therapy were discussed  He had  the opportunity to ask questions and his questions were answered to his  satisfaction  patient and his wife have elected move for with robotic prostatectomy  Surgical be scheduled in the near future  He was offered to have a consultation with Radiation Oncology for 2nd opinion and declined  He is an excellent candidate for radical prostatectomy    I have spent 45 minutes with Patient and family today in which greater than 50% of this time was spent in counseling/coordination of care regarding Diagnostic results, Prognosis, Risks and benefits of tx options, Intructions for management and Impressions  Nelda Martínez MD      Chief Complaint      biopsy follow-up      History of Present Illness     Acacia Levine is a 63 y o  Male returns in follow-up status post recent prostate biopsy  In brief he is a healthy 27-year-old male who was diagnosed with 2 cores of [place] 3 + 3 use 6 disease 1 year ago  He has had a stable PSA between 5 and 6 since that time and an MRI which showed no radiographically detectable disease  He returns 2 weeks status post annual biopsy    This did reveal significant upgrading to Soheila 4+3=7, as well as intraductal phenotype noted  Of note his pathology reviewed by Dr Agatha Alvarado at [facility]  Patient remains asymptomatic  He returns accompanied with his wife to review treatment options  Detailed Urologic History     - please refer to HPI    Review of Systems     Review of Systems   Constitutional: Negative for activity change and fatigue  HENT: Negative for congestion  Eyes: Negative for visual disturbance  Respiratory: Negative for shortness of breath and wheezing  Cardiovascular: Negative for chest pain and leg swelling  Gastrointestinal: Negative for abdominal pain  Genitourinary: Negative for dysuria, flank pain, hematuria and urgency  Musculoskeletal: Negative for back pain  Allergic/Immunologic: Negative for immunocompromised state  Neurological: Negative for dizziness and numbness  Psychiatric/Behavioral: Negative for dysphoric mood  All other systems reviewed and are negative  Allergies     Allergies   Allergen Reactions    Sulfa Antibiotics        Physical Exam     Physical Exam  Constitutional:       General: He is not in acute distress  Appearance: He is well-developed  HENT:      Head: Normocephalic and atraumatic  Neck:      Musculoskeletal: Normal range of motion  Cardiovascular:      Comments: Negative lower extremity edema  Pulmonary:      Effort: Pulmonary effort is normal       Breath sounds: Normal breath sounds  Abdominal:      Palpations: Abdomen is soft  Genitourinary:     Comments: Negative CVA tenderness, no abdominal incisions  Musculoskeletal: Normal range of motion  Skin:     General: Skin is warm  Neurological:      Mental Status: He is alert and oriented to person, place, and time     Psychiatric:         Behavior: Behavior normal              Vital Signs  Vitals:    04/06/21 1501   BP: 122/60   Pulse: 91   Weight: 73 6 kg (162 lb 3 2 oz)   Height: 5' 7" (1 702 m)         Current Medications Current Outpatient Medications:     ALPHAGAN P 0 15 % ophthalmic solution, , Disp: , Rfl:     Cetirizine HCl (ZYRTEC ALLERGY PO), Take 1 tablet by mouth daily as needed, Disp: , Rfl:     cholecalciferol (VITAMIN D3) 1,000 units tablet, Take 4,000 Units by mouth daily , Disp: , Rfl:     donepezil (ARICEPT) 10 mg tablet, Take 1 tablet (10 mg total) by mouth daily, Disp: 90 tablet, Rfl: 3    Lactobacillus (PROBIOTIC ACIDOPHILUS) CAPS, Take by mouth once, Disp: , Rfl:     LUMIGAN 0 01 % ophthalmic drops, , Disp: , Rfl:     memantine (NAMENDA) 5 mg tablet, Take 1 tablet (5 mg total) by mouth 2 (two) times a day, Disp: 60 tablet, Rfl: 3    naproxen sodium (ALEVE) 220 MG tablet, Take 440 mg by mouth daily as needed for mild pain, Disp: , Rfl:     bisacodyl (FLEET) 10 MG/30ML ENEM, Insert 30 mL (10 mg total) into the rectum once for 1 dose Perform morning of prostate biopsy, Disp: 30 mL, Rfl: 0    bisacodyl (FLEET) 10 MG/30ML ENEM, Insert 30 mL (10 mg total) into the rectum once for 1 dose Perform morning of prostate biopsy, Disp: 30 mL, Rfl: 0      Active Problems     Patient Active Problem List   Diagnosis    Allergic rhinitis    Glaucoma    Prediabetes    Sciatica    Short-term memory loss    Mild cognitive impairment    Abnormal EEG    Increased prostate specific antigen (PSA) velocity    Right hip pain    Lumbar spondylosis    Primary localized osteoarthritis of right hip    Enlarged prostate    Screening for prostate cancer    Bilateral leg weakness    Left hip pain    Breast mass in male    Furuncle of chest wall    Cellulitis         Past Medical History     Past Medical History:   Diagnosis Date    Benign colon polyp     Diverticulitis, colon     Diverticulosis     Hyperlipidemia     Prediabetes     Renal calculi     Short-term memory loss     Vitamin D deficiency          Surgical History     Past Surgical History:   Procedure Laterality Date    COLONOSCOPY      ESOPHAGOGASTRODUODENOSCOPY      HERNIA REPAIR      KNEE SURGERY      PROSTATE BIOPSY      TOTAL HIP ARTHROPLASTY Right 2019    TOTAL HIP ARTHROPLASTY Left 2019         Family History     Family History   Problem Relation Age of Onset    Dementia Mother    Yumiko Donohue Glaucoma Mother     Glaucoma Father     Prostate cancer Father     Pulmonary embolism Father     Hypertension Brother     Prostate cancer Brother     Colon cancer Maternal Grandfather          Social History     Social History     Social History     Tobacco Use   Smoking Status Former Smoker    Quit date:     Years since quittin 2   Smokeless Tobacco Never Used         Pertinent Lab Values     Lab Results   Component Value Date    CREATININE 1 03 10/28/2020       Lab Results   Component Value Date    PSA 5 6 (H) 2020    PSA 3 9 2020    PSA 4 3 (H) 2020       @RESULTRCNT(1H])@      Pertinent Imaging      - n/a    Portions of the record may have been created with voice recognition software   Occasional wrong word or "sound a like" substitutions may have occurred due to the inherent limitations of voice recognition software   Read the chart carefully and recognize, using context, where substitutions have occurred

## 2021-04-06 NOTE — TELEPHONE ENCOUNTER
Clerical called and advised patient Dr Vicenta Mcgregor recommends in person appt for today  Patient states he is unable, he works all day, he is even unsure if he will be able to attend virtual appt as his wife set up appt  Call transferred to RN  Advised DR Vicenta Mcgregor would like to see him ideally in person, but it is important he keeps appt for today, as biopsy shows progression of cancer  Dr Vicenta Mcgregor would like to have discussion with patient in regards to new plan of care  Patient reports he is currently at work, he needs to figure things out and then he will call me back  Advised he ask directly for [facility] RN  He reports he will call back

## 2021-04-06 NOTE — TELEPHONE ENCOUNTER
Patient returned call, he will be able to come into office today for in person visit with Dr Maksim Ferro  Appointment time and location confirmed

## 2021-04-06 NOTE — TELEPHONE ENCOUNTER
PATIENTS BIOPSY SHOWS PROGRESSION OF HIS CANCER    I STRONGLY RECOMMEND that his virtual visit be converted to an in person discussion on today 4/6    Clinical or clerical - Please call him ASAP in the morning to arrange

## 2021-04-09 ENCOUNTER — TELEPHONE (OUTPATIENT)
Dept: UROLOGY | Facility: CLINIC | Age: 61
End: 2021-04-09

## 2021-04-09 NOTE — TELEPHONE ENCOUNTER
Called to sched patient for Prostatectomy and patient was very confused  I did go over his recent visit w Pancho Archaan where he declined a consult w [address] and wanted to proceed w sched prostatectomy  He still is confused and would like to wait until he is with his wife before scheduling  I agree that would be best and he will call us back once him and his wife review things

## 2021-04-12 NOTE — TELEPHONE ENCOUNTER
Spoke w patients wife Phil Dominguez and we conf surgery for 7/26 at the [address]  They were offered a sooner date but prefer to wait till after they return from vacation and that she discussed this w Linus Sandifer and he said this was ok  She is aware he will stay over night for observation, she will be his  and the hospital will call the day prior w time of arrival  She is aware he will need clearance and is sched for July 12th and he will go for PATs the week of July 5th  I did go over the bowel prep with her and advised 7 day hold of any aspirin products, multivitamins and fish oils  I am going to mail her surgical packet now this way they have it and can call us with any questions or concerns

## 2021-04-13 ENCOUNTER — PREP FOR PROCEDURE (OUTPATIENT)
Dept: UROLOGY | Facility: CLINIC | Age: 61
End: 2021-04-13

## 2021-04-13 DIAGNOSIS — C61 PROSTATE CANCER (HCC): Primary | ICD-10-CM

## 2021-04-21 ENCOUNTER — PREPPED CHART (OUTPATIENT)
Dept: URBAN - METROPOLITAN AREA CLINIC 6 | Facility: CLINIC | Age: 61
End: 2021-04-21

## 2021-04-28 ENCOUNTER — OFFICE VISIT (OUTPATIENT)
Dept: URGENT CARE | Facility: CLINIC | Age: 61
End: 2021-04-28
Payer: COMMERCIAL

## 2021-04-28 VITALS
HEART RATE: 96 BPM | BODY MASS INDEX: 23.99 KG/M2 | WEIGHT: 162 LBS | TEMPERATURE: 98.3 F | SYSTOLIC BLOOD PRESSURE: 147 MMHG | HEIGHT: 69 IN | DIASTOLIC BLOOD PRESSURE: 87 MMHG | RESPIRATION RATE: 16 BRPM | OXYGEN SATURATION: 97 %

## 2021-04-28 DIAGNOSIS — Z20.822 ENCOUNTER FOR SCREENING LABORATORY TESTING FOR COVID-19 VIRUS: ICD-10-CM

## 2021-04-28 DIAGNOSIS — J06.9 UPPER RESPIRATORY TRACT INFECTION, UNSPECIFIED TYPE: Primary | ICD-10-CM

## 2021-04-28 PROCEDURE — U0005 INFEC AGEN DETEC AMPLI PROBE: HCPCS

## 2021-04-28 PROCEDURE — U0003 INFECTIOUS AGENT DETECTION BY NUCLEIC ACID (DNA OR RNA); SEVERE ACUTE RESPIRATORY SYNDROME CORONAVIRUS 2 (SARS-COV-2) (CORONAVIRUS DISEASE [COVID-19]), AMPLIFIED PROBE TECHNIQUE, MAKING USE OF HIGH THROUGHPUT TECHNOLOGIES AS DESCRIBED BY CMS-2020-01-R: HCPCS

## 2021-04-28 PROCEDURE — 99213 OFFICE O/P EST LOW 20 MIN: CPT | Performed by: NURSE PRACTITIONER

## 2021-04-28 NOTE — PATIENT INSTRUCTIONS
--Rest, drink plenty of fluids  --COVID testing sent  Will call with results  Average turn around time is 48-72 hours  --Advise self-quarantining until you hear back from us regarding test results (at the earliest)  This involves not leaving your house, wearing a mask at all times while outside your immediate living area, and disinfecting all commonly used surfaces and personal items  If your COVID test results are positive, you will need to self-quarantine at home for at least 10 days from the onset of your symptoms, until you are feeling better, and until you are without a fever for at least 72 hours  --For nasal/sinus congestion, helpful measures include steam, warm compresses, an OTC saline nasal spray or Neti pot, or an OTC decongestant (such as Sudafed)  The decongestant should be avoided, however, if you are under 10years of age, or have a history of high blood pressure or heart disease  In addition, an OTC nasal steroid (Flonase, Nasocort) or nasal decongestant (Afrin, Yang-synephrine) may be taken  The nasal steroid should be used at bedtime, after the saline nasal spray  The nasal decongestant should not be taken more than 3 days consecutively in order to prevent rebound congestion  --For nasal drainage, postnasal drip, sneezing and itching, an OTC antihistamine (Allegra, Benadryl, etc) can be taken  --For cough, you can take an OTC expectorant such as plain Robitussion or Mucinex (active ingredient guaifenesin)  A spoonful of honey at bedtime may also be helpful, as may a prescription cough medicine  Also recommended is the use of a cool mist humidifier (with or without Vicks) in the bedroom at night  --For sore throat, you can take OTC lozenges, use warm gargles (salt water or apple cider vinegar and honey), herbal teas, or an OTC throat spray (Chloraseptic)  --You can take Tylenol or Motrin/Advil as needed for fever, headache, body aches   Motrin/Advil should be avoided, however, if you have a history of heart disease, bleeding ulcers, or if you take blood thinners  --You should contact your primary care provider and/or go to the ER if your symptoms are not improved or get worse over the next 7 days  This includes new onset fever, localized ear pain, sinus pain, as well as worsening cough, chest pain, shortness of breath, or significant weakness/fatigue

## 2021-04-28 NOTE — PROGRESS NOTES
[facility] [facility]        NAME: Tushar Colorado is a 63 y o  male  : 1960    MRN: [id number]  DATE: 2021  TIME: 6:30 PM    Assessment and Plan   Upper respiratory tract infection, unspecified type [J06 9]  1  Upper respiratory tract infection, unspecified type     2  Encounter for screening laboratory testing for COVID-19 virus  Novel Coronavirus (Covid-19),PCR [facility]         Patient Instructions     --Rest, drink plenty of fluids  --COVID testing sent  Will call with results  Average turn around time is 48-72 hours  --Advise self-quarantining until you hear back from us regarding test results (at the earliest)  This involves not leaving your house, wearing a mask at all times while outside your immediate living area, and disinfecting all commonly used surfaces and personal items  If your COVID test results are positive, you will need to self-quarantine at home for at least 10 days from the onset of your symptoms, until you are feeling better, and until you are without a fever for at least 72 hours  --For nasal/sinus congestion, helpful measures include steam, warm compresses, an OTC saline nasal spray or Neti pot, or an OTC decongestant (such as Sudafed)  The decongestant should be avoided, however, if you are under 10years of age, or have a history of high blood pressure or heart disease  In addition, an OTC nasal steroid (Flonase, Nasocort) or nasal decongestant (Afrin, Yang-synephrine) may be taken  The nasal steroid should be used at bedtime, after the saline nasal spray  The nasal decongestant should not be taken more than 3 days consecutively in order to prevent rebound congestion  --For nasal drainage, postnasal drip, sneezing and itching, an OTC antihistamine (Allegra, Benadryl, etc) can be taken  --For cough, you can take an OTC expectorant such as plain Robitussion or Mucinex (active ingredient guaifenesin)    A spoonful of honey at bedtime may also be helpful, as may a prescription cough medicine  Also recommended is the use of a cool mist humidifier (with or without Vicks) in the bedroom at night  --For sore throat, you can take OTC lozenges, use warm gargles (salt water or apple cider vinegar and honey), herbal teas, or an OTC throat spray (Chloraseptic)  --You can take Tylenol or Motrin/Advil as needed for fever, headache, body aches  Motrin/Advil should be avoided, however, if you have a history of heart disease, bleeding ulcers, or if you take blood thinners  --You should contact your primary care provider and/or go to the ER if your symptoms are not improved or get worse over the next 7 days  This includes new onset fever, localized ear pain, sinus pain, as well as worsening cough, chest pain, shortness of breath, or significant weakness/fatigue  Chief Complaint     Chief Complaint   Patient presents with    Nasal Congestion     mucus started one week ago, slight cough         History of Present Illness         Here with complaints of nasal congestion, yellow rhinorrhea, mild cough x 2 weeks  No fever/chills, loss of smell or taste, headache, body aches, sore throat, dyspnea, wheezing  No ear pain  Occasional sneezing  No abdominal pain, N/V/D  Denies (known) history of spring allergies  No OTC meds taken  No known sick contacts  States he had first dose of COVID vaccine a week ago  Review of Systems   Review of Systems   Constitutional: Negative for fever  HENT: Positive for postnasal drip and rhinorrhea  Negative for sinus pressure, sinus pain and sore throat  Respiratory: Positive for cough  Negative for shortness of breath and wheezing  Gastrointestinal: Negative for diarrhea, nausea and vomiting  Musculoskeletal: Negative for myalgias  Neurological: Negative for headaches           Current Medications       Current Outpatient Medications:     ALPHAGAN P 0 15 % ophthalmic solution, , Disp: , Rfl:     Cetirizine HCl (ZYRTEC ALLERGY PO), Take 1 tablet by mouth daily as needed, Disp: , Rfl:     cholecalciferol (VITAMIN D3) 1,000 units tablet, Take 4,000 Units by mouth daily , Disp: , Rfl:     donepezil (ARICEPT) 10 mg tablet, Take 1 tablet (10 mg total) by mouth daily, Disp: 90 tablet, Rfl: 3    Lactobacillus (PROBIOTIC ACIDOPHILUS) CAPS, Take by mouth once, Disp: , Rfl:     LUMIGAN 0 01 % ophthalmic drops, , Disp: , Rfl:     memantine (NAMENDA) 5 mg tablet, Take 1 tablet (5 mg total) by mouth 2 (two) times a day, Disp: 60 tablet, Rfl: 3    naproxen sodium (ALEVE) 220 MG tablet, Take 440 mg by mouth daily as needed for mild pain, Disp: , Rfl:     bisacodyl (FLEET) 10 MG/30ML ENEM, Insert 30 mL (10 mg total) into the rectum once for 1 dose Perform morning of prostate biopsy, Disp: 30 mL, Rfl: 0    bisacodyl (FLEET) 10 MG/30ML ENEM, Insert 30 mL (10 mg total) into the rectum once for 1 dose Perform morning of prostate biopsy, Disp: 30 mL, Rfl: 0    Current Allergies     Allergies as of 04/28/2021 - Reviewed 04/28/2021   Allergen Reaction Noted    Sulfa antibiotics  08/13/2012            The following portions of the patient's history were reviewed and updated as appropriate: allergies, current medications, past family history, past medical history, past social history, past surgical history and problem list      Past Medical History:   Diagnosis Date    Benign colon polyp     Diverticulitis, colon     Diverticulosis     Hyperlipidemia     Prediabetes     Renal calculi     Short-term memory loss     Vitamin D deficiency        Past Surgical History:   Procedure Laterality Date    COLONOSCOPY      ESOPHAGOGASTRODUODENOSCOPY      HERNIA REPAIR      KNEE SURGERY      PROSTATE BIOPSY      TOTAL HIP ARTHROPLASTY Right 05/17/2019    TOTAL HIP ARTHROPLASTY Left 08/20/2019       Family History   Problem Relation Age of Onset    Dementia Mother     Glaucoma Mother     Glaucoma Father     Prostate cancer Father  Pulmonary embolism Father     Hypertension Brother     Prostate cancer Brother     Colon cancer Maternal Grandfather          Medications have been verified  Objective   /87 (Patient Position: Sitting)   Pulse 96   Temp 98 3 °F (36 8 °C) (Temporal)   Resp 16   Ht 5' 9" (1 753 m)   Wt 73 5 kg (162 lb)   SpO2 97%   BMI 23 92 kg/m²   No LMP for male patient  Physical Exam     Physical Exam  Constitutional:       General: He is not in acute distress  Appearance: He is well-developed  He is not diaphoretic  HENT:      Head: Normocephalic and atraumatic  Right Ear: External ear normal       Left Ear: External ear normal       Nose: Congestion and rhinorrhea present  Comments: No sinus tenderness  Mouth/Throat:      Pharynx: No oropharyngeal exudate  Eyes:      General:         Right eye: No discharge  Left eye: No discharge  Conjunctiva/sclera: Conjunctivae normal       Pupils: Pupils are equal, round, and reactive to light  Neck:      Musculoskeletal: Neck supple  Cardiovascular:      Rate and Rhythm: Normal rate and regular rhythm  Heart sounds: Normal heart sounds  Pulmonary:      Effort: Pulmonary effort is normal  No respiratory distress  Breath sounds: Normal breath sounds  No wheezing or rales  Lymphadenopathy:      Cervical: No cervical adenopathy  Skin:     General: Skin is warm and dry  Findings: No rash  Neurological:      Mental Status: He is alert and oriented to person, place, and time

## 2021-04-29 LAB — SARS-COV-2 RNA RESP QL NAA+PROBE: NEGATIVE

## 2021-04-29 NOTE — RESULT ENCOUNTER NOTE
I called Zack Forrest and let him know that his COVID-19 swab was negative  I advised him to continue social distancing procedures

## 2021-05-10 NOTE — TELEPHONE ENCOUNTER
Spoke w wife and we have conf RS date from 7/26 to 7/28 at the [address] [place] [address] [address]

## 2021-06-14 ENCOUNTER — TELEPHONE (OUTPATIENT)
Dept: NEUROLOGY | Facility: CLINIC | Age: 61
End: 2021-06-14

## 2021-06-14 DIAGNOSIS — G30.0 EARLY ONSET ALZHEIMER'S DEMENTIA WITHOUT BEHAVIORAL DISTURBANCE (HCC): ICD-10-CM

## 2021-06-14 DIAGNOSIS — F02.80 EARLY ONSET ALZHEIMER'S DEMENTIA WITHOUT BEHAVIORAL DISTURBANCE (HCC): ICD-10-CM

## 2021-06-14 RX ORDER — MEMANTINE HYDROCHLORIDE 10 MG/1
5 TABLET ORAL 2 TIMES DAILY
Qty: 60 TABLET | Refills: 3 | Status: SHIPPED | OUTPATIENT
Start: 2021-06-14 | End: 2021-07-09 | Stop reason: SDUPTHER

## 2021-06-14 NOTE — TELEPHONE ENCOUNTER
Patient's wife requesting to speak to the DR  She reports the patient's becoming more forgetful and confused  She's not sure what to do  She stated he may have to apply for disability, not sure how much longer the patient can work  She'd like to discuss her concerns with the Dr Tal Agarwal [facility]  [phone]

## 2021-06-14 NOTE — TELEPHONE ENCOUNTER
Called patient's wife, advised to increase dose of memantine to 10 mg twice a day, also please put the patient on cancellation list and bring him in within the next 3-4 weeks

## 2021-06-15 NOTE — ADDENDUM NOTE
Addended by: Soledad Everett on: 3/24/2021 03:41 PM     Modules accepted: Orders [Nutrition/ Exercise/ Weight Management] : nutrition, exercise, weight management [Body Image] : body image [Vitamins/Supplements] : vitamins/supplements [Sunscreen] : sunscreen [Smoking Cessation] : smoking cessation [Drugs/Alcohol] : drugs, alcohol [Breast Self Exam] : breast self exam [Bladder Hygiene] : bladder hygiene [Confidentiality] : confidentiality [STD (testing, results, tx)] : STD (testing, results, tx) [Vaccines] : vaccines

## 2021-06-24 ENCOUNTER — TELEPHONE (OUTPATIENT)
Dept: GASTROENTEROLOGY | Facility: AMBULARY SURGERY CENTER | Age: 61
End: 2021-06-24

## 2021-06-24 NOTE — TELEPHONE ENCOUNTER
Dr Tami Kiser, can you please clarify how pt is to be taking memantine? Previous message states 10 mg BID but prescription sent on 6/14 states memantine (NAMENDA) 10 mg tablet [[id number]]     Order Details  Dose: 5 mg Route: Oral Frequency: 2 times daily   Dispense Quantity: 60 tablet Refills: 3          Sig: Take 0 5 tablets (5 mg total) by mouth 2 (two) times a day     Pt's wife Ismael De La Rosa) calling in question correct directions  Please advise      [phone]

## 2021-06-24 NOTE — TELEPHONE ENCOUNTER
Called patient's wife and advised her to take Namenda 10 mg 1 tablet twice a day it was an error in prescription    She is advised to call us if she has any further questions

## 2021-06-25 ENCOUNTER — ANESTHESIA (OUTPATIENT)
Dept: GASTROENTEROLOGY | Facility: AMBULARY SURGERY CENTER | Age: 61
End: 2021-06-25

## 2021-06-25 ENCOUNTER — HOSPITAL ENCOUNTER (OUTPATIENT)
Dept: GASTROENTEROLOGY | Facility: AMBULARY SURGERY CENTER | Age: 61
Setting detail: OUTPATIENT SURGERY
Discharge: HOME | End: 2021-06-25
Attending: COLON & RECTAL SURGERY | Admitting: COLON & RECTAL SURGERY
Payer: COMMERCIAL

## 2021-06-25 ENCOUNTER — ANESTHESIA EVENT (OUTPATIENT)
Dept: GASTROENTEROLOGY | Facility: AMBULARY SURGERY CENTER | Age: 61
End: 2021-06-25

## 2021-06-25 VITALS
TEMPERATURE: 97.1 F | DIASTOLIC BLOOD PRESSURE: 78 MMHG | RESPIRATION RATE: 20 BRPM | HEIGHT: 69 IN | BODY MASS INDEX: 23.61 KG/M2 | SYSTOLIC BLOOD PRESSURE: 131 MMHG | WEIGHT: 159.4 LBS | HEART RATE: 72 BPM | OXYGEN SATURATION: 99 %

## 2021-06-25 DIAGNOSIS — Z80.0 FAMILY HISTORY OF COLON CANCER: ICD-10-CM

## 2021-06-25 DIAGNOSIS — Z86.010 PERSONAL HISTORY OF COLONIC POLYPS: ICD-10-CM

## 2021-06-25 DIAGNOSIS — Z12.11 SCREENING FOR COLON CANCER: ICD-10-CM

## 2021-06-25 PROCEDURE — 99243 OFF/OP CNSLTJ NEW/EST LOW 30: CPT | Performed by: COLON & RECTAL SURGERY

## 2021-06-25 PROCEDURE — G0105 COLORECTAL SCRN; HI RISK IND: HCPCS | Performed by: COLON & RECTAL SURGERY

## 2021-06-25 RX ORDER — PROPOFOL 10 MG/ML
INJECTION, EMULSION INTRAVENOUS CONTINUOUS PRN
Status: DISCONTINUED | OUTPATIENT
Start: 2021-06-25 | End: 2021-06-25

## 2021-06-25 RX ORDER — PROPOFOL 10 MG/ML
INJECTION, EMULSION INTRAVENOUS AS NEEDED
Status: DISCONTINUED | OUTPATIENT
Start: 2021-06-25 | End: 2021-06-25

## 2021-06-25 RX ORDER — LIDOCAINE HYDROCHLORIDE 10 MG/ML
INJECTION, SOLUTION EPIDURAL; INFILTRATION; INTRACAUDAL; PERINEURAL AS NEEDED
Status: DISCONTINUED | OUTPATIENT
Start: 2021-06-25 | End: 2021-06-25

## 2021-06-25 RX ORDER — SODIUM CHLORIDE, SODIUM LACTATE, POTASSIUM CHLORIDE, CALCIUM CHLORIDE 600; 310; 30; 20 MG/100ML; MG/100ML; MG/100ML; MG/100ML
INJECTION, SOLUTION INTRAVENOUS CONTINUOUS PRN
Status: DISCONTINUED | OUTPATIENT
Start: 2021-06-25 | End: 2021-06-25

## 2021-06-25 RX ADMIN — PROPOFOL 30 MG: 10 INJECTION, EMULSION INTRAVENOUS at 08:29

## 2021-06-25 RX ADMIN — PROPOFOL 20 MG: 10 INJECTION, EMULSION INTRAVENOUS at 08:20

## 2021-06-25 RX ADMIN — PROPOFOL 120 MCG/KG/MIN: 10 INJECTION, EMULSION INTRAVENOUS at 08:23

## 2021-06-25 RX ADMIN — Medication 40 MG: at 08:22

## 2021-06-25 RX ADMIN — PROPOFOL 150 MG: 10 INJECTION, EMULSION INTRAVENOUS at 08:19

## 2021-06-25 RX ADMIN — LIDOCAINE HYDROCHLORIDE 50 MG: 10 INJECTION, SOLUTION EPIDURAL; INFILTRATION; INTRACAUDAL at 08:19

## 2021-06-25 RX ADMIN — SODIUM CHLORIDE, SODIUM LACTATE, POTASSIUM CHLORIDE, AND CALCIUM CHLORIDE: .6; .31; .03; .02 INJECTION, SOLUTION INTRAVENOUS at 08:17

## 2021-06-25 NOTE — ANESTHESIA PREPROCEDURE EVALUATION
Procedure:  COLONOSCOPY    Relevant Problems   MUSCULOSKELETAL   (+) Lumbar spondylosis   (+) Primary localized osteoarthritis of right hip   (+) Sciatica      Other   (+) Mild cognitive impairment      ARIADNA  TOTAL HIP ARTHROPLATSTY  Physical Exam    Airway    Mallampati score: II  TM Distance: >3 FB  Neck ROM: full     Dental   upper dentures,     Cardiovascular      Pulmonary      Other Findings        Anesthesia Plan  ASA Score- 2     Anesthesia Type- IV sedation with anesthesia with ASA Monitors  Additional Monitors:   Airway Plan:           Plan Factors-Exercise tolerance (METS): >4 METS  Chart reviewed  Patient is not a current smoker  Patient not instructed to abstain from smoking on day of procedure  Patient did not smoke on day of surgery  Induction- intravenous  Postoperative Plan-     Informed Consent- Anesthetic plan and risks discussed with patient and spouse  I personally reviewed this patient with the CRNA  Discussed and agreed on the Anesthesia Plan with the CRNA  Giovani Gabriel

## 2021-06-25 NOTE — ANESTHESIA POSTPROCEDURE EVALUATION
Post-Op Assessment Note    CV Status:  Stable  Pain Score: 0    Pain management: adequate     Mental Status:  Alert and awake   Hydration Status:  Euvolemic   PONV Controlled:  Controlled   Airway Patency:  Patent and adequate      Post Op Vitals Reviewed: Yes      Staff: CRNA         No complications documented      BP   108/62   Temp      Pulse  80   Resp   20   SpO2   98

## 2021-06-25 NOTE — DISCHARGE INSTRUCTIONS
Diverticulosis   WHAT YOU NEED TO KNOW:   Diverticulosis is a condition that causes small pockets called diverticula to form in your intestine  These pockets make it difficult for bowel movements to pass through your digestive system  DISCHARGE INSTRUCTIONS:   Seek care immediately if:   · You have severe pain on the left side of your lower abdomen  · Your bowel movements are bright or dark red  Contact your healthcare provider if:   · You have a fever and chills  · You feel dizzy or lightheaded  · You have nausea, or you are vomiting  · You have a change in your bowel movements  · You have questions or concerns about your condition or care  Medicines:   · Medicines  to soften your bowel movements may be given  You may also need medicines to treat symptoms such as bloating and pain  · Take your medicine as directed  Contact your healthcare provider if you think your medicine is not helping or if you have side effects  Tell him or her if you are allergic to any medicine  Keep a list of the medicines, vitamins, and herbs you take  Include the amounts, and when and why you take them  Bring the list or the pill bottles to follow-up visits  Carry your medicine list with you in case of an emergency  Self-care: The goal of treatment is to manage any symptoms you have and prevent other problems such as diverticulitis  Diverticulitis is swelling or infection of the diverticula  Your healthcare provider may recommend any of the following:  · Eat a variety of high-fiber foods  High-fiber foods help you have regular bowel movements  High-fiber foods include cooked beans, fruits, vegetables, and some cereals  Most adults need 25 to 35 grams of fiber each day  Your healthcare provider may recommend that you have more  Ask your healthcare provider how much fiber you need  Increase fiber slowly   You may have abdominal discomfort, bloating, and gas if you add fiber to your diet too quickly  You may need to take a fiber supplement if you are not getting enough fiber from food  · Drink liquids as directed  You may need to drink 2 to 3 liters (8 to 12 cups) of liquids every day  Ask your healthcare provider how much liquid to drink each day and which liquids are best for you  · Apply heat  on your abdomen for 20 to 30 minutes every 2 hours for as many days as directed  Heat helps decrease pain and muscle spasms  Help prevent diverticulitis or other symptoms: The following may help decrease your risk for diverticulitis or symptoms, such as bleeding  Talk to your provider about these or other things you can do to prevent problems that may occur with diverticulosis  · Exercise regularly  Ask your healthcare provider about the best exercise plan for you  Exercise can help you have regular bowel movements  Get 30 minutes of exercise on most days of the week  · Maintain a healthy weight  Ask your healthcare provider how much you should weigh  Ask him or her to help you create a weight loss plan if you are overweight  · Do not smoke  Nicotine and other chemicals in cigarettes increase your risk for diverticulitis  Ask your healthcare provider for information if you currently smoke and need help to quit  E-cigarettes or smokeless tobacco still contain nicotine  Talk to your healthcare provider before you use these products  · Ask your healthcare provider if it is safe to take NSAIDs  NSAIDs may increase your risk of diverticulitis  Follow up with your healthcare provider as directed:  Write down your questions so you remember to ask them during your visits  © Copyright [address] Information is for End User's use only and may not be sold, redistributed or otherwise used for commercial purposes   All illustrations and images included in CareNotes® are the copyrighted property of [employer]  or [facility]   The above information is an  only  It is not intended as medical advice for individual conditions or treatments  Talk to your doctor, nurse or pharmacist before following any medical regimen to see if it is safe and effective for you  Diverticulosis Diet   WHAT YOU NEED TO KNOW:   What is a diverticulosis diet? A diverticulosis diet includes high-fiber foods  High-fiber foods help you have regular bowel movements  Extra fiber may decrease your risk of forming new diverticula (small pockets) in your intestine  A high-fiber diet may also help prevent diverticulitis  Diverticulitis is a painful condition that occurs when diverticula become inflamed or infected  You do not need to avoid nuts, seeds, corn, or popcorn while you are on a diverticulosis diet  How much fiber do I need? You may need 25 to 35 grams of fiber each day  Ask your dietitian or healthcare provider how much fiber you should have  Increase your intake of fiber slowly  When you eat more fiber, you may have gas and feel bloated  You may need to take a fiber supplement if you do not get enough fiber from food  Drink plenty of liquids as you increase the fiber in your diet  Your dietitian or healthcare provider may recommend 8 eight-ounce cups or more each day  Ask which liquids are best for you  Which foods are high in fiber? · Foods with at least 4 grams of fiber per serving:      ? ? to ½ cup of high-fiber cereal (check the nutrition label on the box)    ? ½ cup of blackberries or raspberries    ? 4 dried prunes    ? 1 cooked artichoke    ? ½ cup of cooked legumes, such as lentils, or red, kidney, and patel beans    · Foods with 1 to 3 grams of fiber per serving:      ? 1 slice of whole-wheat, pumpernickel, or rye bread    ? 4 whole-wheat crackers    ? ½ cup of cereal with 1 to 3 grams of fiber per serving (check the nutrition label on the box)    ? 1 piece of fruit, such as an apple, banana, pear, kiwi, or orange    ? 3 dates    ?  ½ cup of canned apricots, fruit cocktail, peaches, or pears    ? ½ cup of raw or cooked vegetables, such as carrots, cauliflower, cabbage, spinach, squash, or corn    When should I contact my healthcare provider? · You have questions about a high-fiber diet  · You have a change in your bowel movements  · You have an upset stomach  · You have a fever  · You have pain in your lower abdomen on the left side  · You have questions about your condition or care  CARE AGREEMENT:   You have the right to help plan your care  Learn about your health condition and how it may be treated  Discuss treatment options with your healthcare providers to decide what care you want to receive  You always have the right to refuse treatment  The above information is an  only  It is not intended as medical advice for individual conditions or treatments  Talk to your doctor, nurse or pharmacist before following any medical regimen to see if it is safe and effective for you  © Copyright [address] Information is for End User's use only and may not be sold, redistributed or otherwise used for commercial purposes  All illustrations and images included in CareNotes® are the copyrighted property of [employer]  or [address] [place], [place] [address]       Colonoscopy   WHAT YOU NEED TO KNOW:   A colonoscopy is a procedure to examine the inside of your colon (intestine) with a scope  Polyps or tissue growths may have been removed during your colonoscopy  It is normal to feel bloated and to have some abdominal discomfort  You should be passing gas  If you have hemorrhoids or you had polyps removed, you may have a small amount of bleeding  DISCHARGE INSTRUCTIONS:   Seek care immediately if:   · You have a large amount of bright red blood in your bowel movements  · Your abdomen is hard and firm and you have severe pain  · You have sudden trouble breathing  Contact your healthcare provider if:   · You develop a rash or hives      · You have a fever within 24 hours of your procedure       · You have not had a bowel movement for 3 days after your procedure  · You have questions or concerns about your condition or care  Activity:   · Do not lift, strain, or run  for 3 days after your procedure  · Rest after your procedure  You have been given medicine to relax you  Do not  drive or make important decisions until the day after your procedure  Return to your normal activity as directed  · Relieve gas and discomfort from bloating  by lying on your right side with a heating pad on your abdomen  You may need to take short walks to help the gas move out  Eat small meals until bloating is relieved  If you had polyps removed: For 7 days after your procedure:  · Do not  take aspirin  · Do not  go on long car rides  Follow up with your healthcare provider as directed:  Write down your questions so you remember to ask them during your visits  © 2017 [address] is for End User's use only and may not be sold, redistributed or otherwise used for commercial purposes  All illustrations and images included in CareNotes® are the copyrighted property of [employer]  or Chirag Rich  The above information is an  only  It is not intended as medical advice for individual conditions or treatments  Talk to your doctor, nurse or pharmacist before following any medical regimen to see if it is safe and effective for you

## 2021-06-25 NOTE — H&P
History and Physical   Colon and Rectal Surgery   Radha Campbell 63 y o  male MRN: [id number]  Unit/Bed#:  Encounter: [id number]  06/25/21   8:15 AM      CC:  High risk screening  History of polyps  History of Present Illness   HPI:  Radha Campbell is a 63 y o  male with no GI symptoms      Historical Information   Past Medical History:   Diagnosis Date    Benign colon polyp     Diverticulitis, colon     Diverticulosis     Hyperlipidemia     Prediabetes     Renal calculi     Short-term memory loss     Vitamin D deficiency      Past Surgical History:   Procedure Laterality Date    COLONOSCOPY      ESOPHAGOGASTRODUODENOSCOPY      HERNIA REPAIR      KNEE SURGERY      PROSTATE BIOPSY      TOTAL HIP ARTHROPLASTY Right 05/17/2019    TOTAL HIP ARTHROPLASTY Left 08/20/2019       Meds/Allergies     (Not in a hospital admission)        Current Outpatient Medications:     ALPHAGAN P 0 15 % ophthalmic solution, , Disp: , Rfl:     cholecalciferol (VITAMIN D3) 1,000 units tablet, Take 4,000 Units by mouth daily , Disp: , Rfl:     donepezil (ARICEPT) 10 mg tablet, Take 1 tablet (10 mg total) by mouth daily, Disp: 90 tablet, Rfl: 3    Lactobacillus (PROBIOTIC ACIDOPHILUS) CAPS, Take by mouth once, Disp: , Rfl:     LUMIGAN 0 01 % ophthalmic drops, , Disp: , Rfl:     memantine (NAMENDA) 10 mg tablet, Take 0 5 tablets (5 mg total) by mouth 2 (two) times a day, Disp: 60 tablet, Rfl: 3    Cetirizine HCl (ZYRTEC ALLERGY PO), Take 1 tablet by mouth daily as needed, Disp: , Rfl:     naproxen sodium (ALEVE) 220 MG tablet, Take 440 mg by mouth daily as needed for mild pain, Disp: , Rfl:     Allergies   Allergen Reactions    Sulfa Antibiotics          Social History   Social History     Substance and Sexual Activity   Alcohol Use Yes    Comment: occasional beer and wine (history)     Social History     Substance and Sexual Activity   Drug Use Not Currently    Comment: years ago     Social History     Tobacco Use   Smoking Status Former Smoker    Quit date: 12    Years since quittin 5   Smokeless Tobacco Never Used         Family History:   Family History   Problem Relation Age of Onset   Tabitha [place] Dementia Mother    Tabitha [place] Glaucoma Mother     Glaucoma Father     Prostate cancer Father     Pulmonary embolism Father     Hypertension Brother     Prostate cancer Brother     Colon cancer Maternal Grandfather      Review of Systems - General ROS: negative  Respiratory ROS: negative  Cardiovascular ROS: negative     Objective     Current Vitals:   Blood Pressure: 140/83 (21)  Pulse: 73 (21)  Temperature: 97 5 °F (36 4 °C) (21)  Temp Source: Temporal (21)  Respirations: 16 (21)  Height: 5' 9" (175 3 cm) (21)  Weight - Scale: 72 3 kg (159 lb 6 4 oz) (21)  SpO2: 98 % (21)  No intake or output data in the 24 hours ending 21    Physical Exam:  General:  Well nourished, no distress  Neuro: Alert and oriented  Eyes:Sclera anicteric, conjunctiva pink  Pulm: Clear to auscultation bilaterally  No respiratory Distress  CV:  Regular rate and rhythm  No murmurs  Abdomen:  Soft, flat, non-tender, without masses or hepatosplenomegaly  Lab Results:       ASSESSMENT:  Annie Calhoun is a 63 y o  male for screening, high risk  PLAN:  Colonoscopy  Risks , including, but not limited to, bleeding, perforation, missed lesions, and potential need for surgery, were reviewed  Alternatives to colonoscopy were discussed    Myah Nelson MD

## 2021-06-30 NOTE — TELEPHONE ENCOUNTER
Left detailed message informing patient that I made him an appointment on 07/09/2021 @ 4:30, asked patient to call back to make sure that works for him

## 2021-07-09 ENCOUNTER — OFFICE VISIT (OUTPATIENT)
Dept: NEUROLOGY | Facility: CLINIC | Age: 61
End: 2021-07-09
Payer: COMMERCIAL

## 2021-07-09 VITALS
WEIGHT: 158 LBS | BODY MASS INDEX: 23.4 KG/M2 | HEIGHT: 69 IN | HEART RATE: 78 BPM | RESPIRATION RATE: 16 BRPM | DIASTOLIC BLOOD PRESSURE: 78 MMHG | SYSTOLIC BLOOD PRESSURE: 120 MMHG

## 2021-07-09 DIAGNOSIS — F06.4 ANXIETY DISORDER DUE TO KNOWN PHYSIOLOGICAL CONDITION: Primary | ICD-10-CM

## 2021-07-09 DIAGNOSIS — G30.0 EARLY ONSET ALZHEIMER'S DEMENTIA WITHOUT BEHAVIORAL DISTURBANCE (HCC): ICD-10-CM

## 2021-07-09 DIAGNOSIS — F02.80 EARLY ONSET ALZHEIMER'S DEMENTIA WITHOUT BEHAVIORAL DISTURBANCE (HCC): ICD-10-CM

## 2021-07-09 PROCEDURE — 1036F TOBACCO NON-USER: CPT | Performed by: PSYCHIATRY & NEUROLOGY

## 2021-07-09 PROCEDURE — 99214 OFFICE O/P EST MOD 30 MIN: CPT | Performed by: PSYCHIATRY & NEUROLOGY

## 2021-07-09 RX ORDER — DONEPEZIL HYDROCHLORIDE 10 MG/1
10 TABLET, FILM COATED ORAL DAILY
Qty: 90 TABLET | Refills: 3 | Status: SHIPPED | OUTPATIENT
Start: 2021-07-09 | End: 2022-01-21 | Stop reason: SDUPTHER

## 2021-07-09 RX ORDER — MEMANTINE HYDROCHLORIDE 10 MG/1
5 TABLET ORAL 2 TIMES DAILY
Qty: 60 TABLET | Refills: 3 | Status: SHIPPED | OUTPATIENT
Start: 2021-07-09 | End: 2022-01-21 | Stop reason: SDUPTHER

## 2021-07-09 RX ORDER — ESCITALOPRAM OXALATE 10 MG/1
10 TABLET ORAL DAILY
Qty: 30 TABLET | Refills: 3 | Status: SHIPPED | OUTPATIENT
Start: 2021-07-09 | End: 2021-11-10

## 2021-07-09 NOTE — PROGRESS NOTES
Progress Note - Neurology   Yaima Klein 57 y o  male MRN: [id number]  Unit/Bed#:  Encounter: [id number]      Subjective:     Patient is here for a follow-up visit accompanied with his wife for cognitive dysfunction felt to be secondary to early onset of Alzheimer's disease 2 years ago  On detailed questioning patient has developed significant difficulties at work performing tasks that he was used to and his company has been making accommodations and patient currently actively working with no  difficulty driving  His wife has also noticed him to be agitated easily, seems more anxious and  More dependent on her  He gets confused easily while performing tasks and tends to get frustrated  Patient does admit to having a good appetite, as well as sexual drive  Patient remains on Aricept and Namenda and also reports no side effects  He denies any other neurological symptoms  At this time  ROS:   Review of Systems   Constitutional: Negative  Negative for appetite change and fever  HENT: Negative  Negative for hearing loss, tinnitus, trouble swallowing and voice change  Eyes: Negative  Negative for photophobia and pain  Respiratory: Negative  Negative for shortness of breath  Cardiovascular: Negative  Negative for palpitations  Gastrointestinal: Negative  Negative for nausea and vomiting  Endocrine: Negative  Negative for cold intolerance  Genitourinary: Negative  Negative for dysuria, frequency and urgency  Musculoskeletal: Negative  Negative for myalgias and neck pain  Skin: Negative  Negative for rash  Neurological: Negative  Negative for dizziness, tremors, seizures, syncope, facial asymmetry, speech difficulty, weakness, light-headedness, numbness and headaches  Hematological: Negative  Does not bruise/bleed easily  Psychiatric/Behavioral: Negative  Negative for confusion, hallucinations and sleep disturbance        MA review of systems was reviewed by myself  Vitals:   Vitals:    07/09/21 1623   BP: 120/78   BP Location: Left arm   Patient Position: Sitting   Cuff Size: Standard   Pulse: 78   Resp: 16   Weight: 71 7 kg (158 lb)   Height: 5' 9" (1 753 m)   ,Body mass index is 23 33 kg/m²  MEDS:      Current Outpatient Medications:     ALPHAGAN P 0 15 % ophthalmic solution, , Disp: , Rfl:     Cetirizine HCl (ZYRTEC ALLERGY PO), Take 1 tablet by mouth daily as needed, Disp: , Rfl:     cholecalciferol (VITAMIN D3) 1,000 units tablet, Take 4,000 Units by mouth daily , Disp: , Rfl:     donepezil (ARICEPT) 10 mg tablet, Take 1 tablet (10 mg total) by mouth daily, Disp: 90 tablet, Rfl: 3    Lactobacillus (PROBIOTIC ACIDOPHILUS) CAPS, Take by mouth once, Disp: , Rfl:     LUMIGAN 0 01 % ophthalmic drops, , Disp: , Rfl:     memantine (NAMENDA) 10 mg tablet, Take 0 5 tablets (5 mg total) by mouth 2 (two) times a day, Disp: 60 tablet, Rfl: 3    naproxen sodium (ALEVE) 220 MG tablet, Take 440 mg by mouth daily as needed for mild pain, Disp: , Rfl:   :    Physical Exam:  General appearance: alert, appears stated age and cooperative  Head: Normocephalic, without obvious abnormality, atraumatic   patient is alert awake oriented, seems quite anxious at this time and frustrated about his [place] evaluation and scored a 13/30 as compared to 18/30 2 years ago  Patient had difficulty with his speech,  Difficulty completing sentences, had difficulty with 3 step commands, and on his [place] visuospatial difficulty was noted with deficits also noted in executive and attention span  cranial nerves 2-12 intact, no motor or sensory deficits were noted, deep tendon reflexes are diminished, no dysmetria was good noted although the patient had difficulty with right left confusion  His gait is normal based  Lab Results: I have personally reviewed pertinent reports  Imaging Studies: I have personally reviewed pertinent reports      Patient's MRI films from 2018 was reviewed by myself  Assessment:  1  Dementia, early onset Alzheimer's type variant,  Doubt FTD  2  Anxiety disorder  Plan:   After lengthy discussion with the patient and his wife, he is advised to continue Aricept and Namenda at this time, patient is to undergo prostate surgery in the near future and upon recovery his wife is advised to call me patient will need neuro psychological testing as well as a follow-up MRI NeuroQuant study  Further CSF studies may be required  He will return back to see me in 3-4 months  7/9/2021,4:27 PM    Dictation voice to text software has been used in the creation of this document  Please consider this in light of any contextual or grammatical errors

## 2021-07-12 ENCOUNTER — OFFICE VISIT (OUTPATIENT)
Dept: LAB | Facility: CLINIC | Age: 61
End: 2021-07-12
Payer: COMMERCIAL

## 2021-07-12 ENCOUNTER — APPOINTMENT (OUTPATIENT)
Dept: LAB | Facility: CLINIC | Age: 61
End: 2021-07-12
Payer: COMMERCIAL

## 2021-07-12 ENCOUNTER — LAB REQUISITION (OUTPATIENT)
Dept: LAB | Facility: HOSPITAL | Age: 61
End: 2021-07-12
Payer: COMMERCIAL

## 2021-07-12 ENCOUNTER — CONSULT (OUTPATIENT)
Dept: FAMILY MEDICINE CLINIC | Facility: OTHER | Age: 61
End: 2021-07-12
Payer: COMMERCIAL

## 2021-07-12 VITALS
WEIGHT: 163 LBS | OXYGEN SATURATION: 98 % | DIASTOLIC BLOOD PRESSURE: 82 MMHG | BODY MASS INDEX: 24.14 KG/M2 | RESPIRATION RATE: 18 BRPM | TEMPERATURE: 97.8 F | HEART RATE: 76 BPM | SYSTOLIC BLOOD PRESSURE: 118 MMHG | HEIGHT: 69 IN

## 2021-07-12 DIAGNOSIS — C61 MALIGNANT NEOPLASM OF PROSTATE (HCC): ICD-10-CM

## 2021-07-12 DIAGNOSIS — C61 PROSTATE CANCER (HCC): ICD-10-CM

## 2021-07-12 DIAGNOSIS — Z13.1 SCREENING FOR DIABETES MELLITUS: ICD-10-CM

## 2021-07-12 DIAGNOSIS — Z01.818 PREOPERATIVE CLEARANCE: Primary | ICD-10-CM

## 2021-07-12 LAB
ABO GROUP BLD: NORMAL
ANION GAP SERPL CALCULATED.3IONS-SCNC: 9 MMOL/L (ref 4–13)
APTT PPP: 26 SECONDS (ref 23–37)
BASOPHILS # BLD AUTO: 0.05 THOUSANDS/ΜL (ref 0–0.1)
BASOPHILS NFR BLD AUTO: 1 % (ref 0–1)
BLD GP AB SCN SERPL QL: NEGATIVE
BUN SERPL-MCNC: 11 MG/DL (ref 5–25)
CALCIUM SERPL-MCNC: 8.9 MG/DL (ref 8.3–10.1)
CHLORIDE SERPL-SCNC: 105 MMOL/L (ref 100–108)
CO2 SERPL-SCNC: 28 MMOL/L (ref 21–32)
CREAT SERPL-MCNC: 0.98 MG/DL (ref 0.6–1.3)
EOSINOPHIL # BLD AUTO: 0.12 THOUSAND/ΜL (ref 0–0.61)
EOSINOPHIL NFR BLD AUTO: 2 % (ref 0–6)
ERYTHROCYTE [DISTWIDTH] IN BLOOD BY AUTOMATED COUNT: 13.2 % (ref 11.6–15.1)
EST. AVERAGE GLUCOSE BLD GHB EST-MCNC: 117 MG/DL
GFR SERPL CREATININE-BSD FRML MDRD: 82 ML/MIN/1.73SQ M
GLUCOSE P FAST SERPL-MCNC: 131 MG/DL (ref 65–99)
HBA1C MFR BLD: 5.7 %
HCT VFR BLD AUTO: 49.1 % (ref 36.5–49.3)
HGB BLD-MCNC: 15.9 G/DL (ref 12–17)
IMM GRANULOCYTES # BLD AUTO: 0.03 THOUSAND/UL (ref 0–0.2)
IMM GRANULOCYTES NFR BLD AUTO: 0 % (ref 0–2)
INR PPP: 0.88 (ref 0.84–1.19)
LYMPHOCYTES # BLD AUTO: 1.61 THOUSANDS/ΜL (ref 0.6–4.47)
LYMPHOCYTES NFR BLD AUTO: 21 % (ref 14–44)
MCH RBC QN AUTO: 30.3 PG (ref 26.8–34.3)
MCHC RBC AUTO-ENTMCNC: 32.4 G/DL (ref 31.4–37.4)
MCV RBC AUTO: 94 FL (ref 82–98)
MONOCYTES # BLD AUTO: 0.88 THOUSAND/ΜL (ref 0.17–1.22)
MONOCYTES NFR BLD AUTO: 12 % (ref 4–12)
NEUTROPHILS # BLD AUTO: 4.87 THOUSANDS/ΜL (ref 1.85–7.62)
NEUTS SEG NFR BLD AUTO: 64 % (ref 43–75)
NRBC BLD AUTO-RTO: 0 /100 WBCS
PLATELET # BLD AUTO: 277 THOUSANDS/UL (ref 149–390)
PMV BLD AUTO: 9.5 FL (ref 8.9–12.7)
POTASSIUM SERPL-SCNC: 3.8 MMOL/L (ref 3.5–5.3)
PROTHROMBIN TIME: 12 SECONDS (ref 11.6–14.5)
RBC # BLD AUTO: 5.25 MILLION/UL (ref 3.88–5.62)
RH BLD: POSITIVE
SODIUM SERPL-SCNC: 142 MMOL/L (ref 136–145)
SPECIMEN EXPIRATION DATE: NORMAL
WBC # BLD AUTO: 7.56 THOUSAND/UL (ref 4.31–10.16)

## 2021-07-12 PROCEDURE — 3725F SCREEN DEPRESSION PERFORMED: CPT | Performed by: FAMILY MEDICINE

## 2021-07-12 PROCEDURE — 36415 COLL VENOUS BLD VENIPUNCTURE: CPT

## 2021-07-12 PROCEDURE — 85610 PROTHROMBIN TIME: CPT

## 2021-07-12 PROCEDURE — 86900 BLOOD TYPING SEROLOGIC ABO: CPT | Performed by: UROLOGY

## 2021-07-12 PROCEDURE — 3008F BODY MASS INDEX DOCD: CPT | Performed by: FAMILY MEDICINE

## 2021-07-12 PROCEDURE — 85730 THROMBOPLASTIN TIME PARTIAL: CPT

## 2021-07-12 PROCEDURE — 99244 OFF/OP CNSLTJ NEW/EST MOD 40: CPT | Performed by: FAMILY MEDICINE

## 2021-07-12 PROCEDURE — 93005 ELECTROCARDIOGRAM TRACING: CPT

## 2021-07-12 PROCEDURE — 86901 BLOOD TYPING SEROLOGIC RH(D): CPT | Performed by: UROLOGY

## 2021-07-12 PROCEDURE — 80048 BASIC METABOLIC PNL TOTAL CA: CPT

## 2021-07-12 PROCEDURE — 86850 RBC ANTIBODY SCREEN: CPT | Performed by: UROLOGY

## 2021-07-12 PROCEDURE — 85025 COMPLETE CBC W/AUTO DIFF WBC: CPT

## 2021-07-12 PROCEDURE — 87086 URINE CULTURE/COLONY COUNT: CPT

## 2021-07-12 PROCEDURE — 1036F TOBACCO NON-USER: CPT | Performed by: FAMILY MEDICINE

## 2021-07-12 PROCEDURE — 83036 HEMOGLOBIN GLYCOSYLATED A1C: CPT

## 2021-07-12 NOTE — PROGRESS NOTES
PRE-OPERATIVE EXAMINATION  Alejandro Lee  1960    Alejandro Lee is a 57 y o  male with  who is planning to undergo radical and pelvic prostatectomy under general anesthesia by Dr Sabi Rouse on July 28th, 2021  The procedure is indicated for the following condition: Prostate cancer  Patient has not had complications with anesthesia in the past     ROS:    Chest pain: no    Shortness of breath: no   Shortness of breath with exertion: no   Orthopnea: no   Dizziness: no   Unexplained weight change: no    PMH:   CAD: no   HTN: no   CKD: no   DM: no on insulin: no   History of CVA: no    He  reports that he quit smoking about 29 years ago  His smoking use included cigarettes  He has never used smokeless tobacco  He reports current alcohol use  He reports previous drug use  /82   Pulse 76   Temp 97 8 °F (36 6 °C)   Resp 18   Ht 5' 9" (1 753 m)   Wt 73 9 kg (163 lb)   SpO2 98%   BMI 24 07 kg/m²   Physical Exam  Vitals reviewed  Constitutional:       General: He is not in acute distress  Appearance: Normal appearance  He is normal weight  He is not ill-appearing, toxic-appearing or diaphoretic  HENT:      Head: Normocephalic and atraumatic  Right Ear: External ear normal       Left Ear: External ear normal       Nose: Nose normal       Mouth/Throat:      Mouth: Mucous membranes are moist       Pharynx: Oropharynx is clear  No oropharyngeal exudate or posterior oropharyngeal erythema  Eyes:      General: No scleral icterus  Right eye: No discharge  Left eye: No discharge  Extraocular Movements: Extraocular movements intact  Conjunctiva/sclera: Conjunctivae normal    Cardiovascular:      Rate and Rhythm: Normal rate and regular rhythm  Pulses: Normal pulses  Heart sounds: Normal heart sounds  Pulmonary:      Effort: Pulmonary effort is normal  No respiratory distress  Breath sounds: Normal breath sounds  No wheezing     Abdominal: General: Bowel sounds are normal  There is no distension  Palpations: Abdomen is soft  Tenderness: There is no abdominal tenderness  Musculoskeletal:      Right lower leg: No edema  Left lower leg: No edema  Skin:     General: Skin is warm and dry  Capillary Refill: Capillary refill takes less than 2 seconds  Neurological:      General: No focal deficit present  Mental Status: He is alert and oriented to person, place, and time     Psychiatric:         Mood and Affect: Mood normal          Behavior: Behavior normal          Revised Cardiac Risk Index (RCRI) for Pre-Operative Risk   (estimates risk of cardiac complications after noncardiac surgery)    · High-risk surgery: No 0 / Yes +1  · Intraperitoneal, intrathoracic, suprainguinal vascular  · History of ischemic heart disease: No 0 / Yes +1  · Hx of MI, (+) exercise test, current chest pain considered due to myocardial ischemia, use of nitrate therapy or ECG with pathological Q waves)  · History of CHF: No 0 / Yes +1  · Pulmonary edema, B/L rales or S3 gallop; PHAM, orthopnea, PND, CXR showing pulmonary vascular redistribution)  · History of cerebrovascular disease: No 0 / Yes +1  · Prior TIA or stroke  · Pre-operative treatment with insulin: No 0 / Yes +1  · Pre-operative creatinine >2 mg/dL: No 0 / Yes +1    RCRI Scoring:  · 0 points: Class I Risk, 3 9% 30-day risk of death, MI, or cardiac arrest  · 1 point: Class II Risk, 6 0% 30-day risk of death, MI, or cardiac arrest  · 2 points: Class III Risk, 10 1% 30-day risk of death, MI, or cardiac arrest  · 3 points: Class IV Risk, 15% 30-day risk of death, MI, or cardiac arrest  · 4 points: Class IV Risk, 15% 30-day risk of death, MI, or cardiac arrest  · 5 points: Class IV Risk, 15% 30-day risk of death, MI, or cardiac arrest  · 6 points: Class IV Risk, 15% 30-day risk of death, MI, or cardiac arrest    Lab Results   Component Value Date    CREATININE 0 98 07/12/2021       Balwinder Blackman was seen today for pre-op exam     Diagnoses and all orders for this visit:    Preoperative clearance    Screening for diabetes mellitus  -     HEMOGLOBIN A1C W/ EAG ESTIMATION; Future        Recommendations:  Kodak Carmona is undergoing an elective Moderate Risk surgery, radical and pelvic prostatectomy  He is RCRI class I risk (0 points) with 3 9% 30-day risk of death, MI, or cardiac arrest  All lab results acceptable  He may proceed with surgery as planned without further workup  Patient has been given guidance by operating surgeon on which medications to hold on th the day of surgery  Pre-operative form completed and faxed today to office as requested        Torie Mallory MD  07/12/21

## 2021-07-13 LAB — BACTERIA UR CULT: NORMAL

## 2021-07-14 ENCOUNTER — HOSPITAL ENCOUNTER (EMERGENCY)
Facility: HOSPITAL | Age: 61
Discharge: HOME | End: 2021-07-14
Attending: EMERGENCY MEDICINE | Admitting: EMERGENCY MEDICINE
Payer: COMMERCIAL

## 2021-07-14 ENCOUNTER — APPOINTMENT (EMERGENCY)
Dept: CT IMAGING | Facility: HOSPITAL | Age: 61
End: 2021-07-14
Payer: COMMERCIAL

## 2021-07-14 VITALS
DIASTOLIC BLOOD PRESSURE: 87 MMHG | TEMPERATURE: 98.6 F | OXYGEN SATURATION: 98 % | RESPIRATION RATE: 18 BRPM | HEART RATE: 60 BPM | SYSTOLIC BLOOD PRESSURE: 166 MMHG

## 2021-07-14 DIAGNOSIS — R10.84 GENERALIZED ABDOMINAL PAIN: Primary | ICD-10-CM

## 2021-07-14 LAB
ALBUMIN SERPL BCP-MCNC: 3.6 G/DL (ref 3.5–5)
ALP SERPL-CCNC: 67 U/L (ref 46–116)
ALT SERPL W P-5'-P-CCNC: 28 U/L (ref 12–78)
ANION GAP SERPL CALCULATED.3IONS-SCNC: 10 MMOL/L (ref 4–13)
AST SERPL W P-5'-P-CCNC: 13 U/L (ref 5–45)
ATRIAL RATE: 62 BPM
ATRIAL RATE: 72 BPM
BASOPHILS # BLD AUTO: 0.04 THOUSANDS/ΜL (ref 0–0.1)
BASOPHILS NFR BLD AUTO: 0 % (ref 0–1)
BILIRUB SERPL-MCNC: 0.81 MG/DL (ref 0.2–1)
BUN SERPL-MCNC: 14 MG/DL (ref 5–25)
CALCIUM SERPL-MCNC: 9 MG/DL (ref 8.3–10.1)
CHLORIDE SERPL-SCNC: 103 MMOL/L (ref 100–108)
CO2 SERPL-SCNC: 27 MMOL/L (ref 21–32)
CREAT SERPL-MCNC: 0.82 MG/DL (ref 0.6–1.3)
EOSINOPHIL # BLD AUTO: 0 THOUSAND/ΜL (ref 0–0.61)
EOSINOPHIL NFR BLD AUTO: 0 % (ref 0–6)
ERYTHROCYTE [DISTWIDTH] IN BLOOD BY AUTOMATED COUNT: 12.8 % (ref 11.6–15.1)
GFR SERPL CREATININE-BSD FRML MDRD: 95 ML/MIN/1.73SQ M
GLUCOSE SERPL-MCNC: 134 MG/DL (ref 65–140)
HCT VFR BLD AUTO: 47.9 % (ref 36.5–49.3)
HGB BLD-MCNC: 15.8 G/DL (ref 12–17)
IMM GRANULOCYTES # BLD AUTO: 0.05 THOUSAND/UL (ref 0–0.2)
IMM GRANULOCYTES NFR BLD AUTO: 1 % (ref 0–2)
LIPASE SERPL-CCNC: 227 U/L (ref 73–393)
LYMPHOCYTES # BLD AUTO: 0.68 THOUSANDS/ΜL (ref 0.6–4.47)
LYMPHOCYTES NFR BLD AUTO: 6 % (ref 14–44)
MCH RBC QN AUTO: 30 PG (ref 26.8–34.3)
MCHC RBC AUTO-ENTMCNC: 33 G/DL (ref 31.4–37.4)
MCV RBC AUTO: 91 FL (ref 82–98)
MONOCYTES # BLD AUTO: 0.72 THOUSAND/ΜL (ref 0.17–1.22)
MONOCYTES NFR BLD AUTO: 7 % (ref 4–12)
NEUTROPHILS # BLD AUTO: 9.21 THOUSANDS/ΜL (ref 1.85–7.62)
NEUTS SEG NFR BLD AUTO: 86 % (ref 43–75)
NRBC BLD AUTO-RTO: 0 /100 WBCS
P AXIS: 48 DEGREES
P AXIS: 65 DEGREES
PLATELET # BLD AUTO: 305 THOUSANDS/UL (ref 149–390)
PMV BLD AUTO: 9.5 FL (ref 8.9–12.7)
POTASSIUM SERPL-SCNC: 3.7 MMOL/L (ref 3.5–5.3)
PR INTERVAL: 158 MS
PR INTERVAL: 160 MS
PROT SERPL-MCNC: 7.6 G/DL (ref 6.4–8.2)
QRS AXIS: 24 DEGREES
QRS AXIS: 52 DEGREES
QRSD INTERVAL: 86 MS
QRSD INTERVAL: 88 MS
QT INTERVAL: 370 MS
QT INTERVAL: 410 MS
QTC INTERVAL: 405 MS
QTC INTERVAL: 416 MS
RBC # BLD AUTO: 5.27 MILLION/UL (ref 3.88–5.62)
SODIUM SERPL-SCNC: 140 MMOL/L (ref 136–145)
T WAVE AXIS: 33 DEGREES
T WAVE AXIS: 41 DEGREES
VENTRICULAR RATE: 62 BPM
VENTRICULAR RATE: 72 BPM
WBC # BLD AUTO: 10.7 THOUSAND/UL (ref 4.31–10.16)

## 2021-07-14 PROCEDURE — 85025 COMPLETE CBC W/AUTO DIFF WBC: CPT | Performed by: EMERGENCY MEDICINE

## 2021-07-14 PROCEDURE — 80053 COMPREHEN METABOLIC PANEL: CPT | Performed by: EMERGENCY MEDICINE

## 2021-07-14 PROCEDURE — 83690 ASSAY OF LIPASE: CPT | Performed by: EMERGENCY MEDICINE

## 2021-07-14 PROCEDURE — 93005 ELECTROCARDIOGRAM TRACING: CPT

## 2021-07-14 PROCEDURE — 96365 THER/PROPH/DIAG IV INF INIT: CPT

## 2021-07-14 PROCEDURE — 93010 ELECTROCARDIOGRAM REPORT: CPT | Performed by: INTERNAL MEDICINE

## 2021-07-14 PROCEDURE — 99285 EMERGENCY DEPT VISIT HI MDM: CPT | Performed by: EMERGENCY MEDICINE

## 2021-07-14 PROCEDURE — 74177 CT ABD & PELVIS W/CONTRAST: CPT

## 2021-07-14 PROCEDURE — 36415 COLL VENOUS BLD VENIPUNCTURE: CPT | Performed by: EMERGENCY MEDICINE

## 2021-07-14 PROCEDURE — 99284 EMERGENCY DEPT VISIT MOD MDM: CPT

## 2021-07-14 RX ORDER — PANTOPRAZOLE SODIUM 20 MG/1
20 TABLET, DELAYED RELEASE ORAL ONCE
Status: COMPLETED | OUTPATIENT
Start: 2021-07-14 | End: 2021-07-14

## 2021-07-14 RX ORDER — PANTOPRAZOLE SODIUM 20 MG/1
20 TABLET, DELAYED RELEASE ORAL DAILY
Qty: 30 TABLET | Refills: 3 | Status: SHIPPED | OUTPATIENT
Start: 2021-07-14 | End: 2021-08-13

## 2021-07-14 RX ADMIN — PANTOPRAZOLE SODIUM 20 MG: 20 TABLET, DELAYED RELEASE ORAL at 16:35

## 2021-07-14 RX ADMIN — SODIUM CHLORIDE, SODIUM LACTATE, POTASSIUM CHLORIDE, AND CALCIUM CHLORIDE 500 ML: .6; .31; .03; .02 INJECTION, SOLUTION INTRAVENOUS at 13:37

## 2021-07-14 RX ADMIN — IOHEXOL 100 ML: 350 INJECTION, SOLUTION INTRAVENOUS at 14:56

## 2021-07-14 NOTE — ED PROVIDER NOTES
History  Chief Complaint   Patient presents with    Abdominal Pain     pt c/o abd pain for "a couple of days", +nausea  denies diarrhea or fevers  57 YR MALE - C/O 3-5 DAYS OF GEN MID ABD TENDERNESS- STATES HURTS-- -- NOT ASSOCIATED WITH MEASL/ ANY ACTIVITY -- STATES STOOLS HAVE BEEN SMALLER  BUT NO MELENA/BRBPR/ MUCUS/ - NOT RELATED TO MEALS-- NO RADIATION - NO FEVERS-- N/V X 1- WHICH WAS MORE OF A CLEAR SPIT UP--  NO GERD/ DYSPESPIA/ DYSPHAGIA/ NO RUQ PAIN RAD TO BACK AFTER MEALS-- NO DIFFUSE ABD PAIN FOLLOWED BY V/D AFTER MEALS- INTESTINAL ANGIA- NO  COMPS- NO CP/SOB-       History provided by:  Patient and spouse   used: No    Abdominal Pain  Associated symptoms: no constipation, no diarrhea, no nausea and no vomiting        Prior to Admission Medications   Prescriptions Last Dose Informant Patient Reported? Taking?    ALPHAGAN P 0 15 % ophthalmic solution  Self Yes No   Cetirizine HCl (ZYRTEC ALLERGY PO)  Self Yes No   Sig: Take 1 tablet by mouth daily as needed   LUMIGAN 0 01 % ophthalmic drops  Self Yes No   Lactobacillus (PROBIOTIC ACIDOPHILUS) CAPS  Self Yes No   Sig: Take by mouth once   cholecalciferol (VITAMIN D3) 1,000 units tablet  Self Yes No   Sig: Take 4,000 Units by mouth daily    donepezil (ARICEPT) 10 mg tablet  Self No No   Sig: Take 1 tablet (10 mg total) by mouth daily   escitalopram (LEXAPRO) 10 mg tablet  Self No No   Sig: Take 1 tablet (10 mg total) by mouth daily   memantine (NAMENDA) 10 mg tablet  Self No No   Sig: Take 0 5 tablets (5 mg total) by mouth 2 (two) times a day   naproxen sodium (ALEVE) 220 MG tablet  Self Yes No   Sig: Take 440 mg by mouth daily as needed for mild pain      Facility-Administered Medications: None       Past Medical History:   Diagnosis Date    Benign colon polyp     Diverticulitis, colon     Diverticulosis     Hyperlipidemia     Prediabetes     Renal calculi     Short-term memory loss     Vitamin D deficiency        Past Surgical History:   Procedure Laterality Date    COLONOSCOPY      ESOPHAGOGASTRODUODENOSCOPY      HERNIA REPAIR      KNEE SURGERY      PROSTATE BIOPSY      TOTAL HIP ARTHROPLASTY Right 2019    TOTAL HIP ARTHROPLASTY Left 2019       Family History   Problem Relation Age of Onset    Dementia Mother    [de-identified] Glaucoma Mother     Glaucoma Father     Prostate cancer Father     Pulmonary embolism Father     Hypertension Brother     Prostate cancer Brother     Colon cancer Maternal Grandfather      I have reviewed and agree with the history as documented  E-Cigarette/Vaping    E-Cigarette Use Never User      E-Cigarette/Vaping Substances    Nicotine No     THC No     CBD No     Flavoring No     Other No     Unknown No      Social History     Tobacco Use    Smoking status: Former Smoker     Types: Cigarettes     Quit date:      Years since quittin 5    Smokeless tobacco: Never Used    Tobacco comment: patient states he doesnt remember how much or for how long   Vaping Use    Vaping Use: Never used   Substance Use Topics    Alcohol use: Yes     Comment: occasional beer and wine    Drug use: Not Currently     Comment: years ago       Review of Systems   Constitutional: Negative  HENT: Negative  Eyes: Negative  Respiratory: Negative  Cardiovascular: Negative  Gastrointestinal: Positive for abdominal pain  Negative for abdominal distention, anal bleeding, blood in stool, constipation, diarrhea, nausea, rectal pain and vomiting  CHANGE IN STOOLS    Endocrine: Negative  Genitourinary: Negative  Musculoskeletal: Negative  Skin: Negative  Allergic/Immunologic: Negative  Neurological: Negative  Hematological: Negative  Psychiatric/Behavioral: Negative  Physical Exam  Physical Exam  Vitals and nursing note reviewed  Constitutional:       General: He is not in acute distress  Appearance: He is well-developed   He is not ill-appearing, toxic-appearing or diaphoretic  Comments: AVSS--  PULSE OX 95 % ON RA- INTERPRETATION IS NORMAL- NO INTERVENTION- WELL APPEARING- IN NAD   HENT:      Head: Normocephalic and atraumatic  Mouth/Throat:      Mouth: Mucous membranes are moist    Eyes:      General: No scleral icterus  Extraocular Movements: Extraocular movements intact  Pupils: Pupils are equal, round, and reactive to light  Comments: MM PINK   Cardiovascular:      Rate and Rhythm: Normal rate and regular rhythm  Heart sounds: Normal heart sounds  No murmur heard  No friction rub  No gallop  Comments: EQUAL BILATERAL RADIAL/DP PULSES-NO BLE EDEMA/CALFTENDERNESS/ASYM/ ERYTHEMA  Pulmonary:      Effort: Pulmonary effort is normal  No respiratory distress  Breath sounds: Normal breath sounds  No stridor  No wheezing, rhonchi or rales  Chest:      Chest wall: No tenderness  Abdominal:      General: Abdomen is flat  Bowel sounds are normal  There is no distension or abdominal bruit  There are no signs of injury  Palpations: Abdomen is soft  There is no shifting dullness, fluid wave, hepatomegaly, splenomegaly, mass or pulsatile mass  Tenderness: There is no abdominal tenderness  There is no right CVA tenderness, left CVA tenderness, guarding or rebound  Negative signs include Gonzalez's sign, Rovsing's sign, McBurney's sign, psoas sign and obturator sign  Hernia: No hernia is present  Comments: PAIN HAS NOW RESOLVED- SOFT NT/ND- NO HSM- NO CVA TENDERNESS- NO PERITONEAL SIGNS- NO PULSATILE ABD MASS/BRUIT/ TENDERNESS   Skin:     General: Skin is warm and dry  Capillary Refill: Capillary refill takes less than 2 seconds  Coloration: Skin is not cyanotic, jaundiced, mottled or pale  Findings: No erythema or rash  Neurological:      General: No focal deficit present  Mental Status: He is alert and oriented to person, place, and time  Cranial Nerves: No cranial nerve deficit  Motor: No weakness  Comments: NORMAL NON FOCAL NEURO EXAM    Psychiatric:         Mood and Affect: Mood normal  Mood is not anxious or depressed  Behavior: Behavior normal          Vital Signs  ED Triage Vitals [07/14/21 1031]   Temperature Pulse Respirations Blood Pressure SpO2   98 6 °F (37 °C) 80 16 126/70 95 %      Temp Source Heart Rate Source Patient Position - Orthostatic VS BP Location FiO2 (%)   Oral Monitor -- Left arm --      Pain Score       --           Vitals:    07/14/21 1031   BP: 126/70   Pulse: 80         Visual Acuity      ED Medications  Medications - No data to display    Diagnostic Studies  Results Reviewed     Procedure Component Value Units Date/Time    CBC and differential [[id number]]     Lab Status: No result Specimen: Blood     Comprehensive metabolic panel [[id number]]     Lab Status: No result Specimen: Blood     Lipase [[id number]]     Lab Status: No result Specimen: Blood                  No orders to display              Procedures  Procedures         ED Course  ED Course as of Jul 16 0707 Wed Jul 14, 2021   1110 ER MD NOTE- ANO-RECTAL EXAM - NORMAL ANAL EXAM=- ENLARGED - NT- PROSTATE - SCANT BROWN HEME NEG STOOLS       1111 ER MD PROCEDURE NOTE  FOR BEDSIDE TRANSABD U/S-- NO FLUID SEEN AROUND HEART/ IN RUQ/LUQ BEHIND BLADDER / NO GS SEEN/ NO R/L HYDRONEPHROSIS// NO AAA      1158 ER MD NOTE- 7/12/21 PRE OP LABS/ URINE RESULTS REVIEWED BY ER MD      12 ER MD NOTE- PT RE-EVALUATED  BY ER MD- RESTING IN NAD--  NO RETURN OF ABD PAIN-- PT AND WIFE AWARE OF  CT SCAN REPORT PENDING                                               MDM    Disposition  Final diagnoses:   None     ED Disposition     None      Follow-up Information    None         Patient's Medications   Discharge Prescriptions    No medications on file     No discharge procedures on file      PDMP Review     None          ED Provider  Electronically Signed by           Erma Sierra MD  07/16/21 9419

## 2021-07-14 NOTE — ED PROCEDURE NOTE
PROCEDURE  ECG 12 Lead Documentation Only    Date/Time: 7/14/2021 11:54 AM  Performed by: Neil Burns MD  Authorized by: Neil Burns MD     Indications / Diagnosis:  UPPER ABD PAIN   ECG reviewed by me, the ED Provider: yes    Patient location:  ED  Previous ECG:     Previous ECG:  Compared to current    Comparison ECG info:  7/12/21- NO SIGN CHANGES    Similarity:  No change    Comparison to cardiac monitor: Yes    Interpretation:     Interpretation: non-specific    Rate:     ECG rate:  62    ECG rate assessment: normal    Rhythm:     Rhythm: sinus rhythm    Ectopy:     Ectopy: none    QRS:     QRS axis:  Normal    QRS intervals:  Normal  Conduction:     Conduction: normal    ST segments:     ST segments:  Normal  T waves:     T waves: flattening      Flattening:  III and V1  Other findings:     Other findings: U wave    Comments:      LOW VOLTAGE- NO ECG SIGNS OF ISCHEMIA/ INJURY          Neil Burns MD  07/14/21 2401

## 2021-07-14 NOTE — DISCHARGE INSTRUCTIONS
DIAGNOSIS; MID ABDOMINAL PAIN - GASTRITIS- DUODENITIS ON CT SCAN       - PLEASE STOP  THE ALEVE AS NEEDED ----  FOR HEADACHES/ PAIN -- TAEK OVER THE COUNTER TYLENOL 500 MGB EVERY 4 HRS       - PROTONIX- STOMACH ACID BLOCKER -- STARTING TOMORROW 1 TABLET ONCE A DAY     - PLEASE CALL YOUR PRIMARY DOCTOR TOMORROW- TO SCHEDULE AN APPOINTMENT FOR A RECHECK  within 1 week       - PLEASE RETURN TO  THE ER FOR ANY FEVERS- TEMP > 100 4/ any worsening/ intractable abdominal pain/ any persistent vomiting/ any bloody /coffee ground vomiting/ any bloody /black tarry stools or any new/ worsening/concerning symptoms to you

## 2021-07-19 ENCOUNTER — TELEPHONE (OUTPATIENT)
Dept: UROLOGY | Facility: CLINIC | Age: 61
End: 2021-07-19

## 2021-07-19 NOTE — TELEPHONE ENCOUNTER
Spoke w Hitesh Conrad patients wife and they stated they missed a call  I did inform them it was more than likely from the hospital due to his upcoming procedure and not from our office since everything has been completed  I did ask that they check their voicemail's and missed call log to see  She will call if she needs anything further

## 2021-07-22 NOTE — PROGRESS NOTES
69 year old male diagnosed with Hodgkin lymphoma, sent to hospice , he was sent to admitted for FTT, fever, no infection, now s/p check point inhibitor. Had fever after drug infusion and a  transfusion, no infection found.  No infection is apparent at present on previous work up as per last ID note.  Prior fever probable  B symptoms. He had prior infectious  work up and reviewed micro results.  Blood culture done on this admission reviewed and are no growth, he also had CSF fluid cx   CSF HSV PCR is negative, legionella is neg, crypto antigen is negative, fungitell sent on July 5 reported to be 256, at this point not clear the significance of number as clinically he has no fever, he has no cough, no sob, no signs of infection to correlate with elevated fungitell level. over colonization with Candida may also falsely elevated fungitell.  I do not see signs of infection , no indication for abx use. CT C/A/P report reviewed from 7/19 and read as no acute intra abdominal pathology, interval resolution of lung opacities on prior CT 6/28/21, left basilar  linear atelectasis, I have low suspicion of fungal pulmonary infection with recent reports of resolution of opacities   He remains afebrile and wbc wnl       Plan:  repeat fungitell level orderd result pending, low suspicion of infection  continue supportive care per medicine, pulmonary, cardiology and heme onc   Assessment    1  Well adult exam (V70 0) (Z00 00)   2  Allergic rhinitis (477 9) (J30 9)   3  Glaucoma (365 9) (H40 9)   4  Hyperlipidemia (272 4) (E78 5)   5  History of Localized rash (782 1) (R21)   6  Prediabetes (790 29) (R73 03)   7  Sciatica (724 3) (M54 30)   8  Special screening examination for neoplasm of prostate (V76 44) (Z12 5)    Plan  Hyperlipidemia, Prediabetes, Special screening examination for neoplasm of prostate    · (1) CBC/PLT/DIFF; Status:Active; Requested for:03Apr2017;    · (1) COMPREHENSIVE METABOLIC PANEL; Status:Active; Requested for:42Dfw1480;    · (1) HEMOGLOBIN A1C; Status:Active; Requested for:03Apr2017;    · (1) LIPID PANEL FASTING W DIRECT LDL REFLEX; Status:Active; Requested  for:03Apr2017;    · (1) PSA (SCREEN) (Dx V76 44 Screen for Prostate Cancer); Status:Active; Requested  for:03Apr2017;    · (1) URINALYSIS w URINE C/S REFLEX (will reflex a microscopy if leukocytes, occult  blood, or nitrites are not within normal limits); Status:Active; Requested for:03Apr2017; Well adult exam    · Call [phone] if: You have any warning signs of skin cancer ; Status:Complete;    Done: 10LGW5307   · Call 911 if: You experience a new kind of chest pain (angina) or pressure ;  Status:Complete;   Done: 13QRF6566   · Always use a seat belt and shoulder strap when riding or driving a motor vehicle ;  Status:Complete;   Done: 48YLJ1678   · Begin a limited exercise program ; Status:Complete;   Done: 04EVS5045   · Begin or continue regular aerobic exercise   Gradually work up to at least 3 sessions of 30  minutes of exercise a week ; Status:Complete;   Done: 95TSH1752   · Brush your teeth freq1 and floss at least once a day ; Status:Complete;   Done:  20GVX9424   · Decreasing the stress in your life may help your condition improve ; Status:Complete;    Done: 21GQW2209   · Eat a normal well-balanced diet ; Status:Complete;   Done: 18JSY2847   · Limit your use of alcohol to 2 drinks or cans of beer a day ; Status:Complete;   Done:  69XWK2304   · Stretch and warm up your muscles during the first 10 minutes , then cool down your  muscles for the last 10 minutes of exercise ; Status:Complete;   Done: 16LZT9773   · Use a sun block product with an SPF of 15 or more ; Status:Complete;   Done:  63SYG5247   · Vitamins can help you get daily requirements that your diet may not be giving you ;  Status:Complete;   Done: 30GLT2358   · We recommend routine visits to a dentist ; Status:Complete;   Done: 37FIA8149   · We recommend that you bring your body mass index down to 26 ; Status:Complete;    Done: 43MNX6981   · We recommend that you follow the "Mediterranean diet "; Status:Complete;   Done:  93HIK5972   · We recommend that you follow these rules for gun safety ; Status:Complete;   Done:  73YWY1724    Discussion/Summary  Impression: health maintenance visit  Currently, he eats a healthy diet, eats an adequate diet and has an adequate exercise regimen  Prostate cancer screening: PSA was ordered  Testicular cancer screening: clinical testicular exam was done today  Colorectal cancer screening: colorectal cancer screening is current  Screening lab work includes glucose, lipid profile and urinalysis  The immunizations are up to date  Advice and education were given regarding nutrition, aerobic exercise and weight loss  Patient discussion: discussed with the patient  Preventative + FH prostate cancer: Slip given for yearly labs including PSA  Hx colon polyps  Will be due for repeat colonoscopy later this year (Dr Richa Al)  UTD with eye exam, Tdap  Hyperlipidemia (mild) + hx prediabetes: Diet/lifestyle interventions  Repeat labs  Sciatica: Denies recent worsening or need for additional interventions  Regular walking encouraged  Allergic rhinitis: OTC Claritin, saline nasal spray, Flonase prn  RTO 1 year  The treatment plan was reviewed with the patient/guardian   The patient/guardian understands and agrees with the treatment plan      Chief Complaint  pt here for a 61 year well      History of Present Illness  HM, Adult Male: The patient is being seen for a health maintenance evaluation  The last health maintenance visit was 1 year(s) ago  Social History: He is   Work status: working full time  The patient is a former cigarette smoker  He reports rare alcohol use  He has never used illicit drugs  General Health: The patient's health since the last visit is described as good  He has regular dental visits  He denies vision problems  He denies hearing loss  Immunizations status: up to date  Lifestyle:  He consumes a diverse and healthy diet  He does not have any weight concerns  He exercises regularly  He does not use tobacco  He denies drug use  Reproductive health:  the patient is sexually active  Screening: cancer screening reviewed and updated  metabolic screening reviewed and updated  risk screening reviewed and updated  HPI:   Here for routine well exam      Itchy rash on calves cleared up with use of topical steroid  Nothing elsewhere  Continued mild intermittent sciatica  Non-limiting  No recent worsening  Walks dog 2-3 times a day which helps  Healthy diet  Some increased stress recently d/t moving parents into [employer] [facility]  Mother with dementia  Dad with hx of prostate cancer but doing fine at present  , 3 grown children  Son works for [employer] in [place]  Still works for [employer]  Hopes to retire by 71  Colonoscopy 2012  Dr Verner Patches  Benign polyps  Due for repeat this year  Tdap 2012  Eye exam within the past 2 years  Review of Systems    Constitutional: no fever, no recent weight gain, not feeling tired and no recent weight loss  Eyes: no eyesight problems  ENT: no sore throat and no nasal discharge    no hearing loss  Cardiovascular: no chest pain and no extremity edema  Respiratory: no shortness of breath and no cough  Gastrointestinal: no abdominal pain, no nausea, no vomiting, no constipation, no diarrhea and no blood in stools  Genitourinary: no dysuria, no urinary hesitancy, no incontinence, no nocturia and no testicular pain  Musculoskeletal: as noted in HPI  Integumentary: as noted in HPI, no rashes and no skin lesions  Neurological: no headache and no dizziness  Psychiatric: no anxiety, no personality change, no depression and no emotional problems  Endocrine: no muscle weakness  Hematologic/Lymphatic: no swollen glands, no tendency for easy bleeding and no tendency for easy bruising  Over the past 2 weeks, how often have you been bothered by the following problems? 1 ) Little interest or pleasure in doing things? Not at all    2 ) Feeling down, depressed or hopeless? Not at all  Active Problems    1  Allergic rhinitis (477 9) (J30 9)   2  Allergy To Gluten-containing Products (V15 05)   3  History of Benign colon polyp (211 3) (K63 5)   4  Encounter for routine laboratory testing (V72 62) (Z00 00)   5  Glaucoma (365 9) (H40 9)   6  Hyperlipidemia (272 4) (E78 5)   7  Lactase deficiency (271 3) (E73 9)   8  History of Localized rash (782 1) (R21)   9  Prediabetes (790 29) (R73 03)   10  Sciatica (724 3) (M54 30)   11  Screening for lipoid disorders (V77 91) (Z13 220)   12  Special screening examination for neoplasm of prostate (V76 44) (Z12 5)   13   Well adult exam (V70 0) (Z00 00)    Past Medical History    · History of Benign colon polyp (211 3) (K63 5)   · History of Diverticulosis (562 10) (K57 90)   · Encounter for routine laboratory testing (V72 62) (Z00 00)   · History of diverticulitis of colon (V12 79) (Z87 19)   · History of renal calculi (V13 01) (Z87 442)   · Hyperlipidemia (272 4) (E78 5)   · History of Laceration Of Finger (883 0)   · History of Localized rash (782 1) (R21)   · Prediabetes (790 29) (R73 03)   · History of Skin rash (782 1) (R21)   · Well adult exam (V70 0) (Z00 00)    Surgical History    · History of Arthroscopy Knee Right   · History of Complete Colonoscopy   · History of Diagnostic Esophagogastroduodenoscopy   · History of Inguinal Hernia Repair    Family History  Mother    · Family history of Glaucoma  Father    · Family history of Glaucoma   · Family history of Prostate Cancer (V16 42)   · Family history of Pulmonary Embolism  Brother    · Family history of Hypertension (V17 49)  Maternal Grandfather    · Family history of Colon Cancer (V16 0)    Social History    · Alcohol Use (History)   · Occasional Beer and Wine   · Caffeine Use   · Coffee 3 to 4 cups   · Educational Level   · Some College   · Former smoker (F47 63) (O36 799)   · Quit 1992   · Marital History - Currently    · Occupation:   ·  [employer]  Current Meds   1  Fluocinonide 0 05 % External Ointment; APPLY SPARINGLY TO AFFECTED AREAS   TWICE A DAY  DO NOT USE LONGER THAN 2 WEEKS CONTINUOUSLY; Therapy: 86FCW2985 to (Last Rx:54Hvc8279)  Requested for: 30UDE5062 Ordered   2  Fluticasone Propionate 50 MCG/ACT Nasal Suspension; USE 2 SPRAYS IN EACH   NOSTRIL ONCE DAILY; Therapy: 59ORO9576 to (Last Rx:01Lcn9132)  Requested for: 01Sjk4959 Ordered   3  Ketoconazole 2 % External Cream; APPLY A THIN LAYER TO AFFECTED AREA(S)   TWICE DAILY x 2-4 weeks; Therapy: 74FEJ8664 to ([phone])  Requested for: 78QYB5183; Last   Rx:24Jan2017 Ordered   4  Multiple Vitamins Oral Tablet; TAKE 1 TABLET DAILY; Therapy: 58RBW4375 to (Evaluate:88Ylk9733) Recorded   5  Probiotic Oral Capsule; USE AS DIRECTED; Therapy: 38QID9261 to Recorded    Allergies    1   Sulfa Drugs    Vitals   Recorded: 69TAY4579 08:36AM Recorded: 60WEQ4251 07:56AM   Temperature  98 2 F   Heart Rate  84   Respiration  18   Systolic 012 002   Diastolic 74 90   Height  5 ft 8 in   Weight  174 lb    BMI Calculated  26 46   BSA Calculated  1 93   O2 Saturation  97     Physical Exam    Constitutional   General appearance: No acute distress, well appearing and well nourished  Head and Face   Head and face: Normal     Eyes   Conjunctiva and lids: No erythema, swelling or discharge  Pupils and irises: Equal, round, reactive to light  Ophthalmoscopic examination: Normal fundi and optic discs  Ears, Nose, Mouth, and Throat   External inspection of ears and nose: Normal     Otoscopic examination: Tympanic membranes translucent with normal light reflex  Canals patent without erythema  Nasal mucosa, septum, and turbinates: Normal without edema or erythema  Oropharynx: Normal with no erythema, edema, exudate or lesions  Neck   Neck: Supple, symmetric, trachea midline, no masses  Thyroid: Normal, no thyromegaly  Pulmonary   Respiratory effort: No increased work of breathing or signs of respiratory distress  Auscultation of lungs: Clear to auscultation  Cardiovascular   Auscultation of heart: Normal rate and rhythm, normal S1 and S2, no murmurs  Carotid pulses: 2+ bilaterally  Pedal pulses: 2+ bilaterally  Examination of extremities for edema and/or varicosities: Normal     Chest   Breasts: Normal, no dimpling or skin changes appreciated  Palpation of breasts and axillae: Normal, no masses palpated  Abdomen   Abdomen: Non-tender, no masses  Liver and spleen: No hepatomegaly or splenomegaly  Genitourinary   Scrotal contents: Normal testes, no masses  Penis: Normal, no lesions  Digital rectal exam of prostate: Normal size, no masses  Lymphatic   Palpation of lymph nodes in neck: No lymphadenopathy  Musculoskeletal   Gait and station: Normal     Inspection/palpation of joints, bones, and muscles: Normal     Muscle strength/tone: Normal     Skin   Skin and subcutaneous tissue: Normal without rashes or lesions  Neurologic   Reflexes: 2+ and symmetric      Psychiatric   Orientation to person, place and time: Normal     Mood and affect: Normal        Results/Data  PHQ-2 Adult Depression Screening 53Trn5655 08:46AM Angelica Madrid     Test Name Result Flag Reference   PHQ-2 Adult Depression Score 0     Over the last two weeks, how often have you been bothered by any of the following problems?   Little interest or pleasure in doing things: Not at all - 0  Feeling down, depressed, or hopeless: Not at all - 0   PHQ-2 Adult Depression Screening Negative         Signatures   Electronically signed by : HERBERT Perkins; Feb 27 2017  8:46AM EST                       (Author)    Electronically signed by : Tabitha Ferrell DO; Feb 27 2017  8:53AM EST                       (Author)

## 2021-07-23 NOTE — PRE-PROCEDURE INSTRUCTIONS
Pre-Surgery Instructions:   Medication Instructions    ALPHAGAN P 0 15 % ophthalmic solution Instructed patient per Anesthesia Guidelines  take    donepezil (ARICEPT) 10 mg tablet Instructed patient per Anesthesia Guidelines  take    escitalopram (LEXAPRO) 10 mg tablet Instructed patient per Anesthesia Guidelines  take    Lactobacillus (PROBIOTIC ACIDOPHILUS) CAPS Instructed patient per Anesthesia Guidelines  stop 7/23    LUMIGAN 0 01 % ophthalmic drops Instructed patient per Anesthesia Guidelines  take    memantine (NAMENDA) 10 mg tablet Instructed patient per Anesthesia Guidelines  take    pantoprazole (PROTONIX) 20 mg tablet Instructed patient per Anesthesia Guidelines  take   Pre procedure instructions reviewed verbalizes understanding  NPO after MN  Bathing reviewed  Morning meds with water  No NSAIDS  Prostate class completed  All questions answered  Take morning meds with water only   Stop supplements and vitamins

## 2021-07-27 ENCOUNTER — ANESTHESIA EVENT (OUTPATIENT)
Dept: PERIOP | Facility: HOSPITAL | Age: 61
End: 2021-07-27
Payer: COMMERCIAL

## 2021-07-27 PROBLEM — K21.9 GASTROESOPHAGEAL REFLUX DISEASE: Status: ACTIVE | Noted: 2021-07-27

## 2021-07-27 PROBLEM — E78.5 HYPERLIPIDEMIA: Status: ACTIVE | Noted: 2019-02-18

## 2021-07-27 PROBLEM — E78.5 HYPERLIPIDEMIA: Status: RESOLVED | Noted: 2019-02-18 | Resolved: 2021-07-27

## 2021-07-27 NOTE — ANESTHESIA PREPROCEDURE EVALUATION
Procedure:  ROBOTIC LAPAROSCOPIC RADICAL PROSTATECTOMY AND PELVIC LYMPH NODE DISSECTION (N/A Abdomen)    Relevant Problems   CARDIO   (+) Hyperlipidemia (Resolved)      GI/HEPATIC   (+) Gastroesophageal reflux disease      /RENAL   (+) Prostate cancer (HCC)      MUSCULOSKELETAL   (+) Lumbar spondylosis   (+) Primary localized osteoarthritis of right hip   (+) Sciatica      Other   (+) S/P hip replacement, bilateral   (+) Short-term memory loss        Physical Exam    Airway    Mallampati score: II  TM Distance: >3 FB  Neck ROM: full     Dental   upper dentures,     Cardiovascular  Rhythm: regular, Rate: normal,     Pulmonary  Breath sounds clear to auscultation,     Other Findings  Partial upper plate      Anesthesia Plan  ASA Score- 2     Anesthesia Type- general with ASA Monitors  Additional Monitors:   Airway Plan: ETT  Comment: PIV x2  Plan Factors-    Chart reviewed  Patient is not a current smoker  Induction- intravenous  Postoperative Plan- Plan for postoperative opioid use  Informed Consent- Anesthetic plan and risks discussed with patient  I personally reviewed this patient with the CRNA  Discussed and agreed on the Anesthesia Plan with the CRNA  Georgette Ormond

## 2021-07-28 ENCOUNTER — HOSPITAL ENCOUNTER (OUTPATIENT)
Facility: HOSPITAL | Age: 61
Discharge: HOME | End: 2021-07-29
Attending: UROLOGY | Admitting: UROLOGY
Payer: COMMERCIAL

## 2021-07-28 ENCOUNTER — ANESTHESIA (OUTPATIENT)
Dept: PERIOP | Facility: HOSPITAL | Age: 61
End: 2021-07-28
Payer: COMMERCIAL

## 2021-07-28 DIAGNOSIS — C61 PROSTATE CANCER (HCC): Primary | ICD-10-CM

## 2021-07-28 PROBLEM — Z96.643 S/P HIP REPLACEMENT, BILATERAL: Status: ACTIVE | Noted: 2021-07-28

## 2021-07-28 LAB
ABO GROUP BLD: NORMAL
ANION GAP SERPL CALCULATED.3IONS-SCNC: 4 MMOL/L (ref 4–13)
BUN SERPL-MCNC: 14 MG/DL (ref 5–25)
CALCIUM SERPL-MCNC: 8.3 MG/DL (ref 8.3–10.1)
CHLORIDE SERPL-SCNC: 111 MMOL/L (ref 100–108)
CO2 SERPL-SCNC: 23 MMOL/L (ref 21–32)
CREAT SERPL-MCNC: 0.87 MG/DL (ref 0.6–1.3)
ERYTHROCYTE [DISTWIDTH] IN BLOOD BY AUTOMATED COUNT: 12.8 % (ref 11.6–15.1)
GFR SERPL CREATININE-BSD FRML MDRD: 92 ML/MIN/1.73SQ M
GLUCOSE P FAST SERPL-MCNC: 125 MG/DL (ref 65–99)
GLUCOSE SERPL-MCNC: 125 MG/DL (ref 65–140)
HCT VFR BLD AUTO: 42.7 % (ref 36.5–49.3)
HGB BLD-MCNC: 13.7 G/DL (ref 12–17)
MCH RBC QN AUTO: 30.7 PG (ref 26.8–34.3)
MCHC RBC AUTO-ENTMCNC: 32.1 G/DL (ref 31.4–37.4)
MCV RBC AUTO: 96 FL (ref 82–98)
PLATELET # BLD AUTO: 268 THOUSANDS/UL (ref 149–390)
PMV BLD AUTO: 9 FL (ref 8.9–12.7)
POTASSIUM SERPL-SCNC: 4.4 MMOL/L (ref 3.5–5.3)
RBC # BLD AUTO: 4.46 MILLION/UL (ref 3.88–5.62)
RH BLD: POSITIVE
SODIUM SERPL-SCNC: 138 MMOL/L (ref 136–145)
WBC # BLD AUTO: 15.28 THOUSAND/UL (ref 4.31–10.16)

## 2021-07-28 PROCEDURE — 55866 LAPS SURG PRST8ECT RPBIC RAD: CPT | Performed by: PHYSICIAN ASSISTANT

## 2021-07-28 PROCEDURE — 85027 COMPLETE CBC AUTOMATED: CPT | Performed by: UROLOGY

## 2021-07-28 PROCEDURE — 88309 TISSUE EXAM BY PATHOLOGIST: CPT | Performed by: PATHOLOGY

## 2021-07-28 PROCEDURE — 88363 XM ARCHIVE TISSUE MOLEC ANAL: CPT | Performed by: PATHOLOGY

## 2021-07-28 PROCEDURE — NC001 PR NO CHARGE: Performed by: UROLOGY

## 2021-07-28 PROCEDURE — 80048 BASIC METABOLIC PNL TOTAL CA: CPT | Performed by: UROLOGY

## 2021-07-28 PROCEDURE — 86920 COMPATIBILITY TEST SPIN: CPT

## 2021-07-28 PROCEDURE — 55866 LAPS SURG PRST8ECT RPBIC RAD: CPT | Performed by: UROLOGY

## 2021-07-28 PROCEDURE — C9290 INJ, BUPIVACAINE LIPOSOME: HCPCS | Performed by: UROLOGY

## 2021-07-28 RX ORDER — CEFAZOLIN SODIUM 1 G/50ML
1000 SOLUTION INTRAVENOUS ONCE
Status: DISCONTINUED | OUTPATIENT
Start: 2021-07-28 | End: 2021-07-28

## 2021-07-28 RX ORDER — ACETAMINOPHEN 325 MG/1
650 TABLET ORAL EVERY 4 HOURS PRN
Qty: 30 TABLET | Refills: 0
Start: 2021-07-28

## 2021-07-28 RX ORDER — SENNOSIDES 8.6 MG
1 TABLET ORAL DAILY
Status: DISCONTINUED | OUTPATIENT
Start: 2021-07-28 | End: 2021-07-29 | Stop reason: HOSPADM

## 2021-07-28 RX ORDER — HYDROMORPHONE HCL/PF 1 MG/ML
0.5 SYRINGE (ML) INJECTION EVERY 2 HOUR PRN
Status: DISCONTINUED | OUTPATIENT
Start: 2021-07-28 | End: 2021-07-29 | Stop reason: HOSPADM

## 2021-07-28 RX ORDER — NAPROXEN 500 MG/1
500 TABLET ORAL 2 TIMES DAILY WITH MEALS
Qty: 10 TABLET | Refills: 0 | Status: SHIPPED | OUTPATIENT
Start: 2021-07-28 | End: 2021-08-02

## 2021-07-28 RX ORDER — DOCUSATE SODIUM 100 MG/1
100 CAPSULE, LIQUID FILLED ORAL 2 TIMES DAILY
Qty: 30 CAPSULE | Refills: 0 | Status: SHIPPED | OUTPATIENT
Start: 2021-07-28 | End: 2021-08-12

## 2021-07-28 RX ORDER — KETOROLAC TROMETHAMINE 30 MG/ML
15 INJECTION, SOLUTION INTRAMUSCULAR; INTRAVENOUS EVERY 6 HOURS SCHEDULED
Status: DISCONTINUED | OUTPATIENT
Start: 2021-07-28 | End: 2021-07-29 | Stop reason: HOSPADM

## 2021-07-28 RX ORDER — GLYCOPYRROLATE 0.2 MG/ML
INJECTION INTRAMUSCULAR; INTRAVENOUS AS NEEDED
Status: DISCONTINUED | OUTPATIENT
Start: 2021-07-28 | End: 2021-07-28

## 2021-07-28 RX ORDER — ONDANSETRON 2 MG/ML
4 INJECTION INTRAMUSCULAR; INTRAVENOUS ONCE AS NEEDED
Status: COMPLETED | OUTPATIENT
Start: 2021-07-28 | End: 2021-07-28

## 2021-07-28 RX ORDER — SODIUM CHLORIDE, SODIUM LACTATE, POTASSIUM CHLORIDE, CALCIUM CHLORIDE 600; 310; 30; 20 MG/100ML; MG/100ML; MG/100ML; MG/100ML
75 INJECTION, SOLUTION INTRAVENOUS CONTINUOUS
Status: DISCONTINUED | OUTPATIENT
Start: 2021-07-28 | End: 2021-07-28

## 2021-07-28 RX ORDER — OXYCODONE HYDROCHLORIDE 5 MG/1
10 TABLET ORAL EVERY 4 HOURS PRN
Status: DISCONTINUED | OUTPATIENT
Start: 2021-07-28 | End: 2021-07-29 | Stop reason: HOSPADM

## 2021-07-28 RX ORDER — DONEPEZIL HYDROCHLORIDE 10 MG/1
10 TABLET, FILM COATED ORAL DAILY
Status: DISCONTINUED | OUTPATIENT
Start: 2021-07-29 | End: 2021-07-29 | Stop reason: HOSPADM

## 2021-07-28 RX ORDER — SODIUM CHLORIDE 9 MG/ML
INJECTION, SOLUTION INTRAVENOUS CONTINUOUS PRN
Status: DISCONTINUED | OUTPATIENT
Start: 2021-07-28 | End: 2021-07-28

## 2021-07-28 RX ORDER — ACETAMINOPHEN 325 MG/1
975 TABLET ORAL ONCE
Status: COMPLETED | OUTPATIENT
Start: 2021-07-28 | End: 2021-07-28

## 2021-07-28 RX ORDER — MAGNESIUM HYDROXIDE 1200 MG/15ML
LIQUID ORAL AS NEEDED
Status: DISCONTINUED | OUTPATIENT
Start: 2021-07-28 | End: 2021-07-28 | Stop reason: HOSPADM

## 2021-07-28 RX ORDER — DOCUSATE SODIUM 100 MG/1
100 CAPSULE, LIQUID FILLED ORAL 2 TIMES DAILY
Status: DISCONTINUED | OUTPATIENT
Start: 2021-07-28 | End: 2021-07-29 | Stop reason: HOSPADM

## 2021-07-28 RX ORDER — FENTANYL CITRATE/PF 50 MCG/ML
50 SYRINGE (ML) INJECTION
Status: DISCONTINUED | OUTPATIENT
Start: 2021-07-28 | End: 2021-07-28 | Stop reason: HOSPADM

## 2021-07-28 RX ORDER — HYDROMORPHONE HCL/PF 1 MG/ML
SYRINGE (ML) INJECTION AS NEEDED
Status: DISCONTINUED | OUTPATIENT
Start: 2021-07-28 | End: 2021-07-28

## 2021-07-28 RX ORDER — GABAPENTIN 100 MG/1
100 CAPSULE ORAL 3 TIMES DAILY
Status: DISCONTINUED | OUTPATIENT
Start: 2021-07-28 | End: 2021-07-29 | Stop reason: HOSPADM

## 2021-07-28 RX ORDER — MIDAZOLAM HYDROCHLORIDE 2 MG/2ML
INJECTION, SOLUTION INTRAMUSCULAR; INTRAVENOUS AS NEEDED
Status: DISCONTINUED | OUTPATIENT
Start: 2021-07-28 | End: 2021-07-28

## 2021-07-28 RX ORDER — ACETAMINOPHEN 325 MG/1
650 TABLET ORAL EVERY 6 HOURS SCHEDULED
Status: DISCONTINUED | OUTPATIENT
Start: 2021-07-28 | End: 2021-07-29 | Stop reason: HOSPADM

## 2021-07-28 RX ORDER — OXYCODONE HYDROCHLORIDE 5 MG/1
5 TABLET ORAL EVERY 4 HOURS PRN
Status: DISCONTINUED | OUTPATIENT
Start: 2021-07-28 | End: 2021-07-29 | Stop reason: HOSPADM

## 2021-07-28 RX ORDER — ONDANSETRON 2 MG/ML
4 INJECTION INTRAMUSCULAR; INTRAVENOUS EVERY 6 HOURS PRN
Status: DISCONTINUED | OUTPATIENT
Start: 2021-07-28 | End: 2021-07-29 | Stop reason: HOSPADM

## 2021-07-28 RX ORDER — ONDANSETRON 2 MG/ML
INJECTION INTRAMUSCULAR; INTRAVENOUS AS NEEDED
Status: DISCONTINUED | OUTPATIENT
Start: 2021-07-28 | End: 2021-07-28

## 2021-07-28 RX ORDER — MEMANTINE HYDROCHLORIDE 5 MG/1
5 TABLET ORAL 2 TIMES DAILY
Status: DISCONTINUED | OUTPATIENT
Start: 2021-07-28 | End: 2021-07-29 | Stop reason: HOSPADM

## 2021-07-28 RX ORDER — PROPOFOL 10 MG/ML
INJECTION, EMULSION INTRAVENOUS AS NEEDED
Status: DISCONTINUED | OUTPATIENT
Start: 2021-07-28 | End: 2021-07-28

## 2021-07-28 RX ORDER — DEXAMETHASONE SODIUM PHOSPHATE 10 MG/ML
INJECTION, SOLUTION INTRAMUSCULAR; INTRAVENOUS AS NEEDED
Status: DISCONTINUED | OUTPATIENT
Start: 2021-07-28 | End: 2021-07-28

## 2021-07-28 RX ORDER — PANTOPRAZOLE SODIUM 20 MG/1
20 TABLET, DELAYED RELEASE ORAL DAILY
Status: DISCONTINUED | OUTPATIENT
Start: 2021-07-28 | End: 2021-07-29 | Stop reason: HOSPADM

## 2021-07-28 RX ORDER — HYDROMORPHONE HCL/PF 1 MG/ML
0.4 SYRINGE (ML) INJECTION
Status: DISCONTINUED | OUTPATIENT
Start: 2021-07-28 | End: 2021-07-28 | Stop reason: HOSPADM

## 2021-07-28 RX ORDER — ROCURONIUM BROMIDE 10 MG/ML
INJECTION, SOLUTION INTRAVENOUS AS NEEDED
Status: DISCONTINUED | OUTPATIENT
Start: 2021-07-28 | End: 2021-07-28

## 2021-07-28 RX ORDER — FENTANYL CITRATE 50 UG/ML
INJECTION, SOLUTION INTRAMUSCULAR; INTRAVENOUS AS NEEDED
Status: DISCONTINUED | OUTPATIENT
Start: 2021-07-28 | End: 2021-07-28

## 2021-07-28 RX ORDER — ESCITALOPRAM OXALATE 10 MG/1
10 TABLET ORAL DAILY
Status: DISCONTINUED | OUTPATIENT
Start: 2021-07-28 | End: 2021-07-29 | Stop reason: HOSPADM

## 2021-07-28 RX ORDER — NEOSTIGMINE METHYLSULFATE 1 MG/ML
INJECTION INTRAVENOUS AS NEEDED
Status: DISCONTINUED | OUTPATIENT
Start: 2021-07-28 | End: 2021-07-28

## 2021-07-28 RX ORDER — GABAPENTIN 300 MG/1
300 CAPSULE ORAL ONCE
Status: COMPLETED | OUTPATIENT
Start: 2021-07-28 | End: 2021-07-28

## 2021-07-28 RX ORDER — CEPHALEXIN 500 MG/1
500 CAPSULE ORAL EVERY 6 HOURS SCHEDULED
Qty: 3 CAPSULE | Refills: 0 | Status: SHIPPED | OUTPATIENT
Start: 2021-07-28 | End: 2021-07-29

## 2021-07-28 RX ORDER — BACITRACIN, NEOMYCIN, POLYMYXIN B 400; 3.5; 5 [USP'U]/G; MG/G; [USP'U]/G
1 OINTMENT TOPICAL 2 TIMES DAILY
Status: DISCONTINUED | OUTPATIENT
Start: 2021-07-28 | End: 2021-07-29 | Stop reason: HOSPADM

## 2021-07-28 RX ORDER — OXYCODONE HYDROCHLORIDE 5 MG/1
5 TABLET ORAL EVERY 4 HOURS PRN
Qty: 5 TABLET | Refills: 0 | Status: SHIPPED | OUTPATIENT
Start: 2021-07-28 | End: 2021-08-02

## 2021-07-28 RX ORDER — BRIMONIDINE TARTRATE 2 MG/ML
1 SOLUTION/ DROPS OPHTHALMIC 3 TIMES DAILY
Status: DISCONTINUED | OUTPATIENT
Start: 2021-07-28 | End: 2021-07-29 | Stop reason: HOSPADM

## 2021-07-28 RX ORDER — ALBUMIN, HUMAN INJ 5% 5 %
SOLUTION INTRAVENOUS CONTINUOUS PRN
Status: DISCONTINUED | OUTPATIENT
Start: 2021-07-28 | End: 2021-07-28

## 2021-07-28 RX ORDER — CEFAZOLIN SODIUM 1 G/50ML
SOLUTION INTRAVENOUS AS NEEDED
Status: DISCONTINUED | OUTPATIENT
Start: 2021-07-28 | End: 2021-07-28

## 2021-07-28 RX ORDER — LIDOCAINE HYDROCHLORIDE 10 MG/ML
0.5 INJECTION, SOLUTION EPIDURAL; INFILTRATION; INTRACAUDAL; PERINEURAL ONCE AS NEEDED
Status: DISCONTINUED | OUTPATIENT
Start: 2021-07-28 | End: 2021-07-28

## 2021-07-28 RX ORDER — SODIUM CHLORIDE, SODIUM LACTATE, POTASSIUM CHLORIDE, CALCIUM CHLORIDE 600; 310; 30; 20 MG/100ML; MG/100ML; MG/100ML; MG/100ML
125 INJECTION, SOLUTION INTRAVENOUS CONTINUOUS
Status: DISPENSED | OUTPATIENT
Start: 2021-07-28 | End: 2021-07-29

## 2021-07-28 RX ADMIN — ROCURONIUM BROMIDE 10 MG: 50 INJECTION, SOLUTION INTRAVENOUS at 08:56

## 2021-07-28 RX ADMIN — SODIUM CHLORIDE: 0.9 INJECTION, SOLUTION INTRAVENOUS at 08:25

## 2021-07-28 RX ADMIN — HYDROMORPHONE HYDROCHLORIDE 0.25 MG: 1 INJECTION, SOLUTION INTRAMUSCULAR; INTRAVENOUS; SUBCUTANEOUS at 12:08

## 2021-07-28 RX ADMIN — BRIMONIDINE TARTRATE 1 DROP: 2 SOLUTION OPHTHALMIC at 19:36

## 2021-07-28 RX ADMIN — ROCURONIUM BROMIDE 50 MG: 50 INJECTION, SOLUTION INTRAVENOUS at 08:23

## 2021-07-28 RX ADMIN — FENTANYL CITRATE 50 MCG: 50 INJECTION INTRAMUSCULAR; INTRAVENOUS at 09:00

## 2021-07-28 RX ADMIN — FENTANYL CITRATE 50 MCG: 50 INJECTION INTRAMUSCULAR; INTRAVENOUS at 08:23

## 2021-07-28 RX ADMIN — MIDAZOLAM 2 MG: 1 INJECTION INTRAMUSCULAR; INTRAVENOUS at 08:13

## 2021-07-28 RX ADMIN — PROPOFOL 200 MG: 10 INJECTION, EMULSION INTRAVENOUS at 08:23

## 2021-07-28 RX ADMIN — GLYCOPYRROLATE 0.2 MG: 0.2 INJECTION, SOLUTION INTRAMUSCULAR; INTRAVENOUS at 08:57

## 2021-07-28 RX ADMIN — FENTANYL CITRATE 50 MCG: 50 INJECTION INTRAMUSCULAR; INTRAVENOUS at 13:30

## 2021-07-28 RX ADMIN — GABAPENTIN 300 MG: 300 CAPSULE ORAL at 07:09

## 2021-07-28 RX ADMIN — SODIUM CHLORIDE, SODIUM LACTATE, POTASSIUM CHLORIDE, AND CALCIUM CHLORIDE 125 ML/HR: .6; .31; .03; .02 INJECTION, SOLUTION INTRAVENOUS at 15:47

## 2021-07-28 RX ADMIN — DEXAMETHASONE SODIUM PHOSPHATE 5 MG: 10 INJECTION, SOLUTION INTRAMUSCULAR; INTRAVENOUS at 08:58

## 2021-07-28 RX ADMIN — HYDROMORPHONE HYDROCHLORIDE 0.25 MG: 1 INJECTION, SOLUTION INTRAMUSCULAR; INTRAVENOUS; SUBCUTANEOUS at 12:11

## 2021-07-28 RX ADMIN — ALBUMIN (HUMAN): 12.5 INJECTION, SOLUTION INTRAVENOUS at 08:46

## 2021-07-28 RX ADMIN — ESCITALOPRAM OXALATE 10 MG: 10 TABLET ORAL at 21:11

## 2021-07-28 RX ADMIN — LIDOCAINE HYDROCHLORIDE 100 MG: 20 INJECTION, SOLUTION INTRAVENOUS at 08:23

## 2021-07-28 RX ADMIN — ROCURONIUM BROMIDE 10 MG: 50 INJECTION, SOLUTION INTRAVENOUS at 10:50

## 2021-07-28 RX ADMIN — SENNOSIDES 8.6 MG: 8.6 TABLET ORAL at 18:14

## 2021-07-28 RX ADMIN — SODIUM CHLORIDE, SODIUM LACTATE, POTASSIUM CHLORIDE, AND CALCIUM CHLORIDE: .6; .31; .03; .02 INJECTION, SOLUTION INTRAVENOUS at 07:50

## 2021-07-28 RX ADMIN — GABAPENTIN 100 MG: 100 CAPSULE ORAL at 17:57

## 2021-07-28 RX ADMIN — SODIUM CHLORIDE: 0.9 INJECTION, SOLUTION INTRAVENOUS at 09:05

## 2021-07-28 RX ADMIN — FENTANYL CITRATE 50 MCG: 50 INJECTION INTRAMUSCULAR; INTRAVENOUS at 12:47

## 2021-07-28 RX ADMIN — GABAPENTIN 100 MG: 100 CAPSULE ORAL at 21:11

## 2021-07-28 RX ADMIN — DOCUSATE SODIUM 100 MG: 100 CAPSULE ORAL at 17:56

## 2021-07-28 RX ADMIN — ACETAMINOPHEN 975 MG: 325 TABLET, FILM COATED ORAL at 07:09

## 2021-07-28 RX ADMIN — BIMATOPROST 1 DROP: 0.1 SOLUTION/ DROPS OPHTHALMIC at 21:11

## 2021-07-28 RX ADMIN — CEFAZOLIN SODIUM 1000 MG: 1 SOLUTION INTRAVENOUS at 08:20

## 2021-07-28 RX ADMIN — KETOROLAC TROMETHAMINE 15 MG: 30 INJECTION, SOLUTION INTRAMUSCULAR at 23:52

## 2021-07-28 RX ADMIN — ACETAMINOPHEN 650 MG: 325 TABLET, FILM COATED ORAL at 17:57

## 2021-07-28 RX ADMIN — ONDANSETRON 4 MG: 2 INJECTION INTRAMUSCULAR; INTRAVENOUS at 12:47

## 2021-07-28 RX ADMIN — NEOSTIGMINE METHYLSULFATE 3 MG: 1 INJECTION INTRAVENOUS at 12:04

## 2021-07-28 RX ADMIN — ONDANSETRON 4 MG: 2 INJECTION INTRAMUSCULAR; INTRAVENOUS at 11:57

## 2021-07-28 RX ADMIN — MEMANTINE 5 MG: 5 TABLET ORAL at 17:57

## 2021-07-28 RX ADMIN — PANTOPRAZOLE SODIUM 20 MG: 20 TABLET, DELAYED RELEASE ORAL at 21:11

## 2021-07-28 RX ADMIN — KETOROLAC TROMETHAMINE 15 MG: 30 INJECTION, SOLUTION INTRAMUSCULAR at 17:58

## 2021-07-28 RX ADMIN — ENOXAPARIN SODIUM 40 MG: 40 INJECTION SUBCUTANEOUS at 18:14

## 2021-07-28 RX ADMIN — ROCURONIUM BROMIDE 10 MG: 50 INJECTION, SOLUTION INTRAVENOUS at 10:17

## 2021-07-28 RX ADMIN — PHENYLEPHRINE HYDROCHLORIDE 30 MCG/MIN: 10 INJECTION INTRAVENOUS at 08:28

## 2021-07-28 RX ADMIN — ACETAMINOPHEN 650 MG: 325 TABLET, FILM COATED ORAL at 23:52

## 2021-07-28 RX ADMIN — SODIUM CHLORIDE, SODIUM LACTATE, POTASSIUM CHLORIDE, AND CALCIUM CHLORIDE 125 ML/HR: .6; .31; .03; .02 INJECTION, SOLUTION INTRAVENOUS at 23:02

## 2021-07-28 RX ADMIN — GLYCOPYRROLATE 0.4 MG: 0.2 INJECTION, SOLUTION INTRAMUSCULAR; INTRAVENOUS at 12:04

## 2021-07-28 RX ADMIN — BACITRACIN, NEOMYCIN, POLYMYXIN B 1 SMALL APPLICATION: 400; 3.5; 5 OINTMENT TOPICAL at 17:59

## 2021-07-28 NOTE — H&P
Not Currently     Comment: years ago     Social History     Tobacco Use   Smoking Status Former Smoker    Types: Cigarettes    Quit date: 12    Years since quittin 5   Smokeless Tobacco Never Used   Tobacco Comment    patient states he doesnt remember how much or for how long       Family History:  Family History   Problem Relation Age of Onset   [place] Dementia Mother    [place] Glaucoma Mother     Glaucoma Father     Prostate cancer Father     Pulmonary embolism Father     Hypertension Brother     Prostate cancer Brother     Colon cancer Maternal Grandfather    :     Review of Systems:     General: Fever, chills, or night sweats: negative  Cardiac: Negative for chest pain  Pulmonary: Negative for shortness of breath  Gastrointestinal: Abdominal pain negative  Nausea, vomiting, or diarrhea negative  Genitourinary: See HPI above  Patient does nothave hematuria  All other systems queried were negative  Physical Exam:   General: Patient is pleasant and in NAD  Awake and alert  /80   Pulse 80   Temp (!) 97 °F (36 1 °C) (Tympanic)   Resp 20   Ht 5' 7" (1 702 m)   Wt 73 5 kg (162 lb)   SpO2 98%   BMI 25 37 kg/m²   HEENT:  Normocephalic atraumatic  Cardiac:  Regular rate and rhythm, Peripheral edema: negative  Pulmonary: Non-labored breathing, CTAB  Abdomen: Soft, non-tender, non-distended  No surgical scars  No masses, tenderness, hernias noted  Genitourinary: negative CVA tenderness, neg suprapubic tenderness    Extremities: normal movement in all 4       Labs:     Lab Results   Component Value Date    HGB 15 8 2021    HCT 47 9 2021    WBC 10 70 (H) 2021     2021   ]    Lab Results   Component Value Date     2017    K 3 7 2021     2021    CO2 27 2021    BUN 14 2021    CREATININE 0 82 2021    CALCIUM 9 0 2021    GLUCOSE 132 2014   ]    Imaging:   I personally reviewed the images and report of the following studies, and reviewed them with the patient:        Thank you for allowing me to participate in this patients care  Please do not hesitate to call with any additional questions    Rasheeda Nash MD

## 2021-07-28 NOTE — ANESTHESIA POSTPROCEDURE EVALUATION
Post-Op Assessment Note    CV Status:  Stable    Pain management: adequate     Mental Status:  Awake and sleepy   Hydration Status:  Euvolemic   PONV Controlled:  Controlled   Airway Patency:  Patent      Post Op Vitals Reviewed: Yes      Staff: CRNA         No complications documented      /61 (07/28/21 1229)    Temp 97 8 °F (36 6 °C) (07/28/21 1229)    Pulse 88 (07/28/21 1229)   Resp 14 (07/28/21 1229)    SpO2 100 % (07/28/21 1229)

## 2021-07-28 NOTE — OP NOTE
Operative Note     PATIENT:  Oniel Burden (MRN [id number])    DATE OF PROCEDURE:   7/28/2021    PREOPERATIVE DIAGNOSIS:  [place] Prostate cancer    POSTOPERATIVE DIAGNOSIS:  [place] Prostate cancer    OPERATION:  Robotic radical prostatectomy, laparoscopic lysis of adhesions    SURGEON:  Rajat Gonzalez MD    ASSISTANT:    Baldo Stone PA-C - Assisting PAC    No qualified teaching residents were available to assist   The assistance of a robotically trained physician assistant was required in obtaining access, providing laparoscopic exposure, and managing the bedside equipment during this robotics case    ANESTHESIA:  General    ESTIMATED BLOOD LOSS:  50 mL    COMPLICATIONS:  None    FINDINGS:  Significant intravesical prostatic protrusion into the bladder  This required careful dissection of the bladder neck and a formal bladder neck reconstruction prior to anastomosis  In addition a very small focal midline posterior cystotomy was introduced during mobilization of the bladder neck  This was closed primarily prior to the anastomosis  Ultimate pressure test revealed a watertight anastomosis  INDICATIONS:  The patient is a gentleman who has been diagnosed with prostate cancer  We reviewed the treatment options and the patient elected to proceed with the aforementioned procedure  The risks, benefits, possible complications and alternatives were discussed with him  Informed consent was obtained before proceeding  DESCRIPTION OF PROCEDURE:  After informed consent was obtained, the patient was taken to the operating room and placed in the supine position  He received 2 gm of Ancef within an hour before the start of procedure  He had bilateral lower extremity sequential compression devices for DVT prophylaxis  After anesthesia had been smoothly introduced, the patient was moved into the low lithotomy position  Great care was taken to pad all pressure points appropriately    The patient's abdomen had been clipped in the preoperative area  He was then prepped with ChloraPrep  The patient was draped sterilely and an [place] was placed over the abdomen  A time-out was performed with everyone in the room in agreement of the procedure to be performed  An 8mm midline incision above the umbilicus was made for the camera port  The anterior abdominal wall was elevated and a Veress needle was placed, and we confirmed we were within the abdominal cavity with a water drop test   After the abdomen was insufflated to 15mmHg, a 8-mm robotic port was then placed  We then placed the 30 degree robotic camera to inspect the abdomen  From our insufflation and trocar placement, there was no visualized injury to the bowel or any vessels  We then placed the remainder of the trocars under direct vision  Two 8-mm robotic trocars were placed 10 cm away from the midline trocar in the line of the anterior superior iliac spine  A lateral 8 millimeter trocar was placed in the patient's left side approximately 8 cm away from the more medial robotic trocar and at the same level as the camera trocar  On the patient's right side, 12-mm trocars placed laterally and a 5-mm port was placed more medially for the assistant  The robot was then docked  I began with a posterior approach  The peritoneum overlying posterior bladder was elevated  This was entered using electrocautery  I mobilized each vas deferens and traced approximately with their transected and used to elevate the remaining posterior structures  Dissected further using combination of sharp and electrocautery into the ampulla of each vas deferens was reach  These were then mobilized and divided  Each seminal vesicle is also identifies mobilized and divided  I carried out the posterior dissection sharply towards the apex of the prostate at this point  The bladder was then dropped until the pubic arch was visualized    Space of Retzius was then further developed  The prostate was exposed, and the endopelvic fascia was incised in an athemeral fashion bilaterally  The puboprostatic ligaments were also released  The DVC was controlled with absorbable vicryl suture  There was good hemostasis after this had been done  The junction between the bladder and the prostate was then identified  This was taken down to the level of the Bower catheter  The patient had a median lobe which was excised in its entirety  Later a formal bladder neck reconstruction had to be undertaken due to the segment of bladder neck excised  The Bower was then retracted superiorly  The posterior plate of the bladder neck was then developed  This dissection was carried out further posteriorly until the seminal vesicles and vas deferens were encountered and brought into the surgical field as previously dissected  These structures were then retracted anteriorly  During this maneuver a posterior cystotomy was introduced  This was approximately a 5 mm defect  This was closed primarily in an extravesical fashion using a single layer of 2-0 Vicryl  Care was taken to avoid any implication of the trigonal structures during closure  The lateral pedicles were then identified  These were controlled with targeted bipolar electrocautery  We further developed Denonvilliers space posteriorly  A right partial nerve-sparing and a left partial nerve sparing technique was done  We then proceeded with dissection laterally until the apices were reached bilaterally  The apices were then carefully dissected free from surrounding tissue, preserving the normal anatomical structures  Next, the junction between the apex of the prostate and the urethra were identified  We incised the dorsal vein macedo with electrocautery  We then incised the periurethral tissue without cautery until the urethral catheter was identified  We incised the posterior urethra and the rectourethralis  The prostate and seminal vesicles were then freed  prostate and the seminal vesicles were placed in the entrapment bag  The pressure was lowered and the operative field was irrigated  The rectal wall was inspected with a gloved finger introduced into the rectum and there were no visible rents  A few small bleeders were controlled with cautery  Because of the large bladder neck opening identified after excision of the median lobe, we performed a bladder neck reconstruction  This was done using a 3-0 V-loc down to approximately a 30 Western Gisselle size  The vesicourethral anastomosis was accomplished with a running 3-0 V-loc on a CV-23 needle  We placed a new urethral catheter and no significant leak was seen when irrigated  The balloon was inflated with 15 cc volume  After hemostasis, the sponge, needle and instrument counts were deemed correct  All secondary ports were removed  We enlarged the periumbilical port and removed the specimen within the entrapment bag  All sponge, needle, and instrument counts were correct times two  We closed the fascia with a running #1 Maxon suture  This gave an excellent result  All secondary port sites were irrigated and a Marcaine block was applied  The skin was closed with Indermil and dressed with an OpSite dressing  The patient was taken to the recovery room in excellent condition

## 2021-07-28 NOTE — DISCHARGE INSTRUCTIONS
Robot Assisted Laparoscopic Prostatectomy   WHAT YOU NEED TO KNOW:   Robot-assisted laparoscopic prostatectomy (RALP) is surgery to remove your prostate gland through small incisions in your abdomen  RALP is done with a machine that is controlled by your surgeon  The machine has mechanical arms that use small tools to remove your prostate  DISCHARGE INSTRUCTIONS:   Medicines:   · Pain medicine: You may be given a prescription medicine to decrease pain  Do not wait until the pain is severe before you take this medicine  · Stool softeners: This medicine makes it easier for you to have a bowel movement  You may need this medicine to treat or prevent constipation  · Antibiotics: This medicine is given to fight or prevent an infection caused by bacteria  Always take your antibiotics exactly as ordered by your healthcare provider  Do not stop taking your medicine unless directed by your healthcare provider  Never save antibiotics or take leftover antibiotics that were given to you for another illness  · Take your medicine as directed  Contact your healthcare provider if you think your medicine is not helping or if you have side effects  Tell him or her if you are allergic to any medicine  Keep a list of the medicines, vitamins, and herbs you take  Include the amounts, and when and why you take them  Bring the list or the pill bottles to follow-up visits  Carry your medicine list with you in case of an emergency  Follow up with your healthcare provider or uro-oncologist in 1 week or as directed: You will need your urinary catheter removed  Tests will show if any cancer remains in your body after surgery  Write down your questions so you remember to ask them during your visits  A Bower catheter  is a tube put into your bladder to drain urine into a bag  Keep the bag below your waist  This will prevent urine from flowing back into your bladder and causing an infection or other problems   Also, keep the tube free of kinks so the urine will drain properly  Do not pull on the catheter  This can cause pain and bleeding, and may cause the catheter to come out  Wound care: Follow your healthcare provider's directions on how to care for your incisions  Ask when you can start showering or bathing  You will need to keep your stitches covered so they do not get wet  What to expect after surgery:   · Activities: You may feel like resting more after surgery  Slowly start to do more each day  Rest when you feel it is needed  ? Normal daily activities:  Ask your healthcare provider when it is safe to return to your normal daily activities  You may need to wait 4 to 6 weeks after surgery  Your healthcare provider will tell you about the activities you should avoid after your surgery  These may include driving while you are taking pain medicines or until your catheter is removed  You may also be given a weight restriction on lifting objects  ? Walking and other exercise:  Walking is an important activity to help with your recovery after surgery  Walking is a good way to improve your overall health and help you recover sooner  It also helps keep your blood flowing and reduces the risk of blood clots  Other types of exercise can also be an important part of your recovery  Ask about the exercises that are safe for you     ? Sexual activity:  Ask when it is safe to start having sexual intercourse again  You may have trouble having an erection  Your erections may return with time  Medicines and mechanical aids may help  Talk to your healthcare provider about these options  · Bladder control:  After surgery, you may have problems controlling when you urinate  Ask your healthcare provider about pads and liners that absorb urine while you recover  · Constipation:  You may have problems having bowel movements during your recovery   Ask your healthcare provider about diet changes if you are having problems with constipation  Contact your healthcare provider or uro-oncologist if:   · You have a fever  · Your pain is getting worse, even with medicine  · You have an incision that is red, swollen, or has pus coming from it  · You are urinating less than usual      · You have trouble urinating or having a bowel movement  · You have questions or concerns about your condition or care  Seek care immediately or call 911 if:   · You have more blood in your urine than you were told to expect, or you pass a blood clot  · You have an upset stomach or are vomiting  · You have painful swelling in your abdomen that does not go away  · You suddenly feel lightheaded and short of breath  · You have chest pain when you take a deep breath or cough  You cough up blood  · Your arm or leg feels warm, tender, and painful  It may look swollen and red  © Copyright [employer] 2018 Information is for End User's use only and may not be sold, redistributed or otherwise used for commercial purposes  All illustrations and images included in CareNotes® are the copyrighted property of A D A M , Inc  or [facility]   The above information is an  only  It is not intended as medical advice for individual conditions or treatments  Talk to your doctor, nurse or pharmacist before following any medical regimen to see if it is safe and effective for you

## 2021-07-29 ENCOUNTER — TELEPHONE (OUTPATIENT)
Dept: UROLOGY | Facility: CLINIC | Age: 61
End: 2021-07-29

## 2021-07-29 VITALS
HEIGHT: 67 IN | WEIGHT: 162 LBS | HEART RATE: 59 BPM | RESPIRATION RATE: 12 BRPM | TEMPERATURE: 98.1 F | SYSTOLIC BLOOD PRESSURE: 105 MMHG | DIASTOLIC BLOOD PRESSURE: 56 MMHG | OXYGEN SATURATION: 98 % | BODY MASS INDEX: 25.43 KG/M2

## 2021-07-29 LAB
ANION GAP SERPL CALCULATED.3IONS-SCNC: 6 MMOL/L (ref 4–13)
BUN SERPL-MCNC: 11 MG/DL (ref 5–25)
CALCIUM SERPL-MCNC: 8.4 MG/DL (ref 8.3–10.1)
CHLORIDE SERPL-SCNC: 108 MMOL/L (ref 100–108)
CO2 SERPL-SCNC: 26 MMOL/L (ref 21–32)
CREAT SERPL-MCNC: 0.84 MG/DL (ref 0.6–1.3)
ERYTHROCYTE [DISTWIDTH] IN BLOOD BY AUTOMATED COUNT: 13.2 % (ref 11.6–15.1)
GFR SERPL CREATININE-BSD FRML MDRD: 94 ML/MIN/1.73SQ M
GLUCOSE SERPL-MCNC: 79 MG/DL (ref 65–140)
HCT VFR BLD AUTO: 38.9 % (ref 36.5–49.3)
HGB BLD-MCNC: 12.5 G/DL (ref 12–17)
MCH RBC QN AUTO: 30.9 PG (ref 26.8–34.3)
MCHC RBC AUTO-ENTMCNC: 32.1 G/DL (ref 31.4–37.4)
MCV RBC AUTO: 96 FL (ref 82–98)
PLATELET # BLD AUTO: 231 THOUSANDS/UL (ref 149–390)
PMV BLD AUTO: 9.5 FL (ref 8.9–12.7)
POTASSIUM SERPL-SCNC: 3.8 MMOL/L (ref 3.5–5.3)
RBC # BLD AUTO: 4.05 MILLION/UL (ref 3.88–5.62)
SODIUM SERPL-SCNC: 140 MMOL/L (ref 136–145)
WBC # BLD AUTO: 8.66 THOUSAND/UL (ref 4.31–10.16)

## 2021-07-29 PROCEDURE — 80048 BASIC METABOLIC PNL TOTAL CA: CPT | Performed by: UROLOGY

## 2021-07-29 PROCEDURE — 99024 POSTOP FOLLOW-UP VISIT: CPT | Performed by: PHYSICIAN ASSISTANT

## 2021-07-29 PROCEDURE — NC001 PR NO CHARGE: Performed by: PHYSICIAN ASSISTANT

## 2021-07-29 PROCEDURE — 85027 COMPLETE CBC AUTOMATED: CPT | Performed by: UROLOGY

## 2021-07-29 RX ORDER — OXYBUTYNIN CHLORIDE 5 MG/1
5 TABLET ORAL 2 TIMES DAILY PRN
Qty: 28 TABLET | Refills: 0 | Status: SHIPPED | OUTPATIENT
Start: 2021-07-29 | End: 2021-08-12

## 2021-07-29 RX ORDER — BACITRACIN, NEOMYCIN, POLYMYXIN B 400; 3.5; 5 [USP'U]/G; MG/G; [USP'U]/G
OINTMENT TOPICAL 2 TIMES DAILY PRN
Qty: 15 G | Refills: 0 | Status: SHIPPED | OUTPATIENT
Start: 2021-07-29

## 2021-07-29 RX ADMIN — ACETAMINOPHEN 650 MG: 325 TABLET, FILM COATED ORAL at 11:35

## 2021-07-29 RX ADMIN — DONEPEZIL HYDROCHLORIDE 10 MG: 10 TABLET ORAL at 08:39

## 2021-07-29 RX ADMIN — BRIMONIDINE TARTRATE 1 DROP: 2 SOLUTION OPHTHALMIC at 15:14

## 2021-07-29 RX ADMIN — BRIMONIDINE TARTRATE 1 DROP: 2 SOLUTION OPHTHALMIC at 08:39

## 2021-07-29 RX ADMIN — KETOROLAC TROMETHAMINE 15 MG: 30 INJECTION, SOLUTION INTRAMUSCULAR at 11:35

## 2021-07-29 RX ADMIN — ACETAMINOPHEN 650 MG: 325 TABLET, FILM COATED ORAL at 05:15

## 2021-07-29 RX ADMIN — MEMANTINE 5 MG: 5 TABLET ORAL at 08:39

## 2021-07-29 RX ADMIN — SENNOSIDES 8.6 MG: 8.6 TABLET ORAL at 08:39

## 2021-07-29 RX ADMIN — GABAPENTIN 100 MG: 100 CAPSULE ORAL at 08:39

## 2021-07-29 RX ADMIN — GABAPENTIN 100 MG: 100 CAPSULE ORAL at 15:14

## 2021-07-29 RX ADMIN — ENOXAPARIN SODIUM 40 MG: 40 INJECTION SUBCUTANEOUS at 08:39

## 2021-07-29 RX ADMIN — DOCUSATE SODIUM 100 MG: 100 CAPSULE ORAL at 08:38

## 2021-07-29 RX ADMIN — KETOROLAC TROMETHAMINE 15 MG: 30 INJECTION, SOLUTION INTRAMUSCULAR at 05:15

## 2021-07-29 RX ADMIN — BACITRACIN, NEOMYCIN, POLYMYXIN B 1 SMALL APPLICATION: 400; 3.5; 5 OINTMENT TOPICAL at 08:39

## 2021-07-29 NOTE — TELEPHONE ENCOUNTER
RALP postop seeing me  August 18th     could you schedule nurse visit for Bower removal 2 weeks postop, a few days before the office visit

## 2021-07-29 NOTE — PROGRESS NOTES
Progress Note - Urology  Yaima Klein 57 y o  male MRN: [id number]  Unit/Bed#: [facility] 313-01 Encounter: [id number]    Assessment & Plan:    Prostate cancer:  -postop day 1 radical robotic prostatectomy with laparoscopic lysis of lesions, progressing well  -pain currently well tolerated  -tolerating advanced diet  -encourage ambulation, discussed with RN and patient  -encourage incentive spirometer  -no leukocytosis  -creatinine 0 84  -patient currently afebrile  -hemoglobin stable at 12 5  -urethral Bower catheter to remain in place  Patient will have voiding trial outpatient in 2 weeks along with pathology discussion with  attending  -JUAN LUIS drain with approximately 50 cc over 24 hours, will remove prior to discharge  -patient be re-evaluated this afternoon for possible discharge  Subjective/Objective   Chief Complaint:  None    Subjective:   Patient currently reporting that he is doing well  He reports that he is tolerating a diet  Reports that his pain is well controlled  Denies passing flatus at this time  Objective:     Blood pressure 128/67, pulse 75, temperature 98 3 °F (36 8 °C), temperature source Oral, resp  rate 13, height 5' 7" (1 702 m), weight 73 5 kg (162 lb), SpO2 98 %  ,Body mass index is 25 37 kg/m²  Intake/Output Summary (Last 24 hours) at 7/29/2021 1233  Last data filed at 7/29/2021 1101  Gross per 24 hour   Intake 2527 08 ml   Output 2935 ml   Net -407 92 ml       Invasive Devices     Peripheral Intravenous Line            Peripheral IV 07/28/21 Left;Dorsal (posterior) Forearm 1 day    Peripheral IV 07/28/21 Right Hand 1 day          Drain            Closed/Suction Drain Left Abdomen Bulb 10 Fr  1 day    Urethral Catheter Latex 20 Fr  1 day                Physical Exam  Constitutional:       General: He is not in acute distress  Appearance: He is normal weight  He is not ill-appearing, toxic-appearing or diaphoretic  HENT:      Head: Normocephalic and atraumatic  Right Ear: External ear normal       Left Ear: External ear normal       Nose: Nose normal       Mouth/Throat:      Pharynx: Oropharynx is clear  Eyes:      General: No scleral icterus  Conjunctiva/sclera: Conjunctivae normal    Cardiovascular:      Rate and Rhythm: Normal rate and regular rhythm  Pulses: Normal pulses  Heart sounds: Normal heart sounds  No murmur heard  No friction rub  No gallop  Pulmonary:      Effort: Pulmonary effort is normal  No respiratory distress  Breath sounds: Normal breath sounds  No wheezing, rhonchi or rales  Abdominal:      General: Bowel sounds are normal  There is distension  Palpations: Abdomen is soft  Tenderness: There is abdominal tenderness  There is no right CVA tenderness  Comments: Surgical incisions intact, dry, clean  No sign of overlying infection or wound dehiscence  Abdomen is soft  Mild tenderness around incision sites  Tympanic to percussion upper quadrants bilaterally  Mild distention  JUAN LUIS drain in place with serosanguineous fluid  Genitourinary:     Comments: Urethral Bower catheter placed draining clear yellow urine  Musculoskeletal:         General: Normal range of motion  Cervical back: Normal range of motion  Skin:     General: Skin is warm and dry  Neurological:      General: No focal deficit present  Mental Status: He is alert and oriented to person, place, and time  Psychiatric:         Mood and Affect: Mood normal          Behavior: Behavior normal          Thought Content: Thought content normal          Judgment: Judgment normal            Lab, Imaging and other studies:I have personally reviewed pertinent lab results      Lab Results   Component Value Date    WBC 8 66 07/29/2021    HGB 12 5 07/29/2021    HCT 38 9 07/29/2021    MCV 96 07/29/2021     07/29/2021     Lab Results   Component Value Date    SODIUM 140 07/29/2021    K 3 8 07/29/2021     07/29/2021    CO2 26 07/29/2021 BUN 11 07/29/2021    CREATININE 0 84 07/29/2021    GLUC 79 07/29/2021    CALCIUM 8 4 07/29/2021       VTE Pharmacologic Prophylaxis: Enoxaparin (Lovenox)  VTE Mechanical Prophylaxis: sequential compression device      Jameson Ferro PA-C

## 2021-07-29 NOTE — DISCHARGE SUMMARY
Discharge Summary - Susi Lopez 57 y o  male MRN: [id number]    Unit/Bed#: [facility] 313-01 Encounter: [id number]    Admission Date: 07/28/2021    Admitting Diagnosis: Prostate cancer ([address] ) Lorne Sever    HPI:   Patient is a 57-year-old male with prostate cancer with disease progression on active surveillance  He was originally diagnosed with [place] 6 disease in 2 of 12 cores in April 2020  He had a multi parametric MRI in July of 2020 which showed no radiographically detectable disease  Pretreatment PSA 5 6  Repeat pathology revealed [place] 4+3=7 in 2 of 12 total cores, intraductal phenotype is noted   attending discussed options regarding prostate cancer  Patient decided to move forward with radical prostatectomy  Procedures Performed:  Robotic radical prostatectomy, laparoscopic lysis of adhesion with Dr Dario Pickard    Summary of Hospital Course:   Patient presented to the hospital on 07/28/2021  He presented to the OR holding and underwent a right robotic radical prostatectomy with Dr Dario Pickard  Postoperatively patient was doing well  He was taken to medical-surgical floor for observation overnight  Postop day 1 patient was ambulating, tolerating advanced diet, vital signs and lab results were within normal limits  JUAN LUIS drain placed out approximately 50 cc in 24 hours  Patient was deemed stable for discharge  Significant Findings, Care, Treatment and Services Provided:  None    Complications:  None    Discharge Diagnosis:  Prostate cancer      Condition at Discharge: stable         Discharge instructions/Information to patient and family:   See after visit summary for information provided to patient and family  Provisions for Follow-Up Care:  See after visit summary for information related to follow-up care and any pertinent home health orders        PCP: Derrick Campos MD    Disposition: Home    Planned Readmission: No      Discharge Statement   I spent 20 minutes discharging the patient  This time was spent on the day of discharge  I had direct contact with the patient on the day of discharge  Additional documentation is required if more than 30 minutes were spent on discharge  Discharge Medications:  See after visit summary for reconciled discharge medications provided to patient and family         Alysa Mensah PA-C

## 2021-07-30 ENCOUNTER — NURSE TRIAGE (OUTPATIENT)
Dept: OTHER | Facility: OTHER | Age: 61
End: 2021-07-30

## 2021-07-30 NOTE — TELEPHONE ENCOUNTER
Regarding: Drainage bag full / unsure of what to do next   ----- Message from [facility] CLARISSA ENNIS sent at 7/30/2021  1:28 PM EDT -----  "Im having issues with the Drainage bag, and i put the last one in last night and the other bag, how do i get more bags, do i clean these or what do i do?  This one is like full, so now what?"

## 2021-07-30 NOTE — TELEPHONE ENCOUNTER
Post Op Note    Alejandro Lee is a 57 y o  male s/p ROBOTIC LAPAROSCOPIC RADICAL PROSTATECTOMY (N/A Abdomen)       LYSIS ADHESIONS, LAPAROSCOPIC performed 7/28/21  Alejandro Lee is a patient of Dr Sabi Rouse and is seen at the [facility] office  How would you rate your pain on a scale from 1 to 10, 10 being the worst pain ever? 4  Have you had a fever? No  Have your bowel movements been regular? Yes  Do you have any difficulty urinating? NA soto  If the patient has an incision- do you have any redness around the incision or any drainage from the incision? No , patient had small amount of blood coming from one incision last night, that has since stopped  If the patient has a drain- are you having any issues with the drain? NA  If the patient has a soto- are you comfortable caring for your soto? Yes Is it draining urine? Yes    Do you have any other questions or concerns that I can address at this time? Reviewed medications he was discharged with, pain control, and catheter care  He reports they did not give any extra catheter bags  Reviewed they can come to the office for extras, I will leave them up front for him  Also reviewed use of plug and advised I will put one in the bag as well  Reviewed incontinence is expected post op and patient should bring pad or depend along to nurse appt  Soto removal appt scheduled for 8/11/21 and post op changed to 8/30/21  Patient confirmed both appts  He knows to call in the meantime with any issues, questions or concerns

## 2021-07-30 NOTE — TELEPHONE ENCOUNTER
Pt says he already spoke with someone from the office and was told how to empty soto bag and was also given more supplies that he was able to  in the office

## 2021-07-31 LAB
ABO GROUP BLD BPU: NORMAL
ABO GROUP BLD BPU: NORMAL
BPU ID: NORMAL
BPU ID: NORMAL
CROSSMATCH: NORMAL
CROSSMATCH: NORMAL
UNIT DISPENSE STATUS: NORMAL
UNIT DISPENSE STATUS: NORMAL
UNIT PRODUCT CODE: NORMAL
UNIT PRODUCT CODE: NORMAL
UNIT RH: NORMAL
UNIT RH: NORMAL

## 2021-08-10 ENCOUNTER — TELEPHONE (OUTPATIENT)
Dept: OTHER | Facility: OTHER | Age: 61
End: 2021-08-10

## 2021-08-10 NOTE — TELEPHONE ENCOUNTER
I have made 2 attempts to contact them and have been on hold each time for 20 minutes  I will make another attempt tomorrow, if they are to call this information can be confirmed  Patient had Robotic Prostatectomy on 7/28/21 at [address] [place] [address] with Dr Aisha Todd and his out of work date was 7/26/21 in preparation of procedure  Thank you

## 2021-08-10 NOTE — TELEPHONE ENCOUNTER
Patient's short term disability insurance company called regarding needs to have a verbal verification the date of surgery 07/28/21, type of surgery done and confirm the date 07/26/21  stopped him from reporting to work       Claim# [id number]

## 2021-08-11 ENCOUNTER — PROCEDURE VISIT (OUTPATIENT)
Dept: UROLOGY | Facility: CLINIC | Age: 61
End: 2021-08-11

## 2021-08-11 VITALS
WEIGHT: 160 LBS | BODY MASS INDEX: 25.11 KG/M2 | HEIGHT: 67 IN | DIASTOLIC BLOOD PRESSURE: 88 MMHG | HEART RATE: 88 BPM | SYSTOLIC BLOOD PRESSURE: 152 MMHG

## 2021-08-11 DIAGNOSIS — C61 PROSTATE CANCER (HCC): Primary | ICD-10-CM

## 2021-08-11 PROCEDURE — 99024 POSTOP FOLLOW-UP VISIT: CPT | Performed by: UROLOGY

## 2021-08-11 NOTE — PROGRESS NOTES
8/11/2021    [facility]  1960  [id number]    Diagnosis  Chief Complaint     Prostate Cancer          Patient presents for soto catheter removal and kegal teaching managed by Dr Alex White is to follow up as scheduled with Dr Devyn Fernandes on 8/30  Procedure Soto removal and Kegal teaching  Soto catheter removed after deflation of an intact balloon  Patient tolerated well  Encouraged patient to hydrate well  Provided kegal exercise teaching, provided kegal paperwork  Patient and wife able to verbalize understanding           Vitals:    08/11/21 0940   BP: 152/88   BP Location: Right arm   Patient Position: Sitting   Cuff Size: Standard   Pulse: 88   Weight: 72 6 kg (160 lb)   Height: 5' 7" (1 702 m)           Elder Hatch RN

## 2021-08-13 NOTE — TELEPHONE ENCOUNTER
I returned Treasure's call to inform her that [employer] spoke with Dwight García from [employer] yesterday and confirmed all information that was requested  There was no answer when I called so I did leave the information on her voicemail and I instructed her to call our office back on Monday when Huixiaoer returns if she needs any other information

## 2021-08-13 NOTE — TELEPHONE ENCOUNTER
Patient's wife would like a call back to verify that everything has been confirmed and verified with [employer]

## 2021-08-30 ENCOUNTER — OFFICE VISIT (OUTPATIENT)
Dept: UROLOGY | Facility: CLINIC | Age: 61
End: 2021-08-30

## 2021-08-30 VITALS
SYSTOLIC BLOOD PRESSURE: 120 MMHG | HEART RATE: 84 BPM | WEIGHT: 160.4 LBS | DIASTOLIC BLOOD PRESSURE: 62 MMHG | BODY MASS INDEX: 25.18 KG/M2 | HEIGHT: 67 IN

## 2021-08-30 DIAGNOSIS — C61 PROSTATE CANCER (HCC): Primary | ICD-10-CM

## 2021-08-30 PROCEDURE — 99024 POSTOP FOLLOW-UP VISIT: CPT | Performed by: UROLOGY

## 2021-08-30 PROCEDURE — 3008F BODY MASS INDEX DOCD: CPT | Performed by: FAMILY MEDICINE

## 2021-08-30 RX ORDER — LATANOPROST 50 UG/ML
SOLUTION/ DROPS OPHTHALMIC
COMMUNITY
Start: 2021-07-26

## 2021-08-30 RX ORDER — BRIMONIDINE TARTRATE 0.1 %
DROPS OPHTHALMIC (EYE)
COMMUNITY
Start: 2021-07-26

## 2021-08-30 NOTE — PROGRESS NOTES
Referring Physician: Terry Lovett MD  A copy of this note was sent to the referring physician  Diagnoses and all orders for this visit:    Prostate cancer ([facility] [address] )  -     PSA, Total Screen; Future  -     PSA, Total Screen; Future    Other orders  -     Alphagan P 0 1 %; INSTILL 1 DROP IN BOTH EYES TWICE DAILY  -     latanoprost (XALATAN) 0 005 % ophthalmic solution; PLACE 1 DROP IN BOTH EYES DAILY AT BEDTIME            Assessment and plan:       1  [place] 7, intraductal, prostate cancer status post robotic prostatectomy  - pT3a  Soheila 4+3=7, intraductal, extracapsular extension, negative surgical margins   -pretreatment PSA:  5 6      His recovery has been uneventful and he appears to be doing well at this time  Today we discussed the patient's pathology report from surgery and the implications for short and long-term cancer control were discussed  We discussed expected post-prostatectomy urinary incontinence and erectile dysfunction  We encouraged the patient to initiate Kegel exercises to promote return of bladder control  We reviewed penile rehabilitation strategies, including the use of Oral PDE5 inhibitors and vacuum erection device, which will be further addressed at the patient's follow up visit  We discussed potential side effects associated with these interventions  patient will complete his 1st postoperative PSA in 2 weeks  He will then follow up for a office visit in 2 months time with additional PSA prior to that  Will then continue with q 3 months PSA/office visits for the next year  Daina Tomlin MD      Chief Complaint       Prostatectomy follow-up      History of Present Illness     Catarina Figueroa is a 57 y o  Male returns in follow-up status post recent robotic prostatectomy  He tolerated surgery very well  He had no pain  He took no pain medication  He has no complaints the present time    He presents accompanied by his wife    Detailed Urologic History     - please refer to HPI    Review of Systems     Review of Systems   Constitutional: Negative for activity change and fatigue  HENT: Negative for congestion  Eyes: Negative for visual disturbance  Respiratory: Negative for shortness of breath and wheezing  Cardiovascular: Negative for chest pain and leg swelling  Gastrointestinal: Negative for abdominal pain  Genitourinary: Negative for dysuria, flank pain, hematuria and urgency  Musculoskeletal: Negative for back pain  Allergic/Immunologic: Negative for immunocompromised state  Neurological: Negative for dizziness and numbness  Psychiatric/Behavioral: Negative for dysphoric mood  All other systems reviewed and are negative  Allergies     Allergies   Allergen Reactions    Sulfa Antibiotics Other (See Comments)     Mother told him as child not to take       Physical Exam     Physical Exam  Constitutional:       General: He is not in acute distress  Appearance: He is well-developed  HENT:      Head: Normocephalic and atraumatic  Cardiovascular:      Comments: Negative lower extremity edema  Pulmonary:      Effort: Pulmonary effort is normal       Breath sounds: Normal breath sounds  Abdominal:      Palpations: Abdomen is soft  Genitourinary:     Comments: Negative CVA tenderness, no abdominal incisions  Musculoskeletal:         General: Normal range of motion  Cervical back: Normal range of motion  Skin:     General: Skin is warm  Neurological:      Mental Status: He is alert and oriented to person, place, and time     Psychiatric:         Behavior: Behavior normal         all surgical incisions are well healed, no palpable hernia      Vital Signs  Vitals:    08/30/21 1335   BP: 120/62   BP Location: Left arm   Patient Position: Sitting   Cuff Size: Adult   Pulse: 84   Weight: 72 8 kg (160 lb 6 4 oz)   Height: 5' 7" (1 702 m)         Current Medications       Current Outpatient Medications:    acetaminophen (TYLENOL) 325 mg tablet, Take 2 tablets (650 mg total) by mouth every 4 (four) hours as needed for mild pain, Disp: 30 tablet, Rfl: 0    Alphagan P 0 1 %, INSTILL 1 DROP IN BOTH EYES TWICE DAILY, Disp: , Rfl:     ALPHAGAN P 0 15 % ophthalmic solution, , Disp: , Rfl:     Cetirizine HCl (ZYRTEC ALLERGY PO), Take 1 tablet by mouth daily as needed , Disp: , Rfl:     docusate sodium (COLACE) 100 mg capsule, Take 1 capsule (100 mg total) by mouth 2 (two) times a day for 15 days, Disp: 30 capsule, Rfl: 0    donepezil (ARICEPT) 10 mg tablet, Take 1 tablet (10 mg total) by mouth daily, Disp: 90 tablet, Rfl: 3    escitalopram (LEXAPRO) 10 mg tablet, Take 1 tablet (10 mg total) by mouth daily, Disp: 30 tablet, Rfl: 3    Lactobacillus (PROBIOTIC ACIDOPHILUS) CAPS, Take by mouth once, Disp: , Rfl:     latanoprost (XALATAN) 0 005 % ophthalmic solution, PLACE 1 DROP IN BOTH EYES DAILY AT BEDTIME, Disp: , Rfl:     LUMIGAN 0 01 % ophthalmic drops, , Disp: , Rfl:     memantine (NAMENDA) 10 mg tablet, Take 0 5 tablets (5 mg total) by mouth 2 (two) times a day, Disp: 60 tablet, Rfl: 3    naproxen (NAPROSYN) 500 mg tablet, Take 1 tablet (500 mg total) by mouth 2 (two) times a day with meals for 5 days For pain, Disp: 10 tablet, Rfl: 0    neomycin-bacitracin-polymyxin b (NEOSPORIN) ointment, Apply topically 2 (two) times a day as needed (Catheter irritation), Disp: 15 g, Rfl: 0    oxybutynin (DITROPAN) 5 mg tablet, Take 1 tablet (5 mg total) by mouth 2 (two) times a day as needed (Bladder spasms) for up to 14 days, Disp: 28 tablet, Rfl: 0    pantoprazole (PROTONIX) 20 mg tablet, Take 1 tablet (20 mg total) by mouth daily for 30 doses, Disp: 30 tablet, Rfl: 3      Active Problems     Patient Active Problem List   Diagnosis    Allergic rhinitis    Glaucoma    Prediabetes    Sciatica    Short-term memory loss    Mild cognitive impairment    Abnormal EEG    Increased prostate specific antigen (PSA) velocity    Right hip pain    Lumbar spondylosis    Primary localized osteoarthritis of right hip    Enlarged prostate    Screening for prostate cancer    Bilateral leg weakness    Left hip pain    Breast mass in male    Furuncle of chest wall    Cellulitis    Gastroesophageal reflux disease    S/P hip replacement, bilateral    Prostate cancer [facility])         Past Medical History     Past Medical History:   Diagnosis Date    Benign colon polyp     Diverticulitis, colon     Diverticulosis     Hyperlipidemia     Prediabetes     Renal calculi     Short-term memory loss     Vitamin D deficiency          Surgical History     Past Surgical History:   Procedure Laterality Date    ABDOMINAL ADHESION SURGERY N/A 2021    Procedure: LYSIS ADHESIONS, LAPAROSCOPIC;  Surgeon: Zachariah Lujan MD;  Location: [facility];  Service: Urology    COLONOSCOPY      ESOPHAGOGASTRODUODENOSCOPY      HERNIA REPAIR      [facility]    KNEE ARTHROSCOPY      KNEE SURGERY      NC LAP,PROSTATECTOMY,RADICAL,W/NERVE SPARE,INCL ROBOTIC N/A 2021    Procedure: ROBOTIC LAPAROSCOPIC RADICAL PROSTATECTOMY;  Surgeon: Zachariah Lujan MD;  [facility];  Service: Urology    PROSTATE BIOPSY      TOTAL HIP ARTHROPLASTY Right 2019    TOTAL HIP ARTHROPLASTY Left 2019         Family History     Family History   Problem Relation Age of Onset    Dementia Mother     Glaucoma Mother     Glaucoma Father     Prostate cancer Father     Pulmonary embolism Father     Hypertension Brother     Prostate cancer Brother     Colon cancer Maternal Grandfather          Social History     Social History     Social History     Tobacco Use   Smoking Status Former Smoker    Types: Cigarettes    Quit date: 12    Years since quittin 6   Smokeless Tobacco Never Used   Tobacco Comment    patient states he doesnt remember how much or for how long         Pertinent Lab Values     Lab Results   Component Value Date    CREATININE 0 84 07/29/2021       Lab Results   Component Value Date    PSA 5 6 (H) 12/07/2020    PSA 3 9 07/28/2020    PSA 4 3 (H) 01/06/2020       @RESULTRCNT(1H])@      Pertinent Imaging      - n/a    Portions of the record may have been created with voice recognition software   Occasional wrong word or "sound a like" substitutions may have occurred due to the inherent limitations of voice recognition software   Read the chart carefully and recognize, using context, where substitutions have occurred

## 2021-09-02 ENCOUNTER — TELEPHONE (OUTPATIENT)
Dept: UROLOGY | Facility: MEDICAL CENTER | Age: 61
End: 2021-09-02

## 2021-09-02 NOTE — TELEPHONE ENCOUNTER
Pt manage by Dr Lux,pt's wife called in need of Return to Work note,she would like to  at office ,she believes physician said 9/7 he could return,please verify and contact her directly when ready [phone]

## 2021-09-02 NOTE — LETTER
September 2, 2021     Patient: Orion Leach   YOB: 1960   Date of Visit: 9/2/2021       To Whom it May Concern:    Jose Cough is under my professional care  He had surgery on 7/28/2021 He was seen in my office on 9/2/2021  He may return to work on 9/7/2021 without restrictions  If you have any questions or concerns, please don't hesitate to call           Sincerely,      Vicki Medina MD PhD

## 2021-09-02 NOTE — TELEPHONE ENCOUNTER
Called and spoke with patients wife  Informed her that a return to work letter was finished for patient  Patients wife plans to come tomorrow to pick it up

## 2021-10-19 ENCOUNTER — TELEPHONE (OUTPATIENT)
Dept: UROLOGY | Facility: MEDICAL CENTER | Age: 61
End: 2021-10-19

## 2021-10-20 ENCOUNTER — APPOINTMENT (OUTPATIENT)
Dept: LAB | Facility: CLINIC | Age: 61
End: 2021-10-20
Payer: COMMERCIAL

## 2021-10-20 DIAGNOSIS — C61 PROSTATE CANCER (HCC): ICD-10-CM

## 2021-10-20 LAB — PSA SERPL-MCNC: <0.1 NG/ML (ref 0–4)

## 2021-10-20 PROCEDURE — 36415 COLL VENOUS BLD VENIPUNCTURE: CPT

## 2021-10-20 PROCEDURE — G0103 PSA SCREENING: HCPCS

## 2021-11-03 ENCOUNTER — OFFICE VISIT (OUTPATIENT)
Dept: UROLOGY | Facility: CLINIC | Age: 61
End: 2021-11-03
Payer: COMMERCIAL

## 2021-11-03 VITALS
BODY MASS INDEX: 27 KG/M2 | SYSTOLIC BLOOD PRESSURE: 132 MMHG | WEIGHT: 172 LBS | HEIGHT: 67 IN | HEART RATE: 88 BPM | DIASTOLIC BLOOD PRESSURE: 74 MMHG

## 2021-11-03 DIAGNOSIS — N52.31 ERECTILE DYSFUNCTION AFTER RADICAL PROSTATECTOMY: ICD-10-CM

## 2021-11-03 DIAGNOSIS — C61 PROSTATE CANCER (HCC): Primary | ICD-10-CM

## 2021-11-03 PROCEDURE — 3008F BODY MASS INDEX DOCD: CPT | Performed by: PHYSICIAN ASSISTANT

## 2021-11-03 PROCEDURE — 1036F TOBACCO NON-USER: CPT | Performed by: PHYSICIAN ASSISTANT

## 2021-11-03 PROCEDURE — 99213 OFFICE O/P EST LOW 20 MIN: CPT | Performed by: PHYSICIAN ASSISTANT

## 2021-11-03 RX ORDER — TADALAFIL 5 MG/1
5 TABLET ORAL DAILY PRN
Qty: 30 TABLET | Refills: 6 | Status: SHIPPED | OUTPATIENT
Start: 2021-11-03 | End: 2022-02-10

## 2021-11-05 ENCOUNTER — TELEPHONE (OUTPATIENT)
Dept: UROLOGY | Facility: CLINIC | Age: 61
End: 2021-11-05

## 2021-11-10 DIAGNOSIS — G30.0 EARLY ONSET ALZHEIMER'S DEMENTIA WITHOUT BEHAVIORAL DISTURBANCE (HCC): ICD-10-CM

## 2021-11-10 DIAGNOSIS — F02.80 EARLY ONSET ALZHEIMER'S DEMENTIA WITHOUT BEHAVIORAL DISTURBANCE (HCC): ICD-10-CM

## 2021-11-10 RX ORDER — ESCITALOPRAM OXALATE 10 MG/1
TABLET ORAL
Qty: 30 TABLET | Refills: 3 | Status: SHIPPED | OUTPATIENT
Start: 2021-11-10 | End: 2022-03-31

## 2021-12-01 ENCOUNTER — TELEPHONE (OUTPATIENT)
Dept: UROLOGY | Facility: CLINIC | Age: 61
End: 2021-12-01

## 2021-12-15 ENCOUNTER — TELEPHONE (OUTPATIENT)
Dept: NEUROLOGY | Facility: CLINIC | Age: 61
End: 2021-12-15

## 2021-12-22 ENCOUNTER — IMMUNIZATIONS (OUTPATIENT)
Dept: FAMILY MEDICINE CLINIC | Facility: HOSPITAL | Age: 61
End: 2021-12-22

## 2021-12-22 DIAGNOSIS — Z23 ENCOUNTER FOR IMMUNIZATION: Primary | ICD-10-CM

## 2021-12-22 PROCEDURE — 91306 COVID-19 MODERNA VACC 0.25 ML BOOSTER: CPT

## 2021-12-22 PROCEDURE — 0064A COVID-19 MODERNA VACC 0.25 ML BOOSTER: CPT

## 2021-12-27 ENCOUNTER — IOP CHECK (OUTPATIENT)
Dept: URBAN - METROPOLITAN AREA CLINIC 6 | Facility: CLINIC | Age: 61
End: 2021-12-27

## 2021-12-27 DIAGNOSIS — H40.1131: ICD-10-CM

## 2021-12-27 PROCEDURE — 92012 INTRM OPH EXAM EST PATIENT: CPT

## 2021-12-27 PROCEDURE — G8427 DOCREV CUR MEDS BY ELIG CLIN: HCPCS

## 2021-12-27 PROCEDURE — 1036F TOBACCO NON-USER: CPT

## 2021-12-27 PROCEDURE — 92020 GONIOSCOPY: CPT

## 2021-12-27 ASSESSMENT — VISUAL ACUITY
OS_CC: 20/25-2
OD_CC: 20/30-1

## 2021-12-27 ASSESSMENT — TONOMETRY
OS_IOP_MMHG: 18
OD_IOP_MMHG: 19

## 2022-01-21 ENCOUNTER — OFFICE VISIT (OUTPATIENT)
Dept: NEUROLOGY | Facility: CLINIC | Age: 62
End: 2022-01-21
Payer: COMMERCIAL

## 2022-01-21 VITALS
HEIGHT: 67 IN | HEART RATE: 73 BPM | OXYGEN SATURATION: 98 % | DIASTOLIC BLOOD PRESSURE: 72 MMHG | WEIGHT: 170.4 LBS | SYSTOLIC BLOOD PRESSURE: 120 MMHG | BODY MASS INDEX: 26.74 KG/M2

## 2022-01-21 DIAGNOSIS — G30.0 EARLY ONSET ALZHEIMER'S DEMENTIA WITHOUT BEHAVIORAL DISTURBANCE (HCC): ICD-10-CM

## 2022-01-21 DIAGNOSIS — F02.80 EARLY ONSET ALZHEIMER'S DEMENTIA WITHOUT BEHAVIORAL DISTURBANCE (HCC): ICD-10-CM

## 2022-01-21 PROCEDURE — 99214 OFFICE O/P EST MOD 30 MIN: CPT

## 2022-01-21 PROCEDURE — 3008F BODY MASS INDEX DOCD: CPT

## 2022-01-21 PROCEDURE — 1036F TOBACCO NON-USER: CPT

## 2022-01-21 RX ORDER — DONEPEZIL HYDROCHLORIDE 10 MG/1
10 TABLET, FILM COATED ORAL DAILY
Qty: 90 TABLET | Refills: 3 | Status: SHIPPED | OUTPATIENT
Start: 2022-01-21

## 2022-01-21 RX ORDER — MEMANTINE HYDROCHLORIDE 10 MG/1
10 TABLET ORAL 2 TIMES DAILY
Qty: 60 TABLET | Refills: 3 | Status: SHIPPED | OUTPATIENT
Start: 2022-01-21 | End: 2022-04-25 | Stop reason: SDUPTHER

## 2022-01-21 NOTE — PROGRESS NOTES
Patient ID: Drew Bazan is a 57 y o  male  Assessment/Plan:    Early onset Alzheimer's dementia without behavioral disturbance ([facility] [address] )  Anh Herrera is a 57year old male known to the practice for cognitive dysfunction felt to be secondary to early onset of Alzheimer's disease beginning almost 3 years ago  He is maintained on Namenda 10 mg twice daily and Aricept 10 mg daily, as well as Lexapro 10 mg daily  Over the last 6 months his wife feels his memory has been much worse  He continues to forget names, loses his train of thought easily while speaking, and is having difficulty at work  He works as a water meter serviceman  He is no longer permitted to drive at work and is under constant supervision while working  He did have an episode while working where he wandered off, however he denies this ever happened  As such, his wife is starting the process of looking into social security disability  He tells me he is still driving locally to the grocery store, and back and fourth to work independently and does not get lost  He denies any motor vehicle accidents  He is having difficulty with attention and concentration  He can no longer manage his finances  He is independent with all ADLs without reminders  His wife does the majority of the cooking, but he always takes care of the clean up  He takes the dogs on walks and has never gotten lost  He is sleeping adequately and has a good appetite  His mood is very liable  He becomes very easily agitated and frustrated especially if he cannot recall something, or is questioned about his memory  His wife feels his mood is better since being on Lexapro compared to baseline but feels he has been more agitated over the last 6 months with the decline in his memory  Since increasing Namenda to 10 mg bid in June 2021, they have not seen a difference in his memory  Today he declined to participate in [facility] testing, becoming very frustrated after the first question   He tells me he gets frustrated because he knows the answers or how to do something but it takes a while for "it to come to him"  On neurological examination patient is alert, awake, oriented and in no distress  He is able to correctly identify his name and date of birth (although he did have to pause before telling me his date of birth)  He correctly states today's date, month, and year  He incorrectly tells me the day of the week is "the 21st", after I prompted him again with the same question he states "January", after further prompting and providing multiple choice Friday, Monday or Saturday, he still answers "the 21st"  He correctly identified the POTUS, can read, identify an object "pen" and it's purpose as "write"  He can repeat and read  He correctly recalls what he had for dinner last night  He is unable to name different kinds of fruit and can only recall banana  When showing him 3 images of animals he correctly identifies 2/3, stating the picture of the kangaroo is a racoon  He refused to participate in further memory testing such as delayed word recall, serial subtraction, abstraction, clock drawing, or copying of any image  The rest of his neurologic exam is normal although he did have significant difficulty with multi step commands, as simple as touch your right hand to your left ear or touch your right finger to your nose  At this time it was recommended he continue on Namenda 10 mg twice daily, donepezil 10 mg daily and Lexapro 10 mg daily  I also encouraged him to follow up with his family physician regarding further management of depression and irritability, as it does not appear to be well controlled over the last 6 months  He was also ordered a repeat MRI Brain with Neuroquant analysis to assess for any further change  He was advised unfortunately that he should not drive at this time due to concerns of his own safety and that of others on the road   I encouraged him to complete a fitness to drive evaluation to ensure safety to drive and at that time we can decide if he is safe to continue to drive  He was very upset by this and abruptly left the office  However, his wife stayed for further discussion and she understands this is for his own safety and she agrees  Orders also placed for formal neuropsychologic testing, although I am not sure how much he will participate  We also discussed that he should stay active with mind stimulating activities- crossword, sodoku, search and find, word search, stay socially/physically active  They will follow up in 3 months with Dr Rena Vizcarra before his F[facility]  Other recommendations provided on his AVS today include: Things that we know are helpful for thinking and memory   1  Exercise program: gradually increase your physical activity over time  Start small and be patient  Aerobic (cardio) activity is best but incorporate balance, strength and flexibility training as well    a  Try to get at least 30min 3 times per week   2  Diet: Mediterranean diet (colorful fruits and vegetables, olive oil, fish, whole grains, very little red meet is any), MIND diet, anti-inflammatory diet  a  Stay well hydrated: drink 6-8 glasses of water per day   b  What's good for your heart is good for your brain  c  Avoid high-salt foods   3  Sleep: aim for at least 7-8 hours per night   a  Avoid alcohol and medications like Benadryl (Tylenol PM) or other sedating drugs  4  Stress management/mindfulness practice:   a  Talk with our social workers about finding a cognitive behavioral therapists  b  Try a smart phone terrance like [employer] or mindfulness for beginners   i  Try curable for pain    c  Take a course in Mindfulness Based Stress Reduction (MBSR)   i  Helen valentino  Read or listen to an audiobook about it:  i  Mindfulness for beginners   ii  10% happier  iii  The happiness advantage   5   Social engagement:   a  Stay in touch with family and friends b  Plan a few specific activities for your social health every week   c  [employer] a local support group   d  Volunteer! www [email or web address] or call [phone]     MIND diet score:  1 point for each component  · Green leafy vegetables: at least 6 per week  · Other vegetable: at least 1 per day   · Berries: at least 2 per week  · Red meat: fewer than 4 per week   · Fish: at least 1 per week   · Poultry: at least 2 per week  · Beans: at least 3 per week  · Nuts: at least 5 per week  · Fast or fried food: less than 1 per week  · Olive oil   · Butter less: less than 1 table-spoon per day   · Cheese: less than 1 serving per week   · Pastries/sweets: less than 5 servings per week  · Alcohol: no more than 1 serving/ day                 Diagnoses and all orders for this visit:    Early onset Alzheimer's dementia without behavioral disturbance (HCC)  -     donepezil (ARICEPT) 10 mg tablet; Take 1 tablet (10 mg total) by mouth daily  -     memantine (NAMENDA) 10 mg tablet; Take 1 tablet (10 mg total) by mouth 2 (two) times a day  -     MRI brain NeuroQuant wo contrast; Future  -     Ambulatory referral to Occupational Therapy; Future  -     Ambulatory referral to Neuropsychology; Future           Subjective:    HPI Marilee Breaux is a 57year old male known to the practice for cognitive dysfunction felt to be secondary to early onset of Alzheimer's disease beginning almost 3 years ago  He is accompanied by his wife Lo Alvarez today  He was last seen 6 months ago and at that time was preparing for prostate cancer surgery so repeat MRI neuroquant and neuropsychologic testing was deferred  He is maintained on Namenda 10 mg twice daily and Aricept 10 mg daily, as well as Lexapro 10 mg daily  Over the last 6 months his wife feels his memory has been much worse  He continues to forget names, loses his train of thought easily while speaking, and is having difficulty at work  He works as a water meter serviceman   He is no longer permitted to drive at work and is under constant supervision while working  He did have an episode while working where he wandered off, however he denies this ever happened  As such, his wife is starting the process of looking into social security disability  He tells me he is still driving locally to the grocery store, and back and fourth to work independently and does not get lost  He denies any motor vehicle accidents  He is having difficulty with attention and concentration  He can no longer manage his finances  He is independent with all ADLs without reminders  His wife does the majority of the cooking, but he always takes care of the clean up  He takes the dogs on walks and has never gotten lost  He is sleeping adequately and has a good appetite  His mood is very liable  He becomes very easily agitated and frustrated especially if he cannot recall something, or is questioned about his memory  His wife feels his mood is better since being on Lexapro compared to baseline but feels he has been more agitated over the last 6 months with the decline in his memory  Since increasing Namenda to 10 mg bid in June 2021, they have not seen a difference in his memory  Today he declined to participate in MoCA testing, becoming very frustrated after the first question  He tells me he gets frustrated because he knows the answers or how to do something but it takes a while for "it to come to him"  Prior MRI Neuroquant 3/8/2018:  significant volume loss of the hippocampus and secondary enlargement of the lateral ventricles, there is more diffuse ventriculomegaly in a colpocephalic pattern  Findings more consistent with a   diffuse neurodegenerative process      The following portions of the patient's history were reviewed and updated as appropriate: allergies, current medications, past family history, past medical history, past social history and past surgical history           Objective:    Blood pressure 120/72, pulse 73, height 5' 7" (1 702 m), weight 77 3 kg (170 lb 6 4 oz), SpO2 98 %  Neurological Exam     On neurological examination patient is alert, awake, oriented and in no distress  He is able to correctly identify his name and date of birth (although he did have to pause before telling me his date of birth)  He correctly states today's date, month, and year  He incorrectly tells me the day of the week is "the 21st", after I prompted him again with the same question he states "January", after further prompting and providing multiple choice Friday, Monday or Saturday, he still answers "the 21st"  He correctly identified the POTUS, can read, identify an object "pen" and it's purpose as "write"  He can repeat and read  He correctly recalls what he had for dinner last night  He is unable to name different kinds of fruit and can only recall banana  When showing him 3 images of animals he correctly identifies 2/3, stating the picture of the kangaroo is a racoon  He refused to participate in further memory testing such as delayed word recall, serial subtraction, abstraction, clock drawing, or copying of any image  Speech is fluent without dysarthria or aphasia  Cranial nerves 2-12 were symmetrically intact bilaterally  No evidence of any focal weakness or sensory loss in the upper or lower extremities  Motor testing reveals 5/5 strength of the bilateral upper and lower extremities  There was no pronator drift  No fasciculations present  No abnormal involuntary movements  Finger- to-nose reveals no tremor or ataxia and intact proprioceptive function, no dysmetria was noted  Sensation was intact to vibration, light touch, pin prick and temperature in bilateral upper and lower extremities  Deep tendon reflexes were diminished but symmetric in the bilateral upper and lower extremities  He is able to rise easily without assistance from a seated position and ambulates without assistance   Casual gait is normal including stance, stride, and arm swing  Normal tandem gait  He did have significant difficulty with multi step commands, as simple as touch your right hand to your left ear or touch your right finger to your nose  ROS:    Review of Systems   Constitutional: Negative  Negative for appetite change and fever  HENT: Negative  Negative for hearing loss, tinnitus, trouble swallowing and voice change  Eyes: Negative  Negative for photophobia and pain  Respiratory: Negative  Negative for shortness of breath  Cardiovascular: Negative  Negative for palpitations  Gastrointestinal: Negative  Negative for nausea and vomiting  Endocrine: Negative  Negative for cold intolerance  Genitourinary: Negative  Negative for dysuria, frequency and urgency  Musculoskeletal: Positive for back pain  Negative for myalgias and neck pain  Skin: Negative  Negative for rash  Neurological: Negative  Negative for dizziness, tremors, seizures, syncope, facial asymmetry, speech difficulty, weakness, light-headedness, numbness and headaches  Hematological: Negative  Does not bruise/bleed easily  Psychiatric/Behavioral: Positive for confusion  Negative for hallucinations and sleep disturbance  Reviewed ROS as entered by medical assistant

## 2022-01-21 NOTE — ASSESSMENT & PLAN NOTE
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 permitted to drive at work and is under constant supervision while working  He did have an episode while working where he wandered off, however he denies this ever happened  As such, his wife is starting the process of looking into social security disability  He tells me he is still driving locally to the grocery store, and back and fourth to work independently and does not get lost  He denies any motor vehicle accidents  He is having difficulty with attention and concentration  He can no longer manage his finances  He is independent with all ADLs without reminders  His wife does the majority of the cooking, but he always takes care of the clean up  He takes the dogs on walks and has never gotten lost  He is sleeping adequately and has a good appetite  His mood is very liable  He becomes very easily agitated and frustrated especially if he cannot recall something, or is questioned about his memory  His wife feels his mood is better since being on Lexapro compared to baseline but feels he has been more agitated over the last 6 months with the decline in his memory  Since increasing Namenda to 10 mg bid in June 2021, they have not seen a difference in his memory  Today he declined to participate in [facility] testing, becoming very frustrated after the first question  He tells me he gets frustrated because he knows the answers or how to do something but it takes a while for "it to come to him"      On neurological examination patient is alert, awake, oriented and in no distress  He is able to correctly identify his name and date of birth (although he did have to pause before telling me his date of birth)  He correctly states today's date, month, and year  He incorrectly tells me the day of the week is "the 21st", after I prompted him again with the same question he states "January", after further prompting and providing multiple choice Friday, Monday or Saturday, he still answers "the 21st"  He correctly identified the POTUS, can read, identify an object "pen" and it's purpose as "write"  He can repeat and read  He correctly recalls what he had for dinner last night  He is unable to name different kinds of fruit and can only recall banana  When showing him 3 images of animals he correctly identifies 2/3, stating the picture of the kangaroo is a racoon  He refused to participate in further memory testing such as delayed word recall, serial subtraction, abstraction, clock drawing, or copying of any image  The rest of his neurologic exam is normal although he did have significant difficulty with multi step commands, as simple as touch your right hand to your left ear or touch your right finger to your nose  At this time it was recommended he continue on Namenda 10 mg twice daily, donepezil 10 mg daily and Lexapro 10 mg daily  I also encouraged him to follow up with his family physician regarding further management of depression and irritability, as it does not appear to be well controlled over the last 6 months  He was also ordered a repeat MRI Brain with Neuroquant analysis to assess for any further change  He was advised unfortunately that he should not drive at this time due to concerns of his own safety and that of others on the road  I encouraged him to complete a fitness to drive evaluation to ensure safety to drive and at that time we can decide if he is safe to continue to drive   He was very upset by this and abruptly left the office  However, his wife stayed for further discussion and she understands this is for his own safety and she agrees  Orders also placed for formal neuropsychologic testing, although I am not sure how much he will participate  We also discussed that he should stay active with mind stimulating activities- crossword, sodoku, search and find, word search, stay socially/physically active  They will follow up in 3 months with Dr Rena Vizcarra before his detention  Other recommendations provided on his AVS today include: Things that we know are helpful for thinking and memory   1  Exercise program: gradually increase your physical activity over time  Start small and be patient  Aerobic (cardio) activity is best but incorporate balance, strength and flexibility training as well    a  Try to get at least 30min 3 times per week   2  Diet: Mediterranean diet (colorful fruits and vegetables, olive oil, fish, whole grains, very little red meet is any), MIND diet, anti-inflammatory diet  a  Stay well hydrated: drink 6-8 glasses of water per day   b  What's good for your heart is good for your brain  c  Avoid high-salt foods   3  Sleep: aim for at least 7-8 hours per night   a  Avoid alcohol and medications like Benadryl (Tylenol PM) or other sedating drugs  4  Stress management/mindfulness practice:   a  Talk with our social workers about finding a cognitive behavioral therapists  b  Try a smart phone terrance like [employer] or mindfulness for beginners   i  Try curable for pain    c  Take a course in Mindfulness Based Stress Reduction (MBSR)   i  Helen valentino  Read or listen to an audiobook about it:  i  Mindfulness for beginners   ii  10% happier  iii  The happiness advantage   5  Social engagement:   a  Stay in touch with family and friends   b  Plan a few specific activities for your social health every week   c  Marco [employer] Tafoya a local support group   d  Volunteer! www [email or web address] or call [phone]     MIND diet score:  1 point for each component      · Green leafy vegetables: at least 6 per week  · Other vegetable: at least 1 per day   · Berries: at least 2 per week  · Red meat: fewer than 4 per week   · Fish: at least 1 per week   · Poultry: at least 2 per week  · Beans: at least 3 per week  · Nuts: at least 5 per week  · Fast or fried food: less than 1 per week  · Olive oil   · Butter less: less than 1 table-spoon per day   · Cheese: less than 1 serving per week   · Pastries/sweets: less than 5 servings per week  · Alcohol: no more than 1 serving/ day

## 2022-01-21 NOTE — PATIENT INSTRUCTIONS
Continue Namenda 10 mg twice daily  Continue donepezil 10 mg daily  Continue Lexapro 10 mg daily, follow up with your family physician regarding further management of depression and irritability  Complete MRI Brain with Neuroquant analysis  Complete fitness to drive evaluation to ensure safety to drive  Do not drive at this time  Continue mind stimulating activities- crossword, sodoku, search and find, word search, stay socially/physically active  Things that we know are helpful for thinking and memory   1  Exercise program: gradually increase your physical activity over time  Start small and be patient  Aerobic (cardio) activity is best but incorporate balance, strength and flexibility training as well    a  Try to get at least 30min 3 times per week   2  Diet: Mediterranean diet (colorful fruits and vegetables, olive oil, fish, whole grains, very little red meet is any), MIND diet, anti-inflammatory diet  a  Stay well hydrated: drink 6-8 glasses of water per day   b  What's good for your heart is good for your brain  c  Avoid high-salt foods   3  Sleep: aim for at least 7-8 hours per night   a  Avoid alcohol and medications like Benadryl (Tylenol PM) or other sedating drugs  4  Stress management/mindfulness practice:   a  Talk with our social workers about finding a cognitive behavioral therapists  b  Try a smart phone terrance like [employer] or mindfulness for beginners   i  Try curable for pain    c  Take a course in Mindfulness Based Stress Reduction (MBSR)   i  Helen valentino  Read or listen to an audiobook about it:  i  Mindfulness for beginners   ii  10% happier  iii  The happiness advantage   5  Social engagement:   a  Stay in touch with family and friends   b  Plan a few specific activities for your social health every week   orly Irene a local support group   d  Volunteer! www [email or web address] or call [phone]     MIND diet score:  1 point for each component      · Anita Chun leafy vegetables: at least 6 per week  · Other vegetable: at least 1 per day   · Berries: at least 2 per week  · Red meat: fewer than 4 per week   · Fish: at least 1 per week   · Poultry: at least 2 per week  · Beans: at least 3 per week  · Nuts: at least 5 per week  · Fast or fried food: less than 1 per week  · Olive oil   · Butter less: less than 1 table-spoon per day   · Cheese: less than 1 serving per week   · Pastries/sweets: less than 5 servings per week  · Alcohol: no more than 1 serving/ day

## 2022-02-07 ENCOUNTER — TELEPHONE (OUTPATIENT)
Dept: UROLOGY | Facility: MEDICAL CENTER | Age: 62
End: 2022-02-07

## 2022-02-07 NOTE — TELEPHONE ENCOUNTER
Spoke to patients wife Phil Dominguez letting her know that lab work needing to be completed prior to his upcoming appointment on 2/10/22  Yonny Virgen he will go 2/8/22 to complete

## 2022-02-08 ENCOUNTER — APPOINTMENT (OUTPATIENT)
Dept: LAB | Facility: CLINIC | Age: 62
End: 2022-02-08
Payer: COMMERCIAL

## 2022-02-08 DIAGNOSIS — C61 PROSTATE CANCER (HCC): ICD-10-CM

## 2022-02-08 LAB — PSA SERPL-MCNC: 0.2 NG/ML (ref 0–4)

## 2022-02-08 PROCEDURE — 84153 ASSAY OF PSA TOTAL: CPT

## 2022-02-10 ENCOUNTER — OFFICE VISIT (OUTPATIENT)
Dept: UROLOGY | Facility: CLINIC | Age: 62
End: 2022-02-10
Payer: COMMERCIAL

## 2022-02-10 ENCOUNTER — OFFICE VISIT (OUTPATIENT)
Dept: OCCUPATIONAL THERAPY | Facility: CLINIC | Age: 62
End: 2022-02-10
Payer: COMMERCIAL

## 2022-02-10 VITALS
WEIGHT: 175.4 LBS | SYSTOLIC BLOOD PRESSURE: 138 MMHG | HEART RATE: 82 BPM | DIASTOLIC BLOOD PRESSURE: 82 MMHG | BODY MASS INDEX: 27.53 KG/M2 | HEIGHT: 67 IN

## 2022-02-10 DIAGNOSIS — G30.0 EARLY ONSET ALZHEIMER'S DEMENTIA WITHOUT BEHAVIORAL DISTURBANCE (HCC): Primary | ICD-10-CM

## 2022-02-10 DIAGNOSIS — F02.80 EARLY ONSET ALZHEIMER'S DEMENTIA WITHOUT BEHAVIORAL DISTURBANCE (HCC): Primary | ICD-10-CM

## 2022-02-10 DIAGNOSIS — N52.31 ERECTILE DYSFUNCTION AFTER RADICAL PROSTATECTOMY: ICD-10-CM

## 2022-02-10 DIAGNOSIS — C61 PROSTATE CANCER (HCC): Primary | ICD-10-CM

## 2022-02-10 PROCEDURE — 97166 OT EVAL MOD COMPLEX 45 MIN: CPT

## 2022-02-10 PROCEDURE — 96125 COGNITIVE TEST BY HC PRO: CPT

## 2022-02-10 PROCEDURE — 99213 OFFICE O/P EST LOW 20 MIN: CPT | Performed by: PHYSICIAN ASSISTANT

## 2022-02-10 RX ORDER — TADALAFIL 20 MG/1
20 TABLET ORAL DAILY PRN
Qty: 20 TABLET | Refills: 3 | Status: SHIPPED | OUTPATIENT
Start: 2022-02-10

## 2022-02-10 NOTE — PROGRESS NOTES
UROLOGY PROGRESS NOTE   Patient Identifiers: Cathie Bolden (MRN [id number])  Date of Service: 2/10/2022    Subjective:     70-year-old man history of Soheila 4+3=7 stage T3a prostate cancer  He had a robotic prostatectomy July 2021  Pretreatment PSA was 5 6  Current PSA 0 2  He has good urinary control  His sexual function is slowly responding  Reason visit:  Prostate cancer follow-up     Objective:     VITALS:    There were no vitals filed for this visit          LABS:  Lab Results   Component Value Date    HGB 12 5 07/29/2021    HCT 38 9 07/29/2021    WBC 8 66 07/29/2021     07/29/2021   ]    Lab Results   Component Value Date     12/13/2017    K 3 8 07/29/2021     07/29/2021    CO2 26 07/29/2021    BUN 11 07/29/2021    CREATININE 0 84 07/29/2021    CALCIUM 8 4 07/29/2021    GLUCOSE 132 06/24/2014   ]        INPATIENT MEDS:    Current Outpatient Medications:     acetaminophen (TYLENOL) 325 mg tablet, Take 2 tablets (650 mg total) by mouth every 4 (four) hours as needed for mild pain (Patient not taking: Reported on 1/21/2022 ), Disp: 30 tablet, Rfl: 0    Alphagan P 0 1 %, INSTILL 1 DROP IN BOTH EYES TWICE DAILY, Disp: , Rfl:     ALPHAGAN P 0 15 % ophthalmic solution, , Disp: , Rfl:     Cetirizine HCl (ZYRTEC ALLERGY PO), Take 1 tablet by mouth daily as needed , Disp: , Rfl:     docusate sodium (COLACE) 100 mg capsule, Take 1 capsule (100 mg total) by mouth 2 (two) times a day for 15 days, Disp: 30 capsule, Rfl: 0    donepezil (ARICEPT) 10 mg tablet, Take 1 tablet (10 mg total) by mouth daily, Disp: 90 tablet, Rfl: 3    escitalopram (LEXAPRO) 10 mg tablet, TAKE 1 TABLET(10 MG) BY MOUTH DAILY, Disp: 30 tablet, Rfl: 3    Lactobacillus (PROBIOTIC ACIDOPHILUS) CAPS, Take by mouth once (Patient not taking: Reported on 1/21/2022 ), Disp: , Rfl:     latanoprost (XALATAN) 0 005 % ophthalmic solution, PLACE 1 DROP IN BOTH EYES DAILY AT BEDTIME, Disp: , Rfl:     LUMIGAN 0 01 % ophthalmic drops, , Disp: , Rfl:     memantine (NAMENDA) 10 mg tablet, Take 1 tablet (10 mg total) by mouth 2 (two) times a day, Disp: 60 tablet, Rfl: 3    naproxen (NAPROSYN) 500 mg tablet, Take 1 tablet (500 mg total) by mouth 2 (two) times a day with meals for 5 days For pain, Disp: 10 tablet, Rfl: 0    neomycin-bacitracin-polymyxin b (NEOSPORIN) ointment, Apply topically 2 (two) times a day as needed (Catheter irritation) (Patient not taking: Reported on 1/21/2022 ), Disp: 15 g, Rfl: 0    oxybutynin (DITROPAN) 5 mg tablet, Take 1 tablet (5 mg total) by mouth 2 (two) times a day as needed (Bladder spasms) for up to 14 days, Disp: 28 tablet, Rfl: 0    pantoprazole (PROTONIX) 20 mg tablet, Take 1 tablet (20 mg total) by mouth daily for 30 doses, Disp: 30 tablet, Rfl: 3    tadalafil (CIALIS) 5 MG tablet, Take 1 tablet (5 mg total) by mouth daily as needed for erectile dysfunction, Disp: 30 tablet, Rfl: 6      Physical Exam:   There were no vitals taken for this visit  GEN: no acute distress    RESP: breathing comfortably with no accessory muscle use    ABD: soft, non-tender, non-distended   INCISION:    EXT: no significant peripheral edema         RADIOLOGY:     None     Assessment:    1  Prostate cancer   2   Erectile dysfunction following radical prostatectomy     Plan:   - I reviewed the change in his PSA  - recommend referral to Radiation Oncology for salvage radiation  - urology follow-up in 4 months with PSA prior to visit  - I sent him a new prescription for Cialis 20 mg to their pharmacy

## 2022-02-10 NOTE — PROGRESS NOTES
This note was not shared with the patient due to privacy exception: note includes other individuals      Occupational Therapy Fit to Drive Evaluation:      Attempt #1    Today's Date: 2/10/2022  Patient Name: Iliana Traylor  : 1960  MRN: [id number]  Referring Provider: Gus Kendrick  Dx: Early onset Alzheimer's dementia without behavioral disturbance ([facility] [address] ) [G30 0, F02 80]    Active Problem List:   Patient Active Problem List   Diagnosis    Allergic rhinitis    Glaucoma    Prediabetes    Sciatica    Short-term memory loss    Mild cognitive impairment    Abnormal EEG    Increased prostate specific antigen (PSA) velocity    Right hip pain    Lumbar spondylosis    Primary localized osteoarthritis of right hip    Enlarged prostate    Screening for prostate cancer    Bilateral leg weakness    Left hip pain    Breast mass in male    Furuncle of chest wall    Cellulitis    Gastroesophageal reflux disease    S/P hip replacement, bilateral    Prostate cancer ([address] )    Early onset Alzheimer's dementia without behavioral disturbance ([facility] [address] )     Past Medical Hx:   Past Medical History:   Diagnosis Date    Benign colon polyp     Diverticulitis, colon     Diverticulosis     Hyperlipidemia     Prediabetes     Renal calculi     Short-term memory loss     Vitamin D deficiency      Past Surgical Hx:   Past Surgical History:   Procedure Laterality Date    ABDOMINAL ADHESION SURGERY N/A 2021    Procedure: LYSIS ADHESIONS, LAPAROSCOPIC;  Surgeon: Lane Hess MD;  Location: [facility];  Service: Urology    COLONOSCOPY      ESOPHAGOGASTRODUODENOSCOPY      HERNIA REPAIR      JOINT REPLACEMENT Right     [address]    KNEE ARTHROSCOPY      KNEE SURGERY      WI LAP,PROSTATECTOMY,RADICAL,W/NERVE SPARE,INCL ROBOTIC N/A 2021    Procedure: ROBOTIC LAPAROSCOPIC RADICAL PROSTATECTOMY;  Surgeon: Lane Hess MD;  Location: [facility];  Service: Urology    PROSTATE BIOPSY  TOTAL HIP ARTHROPLASTY Right 2019    TOTAL HIP ARTHROPLASTY Left 2019        Pain Levels:   Restin    With Activity:  0      TIME:   OT eval: [id number]  OT DCAT 1255-200  Administration, scoring, interpretation >91 mins  Subjective/Patient Goal: "To continue doing what I want to be doing"      DCAT/FITNESS TO DRIVE OUTCOMES: fail     PATIENT'S SCORE: 99 %   DRIVING IMPLICATIONS PER DCAT: Pt scoring cognitively at an 99% predicted probability of failing a specialized on-road test  This indicates a cognitive competence for driving is outside the range of nomal with a high probability of performing hazardous or extremely dangerous maneuvers  Pt scoring cognitively at a 88% probability of creating a hazardous situation on a specialized road test    ADDITIONAL THERAPY NEEDS: Occupational Therapy for cognitive rehabilitation with recommendation for re-testing of DCAT following 3 month wait period      Assessment/Plan  Occupational Therapy Skilled Analysis Assessment and Plan of Care:  Pt is a 57 y o  male referred to Occupational Therapy for Fitness to Drive Evaluation to assess pts cognitive, visual, and motor abilities to drive safely in community environment  Pt scoring cognitively at an 99% predicted probability of failing a specialized on-road test  This indicates a cognitive competence for driving is outside the range of nomal with a high probability of performing hazardous or extremely dangerous maneuvers  Pt scoring cognitively at a 88% probability of creating a hazardous situation on a specialized road test  Pt demo difficulties with motor planning and following multi step directions  D/C from OT caseload, D/C OT  MD to discuss results w/ pt  History of Present Illness:  Pt is a 57 y o  male seen for OT Fitness to Drive evaluation to assess pts cognitive, visual, and motor abilities to drive safely in community environment  Pt [facility] from [facility] from Neurology  Pt reports "I know I have some issues," though he states he feels kind of the same as always  When questioned about his "issues" pt would not discuss further and evaded questioning  He says he has familiar routines daily and is taking medication to manage his "issues"  Pt is working full-time right now but is working on getting disability, he runs and plays sports, and visits family and friends  Pt with medical hy of 2 hip replacements and has prostate cancer  Per pt's chart review, Pt demo changes to his cognition over the past 3 years  At pt's last neurology appointment, he refused to complete a MOCA and demo frustration when asked about his memory/thinking skills  Below is a summary of patients current or most recent driving history including vehicle type, roads traveled, and recent auto events  Driving History:    Communication Status: Warren Belcher    Visual History:  last Eye Dr  Appointment unsure, maybe a year ago   Glasses/Contacts:   Yes, describe: glasses for distance/near   Cataracts:  No   Glaucoma:   Yes, describe: Being treated every 3 months   Hemianopsia:   No       License Status: active    Age of Initial Licensure: 15    Vehicle Type:     CAR     Car Transfer: indep    Driving History:   GPS Use: No   Recent Accidents:   No   Tickets:   No   DUI:   No    Last Drove: A couple of days ago    Driving Goals:   Local Roads, [employer], Daytime Driving and Nighttime driving      Objective      DCAT: (see attached report for further details)   Pt scoring an 99% likelihood on failing on road   Probability of creating a hazardous situation on specialized on-road test: 88%; see attached report for further details       Recommend On Road Assessment?:   No     Recommend to [employer]?: No     Physical findings:    cervical rotation:  R wnl, L wnl   UE and LE AROM:  full  UE/LE : 5/5 MMT     INTERVENTION COMMENTS:  Diagnosis: Early onset Alzheimer's dementia without behavioral disturbance (HCC) [G30 0, F02 80]  Precautions: cog deficits  FOTO: N/A for FTD Evaluations  Insurance: Payor: Randolph Johnston / Plan: [facility] PLAN [id number] / Product Type: Blue Fee for Service /

## 2022-02-11 ENCOUNTER — TELEPHONE (OUTPATIENT)
Dept: NEUROLOGY | Facility: CLINIC | Age: 62
End: 2022-02-11

## 2022-02-11 ENCOUNTER — TELEPHONE (OUTPATIENT)
Dept: UROLOGY | Facility: CLINIC | Age: 62
End: 2022-02-11

## 2022-02-11 NOTE — TELEPHONE ENCOUNTER
Called and spoke with patient's wife per comm consent  Advised of Dr Jackie Verduzco recommendations of PSA in 2 weeks  She verbalized understanding

## 2022-02-11 NOTE — TELEPHONE ENCOUNTER
Initial reporting form printed and signed  I printed the fitness to drive results and attached them to the form  I did fax both to [facility] and placed the form at the  in the scanning bin to be scanned into the chart

## 2022-02-11 NOTE — TELEPHONE ENCOUNTER
Initial reporting form completed and emailed to you for signature  Please email back to me and I will fax along with FTD results

## 2022-02-11 NOTE — TELEPHONE ENCOUNTER
I called and spoke to pt's wife Jose Mistry and reviewed the patient's fitness to drive results 57% probability of failing an on the road test and 88% probability of creating a hazardous situation on-road test  Unfortunately, I advised he should no longer be driving (as discussed at our last visit)  I did advise I would be reporting these results to the state as we are mandated to do so  Nursing, please complete a penndot form and include fitness to drive results

## 2022-02-11 NOTE — TELEPHONE ENCOUNTER
----- Message from Shannan Romero PA-C sent at 2/10/2022  4:19 PM EST -----   Dr Frida Luevano would like a repeat PSA in about 2 weeks  I put an order in the chart

## 2022-02-20 ENCOUNTER — TELEPHONE (OUTPATIENT)
Dept: MRI IMAGING | Facility: HOSPITAL | Age: 62
End: 2022-02-20

## 2022-02-20 ENCOUNTER — HOSPITAL ENCOUNTER (OUTPATIENT)
Dept: MRI IMAGING | Facility: HOSPITAL | Age: 62
Discharge: HOME | End: 2022-02-20

## 2022-02-20 DIAGNOSIS — G30.0 EARLY ONSET ALZHEIMER'S DEMENTIA WITHOUT BEHAVIORAL DISTURBANCE (HCC): ICD-10-CM

## 2022-02-20 DIAGNOSIS — F02.80 EARLY ONSET ALZHEIMER'S DEMENTIA WITHOUT BEHAVIORAL DISTURBANCE (HCC): ICD-10-CM

## 2022-02-22 ENCOUNTER — TELEPHONE (OUTPATIENT)
Dept: NEUROLOGY | Facility: CLINIC | Age: 62
End: 2022-02-22

## 2022-02-22 NOTE — TELEPHONE ENCOUNTER
Patient's wife Santhosh Malik called & left a voicemail to cancel appointment  said that the Dr Bernie Kulkarni said that he should cancel it because of his blood pressure

## 2022-02-23 ENCOUNTER — TELEPHONE (OUTPATIENT)
Dept: NEUROLOGY | Facility: CLINIC | Age: 62
End: 2022-02-23

## 2022-02-23 DIAGNOSIS — G30.0 EARLY ONSET ALZHEIMER'S DEMENTIA WITHOUT BEHAVIORAL DISTURBANCE (HCC): Primary | ICD-10-CM

## 2022-02-23 DIAGNOSIS — F02.80 EARLY ONSET ALZHEIMER'S DEMENTIA WITHOUT BEHAVIORAL DISTURBANCE (HCC): Primary | ICD-10-CM

## 2022-02-23 NOTE — TELEPHONE ENCOUNTER
Pt's wife Jen Torres left a message today at 11:21 am requesting a CB at [phone]  Called pt's wife Jen Torres and states that pt was not able to complete brain MRI on 2/20/22 bec he had a panic attack  Pt wants to be sedated  Requesting updated script brain MRI w/ sedation                      Ok to leave detailed message

## 2022-02-23 NOTE — TELEPHONE ENCOUNTER
Spoke w/ pt and pt's wife and advised of the below  Pt is agreeable to try ativan prior MRI  Requesting script be sent to [address] on file  States that pt was awake the last time he had an MRI,  but his anxiety got  worse

## 2022-02-23 NOTE — TELEPHONE ENCOUNTER
I would prefer not to sedate him unless its absolutely necessary  Can you ask if he feels like he could proceed with the imaging with Ativan prior? He had a prior MRI of the brain in the past, was he sedated for that as well?

## 2022-02-24 ENCOUNTER — CLINICAL SUPPORT (OUTPATIENT)
Dept: RADIATION ONCOLOGY | Facility: HOSPITAL | Age: 62
End: 2022-02-24
Attending: STUDENT IN AN ORGANIZED HEALTH CARE EDUCATION/TRAINING PROGRAM
Payer: COMMERCIAL

## 2022-02-24 ENCOUNTER — APPOINTMENT (OUTPATIENT)
Dept: LAB | Facility: CLINIC | Age: 62
End: 2022-02-24
Payer: COMMERCIAL

## 2022-02-24 VITALS
WEIGHT: 171.8 LBS | HEART RATE: 82 BPM | SYSTOLIC BLOOD PRESSURE: 140 MMHG | DIASTOLIC BLOOD PRESSURE: 96 MMHG | RESPIRATION RATE: 16 BRPM | HEIGHT: 67 IN | OXYGEN SATURATION: 98 % | BODY MASS INDEX: 26.97 KG/M2 | TEMPERATURE: 97.9 F

## 2022-02-24 DIAGNOSIS — C61 PROSTATE CANCER (HCC): ICD-10-CM

## 2022-02-24 DIAGNOSIS — C61 PROSTATE CANCER (HCC): Primary | ICD-10-CM

## 2022-02-24 LAB — PSA SERPL-MCNC: 0.2 NG/ML (ref 0–4)

## 2022-02-24 PROCEDURE — 99211 OFF/OP EST MAY X REQ PHY/QHP: CPT | Performed by: STUDENT IN AN ORGANIZED HEALTH CARE EDUCATION/TRAINING PROGRAM

## 2022-02-24 PROCEDURE — 84153 ASSAY OF PSA TOTAL: CPT

## 2022-02-24 PROCEDURE — 77263 THER RADIOLOGY TX PLNG CPLX: CPT | Performed by: STUDENT IN AN ORGANIZED HEALTH CARE EDUCATION/TRAINING PROGRAM

## 2022-02-24 PROCEDURE — 99204 OFFICE O/P NEW MOD 45 MIN: CPT | Performed by: STUDENT IN AN ORGANIZED HEALTH CARE EDUCATION/TRAINING PROGRAM

## 2022-02-24 RX ORDER — LORAZEPAM 1 MG/1
TABLET ORAL
Qty: 2 TABLET | Refills: 0 | Status: SHIPPED | OUTPATIENT
Start: 2022-02-24

## 2022-02-24 NOTE — PROGRESS NOTES
Acacia Levine 1960 is a 57 y o  male  Patient here to discuss radiation treatment for prostate cancer [place] (4+3) s/p prostatectomy, referred by Pinky Hernandez PA-C      57year old male s/p prostatectomy on 7/28/21 with change in PSA  Past medical history includes early onset Alzheimer dementia    7/14/21 CT abdomen pelvis w contrast  IMPRESSION:  Limited evaluation of GI tract without oral contrast   Mild gastroesophageal reflux  Antral gastritis and duodenitis suspected  No perforation  Colonic diverticulosis without evidence of diverticulitis  Large volume of formed stool in the mid sigmoid colon with an adjacent segment of collapsed thick-walled distal sigmoid colon  Patient recently underwent a colonoscopy June 25, 2021  Mild adrenal hypertrophy         7/28/22 Robotic Laparoscopic radical prostatectomy lysis adhesion, laparoscopic   [facility]  DIAGNOSIS     Prostate (biopsy, [id number], 7/28/2021):    - Histologic Type: Adenocarcinoma (conventional, NOS)    - [place]heila Score, Dominant Nodule: 4+3=7 Grade group 3: 70% pattern 4 (Tertiary Pattern 5)    - Tumor Extent: Tumor dimension (max): 14mm    - Location, Dominant Nodule: Left, posterolateral, posterior    - Local Extent (8th Edition AJCC): Extraprostatic extension                                              Location and extent of extraprostatic extension: Left, posterolateral, base, nonfocal    - Margins (as per the report): Negative    - Seminal Vesicle Invasion (as per the report): None    - Lymphatic (small vessel Invasion (as per report): Absent    - Extent of Invasion (8th Edition AJCC) Primary Tumor: pT3: Extraprostatic extension or microscopic bladder neck invasion    - Regional Lymph Nodes: pNx: Cannot be assessed    - Summary Margins: Negative    - Additional Findings, Uninvolved Prostate:                                                High-grade prostatic intraepithelial neoplasia (PIN) Prostatic intraductal adenocarcinoma  Nilo Fields MD    SPECIMEN   Procedure  Radical prostatectomy: robotic radical prostatectomy    Prostate Size     Prostate Weight (g)  75 g   Prostate Greatest Dimension (Centimeters)  5 4 cm   Additional Dimension (Centimeters)  4 9 cm     4 6 cm   TUMOR   Histologic Type  Acinar adenocarcinoma    Histologic Grade     Grade Group and Soheila Score  Grade group 3 ([place] Score 4 + 3 = 7)    Percentage of Pattern 4  70 %   Tertiary Pattern 5 (less than 5%) in Overall Soheila Score 7  Present    Intraductal Carcinoma (IDC)  Present    Tumor Quantitation  Greatest Dimenion of Dominant Nodule (Millimeters): 14 mm   Location of Dominant Nodule  left posterolateral    Extraprostatic Extension (EPE)  Present, nonfocal    Location of Extraprostatic Extension  Left postero-lateral (neurovascular bundle)      Left posterior    Urinary Bladder Neck Invasion  Not identified    Seminal Vesicle Invasion  Not identified    Treatment Effect  No known presurgical therapy    Lymphovascular Invasion  Not Identified    Perineural Invasion  Present    MARGINS   Margins  Uninvolved by invasive carcinoma    LYMPH NODES   Regional Lymph Nodes  No lymph nodes submitted or found    PATHOLOGIC STAGE CLASSIFICATION (pTNM, AJCC 8th Edition)      Primary Tumor (pT)  pT3a    Regional Lymph Nodes (pN)  pNX    ADDITIONAL FINDINGS   Additional Findings  High-grade prostatic intraepithelial neoplasia (PIN)      Lab Results   Component Value Date    PSA 0 2 02/08/2022    PSA <0 1 10/20/2021    PSA 5 6 (H) 12/07/2020      2/10/22 Uro- G   [place] -PA-C  Change in PSA  Radiation referral for salvage radiation    Upcoming:  Neurology - Dr Roberta Menjivar  6/13/22 Sumit Schaeffer    Oncology History   Prostate cancer [facility])   7/28/2021 Initial Diagnosis    Prostate cancer ([facility] [address] )     7/28/2021 Biopsy    [facility] REFERENCE LABORATORIES  DIAGNOSIS     Prostate (biopsy, [id number], 7/28/2021):    - Histologic Type: Adenocarcinoma (conventional, NOS)    - [place] Score, Dominant Nodule: 4+3=7 Grade group 3: 70% pattern 4 (Tertiary Pattern 5)    - Tumor Extent: Tumor dimension (max): 14mm    - Location, Dominant Nodule: Left, posterolateral, posterior    - Local Extent (8th Edition AJCC): Extraprostatic extension                                              Location and extent of extraprostatic extension: Left, posterolateral, base, nonfocal    - Margins (as per the report): Negative    - Seminal Vesicle Invasion (as per the report): None    - Lymphatic (small vessel Invasion (as per report): Absent    - Extent of Invasion (8th Edition AJCC) Primary Tumor: pT3: Extraprostatic extension or microscopic bladder neck invasion    - Regional Lymph Nodes: pNx: Cannot be assessed    - Summary Margins: Negative    - Additional Findings, Uninvolved Prostate:                                                High-grade prostatic intraepithelial neoplasia (PIN)                                               Prostatic intraductal adenocarcinoma  Marilyn Webster MD    Addendum   At the request of Dr Beryle [place], unstained slides from paraffin BLOCK A25 containing the patient's cancer cells were sent to [address] [place] [address]  [place]  [facility]  [place]  Upon completion of testing, [facility] report will be directly sent to the requesting physician as well as posted in the Media Tab of the patient's [employer] EMR by the [facility] Pathology Department  Review of Systems:  Review of Systems   Constitutional: Positive for fatigue (mild)  HENT: Negative  Eyes:        Wears glasses  Glaucoma   Respiratory: Negative  Cardiovascular: Negative  Gastrointestinal: Negative  Endocrine: Negative  Genitourinary: Positive for urgency (mild)  Negative for dysuria, frequency and hematuria          Nocturia x1  Dribbling at times   Musculoskeletal: Positive for arthralgias (generalized) and back pain (lower)  Skin:        Dry skin   Allergic/Immunologic: Negative  Neurological: Positive for numbness (left foot minimal) and headaches (rarely)  Negative for dizziness and light-headedness  Hematological: Negative  Psychiatric/Behavioral: Positive for confusion  Negative for decreased concentration and sleep disturbance  Early onset Alzheimer       Clinical Trial: no    IPSS Questionnaire (AUA-7): Over the past month    1)  How often have you had a sensation of not emptying your bladder completely after you finish urinating? 0 - Not at all   2)  How often have you had to urinate again less than two hours after you finished urinating? 0 - Not at all   3)  How often have you found you stopped and started again several times when you urinated? 1- less than 1 in 5     4) How difficult have you found it to postpone urination? 1 - Less than 1 time in 5   5) How often have you had a weak urinary stream?  0 - Not at all   6) How often have you had to push or strain to begin urination? 0 - Not at all   7) How many times did you most typically get up to urinate from the time you went to bed until the time you got up in the morning?   1 - 1 time   Total Score:  3       Pain assessment: 0    PFT: n/a    Prior Radiation: NO    Teaching: [employer] Radiation Packet    MST: complete    Implantable Devices (Port, pacemaker, pain stimulator): no    Hip Replacement: bilateral    Covid Vaccine Status: Fully vaccinated including booster    Health Maintenance   Topic Date Due    Influenza Vaccine (1) Never done    BMI: Followup Plan  10/16/2021    Annual Physical  10/16/2021    Depression Screening  07/12/2022    OT PLAN OF CARE  03/12/2022    BMI: Adult  02/10/2023    Colorectal Cancer Screening  06/24/2026    DTaP,Tdap,and Td Vaccines (3 - Td or Tdap) 12/15/2030    HIV Screening  Completed    Hepatitis C Screening  Completed    COVID-19 Vaccine  Completed    Pneumococcal Vaccine: Pediatrics (0 to 5 Years) and At-Risk Patients (6 to 59 Years)  Aged Out    HIB Vaccine  Aged Out    Hepatitis B Vaccine  Aged Out    IPV Vaccine  Aged Out    Hepatitis A Vaccine  Aged Out    Meningococcal ACWY Vaccine  Aged Out    HPV Vaccine  Aged Out       Past Medical History:   Diagnosis Date    Benign colon polyp     Diverticulitis, colon     Diverticulosis     Hyperlipidemia     Prediabetes     Renal calculi     Short-term memory loss     Vitamin D deficiency        Past Surgical History:   Procedure Laterality Date    ABDOMINAL ADHESION SURGERY N/A 2021    Procedure: LYSIS ADHESIONS, LAPAROSCOPIC;  Surgeon: Drew Johnson MD;  [facility]: [facility];  Service: Urology    COLONOSCOPY      ESOPHAGOGASTRODUODENOSCOPY      HERNIA REPAIR      [facility]     [facility]    KNEE ARTHROSCOPY      KNEE SURGERY      HI LAP,PROSTATECTOMY,RADICAL,W/NERVE SPARE,INCL ROBOTIC N/A 2021    Procedure: ROBOTIC LAPAROSCOPIC RADICAL PROSTATECTOMY;  Surgeon: Drew Johnson MD;  [facility];  Service: Urology    PROSTATE BIOPSY      TOTAL HIP ARTHROPLASTY Right 2019    TOTAL HIP ARTHROPLASTY Left 2019       Family History   Problem Relation Age of Onset    Dementia Mother     Glaucoma Mother     Glaucoma Father     Prostate cancer Father     Pulmonary embolism Father     Hypertension Brother     Prostate cancer Brother     Colon cancer Maternal Grandfather        Social History     Tobacco Use    Smoking status: Former Smoker     Types: Cigarettes     Quit date:      Years since quittin 1    Smokeless tobacco: Never Used    Tobacco comment: patient states he doesnt remember how much or for how long   Vaping Use    Vaping Use: Never used   Substance Use Topics    Alcohol use: Yes     Comment: occasional beer and wine    Drug use: Not Currently     Comment: years ago          Current Outpatient Medications:     acetaminophen (TYLENOL) 325 mg tablet, Take 2 tablets (650 mg total) by mouth every 4 (four) hours as needed for mild pain (Patient not taking: Reported on 1/21/2022 ), Disp: 30 tablet, Rfl: 0    Alphagan P 0 1 %, INSTILL 1 DROP IN BOTH EYES TWICE DAILY (Patient not taking: Reported on 2/10/2022), Disp: , Rfl:     ALPHAGAN P 0 15 % ophthalmic solution, , Disp: , Rfl:     Cetirizine HCl (ZYRTEC ALLERGY PO), Take 1 tablet by mouth daily as needed  (Patient not taking: Reported on 2/10/2022 ), Disp: , Rfl:     docusate sodium (COLACE) 100 mg capsule, Take 1 capsule (100 mg total) by mouth 2 (two) times a day for 15 days (Patient not taking: Reported on 2/10/2022 ), Disp: 30 capsule, Rfl: 0    donepezil (ARICEPT) 10 mg tablet, Take 1 tablet (10 mg total) by mouth daily, Disp: 90 tablet, Rfl: 3    escitalopram (LEXAPRO) 10 mg tablet, TAKE 1 TABLET(10 MG) BY MOUTH DAILY, Disp: 30 tablet, Rfl: 3    Lactobacillus (PROBIOTIC ACIDOPHILUS) CAPS, Take by mouth once (Patient not taking: Reported on 1/21/2022 ), Disp: , Rfl:     latanoprost (XALATAN) 0 005 % ophthalmic solution, PLACE 1 DROP IN BOTH EYES DAILY AT BEDTIME, Disp: , Rfl:     LUMIGAN 0 01 % ophthalmic drops, , Disp: , Rfl:     memantine (NAMENDA) 10 mg tablet, Take 1 tablet (10 mg total) by mouth 2 (two) times a day, Disp: 60 tablet, Rfl: 3    naproxen (NAPROSYN) 500 mg tablet, Take 1 tablet (500 mg total) by mouth 2 (two) times a day with meals for 5 days For pain (Patient not taking: Reported on 2/10/2022 ), Disp: 10 tablet, Rfl: 0    neomycin-bacitracin-polymyxin b (NEOSPORIN) ointment, Apply topically 2 (two) times a day as needed (Catheter irritation) (Patient not taking: Reported on 1/21/2022 ), Disp: 15 g, Rfl: 0    oxybutynin (DITROPAN) 5 mg tablet, Take 1 tablet (5 mg total) by mouth 2 (two) times a day as needed (Bladder spasms) for up to 14 days (Patient not taking: Reported on 2/10/2022 ), Disp: 28 tablet, Rfl: 0    pantoprazole (PROTONIX) 20 mg tablet, Take 1 tablet (20 mg total) by mouth daily for 30 doses (Patient not taking: Reported on 2/10/2022 ), Disp: 30 tablet, Rfl: 3    tadalafil (CIALIS) 20 MG tablet, Take 1 tablet (20 mg total) by mouth daily as needed for erectile dysfunction, Disp: 20 tablet, Rfl: 3    Allergies   Allergen Reactions    Sulfa Antibiotics Other (See Comments)     Mother told him as child not to take        Vitals:    02/24/22 0734   Height: 5' 7" (1 702 m)

## 2022-02-24 NOTE — PROGRESS NOTES
Consultation - Radiation Oncology      GRP:[id number] : 1960  Encounter: [id number]  Patient Information: Jessica Perez  Chief Complaint   Patient presents with    Consult     Cancer Staging  No matching staging information was found for the patient  Assessment and Plan  Mr Marii Miles is a 57year old man with a history of initially low risk (GS 6) prostate cancer with progression on interval biopsy to GS 4+3  He underwent prostatectomy with pathology showing EPE+, margins-, SVI-  Initial post-operative PSA was undetectable, but subsequently increased to 0 2 most recently  He is seen today in consideration of salvage RT  We reviewed the patient's recent clinical history and pathology reports  Given the patient's elevated postoperative PSA and the presence of EPE+ on pathology, salvage RT is likely warranted at this time  On my review I believe the prostate fossa to be the most likely site of residual disease and would not advocate for inclusion of the pelvis with post-operative RT at this time  Additionally, given his PSA < 0 7, I do not feel he would benefit from the addition of ADT  Acute toxicities of RT include, but are not limited to, increased urinary frequency, dysuria, urinary urgency, nocturia; increased frequency of bowel movements, diarrhea; fatigue; and radiation related skin changes  Late toxicities include, but are not limited to, radiation cystitis, radiation proctitis, decreased erectile function  Rarely patients may develop serious late consequences from RT including fistula formation of secondary cancers  The patient was given the opportunity to ask questions, all of which were answered to his satisfaction  As the patient has only had a single elevated post-prostatectomy PSA we will repeat this prior to starting treatment to confirm biochemical recurrence   Assuming this confirms the prior result we will proceed with CT simulation with RT to begin shortly thereafter  I will remain available should any questions arise in the interim  History of Present Illness   Mr Mery Magaña is a 57y o  year old man with a PMHx early onset Alzheimer disease who was initially diagnosed with low risk, [place] 6 prostate adenocarcinoma in 04/2020  At that time MRI of the prostate demonstrated no radiographically detectable disease  PSA surveillance demonstrated gradual increase from 4 3 (01/06/2020) up to 5 6 (12/07/2020)  Repeat biopsy demonstrated increase in grade with Soheila Score up to 4+3 = 7 involving 70% of 1 core with 80% pattern 4 involvement  On 07/28/2021, the patient underwent radical prostatectomy under the care of Dr Ming Rivas; pathology demonstrated prostate adenocarcinoma, Soheila score 4+3, EPE positive (left posterolateral base), margins negative, SVI negative, no LNs resected  The patient did well postoperatively with initial PSA undetectable  Repeat PSA on 02/08/2022 showed increased to 0 2 and he was subsequently referred to Radiation Oncology for consideration of salvage RT  Currently patient is doing well overall  He has good urinary function without leakage, dysuria, urgency, or nocturia  He has good bowel function without any diarrhea or loose stool  He does have baseline erectile dysfunction and uses Viagra for intercourse          Historical Information   Oncology History   Prostate cancer ([facility] [address] )   7/28/2021 Initial Diagnosis    Prostate cancer ([facility] [address] )     7/28/2021 Biopsy    [facility] REFERENCE LABORATOR[facility]  DIAGNOSIS     Prostate (biopsy, [id number], 7/28/2021):    - Histologic Type: Adenocarcinoma (conventional, NOS)    - Soheila Score, Dominant Nodule: 4+3=7 Grade group 3: 70% pattern 4 (Tertiary Pattern 5)    - Tumor Extent: Tumor dimension (max): 14mm    - Location, Dominant Nodule: Left, posterolateral, posterior    - Local Extent (8th Edition AJCC): Extraprostatic extension Location and extent of extraprostatic extension: Left, posterolateral, base, nonfocal    - Margins (as per the report): Negative    - Seminal Vesicle Invasion (as per the report): None    - Lymphatic (small vessel Invasion (as per report): Absent    - Extent of Invasion (8th Edition AJCC) Primary Tumor: pT3: Extraprostatic extension or microscopic bladder neck invasion    - Regional Lymph Nodes: pNx: Cannot be assessed    - Summary Margins: Negative    - Additional Findings, Uninvolved Prostate:                                                High-grade prostatic intraepithelial neoplasia (PIN)                                               Prostatic intraductal adenocarcinoma  Santino Meraz MD    Addendum   At the request of Dr Emery Fu, unstained slides from paraffin BLOCK A25 containing the patient's cancer cells were sent to [facility]  for  [facility]  testing  Upon completion of testing, the [facility] report will be directly sent to the requesting physician as well as posted in the Media Tab of the patient's [employer] EMR by the [facility] Pathology Department                   Past Medical History:   Diagnosis Date    Benign colon polyp     Diverticulitis, colon     Diverticulosis     Hyperlipidemia     Prediabetes     Prostate cancer ([address] )     Renal calculi     Short-term memory loss     Vitamin D deficiency      Past Surgical History:   Procedure Laterality Date    ABDOMINAL ADHESION SURGERY N/A 7/28/2021    Procedure: LYSIS ADHESIONS, LAPAROSCOPIC;  Surgeon: Saintclair Lah, MD;  Location: [facility];  Service: Urology    COLONOSCOPY      ESOPHAGOGASTRODUODENOSCOPY      HERNIA REPAIR      [facility]    KNEE ARTHROSCOPY      KNEE SURGERY      IL LAP,PROSTATECTOMY,RADICAL,W/NERVE SPARE,INCL ROBOTIC N/A 7/28/2021    Procedure: ROBOTIC LAPAROSCOPIC RADICAL PROSTATECTOMY;  Surgeon: Saintclair Lah, MD;  Location: [facility]; Service: Urology    PROSTATE BIOPSY      TOTAL HIP ARTHROPLASTY Right 2019    TOTAL HIP ARTHROPLASTY Left 2019       Family History   Problem Relation Age of Onset   Jorge Luis Lemme Dementia Mother    Jorge Luis Lemme Glaucoma Mother     Glaucoma Father     Prostate cancer Father     Pulmonary embolism Father     Hypertension Brother     Prostate cancer Brother     Colon cancer Maternal Grandfather        Social History   Social History     Substance and Sexual Activity   Alcohol Use Yes    Comment: occasional beer and wine     Social History     Substance and Sexual Activity   Drug Use Not Currently    Comment: years ago     Social History     Tobacco Use   Smoking Status Former Smoker    Types: Cigarettes    Quit date: 12    Years since quittin 1   Smokeless Tobacco Never Used   Tobacco Comment    patient states he doesnt remember how much or for how long       Meds/Allergies     Current Outpatient Medications:     ALPHAGAN P 0 15 % ophthalmic solution, , Disp: , Rfl:     Cetirizine HCl (ZYRTEC ALLERGY PO), Take 1 tablet by mouth daily as needed  , Disp: , Rfl:     donepezil (ARICEPT) 10 mg tablet, Take 1 tablet (10 mg total) by mouth daily, Disp: 90 tablet, Rfl: 3    escitalopram (LEXAPRO) 10 mg tablet, TAKE 1 TABLET(10 MG) BY MOUTH DAILY, Disp: 30 tablet, Rfl: 3    latanoprost (XALATAN) 0 005 % ophthalmic solution, PLACE 1 DROP IN BOTH EYES DAILY AT BEDTIME, Disp: , Rfl:     LUMIGAN 0 01 % ophthalmic drops, , Disp: , Rfl:     memantine (NAMENDA) 10 mg tablet, Take 1 tablet (10 mg total) by mouth 2 (two) times a day, Disp: 60 tablet, Rfl: 3    tadalafil (CIALIS) 20 MG tablet, Take 1 tablet (20 mg total) by mouth daily as needed for erectile dysfunction, Disp: 20 tablet, Rfl: 3    acetaminophen (TYLENOL) 325 mg tablet, Take 2 tablets (650 mg total) by mouth every 4 (four) hours as needed for mild pain, Disp: 30 tablet, Rfl: 0    Alphagan P 0 1 %, INSTILL 1 DROP IN BOTH EYES TWICE DAILY, Disp: , Rfl:   docusate sodium (COLACE) 100 mg capsule, Take 1 capsule (100 mg total) by mouth 2 (two) times a day for 15 days (Patient not taking: Reported on 2/10/2022 ), Disp: 30 capsule, Rfl: 0    Lactobacillus (PROBIOTIC ACIDOPHILUS) CAPS, Take by mouth once (Patient not taking: Reported on 1/21/2022 ), Disp: , Rfl:     LORazepam (ATIVAN) 1 mg tablet, Take 1 tablet 1 hour prior to MRI and then take 1 tablet 15 minutes prior to MRI  Do not drive , Disp: 2 tablet, Rfl: 0    naproxen (NAPROSYN) 500 mg tablet, Take 1 tablet (500 mg total) by mouth 2 (two) times a day with meals for 5 days For pain (Patient not taking: Reported on 2/10/2022 ), Disp: 10 tablet, Rfl: 0    neomycin-bacitracin-polymyxin b (NEOSPORIN) ointment, Apply topically 2 (two) times a day as needed (Catheter irritation) (Patient not taking: Reported on 1/21/2022 ), Disp: 15 g, Rfl: 0    oxybutynin (DITROPAN) 5 mg tablet, Take 1 tablet (5 mg total) by mouth 2 (two) times a day as needed (Bladder spasms) for up to 14 days (Patient not taking: Reported on 2/10/2022 ), Disp: 28 tablet, Rfl: 0    pantoprazole (PROTONIX) 20 mg tablet, Take 1 tablet (20 mg total) by mouth daily for 30 doses (Patient not taking: Reported on 2/10/2022 ), Disp: 30 tablet, Rfl: 3  Allergies   Allergen Reactions    Sulfa Antibiotics Other (See Comments)     Mother told him as child not to take     Review of Systems:   Constitutional: Positive for fatigue (mild)  HENT: Negative  Eyes:        Wears glasses  Glaucoma   Respiratory: Negative  Cardiovascular: Negative  Gastrointestinal: Negative  Endocrine: Negative  Genitourinary: Positive for urgency (mild)  Negative for dysuria, frequency and hematuria  Nocturia x1  Dribbling at times   Musculoskeletal: Positive for arthralgias (generalized) and back pain (lower)  Skin:        Dry skin   Allergic/Immunologic: Negative  Neurological: Positive for numbness (left foot minimal) and headaches (rarely)  Negative for dizziness and light-headedness  Hematological: Negative  Psychiatric/Behavioral: Positive for confusion  Negative for decreased concentration and sleep disturbance  Early onset Alzheimer      Over the past month     1)  How often have you had a sensation of not emptying your bladder completely after you finish urinating? 0 - Not at all   2)  How often have you had to urinate again less than two hours after you finished urinating? 0 - Not at all   3)  How often have you found you stopped and started again several times when you urinated? 1- less than 1 in 5      4) How difficult have you found it to postpone urination? 1 - Less than 1 time in 5   5) How often have you had a weak urinary stream?  0 - Not at all   6) How often have you had to push or strain to begin urination? 0 - Not at all   7) How many times did you most typically get up to urinate from the time you went to bed until the time you got up in the morning? 1 - 1 time   Total Score:  3          OBJECTIVE:   /96 (BP Location: Left arm, Patient Position: Sitting, Cuff Size: Standard)   Pulse 82   Temp 97 9 °F (36 6 °C) (Temporal)   Resp 16   Ht 5' 7" (1 702 m)   Wt 77 9 kg (171 lb 12 8 oz)   SpO2 98%   BMI 26 91 kg/m²   Pain Assessment:  0  Performance Status: ECOG/Zubrod/WHO: 0 - Asymptomatic    Physical Exam   Well appearing  NAD  No cervical or SCLV adenopathy    RRR  No murmurs appreciated  Lungs CTA bilaterally  No increased work of breathign  Abd soft NT ND  No masses  Rectal exam deferred  No LE edema noted         RESULTS  Lab Results    Chemistry        Component Value Date/Time     12/13/2017 0757    K 3 8 07/29/2021 0516    K 4 4 06/26/2018 0731     07/29/2021 0516     06/26/2018 0731    CO2 26 07/29/2021 0516    CO2 28 06/26/2018 0731    BUN 11 07/29/2021 0516    BUN 13 06/26/2018 0731    CREATININE 0 84 07/29/2021 0516    CREATININE 0 90 12/13/2017 0757        Component Value Date/Time    CALCIUM 8 4 07/29/2021 0516    CALCIUM 9 4 06/26/2018 0731    ALKPHOS 67 07/14/2021 1141    ALKPHOS 55 06/26/2018 0731    AST 13 07/14/2021 1141    AST 15 06/26/2018 0731    ALT 28 07/14/2021 1141    ALT 20 06/26/2018 0731    BILITOT 0 3 12/13/2017 0757            Lab Results   Component Value Date    WBC 8 66 07/29/2021    HGB 12 5 07/29/2021    HCT 38 9 07/29/2021    MCV 96 07/29/2021     07/29/2021         Imaging Studies  No results found        Pathology:  7/28/22 Robotic Laparoscopic radical prostatectomy lysis adhesion, laparoscopic   [facility] REFERENCE LABORATORIES  DIAGNOSIS     Prostate (biopsy, [id number], 7/28/2021):    - Histologic Type: Adenocarcinoma (conventional, NOS)    - Soheila Score, Dominant Nodule: 4+3=7 Grade group 3: 70% pattern 4 (Tertiary Pattern 5)    - Tumor Extent: Tumor dimension (max): 14mm    - Location, Dominant Nodule: Left, posterolateral, posterior    - Local Extent (8th Edition AJCC): Extraprostatic extension                                              Location and extent of extraprostatic extension: Left, posterolateral, base, nonfocal    - Margins (as per the report): Negative    - Seminal Vesicle Invasion (as per the report): None    - Lymphatic (small vessel Invasion (as per report): Absent    - Extent of Invasion (8th Edition AJCC) Primary Tumor: pT3: Extraprostatic extension or microscopic bladder neck invasion    - Regional Lymph Nodes: pNx: Cannot be assessed    - Summary Margins: Negative    - Additional Findings, Uninvolved Prostate:                                                High-grade prostatic intraepithelial neoplasia (PIN)                                               Prostatic intraductal adenocarcinoma          SPECIMEN   Procedure   Radical prostatectomy: robotic radical prostatectomy    Prostate Size       Prostate Weight (g)   75 g   Prostate Greatest Dimension (Centimeters)   5 4 cm   Additional Dimension (Centimeters)   4 9 cm       4 6 cm   TUMOR   Histologic Type   Acinar adenocarcinoma    Histologic Grade       Grade Group and Soheila Score   Grade group 3 (Soheila Score 4 + 3 = 7)    Percentage of Pattern 4   70 %   Tertiary Pattern 5 (less than 5%) in Overall Soheila Score 7   Present    Intraductal Carcinoma (IDC)   Present    Tumor Quantitation   Greatest Dimenion of Dominant Nodule (Millimeters): 14 mm   Location of Dominant Nodule   left posterolateral    Extraprostatic Extension (EPE)   Present, nonfocal    Location of Extraprostatic Extension   Left postero-lateral (neurovascular bundle)        Left posterior    Urinary Bladder Neck Invasion   Not identified    Seminal Vesicle Invasion   Not identified    Treatment Effect   No known presurgical therapy    Lymphovascular Invasion   Not Identified    Perineural Invasion   Present    MARGINS   Margins   Uninvolved by invasive carcinoma    LYMPH NODES   Regional Lymph Nodes   No lymph nodes submitted or found    PATHOLOGIC STAGE CLASSIFICATION (pTNM, AJCC 8th Edition)       Primary Tumor (pT)   pT3a    Regional Lymph Nodes (pN)   pNX    ADDITIONAL FINDINGS   Additional Findings   High-grade prostatic intraepithelial neoplasia (PIN)      ASSESSMENT  1  Prostate cancer [facility])  Ambulatory Referral to Radiation Oncology    Radiation Simulation Treatment     Cancer Staging  No matching staging information was found for the patient  PLAN/DISCUSSION  Orders Placed This Encounter   Procedures   Mayi Cardenas MD  5/93/2086,0:14 AM      Portions of the record may have been created with voice recognition software  Occasional wrong word or "sound a like" substitutions may have occurred due to the inherent limitations of voice recognition software  Read the chart carefully and recognize, using context, where substitutions have occurred

## 2022-02-25 ENCOUNTER — TELEPHONE (OUTPATIENT)
Dept: NEUROLOGY | Facility: CLINIC | Age: 62
End: 2022-02-25

## 2022-02-25 NOTE — TELEPHONE ENCOUNTER
Discussed with his wife at the appointment that he should not be driving  He had a fitness to drive which he then failed, I called and spoke to his wife and reviewed these results and informed her that he would no longer be able to drive  His job was accommodating him up until the time of his last appointment by allowing him to work but not to drive  However we discussed that he would need to be under constant supervision, which was going to be unlikely that they could accommodate  We discussed with his wife at the time of the appt that she should look into social security disability benefits for him  I can complete long-term disability, however it is unlikely that he will return to work at any time so I am not sure how this will work (typically for short term/long term they ask for a return to work date)  If they will need continued paperwork filled out by me they will need to keep the follow-up appointment/reschedule the canceled appointment in May

## 2022-02-25 NOTE — TELEPHONE ENCOUNTER
Pt's wife MICHELLE on nursing line requesting cb  States she has some questions  Spoke with Ruddy Loving and she informs that pt's place of work does not want him to work anymore  Pt's work saying it is due to his alzheimer's becoming a hazard  Wife states pt's license will be taken away and he needs to drive in order to work  Pt's workplace is willing to allow short term or long term disability for medical insurance until he can be seen in May for his next appt with Nona  Asking if provider agreeable to short or long term disability  Best  [phone], ok to leave detailed message  Nona - Please advise  FYI - Looks like we canceled appt in May

## 2022-02-28 ENCOUNTER — APPOINTMENT (OUTPATIENT)
Dept: RADIATION ONCOLOGY | Facility: CLINIC | Age: 62
End: 2022-02-28
Attending: STUDENT IN AN ORGANIZED HEALTH CARE EDUCATION/TRAINING PROGRAM
Payer: COMMERCIAL

## 2022-02-28 PROCEDURE — 77334 RADIATION TREATMENT AID(S): CPT | Performed by: RADIOLOGY

## 2022-03-01 PROCEDURE — 77334 RADIATION TREATMENT AID(S): CPT | Performed by: RADIOLOGY

## 2022-03-01 NOTE — TELEPHONE ENCOUNTER
Pt's wife made aware of below  I scheduled pt for 4/25 for follow up  Made her aware of fee and turn around time    She will have forms faxed to the office or will bring them to the office

## 2022-03-03 NOTE — TELEPHONE ENCOUNTER
Called pt wife and lm for them to call back to collect $30 00 fee for [facility] paperwork

## 2022-03-03 NOTE — TELEPHONE ENCOUNTER
FMLA received  Scanned into media dropped charges $30 00 will call pts wife to advise received and collect fee

## 2022-03-08 ENCOUNTER — APPOINTMENT (OUTPATIENT)
Dept: RADIATION ONCOLOGY | Facility: HOSPITAL | Age: 62
End: 2022-03-08
Payer: COMMERCIAL

## 2022-03-11 ENCOUNTER — APPOINTMENT (OUTPATIENT)
Dept: RADIATION ONCOLOGY | Facility: HOSPITAL | Age: 62
End: 2022-03-11
Payer: COMMERCIAL

## 2022-03-14 ENCOUNTER — APPOINTMENT (OUTPATIENT)
Dept: RADIATION ONCOLOGY | Facility: HOSPITAL | Age: 62
End: 2022-03-14
Attending: STUDENT IN AN ORGANIZED HEALTH CARE EDUCATION/TRAINING PROGRAM
Payer: COMMERCIAL

## 2022-03-14 NOTE — TELEPHONE ENCOUNTER
I sent forms to Michelle to give to [facility] to review and sign  Greg Raymond please assist when forms are done with process  Patient needs to fill out his information on forms

## 2022-03-15 ENCOUNTER — APPOINTMENT (OUTPATIENT)
Dept: RADIATION ONCOLOGY | Facility: HOSPITAL | Age: 62
End: 2022-03-15
Attending: STUDENT IN AN ORGANIZED HEALTH CARE EDUCATION/TRAINING PROGRAM
Payer: COMMERCIAL

## 2022-03-15 NOTE — TELEPHONE ENCOUNTER
Pt's wife made aware of below  She would like a copy of forms mailed to her    Please scan completed forms into chart and mail pt a copy

## 2022-03-15 NOTE — TELEPHONE ENCOUNTER
Forms reviewed, completed pg  4 of STD form, signed off on all pages and faxed to the number provided on the cover sheet [phone]  Will ask the  to scan in completed copies

## 2022-03-16 ENCOUNTER — APPOINTMENT (OUTPATIENT)
Dept: RADIATION ONCOLOGY | Facility: HOSPITAL | Age: 62
End: 2022-03-16
Attending: STUDENT IN AN ORGANIZED HEALTH CARE EDUCATION/TRAINING PROGRAM
Payer: COMMERCIAL

## 2022-03-17 ENCOUNTER — APPOINTMENT (OUTPATIENT)
Dept: RADIATION ONCOLOGY | Facility: HOSPITAL | Age: 62
End: 2022-03-17
Attending: STUDENT IN AN ORGANIZED HEALTH CARE EDUCATION/TRAINING PROGRAM
Payer: COMMERCIAL

## 2022-03-17 ENCOUNTER — APPOINTMENT (OUTPATIENT)
Dept: RADIATION ONCOLOGY | Facility: HOSPITAL | Age: 62
End: 2022-03-17
Payer: COMMERCIAL

## 2022-03-18 ENCOUNTER — APPOINTMENT (OUTPATIENT)
Dept: RADIATION ONCOLOGY | Facility: HOSPITAL | Age: 62
End: 2022-03-18
Attending: STUDENT IN AN ORGANIZED HEALTH CARE EDUCATION/TRAINING PROGRAM
Payer: COMMERCIAL

## 2022-03-21 ENCOUNTER — APPOINTMENT (OUTPATIENT)
Dept: RADIATION ONCOLOGY | Facility: HOSPITAL | Age: 62
End: 2022-03-21
Attending: STUDENT IN AN ORGANIZED HEALTH CARE EDUCATION/TRAINING PROGRAM
Payer: COMMERCIAL

## 2022-03-22 ENCOUNTER — APPOINTMENT (OUTPATIENT)
Dept: LAB | Facility: AMBULARY SURGERY CENTER | Age: 62
End: 2022-03-22
Payer: COMMERCIAL

## 2022-03-22 ENCOUNTER — APPOINTMENT (OUTPATIENT)
Dept: RADIATION ONCOLOGY | Facility: HOSPITAL | Age: 62
End: 2022-03-22
Attending: STUDENT IN AN ORGANIZED HEALTH CARE EDUCATION/TRAINING PROGRAM
Payer: COMMERCIAL

## 2022-03-22 DIAGNOSIS — Z13.220 ENCOUNTER FOR SCREENING FOR LIPOID DISORDERS: ICD-10-CM

## 2022-03-22 DIAGNOSIS — Z12.5 ENCOUNTER FOR SCREENING FOR MALIGNANT NEOPLASM OF PROSTATE: ICD-10-CM

## 2022-03-22 DIAGNOSIS — C61 PROSTATE CANCER (HCC): ICD-10-CM

## 2022-03-22 DIAGNOSIS — Z00.00 ENCOUNTER FOR GENERAL ADULT MEDICAL EXAMINATION WITHOUT ABNORMAL FINDINGS: ICD-10-CM

## 2022-03-22 LAB
BASOPHILS # BLD AUTO: 0.04 THOUSANDS/ΜL (ref 0–0.1)
BASOPHILS NFR BLD AUTO: 1 % (ref 0–1)
EOSINOPHIL # BLD AUTO: 0.17 THOUSAND/ΜL (ref 0–0.61)
EOSINOPHIL NFR BLD AUTO: 4 % (ref 0–6)
ERYTHROCYTE [DISTWIDTH] IN BLOOD BY AUTOMATED COUNT: 12.7 % (ref 11.6–15.1)
HCT VFR BLD AUTO: 43.2 % (ref 36.5–49.3)
HGB BLD-MCNC: 14.8 G/DL (ref 12–17)
IMM GRANULOCYTES # BLD AUTO: 0.02 THOUSAND/UL (ref 0–0.2)
IMM GRANULOCYTES NFR BLD AUTO: 1 % (ref 0–2)
LYMPHOCYTES # BLD AUTO: 1.03 THOUSANDS/ΜL (ref 0.6–4.47)
LYMPHOCYTES NFR BLD AUTO: 25 % (ref 14–44)
MCH RBC QN AUTO: 30.3 PG (ref 26.8–34.3)
MCHC RBC AUTO-ENTMCNC: 34.3 G/DL (ref 31.4–37.4)
MCV RBC AUTO: 88 FL (ref 82–98)
MONOCYTES # BLD AUTO: 0.57 THOUSAND/ΜL (ref 0.17–1.22)
MONOCYTES NFR BLD AUTO: 14 % (ref 4–12)
NEUTROPHILS # BLD AUTO: 2.27 THOUSANDS/ΜL (ref 1.85–7.62)
NEUTS SEG NFR BLD AUTO: 55 % (ref 43–75)
NRBC BLD AUTO-RTO: 0 /100 WBCS
PLATELET # BLD AUTO: 231 THOUSANDS/UL (ref 149–390)
PMV BLD AUTO: 9.7 FL (ref 8.9–12.7)
RBC # BLD AUTO: 4.89 MILLION/UL (ref 3.88–5.62)
WBC # BLD AUTO: 4.1 THOUSAND/UL (ref 4.31–10.16)

## 2022-03-22 PROCEDURE — 85025 COMPLETE CBC W/AUTO DIFF WBC: CPT

## 2022-03-22 PROCEDURE — 36415 COLL VENOUS BLD VENIPUNCTURE: CPT

## 2022-03-23 ENCOUNTER — APPOINTMENT (OUTPATIENT)
Dept: RADIATION ONCOLOGY | Facility: HOSPITAL | Age: 62
End: 2022-03-23
Attending: STUDENT IN AN ORGANIZED HEALTH CARE EDUCATION/TRAINING PROGRAM
Payer: COMMERCIAL

## 2022-03-23 ENCOUNTER — HOSPITAL ENCOUNTER (OUTPATIENT)
Dept: MRI IMAGING | Facility: HOSPITAL | Age: 62
Discharge: HOME | End: 2022-03-23
Payer: COMMERCIAL

## 2022-03-23 PROCEDURE — 70551 MRI BRAIN STEM W/O DYE: CPT

## 2022-03-23 PROCEDURE — G1004 CDSM NDSC: HCPCS

## 2022-03-24 ENCOUNTER — APPOINTMENT (OUTPATIENT)
Dept: RADIATION ONCOLOGY | Facility: HOSPITAL | Age: 62
End: 2022-03-24
Attending: STUDENT IN AN ORGANIZED HEALTH CARE EDUCATION/TRAINING PROGRAM
Payer: COMMERCIAL

## 2022-03-25 ENCOUNTER — APPOINTMENT (OUTPATIENT)
Dept: RADIATION ONCOLOGY | Facility: HOSPITAL | Age: 62
End: 2022-03-25
Attending: STUDENT IN AN ORGANIZED HEALTH CARE EDUCATION/TRAINING PROGRAM
Payer: COMMERCIAL

## 2022-03-28 ENCOUNTER — APPOINTMENT (OUTPATIENT)
Dept: RADIATION ONCOLOGY | Facility: HOSPITAL | Age: 62
End: 2022-03-28
Attending: STUDENT IN AN ORGANIZED HEALTH CARE EDUCATION/TRAINING PROGRAM
Payer: COMMERCIAL

## 2022-03-29 ENCOUNTER — APPOINTMENT (OUTPATIENT)
Dept: RADIATION ONCOLOGY | Facility: HOSPITAL | Age: 62
End: 2022-03-29
Attending: STUDENT IN AN ORGANIZED HEALTH CARE EDUCATION/TRAINING PROGRAM
Payer: COMMERCIAL

## 2022-03-30 ENCOUNTER — APPOINTMENT (OUTPATIENT)
Dept: RADIATION ONCOLOGY | Facility: HOSPITAL | Age: 62
End: 2022-03-30
Attending: STUDENT IN AN ORGANIZED HEALTH CARE EDUCATION/TRAINING PROGRAM
Payer: COMMERCIAL

## 2022-03-31 ENCOUNTER — APPOINTMENT (OUTPATIENT)
Dept: RADIATION ONCOLOGY | Facility: HOSPITAL | Age: 62
End: 2022-03-31
Attending: STUDENT IN AN ORGANIZED HEALTH CARE EDUCATION/TRAINING PROGRAM
Payer: COMMERCIAL

## 2022-03-31 ENCOUNTER — TELEPHONE (OUTPATIENT)
Dept: NEUROLOGY | Facility: CLINIC | Age: 62
End: 2022-03-31

## 2022-03-31 DIAGNOSIS — F02.80 EARLY ONSET ALZHEIMER'S DEMENTIA WITHOUT BEHAVIORAL DISTURBANCE (HCC): ICD-10-CM

## 2022-03-31 DIAGNOSIS — G30.0 EARLY ONSET ALZHEIMER'S DEMENTIA WITHOUT BEHAVIORAL DISTURBANCE (HCC): ICD-10-CM

## 2022-03-31 RX ORDER — ESCITALOPRAM OXALATE 10 MG/1
TABLET ORAL
Qty: 30 TABLET | Refills: 3 | Status: SHIPPED | OUTPATIENT
Start: 2022-03-31 | End: 2022-04-25 | Stop reason: SDUPTHER

## 2022-03-31 NOTE — TELEPHONE ENCOUNTER
Neuropsychological Evaluation  [facility]  [address]  [facility], [place] [address]  P: [phone] F: [phone]    Intake Note  Date of Referral to Neuropsychology: 1/21/2022  Referring Provider: Javid Waggoner, [facility] to conduct screening prior to scheduling neuropsychological evaluation  Spoke to patient who directed me to his wife for intake purposes and was able to reach the requested contact via phone  Explained nature of neuropsychological evaluation  Patient is not agreeable to evaluation  Referring provider will be informed that patient is not interested in neuropsychological evaluation at this time  New referral may be placed if patient is interested in evaluation at a later date  Contact information for this office was provided if questions or concerns arise  Patient and wife do not feel that this testing is a possibility at this time due to other health concerns and agitation  I did let them know that if they want to revisit in the future to give our office a call back  Provided them with office number

## 2022-04-01 ENCOUNTER — TELEPHONE (OUTPATIENT)
Dept: NEUROLOGY | Facility: CLINIC | Age: 62
End: 2022-04-01

## 2022-04-01 ENCOUNTER — APPOINTMENT (OUTPATIENT)
Dept: RADIATION ONCOLOGY | Facility: HOSPITAL | Age: 62
End: 2022-04-01
Attending: STUDENT IN AN ORGANIZED HEALTH CARE EDUCATION/TRAINING PROGRAM
Payer: COMMERCIAL

## 2022-04-01 PROCEDURE — 77385 HB NTSTY MODUL RAD TX DLVR SMPL: CPT | Performed by: STUDENT IN AN ORGANIZED HEALTH CARE EDUCATION/TRAINING PROGRAM

## 2022-04-01 PROCEDURE — 77014 CHG CT GUIDANCE PLACEMENT RAD THERAPY FIELDS: CPT | Performed by: STUDENT IN AN ORGANIZED HEALTH CARE EDUCATION/TRAINING PROGRAM

## 2022-04-01 PROCEDURE — 77336 RADIATION PHYSICS CONSULT: CPT | Performed by: STUDENT IN AN ORGANIZED HEALTH CARE EDUCATION/TRAINING PROGRAM

## 2022-04-01 NOTE — TELEPHONE ENCOUNTER
Received fax from [facility] of initial disability claim form  Please complete and give to [facility] for her review and signature  Form attached to encounter

## 2022-04-04 ENCOUNTER — RADIATION THERAPY TREATMENT (OUTPATIENT)
Dept: RADIATION ONCOLOGY | Facility: HOSPITAL | Age: 62
End: 2022-04-04
Attending: STUDENT IN AN ORGANIZED HEALTH CARE EDUCATION/TRAINING PROGRAM
Payer: COMMERCIAL

## 2022-04-04 PROCEDURE — 77385 HB NTSTY MODUL RAD TX DLVR SMPL: CPT | Performed by: RADIOLOGY

## 2022-04-04 PROCEDURE — 77427 RADIATION TX MANAGEMENT X5: CPT | Performed by: STUDENT IN AN ORGANIZED HEALTH CARE EDUCATION/TRAINING PROGRAM

## 2022-04-04 PROCEDURE — 77014 CHG CT GUIDANCE PLACEMENT RAD THERAPY FIELDS: CPT | Performed by: RADIOLOGY

## 2022-04-05 ENCOUNTER — APPOINTMENT (OUTPATIENT)
Dept: RADIATION ONCOLOGY | Facility: HOSPITAL | Age: 62
End: 2022-04-05
Attending: STUDENT IN AN ORGANIZED HEALTH CARE EDUCATION/TRAINING PROGRAM
Payer: COMMERCIAL

## 2022-04-05 PROCEDURE — 77014 CHG CT GUIDANCE PLACEMENT RAD THERAPY FIELDS: CPT | Performed by: RADIOLOGY

## 2022-04-05 PROCEDURE — 77385 HB NTSTY MODUL RAD TX DLVR SMPL: CPT | Performed by: RADIOLOGY

## 2022-04-06 ENCOUNTER — APPOINTMENT (OUTPATIENT)
Dept: RADIATION ONCOLOGY | Facility: HOSPITAL | Age: 62
End: 2022-04-06
Attending: STUDENT IN AN ORGANIZED HEALTH CARE EDUCATION/TRAINING PROGRAM
Payer: COMMERCIAL

## 2022-04-06 PROCEDURE — 77385 HB NTSTY MODUL RAD TX DLVR SMPL: CPT | Performed by: RADIOLOGY

## 2022-04-06 PROCEDURE — 77014 CHG CT GUIDANCE PLACEMENT RAD THERAPY FIELDS: CPT | Performed by: RADIOLOGY

## 2022-04-07 ENCOUNTER — RADIATION THERAPY TREATMENT (OUTPATIENT)
Dept: RADIATION ONCOLOGY | Facility: HOSPITAL | Age: 62
End: 2022-04-07
Attending: STUDENT IN AN ORGANIZED HEALTH CARE EDUCATION/TRAINING PROGRAM
Payer: COMMERCIAL

## 2022-04-07 PROCEDURE — 77385 HB NTSTY MODUL RAD TX DLVR SMPL: CPT | Performed by: RADIOLOGY

## 2022-04-07 PROCEDURE — 77014 CHG CT GUIDANCE PLACEMENT RAD THERAPY FIELDS: CPT | Performed by: RADIOLOGY

## 2022-04-08 ENCOUNTER — APPOINTMENT (OUTPATIENT)
Dept: RADIATION ONCOLOGY | Facility: HOSPITAL | Age: 62
End: 2022-04-08
Attending: STUDENT IN AN ORGANIZED HEALTH CARE EDUCATION/TRAINING PROGRAM
Payer: COMMERCIAL

## 2022-04-08 PROCEDURE — 77336 RADIATION PHYSICS CONSULT: CPT | Performed by: STUDENT IN AN ORGANIZED HEALTH CARE EDUCATION/TRAINING PROGRAM

## 2022-04-08 PROCEDURE — 77385 HB NTSTY MODUL RAD TX DLVR SMPL: CPT | Performed by: RADIOLOGY

## 2022-04-08 PROCEDURE — 77014 CHG CT GUIDANCE PLACEMENT RAD THERAPY FIELDS: CPT | Performed by: RADIOLOGY

## 2022-04-11 ENCOUNTER — APPOINTMENT (OUTPATIENT)
Dept: RADIATION ONCOLOGY | Facility: HOSPITAL | Age: 62
End: 2022-04-11
Attending: STUDENT IN AN ORGANIZED HEALTH CARE EDUCATION/TRAINING PROGRAM
Payer: COMMERCIAL

## 2022-04-11 PROCEDURE — 77014 CHG CT GUIDANCE PLACEMENT RAD THERAPY FIELDS: CPT | Performed by: RADIOLOGY

## 2022-04-11 PROCEDURE — 77427 RADIATION TX MANAGEMENT X5: CPT | Performed by: RADIOLOGY

## 2022-04-11 PROCEDURE — 77385 HB NTSTY MODUL RAD TX DLVR SMPL: CPT | Performed by: RADIOLOGY

## 2022-04-12 ENCOUNTER — APPOINTMENT (OUTPATIENT)
Dept: RADIATION ONCOLOGY | Facility: HOSPITAL | Age: 62
End: 2022-04-12
Attending: STUDENT IN AN ORGANIZED HEALTH CARE EDUCATION/TRAINING PROGRAM
Payer: COMMERCIAL

## 2022-04-12 PROCEDURE — 77014 CHG CT GUIDANCE PLACEMENT RAD THERAPY FIELDS: CPT | Performed by: RADIOLOGY

## 2022-04-12 PROCEDURE — 77385 HB NTSTY MODUL RAD TX DLVR SMPL: CPT | Performed by: RADIOLOGY

## 2022-04-13 ENCOUNTER — APPOINTMENT (OUTPATIENT)
Dept: RADIATION ONCOLOGY | Facility: HOSPITAL | Age: 62
End: 2022-04-13
Attending: STUDENT IN AN ORGANIZED HEALTH CARE EDUCATION/TRAINING PROGRAM
Payer: COMMERCIAL

## 2022-04-13 PROCEDURE — 77014 CHG CT GUIDANCE PLACEMENT RAD THERAPY FIELDS: CPT | Performed by: STUDENT IN AN ORGANIZED HEALTH CARE EDUCATION/TRAINING PROGRAM

## 2022-04-13 PROCEDURE — 77385 HB NTSTY MODUL RAD TX DLVR SMPL: CPT | Performed by: STUDENT IN AN ORGANIZED HEALTH CARE EDUCATION/TRAINING PROGRAM

## 2022-04-13 NOTE — TELEPHONE ENCOUNTER
Good Morning Adonis Green for the delay Erendira Olguin she was out of the office Monday and Tuesday and now she is in [place] and I'm in [place] office but I complete the form and I will give to her for review and signature tomorrow the first thing in the morning

## 2022-04-14 ENCOUNTER — APPOINTMENT (OUTPATIENT)
Dept: RADIATION ONCOLOGY | Facility: HOSPITAL | Age: 62
End: 2022-04-14
Attending: STUDENT IN AN ORGANIZED HEALTH CARE EDUCATION/TRAINING PROGRAM
Payer: COMMERCIAL

## 2022-04-14 PROCEDURE — 77014 CHG CT GUIDANCE PLACEMENT RAD THERAPY FIELDS: CPT | Performed by: STUDENT IN AN ORGANIZED HEALTH CARE EDUCATION/TRAINING PROGRAM

## 2022-04-14 PROCEDURE — 77385 HB NTSTY MODUL RAD TX DLVR SMPL: CPT | Performed by: STUDENT IN AN ORGANIZED HEALTH CARE EDUCATION/TRAINING PROGRAM

## 2022-04-15 ENCOUNTER — APPOINTMENT (OUTPATIENT)
Dept: RADIATION ONCOLOGY | Facility: HOSPITAL | Age: 62
End: 2022-04-15
Attending: STUDENT IN AN ORGANIZED HEALTH CARE EDUCATION/TRAINING PROGRAM
Payer: COMMERCIAL

## 2022-04-15 PROCEDURE — 77014 CHG CT GUIDANCE PLACEMENT RAD THERAPY FIELDS: CPT | Performed by: RADIOLOGY

## 2022-04-15 PROCEDURE — 77336 RADIATION PHYSICS CONSULT: CPT | Performed by: RADIOLOGY

## 2022-04-15 PROCEDURE — 77385 HB NTSTY MODUL RAD TX DLVR SMPL: CPT | Performed by: RADIOLOGY

## 2022-04-18 ENCOUNTER — APPOINTMENT (OUTPATIENT)
Dept: RADIATION ONCOLOGY | Facility: HOSPITAL | Age: 62
End: 2022-04-18
Attending: STUDENT IN AN ORGANIZED HEALTH CARE EDUCATION/TRAINING PROGRAM
Payer: COMMERCIAL

## 2022-04-18 PROCEDURE — 77427 RADIATION TX MANAGEMENT X5: CPT | Performed by: RADIOLOGY

## 2022-04-18 PROCEDURE — 77385 HB NTSTY MODUL RAD TX DLVR SMPL: CPT | Performed by: RADIOLOGY

## 2022-04-18 PROCEDURE — 77014 CHG CT GUIDANCE PLACEMENT RAD THERAPY FIELDS: CPT | Performed by: RADIOLOGY

## 2022-04-19 ENCOUNTER — APPOINTMENT (OUTPATIENT)
Dept: RADIATION ONCOLOGY | Facility: HOSPITAL | Age: 62
End: 2022-04-19
Attending: STUDENT IN AN ORGANIZED HEALTH CARE EDUCATION/TRAINING PROGRAM
Payer: COMMERCIAL

## 2022-04-19 ENCOUNTER — APPOINTMENT (OUTPATIENT)
Dept: RADIATION ONCOLOGY | Facility: HOSPITAL | Age: 62
End: 2022-04-19
Payer: COMMERCIAL

## 2022-04-19 PROCEDURE — 77385 HB NTSTY MODUL RAD TX DLVR SMPL: CPT | Performed by: RADIOLOGY

## 2022-04-19 PROCEDURE — 77014 CHG CT GUIDANCE PLACEMENT RAD THERAPY FIELDS: CPT | Performed by: RADIOLOGY

## 2022-04-20 ENCOUNTER — APPOINTMENT (OUTPATIENT)
Dept: RADIATION ONCOLOGY | Facility: HOSPITAL | Age: 62
End: 2022-04-20
Attending: STUDENT IN AN ORGANIZED HEALTH CARE EDUCATION/TRAINING PROGRAM
Payer: COMMERCIAL

## 2022-04-20 PROCEDURE — 77014 CHG CT GUIDANCE PLACEMENT RAD THERAPY FIELDS: CPT | Performed by: RADIOLOGY

## 2022-04-20 PROCEDURE — 77385 HB NTSTY MODUL RAD TX DLVR SMPL: CPT | Performed by: RADIOLOGY

## 2022-04-21 ENCOUNTER — APPOINTMENT (OUTPATIENT)
Dept: RADIATION ONCOLOGY | Facility: HOSPITAL | Age: 62
End: 2022-04-21
Attending: STUDENT IN AN ORGANIZED HEALTH CARE EDUCATION/TRAINING PROGRAM
Payer: COMMERCIAL

## 2022-04-21 PROCEDURE — 77385 HB NTSTY MODUL RAD TX DLVR SMPL: CPT | Performed by: RADIOLOGY

## 2022-04-21 PROCEDURE — 77014 CHG CT GUIDANCE PLACEMENT RAD THERAPY FIELDS: CPT | Performed by: RADIOLOGY

## 2022-04-22 ENCOUNTER — APPOINTMENT (OUTPATIENT)
Dept: RADIATION ONCOLOGY | Facility: HOSPITAL | Age: 62
End: 2022-04-22
Attending: STUDENT IN AN ORGANIZED HEALTH CARE EDUCATION/TRAINING PROGRAM
Payer: COMMERCIAL

## 2022-04-22 PROCEDURE — 77014 CHG CT GUIDANCE PLACEMENT RAD THERAPY FIELDS: CPT | Performed by: RADIOLOGY

## 2022-04-22 PROCEDURE — 77336 RADIATION PHYSICS CONSULT: CPT | Performed by: RADIOLOGY

## 2022-04-22 PROCEDURE — 77385 HB NTSTY MODUL RAD TX DLVR SMPL: CPT | Performed by: RADIOLOGY

## 2022-04-25 ENCOUNTER — OFFICE VISIT (OUTPATIENT)
Dept: NEUROLOGY | Facility: CLINIC | Age: 62
End: 2022-04-25
Payer: COMMERCIAL

## 2022-04-25 ENCOUNTER — APPOINTMENT (OUTPATIENT)
Dept: RADIATION ONCOLOGY | Facility: HOSPITAL | Age: 62
End: 2022-04-25
Attending: STUDENT IN AN ORGANIZED HEALTH CARE EDUCATION/TRAINING PROGRAM
Payer: COMMERCIAL

## 2022-04-25 VITALS
DIASTOLIC BLOOD PRESSURE: 72 MMHG | SYSTOLIC BLOOD PRESSURE: 126 MMHG | OXYGEN SATURATION: 98 % | HEIGHT: 67 IN | HEART RATE: 64 BPM | BODY MASS INDEX: 27.47 KG/M2 | WEIGHT: 175 LBS | TEMPERATURE: 97.9 F

## 2022-04-25 DIAGNOSIS — G30.0 EARLY ONSET ALZHEIMER'S DEMENTIA WITHOUT BEHAVIORAL DISTURBANCE (HCC): ICD-10-CM

## 2022-04-25 DIAGNOSIS — F02.80 EARLY ONSET ALZHEIMER'S DEMENTIA WITHOUT BEHAVIORAL DISTURBANCE (HCC): ICD-10-CM

## 2022-04-25 PROCEDURE — 1036F TOBACCO NON-USER: CPT

## 2022-04-25 PROCEDURE — 3008F BODY MASS INDEX DOCD: CPT

## 2022-04-25 PROCEDURE — 99213 OFFICE O/P EST LOW 20 MIN: CPT

## 2022-04-25 RX ORDER — ESCITALOPRAM OXALATE 10 MG/1
10 TABLET ORAL DAILY
Qty: 90 TABLET | Refills: 1 | Status: SHIPPED | OUTPATIENT
Start: 2022-04-25

## 2022-04-25 RX ORDER — MEMANTINE HYDROCHLORIDE 10 MG/1
10 TABLET ORAL 2 TIMES DAILY
Qty: 180 TABLET | Refills: 1 | Status: SHIPPED | OUTPATIENT
Start: 2022-04-25 | End: 2022-07-11

## 2022-04-25 NOTE — PATIENT INSTRUCTIONS
- Continue with mind stimulating activities (word search, cross word, sodoku, search & find, coloring, jig saw, reading etc )  - Continue Aricept 10 mg daily and Namenda 10 mg bid  - Continue Lexapro 10 mg daily    Things that we know are helpful for thinking and memory   1  Exercise program: gradually increase your physical activity over time  Start small and be patient  Aerobic (cardio) activity is best but incorporate balance, strength and flexibility training as well    a  Try to get at least 30min 3 times per week   2  Diet: Mediterranean diet (colorful fruits and vegetables, olive oil, fish, whole grains, very little red meet is any), MIND diet, anti-inflammatory diet  a  Stay well hydrated: drink 6-8 glasses of water per day   b  What's good for your heart is good for your brain  c  Avoid high-salt foods   3  Sleep: aim for at least 7-8 hours per night   a  Avoid alcohol and medications like Benadryl (Tylenol PM) or other sedating drugs  4  Stress management/mindfulness practice:   a  Talk with our social workers about finding a cognitive behavioral therapists  b  Try a smart phone terrance like [employer]-vinyl or "[employer] for beginners   i  Try curable for pain    c  Take a course in Mindfulness Based Stress Reduction (MBSR)   i  Helen valentino  Read or listen to an audiobook about it:  i  Mindfulness for beginners   ii  10% happier  iii  The happiness advantage   5  Social engagement:   a  Stay in touch with family and friends   b  Plan a few specific activities for your social health every week   orly darling local support group   d  Volunteer! www [email or web address] or call [phone]     MIND diet score:  1 point for each component      · Green leafy vegetables: at least 6 per week  · Other vegetable: at least 1 per day   · Berries: at least 2 per week  · Red meat: fewer than 4 per week   · Fish: at least 1 per week   · Poultry: at least 2 per week  · Beans: at least 3 per week  · Nuts: at least 5 per week  · Fast or fried food: less than 1 per week  · Olive oil   · Butter less: less than 1 table-spoon per day   · Cheese: less than 1 serving per week   · Pastries/sweets: less than 5 servings per week  · Alcohol: no more than 1 serving/ day

## 2022-04-25 NOTE — ASSESSMENT & PLAN NOTE
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  He is no longer working or driving as a result of his failed fitness to drive testing revealing a 99% probability of failing an on the road test in 88% probability of creating a hazardous situation of the road  His memory examination was limited today as he continues to decline to participate in MoCA testing and even with question probing by myself he rapidly becomes frustrated and declines to participate  Overall things appear to be stable  We will continue to monitor and have him follow up in office in 6 months  Recommendations provided today include:  - Continue with mind stimulating activities (word search, cross word, sodoku, search & find, coloring, jig saw, reading etc )  - Continue Aricept 10 mg daily and Namenda 10 mg bid  - Continue Lexapro 10 mg daily    Things that we know are helpful for thinking and memory   1  Exercise program: gradually increase your physical activity over time  Start small and be patient  Aerobic (cardio) activity is best but incorporate balance, strength and flexibility training as well    a  Try to get at least 30min 3 times per week   2  Diet: Mediterranean diet (colorful fruits and vegetables, olive oil, fish, whole grains, very little red meet is any), MIND diet, anti-inflammatory diet  a  Stay well hydrated: drink 6-8 glasses of water per day   b  What's good for your heart is good for your brain  c  Avoid high-salt foods   3  Sleep: aim for at least 7-8 hours per night   a  Avoid alcohol and medications like Benadryl (Tylenol PM) or other sedating drugs  4  Stress management/mindfulness practice:   a  Talk with our social workers about finding a cognitive behavioral therapists  b   Try a smart phone terrance like [employer] or mindfulness for beginners   i  Try curable for pain    c  Take a course in Mindfulness Based Stress Reduction (MBSR)   i  Helen valentino  Read or listen to an audiobook about it:  i  Mindfulness for beginners   ii  10% happier  iii  The happiness advantage   5  Social engagement:   a  Stay in touch with family and friends   b  Plan a few specific activities for your social health every week   c  Nam Hernandez a local support group   d  Volunteer! www [email or web address] or call [phone]     MIND diet score:  1 point for each component      · Green leafy vegetables: at least 6 per week  · Other vegetable: at least 1 per day   · Berries: at least 2 per week  · Red meat: fewer than 4 per week   · Fish: at least 1 per week   · Poultry: at least 2 per week  · Beans: at least 3 per week  · Nuts: at least 5 per week  · Fast or fried food: less than 1 per week  · Olive oil   · Butter less: less than 1 table-spoon per day   · Cheese: less than 1 serving per week   · Pastries/sweets: less than 5 servings per week  · Alcohol: no more than 1 serving/ day

## 2022-04-25 NOTE — PROGRESS NOTES
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  He is no longer working or driving as a result of his failed fitness to drive testing revealing a 99% probability of failing an on the road test in 88% probability of creating a hazardous situation of the road  His memory examination was limited today as he continues to decline to participate in MoCA testing and even with question probing by myself he rapidly becomes frustrated and declines to participate  Overall things appear to be stable  We will continue to monitor and have him follow up in office in 6 months  Recommendations provided today include:  - Continue with mind stimulating activities (word search, cross word, sodoku, search & find, coloring, jig saw, reading etc )  - Continue Aricept 10 mg daily and Namenda 10 mg bid  - Continue Lexapro 10 mg daily    Things that we know are helpful for thinking and memory   1  Exercise program: gradually increase your physical activity over time  Start small and be patient  Aerobic (cardio) activity is best but incorporate balance, strength and flexibility training as well    a  Try to get at least 30min 3 times per week   2  Diet: Mediterranean diet (colorful fruits and vegetables, olive oil, fish, whole grains, very little red meet is any), MIND diet, anti-inflammatory diet  a  Stay well hydrated: drink 6-8 glasses of water per day   b  What's good for your heart is good for your brain  c  Avoid high-salt foods   3  Sleep: aim for at least 7-8 hours per night   a   Avoid alcohol and medications like Benadryl (Tylenol PM) or other sedating drugs  4  Stress management/mindfulness practice:   a  Talk with our social workers about finding a cognitive behavioral therapists  b  Try a smart phone terrance like [employer] or mindfulness for beginners   i  Try curable for pain    c  Take a course in Mindfulness Based Stress Reduction (MBSR)   i  Helen valentino  Read or listen to an audiobook about it:  i  Mindfulness for beginners   ii  10% happier  iii  The happiness advantage   5  Social engagement:   a  Stay in touch with family and friends   b  Plan a few specific activities for your social health every week   c  Dalton Mansfield a local support group   d  Volunteer! www [email or web address] or call [phone]     MIND diet score:  1 point for each component  · Green leafy vegetables: at least 6 per week  · Other vegetable: at least 1 per day   · Berries: at least 2 per week  · Red meat: fewer than 4 per week   · Fish: at least 1 per week   · Poultry: at least 2 per week  · Beans: at least 3 per week  · Nuts: at least 5 per week  · Fast or fried food: less than 1 per week  · Olive oil   · Butter less: less than 1 table-spoon per day   · Cheese: less than 1 serving per week   · Pastries/sweets: less than 5 servings per week  · Alcohol: no more than 1 serving/ day              Diagnoses and all orders for this visit:    Early onset Alzheimer's dementia without behavioral disturbance (HCC)  -     memantine (NAMENDA) 10 mg tablet; Take 1 tablet (10 mg total) by mouth 2 (two) times a day  -     escitalopram (LEXAPRO) 10 mg tablet; Take 1 tablet (10 mg total) by mouth daily           Subjective:    ALEAH Peder Lennox is a 57year old male known to the practice for cognitive dysfunction felt to be secondary to early onset of Alzheimer's disease beginning almost 3 years ago  He is accompanied by his wife Sanjeev Calloway today  He was last seen 3 months ago   He is maintained on Namenda 10 mg twice daily and Aricept 10 mg daily, as well as Lexapro 10 mg daily   Since his last visit he has completed an MRI 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:      1  No significant white matter signal abnormality  2  NeuroQuant analysis was performed: Low hippocampal volume and enlarged inferior lateral and overall ventricular system  There are abnormal hippocampal occupancy score and small entorhinal cortex  There has been about 22% hippocampal volume   reduction compared to 3/8/2018, which is greater than expected volume loss for this time interval suggesting mesial temporal lobe focused neurodegeneration  Since his last visit he has been stable, other than some more difficulty with language (expressive aphasia) that seemed to start when his father passed away in 2022  He has his last day of radiation coming up in 2 weeks (22) for his prostate cancer  Since his last visit, he is no longer working and has come to terms with the fact that he can no longer drive (he felt his fitness to drive testin% probability of failing an on the road test and 88% probability of creating a hazardous situation on-road test)  He remains independent with all ADLs without reminders  His wife does the majority of the cooking, but he always takes care of the clean up  He takes the dogs on walks and has never gotten lost  He is sleeping adequately and has a good appetite  His mood is very liable, although a little better since the last time he was seen  He becomes very easily agitated and frustrated especially if he cannot recall something, or is questioned about his memory  He denies any hallucinations  Today he declined to participate in MoCA testing  He was contacted to schedule neuropsychological testing, however he has declined to participate in this  He denies any neurologic complaints today        The following portions of the patient's history were reviewed and updated as appropriate: allergies, current medications, past family history, past medical history, past social history and past surgical history  Objective:    Blood pressure 126/72, pulse 64, temperature 97 9 °F (36 6 °C), temperature source Temporal, height 5' 7" (1 702 m), weight 79 4 kg (175 lb), SpO2 98 %  Neurological Exam     He correctly tells me his name and his date of birth  He is able to correctly tell me today's date as the 25th of April  However, he cannot tell me the year stating "I do not know"  He cannot recall the day of the week  He does correctly identify the current President of [place]  He correctly reads and repeats  He is able to name 2 items such as a watch and pen  He correctly states the purpose of a pen is to write however cannot identify the purpose of a watch and states "Tick Tock"  He cannot recall what he had for dinner  When asked to name 10 animals, he declines to participate in any further memory testing  On neurological examination patient is alert, awake, and in no distress  Speech is fluent without dysarthria or aphasia  Cranial nerves 2-12 were symmetrically intact bilaterally  No evidence of any focal weakness or sensory loss in the upper or lower extremities  He is able to rise easily without assistance from a seated position  Casual gait is normal including stance, stride, and arm swing  ROS:    Review of Systems   Constitutional: Negative  Negative for appetite change and fever  HENT: Negative  Negative for hearing loss, tinnitus, trouble swallowing and voice change  Eyes: Negative  Negative for photophobia and pain  Respiratory: Negative  Negative for shortness of breath  Cardiovascular: Negative  Negative for palpitations  Gastrointestinal: Negative  Negative for nausea and vomiting  Endocrine: Negative  Negative for cold intolerance  Genitourinary: Negative  Negative for dysuria, frequency and urgency  Musculoskeletal: Negative  Negative for myalgias and neck pain  Skin: Negative  Negative for rash  Neurological: Negative  Negative for dizziness, tremors, seizures, syncope, facial asymmetry, speech difficulty, weakness, light-headedness, numbness and headaches  Hematological: Negative  Does not bruise/bleed easily  Psychiatric/Behavioral: Negative  Negative for confusion, hallucinations and sleep disturbance  Reviewed ROS as entered by medical assistant

## 2022-04-26 ENCOUNTER — APPOINTMENT (OUTPATIENT)
Dept: RADIATION ONCOLOGY | Facility: HOSPITAL | Age: 62
End: 2022-04-26
Attending: STUDENT IN AN ORGANIZED HEALTH CARE EDUCATION/TRAINING PROGRAM
Payer: COMMERCIAL

## 2022-04-26 PROCEDURE — 77014 CHG CT GUIDANCE PLACEMENT RAD THERAPY FIELDS: CPT | Performed by: RADIOLOGY

## 2022-04-26 PROCEDURE — 77385 HB NTSTY MODUL RAD TX DLVR SMPL: CPT | Performed by: RADIOLOGY

## 2022-04-26 NOTE — TELEPHONE ENCOUNTER
I just saw the patient yesterday again  I updated the [facility] form, signed and dated it and faxed it back  It was put at the  in [place] to be scanned into the chart

## 2022-04-27 ENCOUNTER — APPOINTMENT (OUTPATIENT)
Dept: RADIATION ONCOLOGY | Facility: HOSPITAL | Age: 62
End: 2022-04-27
Attending: STUDENT IN AN ORGANIZED HEALTH CARE EDUCATION/TRAINING PROGRAM
Payer: COMMERCIAL

## 2022-04-27 PROCEDURE — 77385 HB NTSTY MODUL RAD TX DLVR SMPL: CPT | Performed by: STUDENT IN AN ORGANIZED HEALTH CARE EDUCATION/TRAINING PROGRAM

## 2022-04-27 PROCEDURE — 77014 CHG CT GUIDANCE PLACEMENT RAD THERAPY FIELDS: CPT | Performed by: STUDENT IN AN ORGANIZED HEALTH CARE EDUCATION/TRAINING PROGRAM

## 2022-04-28 ENCOUNTER — APPOINTMENT (OUTPATIENT)
Dept: RADIATION ONCOLOGY | Facility: HOSPITAL | Age: 62
End: 2022-04-28
Attending: STUDENT IN AN ORGANIZED HEALTH CARE EDUCATION/TRAINING PROGRAM
Payer: COMMERCIAL

## 2022-04-28 PROCEDURE — 77014 CHG CT GUIDANCE PLACEMENT RAD THERAPY FIELDS: CPT | Performed by: STUDENT IN AN ORGANIZED HEALTH CARE EDUCATION/TRAINING PROGRAM

## 2022-04-28 PROCEDURE — 77385 HB NTSTY MODUL RAD TX DLVR SMPL: CPT | Performed by: STUDENT IN AN ORGANIZED HEALTH CARE EDUCATION/TRAINING PROGRAM

## 2022-04-29 ENCOUNTER — APPOINTMENT (OUTPATIENT)
Dept: RADIATION ONCOLOGY | Facility: HOSPITAL | Age: 62
End: 2022-04-29
Attending: STUDENT IN AN ORGANIZED HEALTH CARE EDUCATION/TRAINING PROGRAM
Payer: COMMERCIAL

## 2022-04-29 PROCEDURE — 77385 HB NTSTY MODUL RAD TX DLVR SMPL: CPT | Performed by: RADIOLOGY

## 2022-04-29 PROCEDURE — 77014 CHG CT GUIDANCE PLACEMENT RAD THERAPY FIELDS: CPT | Performed by: RADIOLOGY

## 2022-05-02 ENCOUNTER — APPOINTMENT (OUTPATIENT)
Dept: RADIATION ONCOLOGY | Facility: HOSPITAL | Age: 62
End: 2022-05-02
Attending: STUDENT IN AN ORGANIZED HEALTH CARE EDUCATION/TRAINING PROGRAM
Payer: COMMERCIAL

## 2022-05-02 PROCEDURE — 77014 CHG CT GUIDANCE PLACEMENT RAD THERAPY FIELDS: CPT | Performed by: STUDENT IN AN ORGANIZED HEALTH CARE EDUCATION/TRAINING PROGRAM

## 2022-05-02 PROCEDURE — 77385 HB NTSTY MODUL RAD TX DLVR SMPL: CPT | Performed by: STUDENT IN AN ORGANIZED HEALTH CARE EDUCATION/TRAINING PROGRAM

## 2022-05-02 PROCEDURE — 77336 RADIATION PHYSICS CONSULT: CPT | Performed by: STUDENT IN AN ORGANIZED HEALTH CARE EDUCATION/TRAINING PROGRAM

## 2022-05-03 ENCOUNTER — APPOINTMENT (OUTPATIENT)
Dept: RADIATION ONCOLOGY | Facility: HOSPITAL | Age: 62
End: 2022-05-03
Attending: STUDENT IN AN ORGANIZED HEALTH CARE EDUCATION/TRAINING PROGRAM
Payer: COMMERCIAL

## 2022-05-03 PROCEDURE — 77385 HB NTSTY MODUL RAD TX DLVR SMPL: CPT | Performed by: RADIOLOGY

## 2022-05-03 PROCEDURE — 77427 RADIATION TX MANAGEMENT X5: CPT | Performed by: STUDENT IN AN ORGANIZED HEALTH CARE EDUCATION/TRAINING PROGRAM

## 2022-05-03 PROCEDURE — 77014 CHG CT GUIDANCE PLACEMENT RAD THERAPY FIELDS: CPT | Performed by: RADIOLOGY

## 2022-05-04 ENCOUNTER — APPOINTMENT (OUTPATIENT)
Dept: RADIATION ONCOLOGY | Facility: HOSPITAL | Age: 62
End: 2022-05-04
Attending: STUDENT IN AN ORGANIZED HEALTH CARE EDUCATION/TRAINING PROGRAM
Payer: COMMERCIAL

## 2022-05-04 PROCEDURE — 77014 CHG CT GUIDANCE PLACEMENT RAD THERAPY FIELDS: CPT | Performed by: RADIOLOGY

## 2022-05-04 PROCEDURE — 77385 HB NTSTY MODUL RAD TX DLVR SMPL: CPT | Performed by: RADIOLOGY

## 2022-05-05 ENCOUNTER — APPOINTMENT (OUTPATIENT)
Dept: RADIATION ONCOLOGY | Facility: HOSPITAL | Age: 62
End: 2022-05-05
Attending: STUDENT IN AN ORGANIZED HEALTH CARE EDUCATION/TRAINING PROGRAM
Payer: COMMERCIAL

## 2022-05-05 PROCEDURE — 77385 HB NTSTY MODUL RAD TX DLVR SMPL: CPT | Performed by: RADIOLOGY

## 2022-05-05 PROCEDURE — 77014 CHG CT GUIDANCE PLACEMENT RAD THERAPY FIELDS: CPT | Performed by: RADIOLOGY

## 2022-05-05 PROCEDURE — 77336 RADIATION PHYSICS CONSULT: CPT | Performed by: STUDENT IN AN ORGANIZED HEALTH CARE EDUCATION/TRAINING PROGRAM

## 2022-05-31 ENCOUNTER — TELEMEDICINE (OUTPATIENT)
Dept: RADIATION ONCOLOGY | Facility: CLINIC | Age: 62
End: 2022-05-31
Attending: STUDENT IN AN ORGANIZED HEALTH CARE EDUCATION/TRAINING PROGRAM

## 2022-05-31 DIAGNOSIS — C61 PROSTATE CANCER (HCC): Primary | ICD-10-CM

## 2022-05-31 PROCEDURE — 99024 POSTOP FOLLOW-UP VISIT: CPT | Performed by: STUDENT IN AN ORGANIZED HEALTH CARE EDUCATION/TRAINING PROGRAM

## 2022-05-31 NOTE — PROGRESS NOTES
Telephone Follow-up - Radiation Oncology   George Batista 1960 57 y o  male [id number]      History of Present Illness   Cancer Staging  No matching staging information was found for the patient  Follow-Up:   Mr Nneka Davis is a 57year old man with PMHx early onset Alzheimer Disease with GS 4+3, pT3a prostate cancer s/p RALP  He thereafter presented with elevated PSA to 0 2 and was referred for salvage PSA  On 5/5/22, he completed a course of salvage RT to a dose of 6840cGy in 38 fractions  He returns today for telephone follow-up  Interval History:   The patient tolerated RT well overall with minimal treatment related side effects  Currently, the patient is doing well overall  He has no dysuria, no frequency, no nocturia, no other urinary complaints  No diarrhea, no loose stool  No fatigue, no bone pain, no fevers/chills, no weight loss  6/13/22 Urology - ESSENCE Soto PA-C          Historical Information   Oncology History   Prostate cancer ([facility] )   7/28/2021 Initial Diagnosis    Prostate cancer ([place] [id number] )     7/28/2021 Biopsy    [facility]  DIAGNOSIS     Prostate (biopsy, [id number], 7/28/2021):    - Histologic Type: Adenocarcinoma (conventional, NOS)    - Lacona Score, Dominant Nodule: 4+3=7 Grade group 3: 70% pattern 4 (Tertiary Pattern 5)    - Tumor Extent: Tumor dimension (max): 14mm    - Location, Dominant Nodule: Left, posterolateral, posterior    - Local Extent (8th Edition AJCC): Extraprostatic extension                                              Location and extent of extraprostatic extension: Left, posterolateral, base, nonfocal    - Margins (as per the report): Negative    - Seminal Vesicle Invasion (as per the report): None    - Lymphatic (small vessel Invasion (as per report): Absent    - Extent of Invasion (8th Edition AJCC) Primary Tumor: pT3: Extraprostatic extension or microscopic bladder neck invasion    - Regional Lymph Nodes: pNx: Cannot be assessed    - Summary Margins: Negative    - Additional Findings, Uninvolved Prostate:                                                High-grade prostatic intraepithelial neoplasia (PIN)                                               Prostatic intraductal adenocarcinoma  Shannon Perla MD    Addendum   At the request of Dr Evelin Walls, unstained slides from paraffin BLOCK A25 containing the patient's cancer cells were sent to [address] [place] [address]  for  Prolaris  testing  Upon completion of testing, the [address] Laboratory report will be directly sent to the requesting physician as well as posted in the Media Tab of the patient's [facility] EMR by the [facility]  Pathology Department  3/14/2022 - 5/5/2022 Radiation      Treatments:  Course: C1    Plan ID Energy Fractions Dose per Fraction (cGy) Dose Correction (cGy) Total Dose Delivered (cGy) Elapsed Days   Prostate Bed 10X 38 / 38 180 0 6,840 52      Treatment Dates:  3/14/2022 - 5/5/2022              Past Medical History:   Diagnosis Date    Benign colon polyp     Diverticulitis, colon     Diverticulosis     Hyperlipidemia     Prediabetes     Prostate cancer ([address] )     Renal calculi     Short-term memory loss     Vitamin D deficiency      Past Surgical History:   Procedure Laterality Date    ABDOMINAL ADHESION SURGERY N/A 7/28/2021    Procedure: LYSIS ADHESIONS, LAPAROSCOPIC;  Surgeon: Jules Saldaña MD;  Location: [facility];  Service: Urology    COLONOSCOPY      ESOPHAGOGASTRODUODENOSCOPY      HERNIA REPAIR      [facility] Right     [facility]    KNEE ARTHROSCOPY      KNEE SURGERY      NE LAP,PROSTATECTOMY,RADICAL,W/NERVE SPARE,INCL ROBOTIC N/A 7/28/2021    Procedure: ROBOTIC LAPAROSCOPIC RADICAL PROSTATECTOMY;  Surgeon: Jules Saldaña MD;  Location: [facility];  Service: Urology    PROSTATE BIOPSY      TOTAL HIP ARTHROPLASTY Right 05/17/2019    TOTAL HIP ARTHROPLASTY Left 08/20/2019       Social History   Social History Substance and Sexual Activity   Alcohol Use Yes    Comment: occasional beer and wine     Social History     Substance and Sexual Activity   Drug Use Not Currently    Comment: years ago     Social History     Tobacco Use   Smoking Status Former Smoker    Types: Cigarettes    Quit date: 12    Years since quittin 4   Smokeless Tobacco Never Used   Tobacco Comment    patient states he doesnt remember how much or for how long         Meds/Allergies     Current Outpatient Medications:     acetaminophen (TYLENOL) 325 mg tablet, Take 2 tablets (650 mg total) by mouth every 4 (four) hours as needed for mild pain (Patient not taking: Reported on 2022 ), Disp: 30 tablet, Rfl: 0    Alphagan P 0 1 %, INSTILL 1 DROP IN BOTH EYES TWICE DAILY, Disp: , Rfl:     ALPHAGAN P 0 15 % ophthalmic solution, , Disp: , Rfl:     Cetirizine HCl (ZYRTEC ALLERGY PO), Take 1 tablet by mouth daily as needed   (Patient not taking: Reported on 2022 ), Disp: , Rfl:     docusate sodium (COLACE) 100 mg capsule, Take 1 capsule (100 mg total) by mouth 2 (two) times a day for 15 days (Patient not taking: Reported on 2/10/2022 ), Disp: 30 capsule, Rfl: 0    donepezil (ARICEPT) 10 mg tablet, Take 1 tablet (10 mg total) by mouth daily, Disp: 90 tablet, Rfl: 3    escitalopram (LEXAPRO) 10 mg tablet, Take 1 tablet (10 mg total) by mouth daily, Disp: 90 tablet, Rfl: 1    Lactobacillus (PROBIOTIC ACIDOPHILUS) CAPS, Take by mouth once (Patient not taking: Reported on 2022 ), Disp: , Rfl:     latanoprost (XALATAN) 0 005 % ophthalmic solution, PLACE 1 DROP IN BOTH EYES DAILY AT BEDTIME, Disp: , Rfl:     LORazepam (ATIVAN) 1 mg tablet, Take 1 tablet 1 hour prior to MRI and then take 1 tablet 15 minutes prior to MRI  Do not drive   (Patient not taking: Reported on 2022 ), Disp: 2 tablet, Rfl: 0    LUMIGAN 0 01 % ophthalmic drops, , Disp: , Rfl:     memantine (NAMENDA) 10 mg tablet, Take 1 tablet (10 mg total) by mouth 2 (two) times a day, Disp: 180 tablet, Rfl: 1    naproxen (NAPROSYN) 500 mg tablet, Take 1 tablet (500 mg total) by mouth 2 (two) times a day with meals for 5 days For pain (Patient not taking: Reported on 2/10/2022 ), Disp: 10 tablet, Rfl: 0    neomycin-bacitracin-polymyxin b (NEOSPORIN) ointment, Apply topically 2 (two) times a day as needed (Catheter irritation) (Patient not taking: Reported on 1/21/2022 ), Disp: 15 g, Rfl: 0    oxybutynin (DITROPAN) 5 mg tablet, Take 1 tablet (5 mg total) by mouth 2 (two) times a day as needed (Bladder spasms) for up to 14 days (Patient not taking: Reported on 2/10/2022 ), Disp: 28 tablet, Rfl: 0    pantoprazole (PROTONIX) 20 mg tablet, Take 1 tablet (20 mg total) by mouth daily for 30 doses (Patient not taking: Reported on 2/10/2022 ), Disp: 30 tablet, Rfl: 3    tadalafil (CIALIS) 20 MG tablet, Take 1 tablet (20 mg total) by mouth daily as needed for erectile dysfunction (Patient not taking: Reported on 4/25/2022 ), Disp: 20 tablet, Rfl: 3  Allergies   Allergen Reactions    Sulfa Antibiotics Other (See Comments)     Mother told him as child not to take       OBJECTIVE:   There were no vitals taken for this visit  No physical exam performed in the context of this telephone follow-up  RESULTS    Lab Results: No results found for this or any previous visit (from the past 672 hour(s))  Imaging Studies:No results found  Assessment/Plan:  Orders Placed This Encounter   Procedures    PSA Total, Diagnostic    PSA Total, Diagnostic      Approximately 1 month following completion of RT, the patient is doing well overall  He remains clinically well and has not suffered significant radiation related morbidity  He is scheduled for follow-up with Urology in the coming weeks and should have a PSA before that meeting    Pending the results of this, I would recommend repeat PSA every 6 months for the immediate future, ideally alternating between our service and urology for this purpose  I will plan to see him in follow-up in approximately 6 months for continued surveillance  Mir Argueta MD  5/31/2022,1:57 PM    Portions of the record may have been created with voice recognition software   Occasional wrong word or "sound a like" substitutions may have occurred due to the inherent limitations of voice recognition software   Read the chart carefully and recognize, using context, where substitutions have occurred

## 2022-06-13 ENCOUNTER — OFFICE VISIT (OUTPATIENT)
Dept: UROLOGY | Facility: CLINIC | Age: 62
End: 2022-06-13
Payer: COMMERCIAL

## 2022-06-13 ENCOUNTER — APPOINTMENT (OUTPATIENT)
Dept: LAB | Facility: AMBULARY SURGERY CENTER | Age: 62
End: 2022-06-13
Payer: COMMERCIAL

## 2022-06-13 VITALS
BODY MASS INDEX: 27.47 KG/M2 | OXYGEN SATURATION: 97 % | DIASTOLIC BLOOD PRESSURE: 78 MMHG | SYSTOLIC BLOOD PRESSURE: 132 MMHG | WEIGHT: 175 LBS | HEIGHT: 67 IN | HEART RATE: 82 BPM

## 2022-06-13 DIAGNOSIS — N52.31 ERECTILE DYSFUNCTION AFTER RADICAL PROSTATECTOMY: ICD-10-CM

## 2022-06-13 DIAGNOSIS — C61 PROSTATE CANCER (HCC): ICD-10-CM

## 2022-06-13 DIAGNOSIS — C61 PROSTATE CANCER (HCC): Primary | ICD-10-CM

## 2022-06-13 LAB — PSA SERPL-MCNC: 0.6 NG/ML (ref 0–4)

## 2022-06-13 PROCEDURE — 3008F BODY MASS INDEX DOCD: CPT | Performed by: PHYSICIAN ASSISTANT

## 2022-06-13 PROCEDURE — 99213 OFFICE O/P EST LOW 20 MIN: CPT | Performed by: PHYSICIAN ASSISTANT

## 2022-06-13 PROCEDURE — 1036F TOBACCO NON-USER: CPT | Performed by: PHYSICIAN ASSISTANT

## 2022-06-13 PROCEDURE — 84153 ASSAY OF PSA TOTAL: CPT

## 2022-06-13 RX ORDER — TADALAFIL 20 MG/1
20 TABLET ORAL DAILY PRN
Qty: 30 TABLET | Refills: 3 | Status: SHIPPED | OUTPATIENT
Start: 2022-06-13

## 2022-06-13 NOTE — PROGRESS NOTES
UROLOGY PROGRESS NOTE   Patient Identifiers: Jay Jay Silvestre (MRN [id number])  Date of Service: 6/13/2022    Subjective:    59-year-old man history of Soheila 4+3=7 stage T3a prostate cancer  He had a robotic prostatectomy July 2021  Pretreatment PSA 5 6  His last 2 PSAs were 0 2  He has good urinary control  He was treated with adjuvant radiation completed last month  Current PSA is pending  Reason for visit:  Prostate cancer follow-up      Objective:     VITALS:    There were no vitals filed for this visit          LABS:  Lab Results   Component Value Date    HGB 14 8 03/22/2022    HCT 43 2 03/22/2022    WBC 4 10 (L) 03/22/2022     03/22/2022   ]    Lab Results   Component Value Date     12/13/2017    K 3 8 07/29/2021     07/29/2021    CO2 26 07/29/2021    BUN 11 07/29/2021    CREATININE 0 84 07/29/2021    CALCIUM 8 4 07/29/2021    GLUCOSE 132 06/24/2014   ]        INPATIENT MEDS:    Current Outpatient Medications:     Alphagan P 0 1 %, INSTILL 1 DROP IN BOTH EYES TWICE DAILY, Disp: , Rfl:     ALPHAGAN P 0 15 % ophthalmic solution, , Disp: , Rfl:     donepezil (ARICEPT) 10 mg tablet, Take 1 tablet (10 mg total) by mouth daily, Disp: 90 tablet, Rfl: 3    escitalopram (LEXAPRO) 10 mg tablet, Take 1 tablet (10 mg total) by mouth daily, Disp: 90 tablet, Rfl: 1    latanoprost (XALATAN) 0 005 % ophthalmic solution, PLACE 1 DROP IN BOTH EYES DAILY AT BEDTIME, Disp: , Rfl:     LUMIGAN 0 01 % ophthalmic drops, , Disp: , Rfl:     memantine (NAMENDA) 10 mg tablet, Take 1 tablet (10 mg total) by mouth 2 (two) times a day, Disp: 180 tablet, Rfl: 1    acetaminophen (TYLENOL) 325 mg tablet, Take 2 tablets (650 mg total) by mouth every 4 (four) hours as needed for mild pain (Patient not taking: No sig reported), Disp: 30 tablet, Rfl: 0    Cetirizine HCl (ZYRTEC ALLERGY PO), Take 1 tablet by mouth daily as needed   (Patient not taking: No sig reported), Disp: , Rfl:     docusate sodium (COLACE) 100 mg capsule, Take 1 capsule (100 mg total) by mouth 2 (two) times a day for 15 days (Patient not taking: Reported on 2/10/2022 ), Disp: 30 capsule, Rfl: 0    Lactobacillus (PROBIOTIC ACIDOPHILUS) CAPS, Take by mouth once (Patient not taking: No sig reported), Disp: , Rfl:     LORazepam (ATIVAN) 1 mg tablet, Take 1 tablet 1 hour prior to MRI and then take 1 tablet 15 minutes prior to MRI  Do not drive  (Patient not taking: No sig reported), Disp: 2 tablet, Rfl: 0    naproxen (NAPROSYN) 500 mg tablet, Take 1 tablet (500 mg total) by mouth 2 (two) times a day with meals for 5 days For pain (Patient not taking: Reported on 2/10/2022 ), Disp: 10 tablet, Rfl: 0    neomycin-bacitracin-polymyxin b (NEOSPORIN) ointment, Apply topically 2 (two) times a day as needed (Catheter irritation) (Patient not taking: No sig reported), Disp: 15 g, Rfl: 0    oxybutynin (DITROPAN) 5 mg tablet, Take 1 tablet (5 mg total) by mouth 2 (two) times a day as needed (Bladder spasms) for up to 14 days (Patient not taking: Reported on 2/10/2022 ), Disp: 28 tablet, Rfl: 0    pantoprazole (PROTONIX) 20 mg tablet, Take 1 tablet (20 mg total) by mouth daily for 30 doses (Patient not taking: Reported on 2/10/2022 ), Disp: 30 tablet, Rfl: 3    tadalafil (CIALIS) 20 MG tablet, Take 1 tablet (20 mg total) by mouth daily as needed for erectile dysfunction (Patient not taking: No sig reported), Disp: 20 tablet, Rfl: 3      Physical Exam:   Ht 5' 7" (1 702 m)   Wt 79 4 kg (175 lb)   BMI 27 41 kg/m²   GEN: no acute distress    RESP: breathing comfortably with no accessory muscle use    ABD: soft, non-tender, non-distended   INCISION:    EXT: no significant peripheral edema       RADIOLOGY:   None     Assessment:   1  Prostate cancer     Plan:   -will check a PSA now and follow-up in 6 months with PSA prior to visit  -he also sees Radiation Oncology around the same time    -  -

## 2022-06-14 ENCOUNTER — TELEPHONE (OUTPATIENT)
Dept: RADIATION ONCOLOGY | Facility: HOSPITAL | Age: 62
End: 2022-06-14

## 2022-06-14 NOTE — TELEPHONE ENCOUNTER
Called patient to review findings of post-RT PSA showing increase from 0 2 to 0 6 ng/dL  Given increase will plan to repeat PSA in 3 months with RTC thereafter  If PSA continues to increase at that point will need to consider PSMA PET to rule out recurrent disease  Patient and his wife Tad Galvan are aware  We will be reaching out to reschedule his follow-up appointment to early 9/2022       Camacho Luque MD  Dept of Radiation Oncology

## 2022-06-15 ENCOUNTER — TELEPHONE (OUTPATIENT)
Dept: NEUROLOGY | Facility: CLINIC | Age: 62
End: 2022-06-15

## 2022-06-15 NOTE — TELEPHONE ENCOUNTER
Fax received from [employer] with short term disability paperwork   Scanned into media and forwarded to her MA

## 2022-06-15 NOTE — TELEPHONE ENCOUNTER
Can one of you complete the form stating he cannot return to work   I think I previously filled this out so you can just copy the last

## 2022-06-17 ENCOUNTER — TELEPHONE (OUTPATIENT)
Dept: RADIATION ONCOLOGY | Facility: HOSPITAL | Age: 62
End: 2022-06-17

## 2022-06-17 NOTE — TELEPHONE ENCOUNTER
Advised patient unlikely related to RT, if symptoms persist or worsen call PCP for evaluation  Verbalized understanding

## 2022-06-17 NOTE — TELEPHONE ENCOUNTER
Wife calling states Ovidio Antonio is not feeling well since Tuesday  He had some diarrhea 2-3x on Tuesday, abdominal bloating,nausea, no appetite and now small hard stools with blood in stool x1  He is have some abdominal discomfort  Advised likely not related to radiation  Routing to provider for advice or to please call patient

## 2022-06-29 ENCOUNTER — ESTABLISHED COMPREHENSIVE EXAM (OUTPATIENT)
Dept: URBAN - METROPOLITAN AREA CLINIC 6 | Facility: CLINIC | Age: 62
End: 2022-06-29

## 2022-06-29 DIAGNOSIS — H25.813: ICD-10-CM

## 2022-06-29 DIAGNOSIS — H40.1131: ICD-10-CM

## 2022-06-29 PROCEDURE — 92202 OPSCPY EXTND ON/MAC DRAW: CPT

## 2022-06-29 PROCEDURE — 92014 COMPRE OPH EXAM EST PT 1/>: CPT

## 2022-06-29 PROCEDURE — 92133 CPTRZD OPH DX IMG PST SGM ON: CPT

## 2022-06-29 ASSESSMENT — TONOMETRY
OS_IOP_MMHG: 16
OD_IOP_MMHG: 17

## 2022-06-29 ASSESSMENT — VISUAL ACUITY
OD_CC: 20/40
OS_CC: 20/30-1
OU_CC: J2-1

## 2022-08-31 ENCOUNTER — APPOINTMENT (OUTPATIENT)
Dept: LAB | Facility: AMBULARY SURGERY CENTER | Age: 62
End: 2022-08-31
Payer: COMMERCIAL

## 2022-08-31 DIAGNOSIS — C61 PROSTATE CANCER (HCC): ICD-10-CM

## 2022-08-31 LAB — PSA SERPL-MCNC: 1.3 NG/ML (ref 0–4)

## 2022-08-31 PROCEDURE — 84153 ASSAY OF PSA TOTAL: CPT

## 2022-09-01 ENCOUNTER — RADIATION ONCOLOGY FOLLOW-UP (OUTPATIENT)
Dept: RADIATION ONCOLOGY | Facility: CLINIC | Age: 62
End: 2022-09-01
Attending: STUDENT IN AN ORGANIZED HEALTH CARE EDUCATION/TRAINING PROGRAM
Payer: COMMERCIAL

## 2022-09-01 ENCOUNTER — APPOINTMENT (OUTPATIENT)
Dept: RADIATION ONCOLOGY | Facility: HOSPITAL | Age: 62
End: 2022-09-01
Attending: STUDENT IN AN ORGANIZED HEALTH CARE EDUCATION/TRAINING PROGRAM
Payer: COMMERCIAL

## 2022-09-01 VITALS
BODY MASS INDEX: 27.1 KG/M2 | RESPIRATION RATE: 16 BRPM | TEMPERATURE: 97.3 F | HEART RATE: 64 BPM | SYSTOLIC BLOOD PRESSURE: 140 MMHG | OXYGEN SATURATION: 96 % | DIASTOLIC BLOOD PRESSURE: 82 MMHG | WEIGHT: 173 LBS

## 2022-09-01 DIAGNOSIS — C61 PROSTATE CANCER (HCC): Primary | ICD-10-CM

## 2022-09-01 PROCEDURE — 99211 OFF/OP EST MAY X REQ PHY/QHP: CPT | Performed by: STUDENT IN AN ORGANIZED HEALTH CARE EDUCATION/TRAINING PROGRAM

## 2022-09-01 PROCEDURE — 99213 OFFICE O/P EST LOW 20 MIN: CPT | Performed by: STUDENT IN AN ORGANIZED HEALTH CARE EDUCATION/TRAINING PROGRAM

## 2022-09-01 NOTE — PROGRESS NOTES
Follow-up - Radiation Oncology   Pratik Sanderson 1960 60 y o  male [id number]      History of Present Illness   Cancer Staging  No matching staging information was found for the patient  Mr Pratik Sanderson is a 60year old with PMHx early onset Alzheimer Disease with GS 4+3, pT3a prostate cancer s/p RALP in 2021  He then presented with elevated PSA to 0 2 and was referred for salvage radiation therapy  He completed a course of salvage RT to a dose of 6840cGy in 38 fractions on  22  He returns today for follow up evaluation  Interval History:   22 Urology  check a PSA now and follow-up in 6 months with PSA prior to visit     Component PSA, Total   Latest Ref Rng & Units 0 0 - 4 0 ng/mL   2020 5 6 (H)   10/20/2021 <0 1   2022 0 2   2022 0 2   2022 0 6   2022 1 3        Currently he is doing well overall  He has minimal urinary symptoms  No change in bowel movements  He has good energy though with occasional fatigue   He does note some intermittent back pain/stiffness       Upcomin22 Urology          Historical Information   Oncology History   Prostate cancer [facility])   2021 Initial Diagnosis    Prostate cancer ([facility] [address] )     2021 Biopsy    [facility] LABORATOR[facility]  DIAGNOSIS     Prostate (biopsy, [id number], 2021):    - Histologic Type: Adenocarcinoma (conventional, NOS)    - [place] Score, Dominant Nodule: 4+3=7 Grade group 3: 70% pattern 4 (Tertiary Pattern 5)    - Tumor Extent: Tumor dimension (max): 14mm    - Location, Dominant Nodule: Left, posterolateral, posterior    - Local Extent (8th Edition AJCC): Extraprostatic extension                                              Location and extent of extraprostatic extension: Left, posterolateral, base, nonfocal    - Margins (as per the report): Negative    - Seminal Vesicle Invasion (as per the report): None    - Lymphatic (small vessel Invasion (as per report): Absent    - Extent of Invasion (8th Edition AJCC) Primary Tumor: pT3: Extraprostatic extension or microscopic bladder neck invasion    - Regional Lymph Nodes: pNx: Cannot be assessed    - Summary Margins: Negative    - Additional Findings, Uninvolved Prostate:                                                High-grade prostatic intraepithelial neoplasia (PIN)                                               Prostatic intraductal adenocarcinoma  Lou Mckeon MD    Addendum   At the request of Dr Nicolasa Muñoz, unstained slides from paraffin BLOCK A25 containing the patient's cancer cells were sent to [address] [place] [address]  for  [facility]  testing  Upon completion of testing, the [facility] report will be directly sent to the requesting physician as well as posted in the Media Tab of the patient's [employer] EMR by the [facility]  Pathology Department  3/14/2022 - 5/5/2022 Radiation      Treatments:  Course: C1    Plan ID Energy Fractions Dose per Fraction (cGy) Dose Correction (cGy) Total Dose Delivered (cGy) Elapsed Days   Prostate Bed 10X 38 / 38 180 0 6,840 52      Treatment Dates:  3/14/2022 - 5/5/2022              Past Medical History:   Diagnosis Date    Benign colon polyp     Diverticulitis, colon     Diverticulosis     Hyperlipidemia     Prediabetes     Prostate cancer ([facility] [address] )     Renal calculi     Short-term memory loss     Vitamin D deficiency      Past Surgical History:   Procedure Laterality Date    ABDOMINAL ADHESION SURGERY N/A 7/28/2021    Procedure: LYSIS ADHESIONS, LAPAROSCOPIC;  Surgeon: Tracy Kolb MD;  [facility];  Service: Urology    COLONOSCOPY      ESOPHAGOGASTRODUODENOSCOPY      HERNIA REPAIR      JOINT REPLACEMENT Right     [address]    KNEE ARTHROSCOPY      KNEE SURGERY      [place] LAP,PROSTATECTOMY,RADICAL,W/NERVE SPARE,INCL ROBOTIC N/A 7/28/2021    Procedure: ROBOTIC LAPAROSCOPIC RADICAL PROSTATECTOMY;  Surgeon: Trayc Kolb MD;  [facility];  Service: Urology    PROSTATE BIOPSY      TOTAL HIP ARTHROPLASTY Right 2019    TOTAL HIP ARTHROPLASTY Left 2019       Social History   Social History     Substance and Sexual Activity   Alcohol Use Yes    Comment: occasional beer and wine     Social History     Substance and Sexual Activity   Drug Use Not Currently    Comment: years ago     Social History     Tobacco Use   Smoking Status Former Smoker    Types: Cigarettes    Quit date: 12    Years since quittin 6   Smokeless Tobacco Never Used   Tobacco Comment    patient states he doesnt remember how much or for how long         Meds/Allergies     Current Outpatient Medications:     Alphagan P 0 1 %, INSTILL 1 DROP IN BOTH EYES TWICE DAILY, Disp: , Rfl:     ALPHAGAN P 0 15 % ophthalmic solution, , Disp: , Rfl:     donepezil (ARICEPT) 10 mg tablet, Take 1 tablet (10 mg total) by mouth daily, Disp: 90 tablet, Rfl: 3    escitalopram (LEXAPRO) 10 mg tablet, Take 1 tablet (10 mg total) by mouth daily, Disp: 90 tablet, Rfl: 1    LUMIGAN 0 01 % ophthalmic drops, , Disp: , Rfl:     memantine (NAMENDA) 10 mg tablet, Take 1 tablet (10 mg total) by mouth 2 (two) times a day, Disp: 60 tablet, Rfl: 6    acetaminophen (TYLENOL) 325 mg tablet, Take 2 tablets (650 mg total) by mouth every 4 (four) hours as needed for mild pain (Patient not taking: No sig reported), Disp: 30 tablet, Rfl: 0    Cetirizine HCl (ZYRTEC ALLERGY PO), Take 1 tablet by mouth daily as needed   (Patient not taking: No sig reported), Disp: , Rfl:     docusate sodium (COLACE) 100 mg capsule, Take 1 capsule (100 mg total) by mouth 2 (two) times a day for 15 days (Patient not taking: Reported on 2/10/2022 ), Disp: 30 capsule, Rfl: 0    Lactobacillus (PROBIOTIC ACIDOPHILUS) CAPS, Take by mouth once (Patient not taking: No sig reported), Disp: , Rfl:     latanoprost (XALATAN) 0 005 % ophthalmic solution, PLACE 1 DROP IN BOTH EYES DAILY AT BEDTIME, Disp: , Rfl:     LORazepam (ATIVAN) 1 mg tablet, Take 1 tablet 1 hour prior to MRI and then take 1 tablet 15 minutes prior to MRI  Do not drive  (Patient not taking: No sig reported), Disp: 2 tablet, Rfl: 0    naproxen (NAPROSYN) 500 mg tablet, Take 1 tablet (500 mg total) by mouth 2 (two) times a day with meals for 5 days For pain (Patient not taking: Reported on 2/10/2022 ), Disp: 10 tablet, Rfl: 0    neomycin-bacitracin-polymyxin b (NEOSPORIN) ointment, Apply topically 2 (two) times a day as needed (Catheter irritation) (Patient not taking: No sig reported), Disp: 15 g, Rfl: 0    oxybutynin (DITROPAN) 5 mg tablet, Take 1 tablet (5 mg total) by mouth 2 (two) times a day as needed (Bladder spasms) for up to 14 days (Patient not taking: Reported on 2/10/2022 ), Disp: 28 tablet, Rfl: 0    pantoprazole (PROTONIX) 20 mg tablet, Take 1 tablet (20 mg total) by mouth daily for 30 doses (Patient not taking: Reported on 2/10/2022 ), Disp: 30 tablet, Rfl: 3    tadalafil (CIALIS) 20 MG tablet, Take 1 tablet (20 mg total) by mouth daily as needed for erectile dysfunction (Patient not taking: No sig reported), Disp: 20 tablet, Rfl: 3    tadalafil (CIALIS) 20 MG tablet, Take 1 tablet (20 mg total) by mouth daily as needed for erectile dysfunction (Patient not taking: Reported on 9/1/2022), Disp: 30 tablet, Rfl: 3  Allergies   Allergen Reactions    Sulfa Antibiotics Other (See Comments)     Mother told him as child not to take     Review of Systems   Constitutional: Positive for fatigue (occasional)  HENT: Negative  Eyes:        Wears glasses   Respiratory: Negative  Cardiovascular: Negative  Gastrointestinal: Negative  Genitourinary: Negative for dysuria, frequency and urgency  Nocturia x 1-2   Musculoskeletal: Positive for back pain  Skin: Negative  Allergic/Immunologic: Negative  Neurological:        Memory loss   Hematological: Negative      Psychiatric/Behavioral: Positive for sleep disturbance          Clinical Trial: no     IPSS Questionnaire (AUA-7): Over the past month     1)  How often have you had a sensation of not emptying your bladder completely after you finish urinating? 0 - Not at all   2)  How often have you had to urinate again less than two hours after you finished urinating? 0 - Not at all   3)  How often have you found you stopped and started again several times when you urinated? 0 - Not at all   4) How difficult have you found it to postpone urination? 0 - Not at all   5) How often have you had a weak urinary stream?  0 - Not at all   6) How often have you had to push or strain to begin urination? 0 - Not at all   7) How many times did you most typically get up to urinate from the time you went to bed until the time you got up in the morning? 1 - 1 time   Total Score:  1        OBJECTIVE:   /82   Pulse 64   Temp (!) 97 3 °F (36 3 °C)   Resp 16   Wt 78 5 kg (173 lb)   SpO2 96%   BMI 27 10 kg/m²   Pain Assessment:  2  ECOG/Zubrod/WHO: 0 - Asymptomatic    Physical Exam   Well appearing  NAD  No increased work of breathing  No MALCOLM performed  RESULTS    Lab Results:   Recent Results (from the past 672 hour(s))   PSA Total, Diagnostic    Collection Time: 08/31/22 12:23 PM   Result Value Ref Range    PSA, Diagnostic 1 3 0 0 - 4 0 ng/mL       Imaging Studies:No results found  Assessment/Plan:  Orders Placed This Encounter   Procedures    NM PET CT skull base to mid thigh      Approximately 4 months following completion of salvage RT, the patient is clinically doing well overall  He continues to have minimal if any side effects from prior treatment  We reviewed his short interval repeat PSA, which does unfortunately show continued increase concerning for residual/recurrent disease  Given this increase I am recommending PSMA PET/CT to identify the cause of this increase  We discussed that if a localized area of uptake is identified he would likely be a candidate for SBRT   We also briefly discussed that he may ultimately need ADT as well depending on the results of upcoming imaging  I will plan to obtain PSMA PET within the next 2 weeks with RTC thereafter to review the results  Grant Menendez MD  9/1/2022,12:32 PM    Portions of the record may have been created with voice recognition software   Occasional wrong word or "sound a like" substitutions may have occurred due to the inherent limitations of voice recognition software   Read the chart carefully and recognize, using context, where substitutions have occurred

## 2022-09-01 NOTE — PROGRESS NOTES
Tami Bound 1960 is a 60 y o  male with PMHx early onset Alzheimer Disease with GS 4+3, pT3a prostate cancer s/p RALP in 2021  He then presented with elevated PSA to 0 2 and was referred for salvage radiation therapy  He completed a course of salvage RT to a dose of 6840cGy in 38 fractions on  22  He returns today for follow up evaluation      22 Urology  check a PSA now and follow-up in 6 months with PSA prior to visit    Component PSA, Total   Latest Ref Rng & Units 0 0 - 4 0 ng/mL   2020 5 6 (H)   10/20/2021 <0 1   2022 0 2   2022 0 2   2022 0 6   2022 1 3       Upcomin22 Urology      Follow up visit     Oncology History   Prostate cancer ([facility] [address] )   2021 Initial Diagnosis    Prostate cancer ([facility] )     2021 Biopsy    [facility] REFERENCE LABORATORIES  DIAGNOSIS     Prostate (biopsy, [id number], 2021):    - Histologic Type: Adenocarcinoma (conventional, NOS)    - [place] Score, Dominant Nodule: 4+3=7 Grade group 3: 70% pattern 4 (Tertiary Pattern 5)    - Tumor Extent: Tumor dimension (max): 14mm    - Location, Dominant Nodule: Left, posterolateral, posterior    - Local Extent (8th Edition AJCC): Extraprostatic extension                                              Location and extent of extraprostatic extension: Left, posterolateral, base, nonfocal    - Margins (as per the report): Negative    - Seminal Vesicle Invasion (as per the report): None    - Lymphatic (small vessel Invasion (as per report): Absent    - Extent of Invasion (8th Edition AJCC) Primary Tumor: pT3: Extraprostatic extension or microscopic bladder neck invasion    - Regional Lymph Nodes: pNx: Cannot be assessed    - Summary Margins: Negative    - Additional Findings, Uninvolved Prostate:                                                High-grade prostatic intraepithelial neoplasia (PIN)                                               Prostatic intraductal adenocarcinoma  Orion Hoyt MD Jessica    Addendum   At the request of Dr Jaiden Wright, unstained slides from paraffin BLOCK A25 containing the patient's cancer cells were sent to [address] [place] [address]  for  [facility]  testing  Upon completion of testing, the [address] Laboratory report will be directly sent to the requesting physician as well as posted in the Media Tab of the patient's [employer] EMR by the [facility] Pathology Department  3/14/2022 - 5/5/2022 Radiation      Treatments:  Course: C1    Plan ID Energy Fractions Dose per Fraction (cGy) Dose Correction (cGy) Total Dose Delivered (cGy) Elapsed Days   Prostate Bed 10X 38 / 38 180 0 6,840 52      Treatment Dates:  3/14/2022 - 5/5/2022  Review of Systems:  Review of Systems   Constitutional: Positive for fatigue (occasional)  HENT: Negative  Eyes:        Wears glasses   Respiratory: Negative  Cardiovascular: Negative  Gastrointestinal: Negative  Genitourinary: Negative for dysuria, frequency and urgency  Nocturia x 1-2   Musculoskeletal: Positive for back pain  Skin: Negative  Allergic/Immunologic: Negative  Neurological:        Memory loss   Hematological: Negative  Psychiatric/Behavioral: Positive for sleep disturbance  Clinical Trial: no    IPSS Questionnaire (AUA-7): Over the past month    1)  How often have you had a sensation of not emptying your bladder completely after you finish urinating? 0 - Not at all   2)  How often have you had to urinate again less than two hours after you finished urinating? 0 - Not at all   3)  How often have you found you stopped and started again several times when you urinated? 0 - Not at all   4) How difficult have you found it to postpone urination? 0 - Not at all   5) How often have you had a weak urinary stream?  0 - Not at all   6) How often have you had to push or strain to begin urination?   0 - Not at all   7) How many times did you most typically get up to urinate from the time you went to bed until the time you got up in the morning?   1 - 1 time   Total Score:  1           Covid Vaccine Status yes    Health Maintenance   Topic Date Due    Pneumococcal Vaccine: Pediatrics (0 to 5 Years) and At-Risk Patients (6 to 59 Years) (1 - PCV) Never done    BMI: Followup Plan  10/16/2021    Annual Physical  10/16/2021    COVID-19 Vaccine (3 - Moderna risk series) 01/19/2022    OT PLAN OF CARE  03/12/2022    Influenza Vaccine (1) 09/01/2022    Depression Screening  02/24/2023    BMI: Adult  06/13/2023    Colorectal Cancer Screening  06/24/2026    DTaP,Tdap,and Td Vaccines (3 - Td or Tdap) 12/15/2030    HIV Screening  Completed    Hepatitis C Screening  Completed    HIB Vaccine  Aged Out    Hepatitis B Vaccine  Aged Out    IPV Vaccine  Aged Out    Hepatitis A Vaccine  Aged Out    Meningococcal ACWY Vaccine  Aged Out    HPV Vaccine  Aged Out     Patient Active Problem List   Diagnosis    Allergic rhinitis    Glaucoma    Prediabetes    Sciatica    Short-term memory loss    Mild cognitive impairment    Abnormal EEG    Increased prostate specific antigen (PSA) velocity    Right hip pain    Lumbar spondylosis    Primary localized osteoarthritis of right hip    Enlarged prostate    Screening for prostate cancer    Bilateral leg weakness    Left hip pain    Breast mass in male    Furuncle of chest wall    Cellulitis    Gastroesophageal reflux disease    S/P hip replacement, bilateral    Prostate cancer ([address] )    Early onset Alzheimer's dementia without behavioral disturbance ([address] )     Past Medical History:   Diagnosis Date    Benign colon polyp     Diverticulitis, colon     Diverticulosis     Hyperlipidemia     Prediabetes     Prostate cancer ([address] )     Renal calculi     Short-term memory loss     Vitamin D deficiency      Past Surgical History:   Procedure Laterality Date    ABDOMINAL ADHESION SURGERY N/A 7/28/2021    Procedure: LYSIS ADHESIONS, LAPAROSCOPIC;  Surgeon: Sally Bueno MD;  [facility];  Service: Urology    COLONOSCOPY      ESOPHAGOGASTRODUODENOSCOPY      HERNIA REPAIR      JOINT REPLACEMENT Right     [address]    KNEE ARTHROSCOPY      KNEE SURGERY      [place] LAP,PROSTATECTOMY,RADICAL,W/NERVE SPARE,INCL ROBOTIC N/A 2021    Procedure: ROBOTIC LAPAROSCOPIC RADICAL PROSTATECTOMY;  Surgeon: Sally Bueno MD;  [facility];  Service: Urology    PROSTATE BIOPSY      TOTAL HIP ARTHROPLASTY Right 2019    TOTAL HIP ARTHROPLASTY Left 2019     Family History   Problem Relation Age of Onset    Dementia Mother    [facility] Glaucoma Mother     Glaucoma Father     Prostate cancer Father     Pulmonary embolism Father     Hypertension Brother     Prostate cancer Brother     Colon cancer Maternal Grandfather      Social History     Socioeconomic History    Marital status: /Civil Union     Spouse name: Not on file    Number of children: Not on file    Years of education: Not on file    Highest education level: Not on file   Occupational History    Occupation:    Tobacco Use    Smoking status: Former Smoker     Types: Cigarettes     Quit date:      Years since quittin 6    Smokeless tobacco: Never Used    Tobacco comment: patient states he doesnt remember how much or for how long   Vaping Use    Vaping Use: Never used   Substance and Sexual Activity    Alcohol use: Yes     Comment: occasional beer and wine    Drug use: Not Currently     Comment: years ago    Sexual activity: Not Currently   Other Topics Concern    Not on file   Social History Narrative    Daily caffeine consumption- coffee, 3-4 cups    Education level- some college     Social Determinants of Health     Financial Resource Strain: Not on file   Food Insecurity: Not on file   Transportation Needs: Not on file   Physical Activity: Not on file   Stress: Not on file   Social Connections: Not on file   Intimate Partner Violence: Not on file   Housing Stability: Not on file       Current Outpatient Medications:     acetaminophen (TYLENOL) 325 mg tablet, Take 2 tablets (650 mg total) by mouth every 4 (four) hours as needed for mild pain (Patient not taking: No sig reported), Disp: 30 tablet, Rfl: 0    Alphagan P 0 1 %, INSTILL 1 DROP IN BOTH EYES TWICE DAILY, Disp: , Rfl:     ALPHAGAN P 0 15 % ophthalmic solution, , Disp: , Rfl:     Cetirizine HCl (ZYRTEC ALLERGY PO), Take 1 tablet by mouth daily as needed   (Patient not taking: No sig reported), Disp: , Rfl:     docusate sodium (COLACE) 100 mg capsule, Take 1 capsule (100 mg total) by mouth 2 (two) times a day for 15 days (Patient not taking: Reported on 2/10/2022 ), Disp: 30 capsule, Rfl: 0    donepezil (ARICEPT) 10 mg tablet, Take 1 tablet (10 mg total) by mouth daily, Disp: 90 tablet, Rfl: 3    escitalopram (LEXAPRO) 10 mg tablet, Take 1 tablet (10 mg total) by mouth daily, Disp: 90 tablet, Rfl: 1    Lactobacillus (PROBIOTIC ACIDOPHILUS) CAPS, Take by mouth once (Patient not taking: No sig reported), Disp: , Rfl:     latanoprost (XALATAN) 0 005 % ophthalmic solution, PLACE 1 DROP IN BOTH EYES DAILY AT BEDTIME, Disp: , Rfl:     LORazepam (ATIVAN) 1 mg tablet, Take 1 tablet 1 hour prior to MRI and then take 1 tablet 15 minutes prior to MRI  Do not drive   (Patient not taking: No sig reported), Disp: 2 tablet, Rfl: 0    LUMIGAN 0 01 % ophthalmic drops, , Disp: , Rfl:     memantine (NAMENDA) 10 mg tablet, Take 1 tablet (10 mg total) by mouth 2 (two) times a day, Disp: 60 tablet, Rfl: 6    naproxen (NAPROSYN) 500 mg tablet, Take 1 tablet (500 mg total) by mouth 2 (two) times a day with meals for 5 days For pain (Patient not taking: Reported on 2/10/2022 ), Disp: 10 tablet, Rfl: 0    neomycin-bacitracin-polymyxin b (NEOSPORIN) ointment, Apply topically 2 (two) times a day as needed (Catheter irritation) (Patient not taking: No sig reported), Disp: 15 g, Rfl: 0   oxybutynin (DITROPAN) 5 mg tablet, Take 1 tablet (5 mg total) by mouth 2 (two) times a day as needed (Bladder spasms) for up to 14 days (Patient not taking: Reported on 2/10/2022 ), Disp: 28 tablet, Rfl: 0    pantoprazole (PROTONIX) 20 mg tablet, Take 1 tablet (20 mg total) by mouth daily for 30 doses (Patient not taking: Reported on 2/10/2022 ), Disp: 30 tablet, Rfl: 3    tadalafil (CIALIS) 20 MG tablet, Take 1 tablet (20 mg total) by mouth daily as needed for erectile dysfunction (Patient not taking: No sig reported), Disp: 20 tablet, Rfl: 3    tadalafil (CIALIS) 20 MG tablet, Take 1 tablet (20 mg total) by mouth daily as needed for erectile dysfunction, Disp: 30 tablet, Rfl: 3  Allergies   Allergen Reactions    Sulfa Antibiotics Other (See Comments)     Mother told him as child not to take     There were no vitals filed for this visit

## 2022-09-07 ENCOUNTER — TELEPHONE (OUTPATIENT)
Dept: NEUROLOGY | Facility: CLINIC | Age: 62
End: 2022-09-07

## 2022-09-07 NOTE — TELEPHONE ENCOUNTER
Received request for a continuing disability form to be filled out  I saw in media it was previously filled out  Please respond let us know if you will fill out this new one for the new requester so we may charge appropriately

## 2022-09-14 ENCOUNTER — TELEPHONE (OUTPATIENT)
Dept: OTHER | Facility: OTHER | Age: 62
End: 2022-09-14

## 2022-09-14 NOTE — TELEPHONE ENCOUNTER
Patient's wife called and wanted to know if he can get an ativan or something of the like prescribe before his PET Scan @ 1pm on 9/15  She apologizes for the last minute request and would like a call back if possible   Pharmacy on file is correct

## 2022-09-15 ENCOUNTER — HOSPITAL ENCOUNTER (OUTPATIENT)
Dept: RADIOLOGY | Age: 62
Discharge: HOME | End: 2022-09-15
Payer: COMMERCIAL

## 2022-09-15 DIAGNOSIS — F02.80 EARLY ONSET ALZHEIMER'S DEMENTIA WITHOUT BEHAVIORAL DISTURBANCE (HCC): ICD-10-CM

## 2022-09-15 DIAGNOSIS — C61 PROSTATE CANCER (HCC): ICD-10-CM

## 2022-09-15 DIAGNOSIS — G30.0 EARLY ONSET ALZHEIMER'S DEMENTIA WITHOUT BEHAVIORAL DISTURBANCE (HCC): ICD-10-CM

## 2022-09-15 PROCEDURE — 78815 PET IMAGE W/CT SKULL-THIGH: CPT

## 2022-09-15 PROCEDURE — G1004 CDSM NDSC: HCPCS

## 2022-09-15 PROCEDURE — A9595 HB PIFLUFOLASTAT F-18, DIAGNOSTIC, 1 MILLICURIE: HCPCS

## 2022-09-15 RX ORDER — LORAZEPAM 1 MG/1
TABLET ORAL
Qty: 2 TABLET | Refills: 0 | Status: SHIPPED | OUTPATIENT
Start: 2022-09-15 | End: 2022-09-23 | Stop reason: SDUPTHER

## 2022-09-23 ENCOUNTER — RADIATION ONCOLOGY FOLLOW-UP (OUTPATIENT)
Dept: RADIATION ONCOLOGY | Facility: HOSPITAL | Age: 62
End: 2022-09-23
Attending: STUDENT IN AN ORGANIZED HEALTH CARE EDUCATION/TRAINING PROGRAM
Payer: COMMERCIAL

## 2022-09-23 VITALS
OXYGEN SATURATION: 96 % | WEIGHT: 177 LBS | DIASTOLIC BLOOD PRESSURE: 98 MMHG | HEART RATE: 71 BPM | RESPIRATION RATE: 16 BRPM | SYSTOLIC BLOOD PRESSURE: 152 MMHG | BODY MASS INDEX: 27.72 KG/M2 | TEMPERATURE: 97.6 F

## 2022-09-23 DIAGNOSIS — F02.80 EARLY ONSET ALZHEIMER'S DEMENTIA WITHOUT BEHAVIORAL DISTURBANCE (HCC): ICD-10-CM

## 2022-09-23 DIAGNOSIS — C61 PROSTATE CANCER (HCC): Primary | ICD-10-CM

## 2022-09-23 DIAGNOSIS — G30.0 EARLY ONSET ALZHEIMER'S DEMENTIA WITHOUT BEHAVIORAL DISTURBANCE (HCC): ICD-10-CM

## 2022-09-23 PROCEDURE — 99213 OFFICE O/P EST LOW 20 MIN: CPT | Performed by: STUDENT IN AN ORGANIZED HEALTH CARE EDUCATION/TRAINING PROGRAM

## 2022-09-23 PROCEDURE — 99211 OFF/OP EST MAY X REQ PHY/QHP: CPT | Performed by: STUDENT IN AN ORGANIZED HEALTH CARE EDUCATION/TRAINING PROGRAM

## 2022-09-23 RX ORDER — LORAZEPAM 1 MG/1
TABLET ORAL
Qty: 2 TABLET | Refills: 0 | Status: SHIPPED | OUTPATIENT
Start: 2022-09-23 | End: 2022-10-25

## 2022-09-23 NOTE — PROGRESS NOTES
Gina Grijalva 1960 is a 60 y o  male with PMHx early onset Alzheimer Disease with GS 4+3, pT3a prostate cancer s/p RALP in July 2021  He then presented with elevated PSA to 0 2 and was referred for salvage radiation therapy  He completed a course of salvage RT to a dose of 6840cGy in 38 fractions on  5/5/22  He was last seen on 9/1/22  He is seen today following PSMA PET, to review results  9/15/22 - PSMA PET  IMPRESSION:  1   Multiple PSMA avid lesions in the left pelvis, most concerning for aj metastases  2   PSMA avid focus of activity in the deep right pelvis at the level of the femoral head  Corresponding CT images are very limited due to significant artifact from bilateral hip arthroplasty hardware, and it is uncertain if this represents distal right   ureteral activity versus an additional aj metastasis  3   No PSMA avid metastases visualized in the neck, chest, upper abdomen or osseous structures      Lab Results   Component Value Date    PSA 1 3 08/31/2022    PSA 0 6 06/13/2022    PSA 0 2 02/24/2022           Upcoming:  10/25/22 - Neurology  12/13/22 - Urology            Oncology History   Prostate cancer [facility])   7/28/2021 Initial Diagnosis    [facility] )     7/28/2021 Biopsy    [facility]  DIAGNOSIS     Prostate (biopsy, [id number], 7/28/2021):    - Histologic Type: Adenocarcinoma (conventional, NOS)    - Hartford Score, Dominant Nodule: 4+3=7 Grade group 3: 70% pattern 4 (Tertiary Pattern 5)    - Tumor Extent: Tumor dimension (max): 14mm    - Location, Dominant Nodule: Left, posterolateral, posterior    - Local Extent (8th Edition AJCC): Extraprostatic extension                                              Location and extent of extraprostatic extension: Left, posterolateral, base, nonfocal    - Margins (as per the report): Negative    - Seminal Vesicle Invasion (as per the report): None    - Lymphatic (small vessel Invasion (as per report): Absent - Extent of Invasion (8th Edition AJCC) Primary Tumor: pT3: Extraprostatic extension or microscopic bladder neck invasion    - Regional Lymph Nodes: pNx: Cannot be assessed    - Summary Margins: Negative    - Additional Findings, Uninvolved Prostate:                                                High-grade prostatic intraepithelial neoplasia (PIN)                                               Prostatic intraductal adenocarcinoma  Thomas Vergara MD    Addendum   At the request of Dr Yesica Oreilly, unstained slides from paraffin BLOCK A25 containing the patient's cancer cells were sent to [address] [place] [address]  for  Jenkins-Solorio  testing  Upon completion of testing, the [address] Laboratory report will be directly sent to the requesting physician as well as posted in the Media Tab of the patient's [employer] EMR by the [facility]  3/14/2022 - 5/5/2022 Radiation      Treatments:  Course: C1    Plan ID Energy Fractions Dose per Fraction (cGy) Dose Correction (cGy) Total Dose Delivered (cGy) Elapsed Days   Prostate Bed 10X 38 / 38 180 0 6,840 52      Treatment Dates:  3/14/2022 - 5/5/2022  Review of Systems:  Review of Systems   Constitutional: Positive for fatigue (occasional)  HENT: Negative  Eyes:        Wears glasses   Respiratory: Negative  Cardiovascular: Negative  Gastrointestinal: Negative  Genitourinary: Negative for dysuria and urgency  Nocturia x 1   Musculoskeletal: Positive for back pain  Skin: Negative  Allergic/Immunologic: Negative  Neurological:        Memory loss   Hematological: Negative  Psychiatric/Behavioral: Positive for sleep disturbance  Clinical Trial: no    IPSS Questionnaire (AUA-7): Over the past month    1)  How often have you had a sensation of not emptying your bladder completely after you finish urinating?   0 - Not at all   2)  How often have you had to urinate again less than two hours after you finished urinating? 0 - Not at all   3)  How often have you found you stopped and started again several times when you urinated? 0 - Not at all   4) How difficult have you found it to postpone urination? 0 - Not at all   5) How often have you had a weak urinary stream?  0 - Not at all   6) How often have you had to push or strain to begin urination? 0 - Not at all   7) How many times did you most typically get up to urinate from the time you went to bed until the time you got up in the morning?   1 - 1 time   Total Score:  1           Covid Vaccine Status: vaccinated      Health Maintenance   Topic Date Due    Pneumococcal Vaccine: Pediatrics (0 to 5 Years) and At-Risk Patients (6 to 59 Years) (1 - PCV) Never done    BMI: Followup Plan  10/16/2021    Annual Physical  10/16/2021    COVID-19 Vaccine (3 - Moderna risk series) 01/19/2022    OT PLAN OF CARE  03/12/2022    Influenza Vaccine (1) 09/01/2022    Depression Screening  09/01/2023    BMI: Adult  09/01/2023    Colorectal Cancer Screening  06/24/2026    DTaP,Tdap,and Td Vaccines (3 - Td or Tdap) 12/15/2030    HIV Screening  Completed    Hepatitis C Screening  Completed    HIB Vaccine  Aged Out    Hepatitis B Vaccine  Aged Out    IPV Vaccine  Aged Out    Hepatitis A Vaccine  Aged Out    Meningococcal ACWY Vaccine  Aged Out    HPV Vaccine  Aged Out     Patient Active Problem List   Diagnosis    Allergic rhinitis    Glaucoma    Prediabetes    Sciatica    Short-term memory loss    Mild cognitive impairment    Abnormal EEG    Increased prostate specific antigen (PSA) velocity    Right hip pain    Lumbar spondylosis    Primary localized osteoarthritis of right hip    Enlarged prostate    Screening for prostate cancer    Bilateral leg weakness    Left hip pain    Breast mass in male    Furuncle of chest wall    Cellulitis    Gastroesophageal reflux disease    S/P hip replacement, bilateral    [facility] ([facility] )    Early onset Alzheimer's dementia without behavioral disturbance [facility])     Past Medical History:   Diagnosis Date    Benign colon polyp     Diverticulitis, colon     Diverticulosis     Hyperlipidemia     Prediabetes     Prostate cancer ([address] )     Renal calculi     Short-term memory loss     Vitamin D deficiency      Past Surgical History:   Procedure Laterality Date    ABDOMINAL ADHESION SURGERY N/A 2021    Procedure: LYSIS ADHESIONS, LAPAROSCOPIC;  Surgeon: Evonne Abdi MD;  [facility];  Service: Urology    COLONOSCOPY      ESOPHAGOGASTRODUODENOSCOPY      HERNIA REPAIR      JOINT REPLACEMENT Right     [address]    KNEE ARTHROSCOPY      KNEE SURGERY      MO LAP,PROSTATECTOMY,RADICAL,W/NERVE SPARE,INCL ROBOTIC N/A 2021    Procedure: ROBOTIC LAPAROSCOPIC RADICAL PROSTATECTOMY;  Surgeon: Evonne Abdi MD;  [facility];  Service: Urology    PROSTATE BIOPSY      TOTAL HIP ARTHROPLASTY Right 2019    TOTAL HIP ARTHROPLASTY Left 2019     Family History   Problem Relation Age of Onset    Dementia Mother    Mor Daubs Glaucoma Mother     Glaucoma Father     Prostate cancer Father     Pulmonary embolism Father     Hypertension Brother     Prostate cancer Brother     Colon cancer Maternal Grandfather      Social History     Socioeconomic History    Marital status: /Civil Union     Spouse name: Not on file    Number of children: Not on file    Years of education: Not on file    Highest education level: Not on file   Occupational History    Occupation:    Tobacco Use    Smoking status: Former Smoker     Types: Cigarettes     Quit date:      Years since quittin 7    Smokeless tobacco: Never Used    Tobacco comment: patient states he doesnt remember how much or for how long   Vaping Use    Vaping Use: Never used   Substance and Sexual Activity    Alcohol use: Yes     Comment: occasional beer and wine    Drug use: Not Currently     Comment: years ago    Sexual activity: Not Currently   Other Topics Concern    Not on file   Social History Narrative    Daily caffeine consumption- coffee, 3-4 cups    Education level- some college     Social Determinants of Health     Financial Resource Strain: Not on file   Food Insecurity: Not on file   Transportation Needs: Not on file   Physical Activity: Not on file   Stress: Not on file   Social Connections: Not on file   Intimate Partner Violence: Not on file   Housing Stability: Not on file       Current Outpatient Medications:     Alphagan P 0 1 %, INSTILL 1 DROP IN BOTH EYES TWICE DAILY, Disp: , Rfl:     ALPHAGAN P 0 15 % ophthalmic solution, , Disp: , Rfl:     donepezil (ARICEPT) 10 mg tablet, Take 1 tablet (10 mg total) by mouth daily, Disp: 90 tablet, Rfl: 3    escitalopram (LEXAPRO) 10 mg tablet, Take 1 tablet (10 mg total) by mouth daily, Disp: 90 tablet, Rfl: 1    latanoprost (XALATAN) 0 005 % ophthalmic solution, PLACE 1 DROP IN BOTH EYES DAILY AT BEDTIME, Disp: , Rfl:     LORazepam (ATIVAN) 1 mg tablet, Take 1 tablet 1 hour prior to MRI and then take 1 tablet 15 minutes prior to MRI   Do not drive , Disp: 2 tablet, Rfl: 0    LUMIGAN 0 01 % ophthalmic drops, , Disp: , Rfl:     memantine (NAMENDA) 10 mg tablet, Take 1 tablet (10 mg total) by mouth 2 (two) times a day, Disp: 60 tablet, Rfl: 6  Allergies   Allergen Reactions    Sulfa Antibiotics Other (See Comments)     Mother told him as child not to take     Vitals:    09/23/22 1259 09/23/22 1302   BP:  152/98   BP Location:  Left arm   Pulse:  71   Resp:  16   Temp:  97 6 °F (36 4 °C)   TempSrc: Temporal Temporal   SpO2:  96%   Weight:  80 3 kg (177 lb)

## 2022-09-23 NOTE — PROGRESS NOTES
Grzegorz Judd 1960 is a 60 y o  male with PMHx early onset Alzheimer Disease with GS 4+3, pT3a prostate cancer s/p RALP in July 2021  He then presented with elevated PSA to 0 2 and was referred for salvage radiation therapy  He completed a course of salvage RT to a dose of 6840cGy in 38 fractions on  5/5/22  He was last seen on 9/1/22  He is seen today following PSMA PET, to review results  9/15/22 - PSMA PET  IMPRESSION:  1   Multiple PSMA avid lesions in the left pelvis, most concerning for aj metastases  2   PSMA avid focus of activity in the deep right pelvis at the level of the femoral head  Corresponding CT images are very limited due to significant artifact from bilateral hip arthroplasty hardware, and it is uncertain if this represents distal right   ureteral activity versus an additional aj metastasis  3   No PSMA avid metastases visualized in the neck, chest, upper abdomen or osseous structures      Lab Results   Component Value Date    PSA 1 3 08/31/2022    PSA 0 6 06/13/2022    PSA 0 2 02/24/2022           Upcoming:  10/25/22 - Neurology  12/13/22 - Urology          Oncology History   Prostate cancer [facility])   7/28/2021 Initial Diagnosis    Prostate cancer ([facility] [address] )     7/28/2021 Biopsy    [facility]  DIAGNOSIS     Prostate (biopsy, [id number], 7/28/2021):    - Histologic Type: Adenocarcinoma (conventional, NOS)    - Soheila Score, Dominant Nodule: 4+3=7 Grade group 3: 70% pattern 4 (Tertiary Pattern 5)    - Tumor Extent: Tumor dimension (max): 14mm    - Location, Dominant Nodule: Left, posterolateral, posterior    - Local Extent (8th Edition AJCC): Extraprostatic extension                                              Location and extent of extraprostatic extension: Left, posterolateral, base, nonfocal    - Margins (as per the report): Negative    - Seminal Vesicle Invasion (as per the report): None    - Lymphatic (small vessel Invasion (as per report): Absent - Extent of Invasion (8th Edition AJCC) Primary Tumor: pT3: Extraprostatic extension or microscopic bladder neck invasion    - Regional Lymph Nodes: pNx: Cannot be assessed    - Summary Margins: Negative    - Additional Findings, Uninvolved Prostate:                                                High-grade prostatic intraepithelial neoplasia (PIN)                                               Prostatic intraductal adenocarcinoma  Luis Myles MD    Addendum   At the request of Dr Germaine Tuttle, unstained slides from paraffin BLOCK A25 containing the patient's cancer cells were sent to [facility]  for  Jenkins-Solorio  testing  Upon completion of testing, the [address] Laboratory report will be directly sent to the requesting physician as well as posted in the Media Tab of the patient's [employer] EMR by the [facility]ology Department  3/14/2022 - 5/5/2022 Radiation      Treatments:  Course: C1    Plan ID Energy Fractions Dose per Fraction (cGy) Dose Correction (cGy) Total Dose Delivered (cGy) Elapsed Days   Prostate Bed 10X 38 / 38 180 0 6,840 52      Treatment Dates:  3/14/2022 - 5/5/2022              Review of Systems:  Review of Systems    Clinical Trial: no    Covid Vaccine Status: vaccinated     Health Maintenance   Topic Date Due    Pneumococcal Vaccine: Pediatrics (0 to 5 Years) and At-Risk Patients (6 to 59 Years) (1 - PCV) Never done    BMI: Followup Plan  10/16/2021    Annual Physical  10/16/2021    COVID-19 Vaccine (3 - Moderna risk series) 01/19/2022    OT PLAN OF CARE  03/12/2022    Influenza Vaccine (1) 09/01/2022    Depression Screening  09/01/2023    BMI: Adult  09/01/2023    Colorectal Cancer Screening  06/24/2026    DTaP,Tdap,and Td Vaccines (3 - Td or Tdap) 12/15/2030    HIV Screening  Completed    Hepatitis C Screening  Completed    HIB Vaccine  Aged Out    Hepatitis B Vaccine  Aged Out    IPV Vaccine  Aged Out    Hepatitis A Vaccine  Aged C/ [address] 19 Meningococcal ACWY Vaccine  Aged Out    HPV Vaccine  Aged Out     Patient Active Problem List   Diagnosis    Allergic rhinitis    Glaucoma    Prediabetes    Sciatica    Short-term memory loss    Mild cognitive impairment    Abnormal EEG    Increased prostate specific antigen (PSA) velocity    Right hip pain    Lumbar spondylosis    Primary localized osteoarthritis of right hip    Enlarged prostate    Screening for prostate cancer    Bilateral leg weakness    Left hip pain    Breast mass in male    Furuncle of chest wall    Cellulitis    Gastroesophageal reflux disease    S/P hip replacement, bilateral    Pro[facility] ([facility] )    Early onset Alzheimer's dementia without behavioral disturbance (HCC)     Past Medical History:   Diagnosis Date    Benign colon polyp     Diverticulitis, colon     Diverticulosis     Hyperlipidemia     Prediabetes     Pro[facility] ([facility]     Renal calculi     Short-term memory loss     Vitamin D deficiency      Past Surgical History:   Procedure Laterality Date    ABDOMINAL ADHESION SURGERY N/A 7/28/2021    Procedure: LYSIS ADHESIONS, LAPAROSCOPIC;  Surgeon: Ramírez Winn MD;  [facility];  Service: Urology    COLONOSCOPY      ESOPHAGOGASTRODUODENOSCOPY      HERNIA REPAIR      JOINT REPLACEMENT Right     [address]    KNEE ARTHROSCOPY      KNEE SURGERY      NH LAP,PROSTATECTOMY,RADICAL,W/NERVE SPARE,INCL ROBOTIC N/A 7/28/2021    Procedure: ROBOTIC LAPAROSCOPIC RADICAL PROSTATECTOMY;  Surgeon: Ramírez Winn MD;  Location[facility];  Service: Urology    PROSTATE BIOPSY      TOTAL HIP ARTHROPLASTY Right 05/17/2019    TOTAL HIP ARTHROPLASTY Left 08/20/2019     Family History   Problem Relation Age of Onset    Dementia Mother     Glaucoma Mother     Glaucoma Father     Prostate cancer Father     Pulmonary embolism Father     Hypertension Brother     Prostate cancer Brother     Colon cancer Maternal Grandfather      Social History     Socioeconomic History    Marital status: /Civil Union     Spouse name: Not on file    Number of children: Not on file    Years of education: Not on file    Highest education level: Not on file   Occupational History    Occupation:    Tobacco Use    Smoking status: Former Smoker     Types: Cigarettes     Quit date:      Years since quittin 7    Smokeless tobacco: Never Used    Tobacco comment: patient states he doesnt remember how much or for how long   Vaping Use    Vaping Use: Never used   Substance and Sexual Activity    Alcohol use: Yes     Comment: occasional beer and wine    Drug use: Not Currently     Comment: years ago    Sexual activity: Not Currently   Other Topics Concern    Not on file   Social History Narrative    Daily caffeine consumption- coffee, 3-4 cups    Education level- some college     Social Determinants of Health     Financial Resource Strain: Not on file   Food Insecurity: Not on file   Transportation Needs: Not on file   Physical Activity: Not on file   Stress: Not on file   Social Connections: Not on file   Intimate Partner Violence: Not on file   Housing Stability: Not on file       Current Outpatient Medications:     Alphagan P 0 1 %, INSTILL 1 DROP IN BOTH EYES TWICE DAILY, Disp: , Rfl:     ALPHAGAN P 0 15 % ophthalmic solution, , Disp: , Rfl:     donepezil (ARICEPT) 10 mg tablet, Take 1 tablet (10 mg total) by mouth daily, Disp: 90 tablet, Rfl: 3    escitalopram (LEXAPRO) 10 mg tablet, Take 1 tablet (10 mg total) by mouth daily, Disp: 90 tablet, Rfl: 1    latanoprost (XALATAN) 0 005 % ophthalmic solution, PLACE 1 DROP IN BOTH EYES DAILY AT BEDTIME, Disp: , Rfl:     LORazepam (ATIVAN) 1 mg tablet, Take 1 tablet 1 hour prior to MRI and then take 1 tablet 15 minutes prior to MRI   Do not drive , Disp: 2 tablet, Rfl: 0    LUMIGAN 0 01 % ophthalmic drops, , Disp: , Rfl:     memantine (NAMENDA) 10 mg tablet, Take 1 tablet (10 mg total) by mouth 2 (two) times a day, Disp: 60 tablet, Rfl: 6  Allergies   Allergen Reactions    Sulfa Antibiotics Other (See Comments)     Mother told him as child not to take     Vitals:    09/23/22 1259 09/23/22 1302   BP:  152/98   BP Location:  Left arm   Pulse:  71   Resp:  16   Temp:  97 6 °F (36 4 °C)   TempSrc: Temporal Temporal   SpO2:  96%   Weight:  80 3 kg (177 lb)

## 2022-09-23 NOTE — PROGRESS NOTES
Follow-up - Radiation Oncology   Grzegorz Judd 1960 60 y o  male [id number]      History of Present Illness   Cancer Staging  No matching staging information was found for the patient  Mr Ramandeep Merchant a 60year old with PMHx early onset Alzheimer Disease with GS 4+3, pT3a prostate cancer s/p RALP in July 2021  He then presented with elevated PSA to 0 2 and was referred for salvage radiation therapy  Otis Lafleur completed a course of salvage RT to a dose of 6840cGy in 38 fractions on  5/5/22      Interval History:   The patient was last seen in 9/1/22 with a rising PSA  At that time we opted to proceed with PSMA PET with follow-up thereafter  PSMA PET (9/15/22): IMPRESSION:  1   Multiple PSMA avid lesions in the left pelvis, most concerning for aj metastases  2   PSMA avid focus of activity in the deep right pelvis at the level of the femoral head  Corresponding CT images are very limited due to significant artifact from bilateral hip arthroplasty hardware, and it is uncertain if this represents distal right   ureteral activity versus an additional aj metastasis     3   No PSMA avid metastases visualized in the neck, chest, upper abdomen or osseous structures  Currently the patient is doing well overall  He remains well clinically and is symptomatically unchanged since his last visit       Historical Information   Oncology History   Prostate cancer ([facility] )   7/28/2021 Initial Diagnosis    Prostate cancer ([facility] )     7/28/2021 Biopsy    [facility]  DIAGNOSIS     Prostate (biopsy, [id number], 7/28/2021):    - Histologic Type: Adenocarcinoma (conventional, NOS)    - [place] Score, Dominant Nodule: 4+3=7 Grade group 3: 70% pattern 4 (Tertiary Pattern 5)    - Tumor Extent: Tumor dimension (max): 14mm    - Location, Dominant Nodule: Left, posterolateral, posterior    - Local Extent (8th Edition AJCC): Extraprostatic extension Location and extent of extraprostatic extension: Left, posterolateral, base, nonfocal    - Margins (as per the report): Negative    - Seminal Vesicle Invasion (as per the report): None    - Lymphatic (small vessel Invasion (as per report): Absent    - Extent of Invasion (8th Edition AJCC) Primary Tumor: pT3: Extraprostatic extension or microscopic bladder neck invasion    - Regional Lymph Nodes: pNx: Cannot be assessed    - Summary Margins: Negative    - Additional Findings, Uninvolved Prostate:                                                High-grade prostatic intraepithelial neoplasia (PIN)                                               Prostatic intraductal adenocarcinoma  Sanjuanita Mota MD    Addendum   At the request of Dr Alexandre Dunn, unstained slides from paraffin BLOCK A25 containing the patient's cancer cells were sent to [facility]  for  Jenkins[facility]  testing  Upon completion of testing, the [facility] [address] Laboratory report will be directly sent to the requesting physician as well as posted in the Media Tab of the patient's [employer] EMR by the [facility]  Pathology Department  3/14/2022 - 5/5/2022 Radiation      Treatments:  Course: C1    Plan ID Energy Fractions Dose per Fraction (cGy) Dose Correction (cGy) Total Dose Delivered (cGy) Elapsed Days   Prostate Bed 10X 38 / 38 180 0 6,840 52      Treatment Dates:  3/14/2022 - 5/5/2022              Past Medical History:   Diagnosis Date    Benign colon polyp     Diverticulitis, colon     Diverticulosis     Hyperlipidemia     Prediabetes     Prostate cancer ([address] )     Renal calculi     Short-term memory loss     Vitamin D deficiency      Past Surgical History:   Procedure Laterality Date    ABDOMINAL ADHESION SURGERY N/A 7/28/2021    Procedure: LYSIS ADHESIONS, LAPAROSCOPIC;  Surgeon: Nelda Martínez MD;  [facility];  Service: Urology    COLONOSCOPY      ESOPHAGOGASTRODUODENOSCOPY     [facility] REPLACEMENT Right     RTH    KNEE ARTHROSCOPY      KNEE SURGERY      [place] LAP,PROSTATECTOMY,RADICAL,W/NERVE SPARE,INCL ROBOTIC N/A 2021    Procedure: ROBOTIC LAPAROSCOPIC RADICAL PROSTATECTOMY;  Surgeon: Linsey Recinos MD;  Location: [facility];  Service: Urology    PROSTATE BIOPSY      TOTAL HIP ARTHROPLASTY Right 2019    TOTAL HIP ARTHROPLASTY Left 2019       Social History   Social History     Substance and Sexual Activity   Alcohol Use Yes    Comment: occasional beer and wine     Social History     Substance and Sexual Activity   Drug Use Not Currently    Comment: years ago     Social History     Tobacco Use   Smoking Status Former Smoker    Types: Cigarettes    Quit date: 12    Years since quittin 7   Smokeless Tobacco Never Used   Tobacco Comment    patient states he doesnt remember how much or for how long         Meds/Allergies     Current Outpatient Medications:     Alphagan P 0 1 %, INSTILL 1 DROP IN BOTH EYES TWICE DAILY, Disp: , Rfl:     ALPHAGAN P 0 15 % ophthalmic solution, , Disp: , Rfl:     donepezil (ARICEPT) 10 mg tablet, Take 1 tablet (10 mg total) by mouth daily, Disp: 90 tablet, Rfl: 3    escitalopram (LEXAPRO) 10 mg tablet, Take 1 tablet (10 mg total) by mouth daily, Disp: 90 tablet, Rfl: 1    latanoprost (XALATAN) 0 005 % ophthalmic solution, PLACE 1 DROP IN BOTH EYES DAILY AT BEDTIME, Disp: , Rfl:     LORazepam (ATIVAN) 1 mg tablet, Take 1 tablet 1 hour prior to MRI and then take 1 tablet 15 minutes prior to MRI  Do not drive , Disp: 2 tablet, Rfl: 0    LUMIGAN 0 01 % ophthalmic drops, , Disp: , Rfl:     memantine (NAMENDA) 10 mg tablet, Take 1 tablet (10 mg total) by mouth 2 (two) times a day, Disp: 60 tablet, Rfl: 6  Allergies   Allergen Reactions    Sulfa Antibiotics Other (See Comments)     Mother told him as child not to take       Review of Systems   Constitutional: Positive for fatigue (occasional)  HENT: Negative      Eyes:        Wears glasses   Respiratory: Negative  Cardiovascular: Negative  Gastrointestinal: Negative  Genitourinary: Negative for dysuria and urgency  Nocturia x 1   Musculoskeletal: Positive for back pain  Skin: Negative  Allergic/Immunologic: Negative  Neurological:        Memory loss   Hematological: Negative  Psychiatric/Behavioral: Positive for sleep disturbance          Clinical Trial: no     IPSS Questionnaire (AUA-7): Over the past month     1)  How often have you had a sensation of not emptying your bladder completely after you finish urinating? 0 - Not at all   2)  How often have you had to urinate again less than two hours after you finished urinating? 0 - Not at all   3)  How often have you found you stopped and started again several times when you urinated? 0 - Not at all   4) How difficult have you found it to postpone urination? 0 - Not at all   5) How often have you had a weak urinary stream?  0 - Not at all   6) How often have you had to push or strain to begin urination? 0 - Not at all   7) How many times did you most typically get up to urinate from the time you went to bed until the time you got up in the morning? 1 - 1 time   Total Score:  1             OBJECTIVE:   /98 (BP Location: Left arm)   Pulse 71   Temp 97 6 °F (36 4 °C) (Temporal)   Resp 16   Wt 80 3 kg (177 lb)   SpO2 96%   BMI 27 72 kg/m²   Pain Assessment:  0  ECOG/Zubrod/WHO: 1 - Symptomatic but completely ambulatory    Physical Exam   Well appearing  NAD  No increased work of breathing  No MALCOLM performed       RESULTS    Lab Results:   Recent Results (from the past 672 hour(s))   PSA Total, Diagnostic    Collection Time: 08/31/22 12:23 PM   Result Value Ref Range    PSA, Diagnostic 1 3 0 0 - 4 0 ng/mL       Imaging Studies:NM PET CT skull base to mid thigh    Result Date: 9/15/2022  Narrative: PYLARIFY PSMA PET/CT SCAN INDICATION:  C61: Malignant neoplasm of prostate   , status post prostatectomy 7/28/2021 and salvage radiation, rising PSA, restaging, PSA 1 3 on 8/31/2022 MODIFIER: PS COMPARISON: CT 7/14/2021, MRI prostate 7/21/2020 CELL TYPE:  Prostate adenocarcinoma, prostate biopsy 3/18/2020 TECHNIQUE:   9 7 mCi F-18 Pylarify PSMA administered IV  Multiplanar attenuation corrected and non attenuation corrected PET images were acquired 60 minutes post injection  Contiguous, low dose, axial CT sections were obtained from the vertex through the  femurs   Intravenous or oral contrast was not utilized  This examination, like all CT scans performed in the [facility], was performed utilizing techniques to minimize radiation dose exposure, including the use of iterative reconstruction and automated exposure control  FINDINGS: VISUALIZED BRAIN:   Stable prominent posterior horns of the lateral ventricles  No acute abnormalities are seen  HEAD/NECK:   There is a physiologic distribution of the radiotracer  No PSMA avid cervical adenopathy is seen  CT images: Unremarkable  CHEST:   No PSMA avid soft tissue lesions are seen  CT images: Unremarkable  ABDOMEN:   No PSMA avid soft tissue lesions are seen  CT images: Unremarkable  PELVIS: There are multiple small subcentimeter PSMA avid nodes in the left pelvis adjacent to the left common, external and internal iliac vessels, most concerning for aj metastases  Example node posterior to the left common iliac vein, image 3/226, measures 5 mm, SUV 8 6  Additional left pelvic nodes at the level of the left acetabulum and left femoral head have an SUV of 13 7  In the deep right pelvis at the level of the right femoral head, there is a PSMA avid focus of activity, SUV 6 4  Corresponding CT images are very limited due to significant artifact from bilateral hip arthroplasty hardware, and it is uncertain if this represents distal right ureteral activity versus an additional aj metastasis   CT images: Limited evaluation due to artifact from bilateral hip arthroplasty hardware  OSSEOUS STRUCTURES: No PSMA avid lesions are seen  CT images: Mild lumbar dextroscoliosis  Bilateral hip arthroplasty  Impression: 1  Multiple PSMA avid lesions in the left pelvis, most concerning for aj metastases  2   PSMA avid focus of activity in the deep right pelvis at the level of the femoral head  Corresponding CT images are very limited due to significant artifact from bilateral hip arthroplasty hardware, and it is uncertain if this represents distal right ureteral activity versus an additional aj metastasis  3   No PSMA avid metastases visualized in the neck, chest, upper abdomen or osseous structures  Workstation performed: [id number]           Assessment/Plan:  Orders Placed This Encounter   Procedures    MRI pelvis w wo contrast      We reviewed the patient's PSMA PET scan in detail, which demonstrated multiple avid lesions in the left pelvis concerning for aj metastases  In the lower pelvis, overlapping with prior RT fields, there is additional bilateral symmetric uptake in the area of the ureter  Due to his prior bilateral hip replacements, this area is obscured on corresponding CT and it is difficult to discern whether these are due to aj metastases or due to urinary excretion  To better assess these areas and to assist with further decision making I will obtain an MRI pelvis to see if there are any corresponding low pelvis lymph nodes  If MRI does show obvious pathologic adenopathy corresponding to these areas of uptake, local treatment options would be limited due to direct overlap with prior RT fields  However if these areas of uptake are found to be likely physiologic we could offer pelvic RT in an attempt to provide longterm freedom from disease progression  He will also need to start systemic therapy with ADT, which I will help to arrange through urology   If we were to proceed with RT, we would plan to start approximately 2 months following initiation of ADT  Wilhelmena Dancer, MD  2/98/6139,2:10 PM    Portions of the record may have been created with voice recognition software   Occasional wrong word or "sound a like" substitutions may have occurred due to the inherent limitations of voice recognition software   Read the chart carefully and recognize, using context, where substitutions have occurred

## 2022-10-01 ENCOUNTER — HOSPITAL ENCOUNTER (OUTPATIENT)
Dept: MRI IMAGING | Facility: HOSPITAL | Age: 62
Discharge: HOME | End: 2022-10-01
Payer: COMMERCIAL

## 2022-10-01 DIAGNOSIS — C61 PROSTATE CANCER (HCC): ICD-10-CM

## 2022-10-01 PROCEDURE — G1004 CDSM NDSC: HCPCS

## 2022-10-01 PROCEDURE — 72197 MRI PELVIS W/O & W/DYE: CPT

## 2022-10-01 PROCEDURE — A9585 GADOBUTROL INJECTION: HCPCS | Performed by: STUDENT IN AN ORGANIZED HEALTH CARE EDUCATION/TRAINING PROGRAM

## 2022-10-01 RX ADMIN — GADOBUTROL 8 ML: 604.72 INJECTION INTRAVENOUS at 15:02

## 2022-10-07 ENCOUNTER — RADIATION ONCOLOGY FOLLOW-UP (OUTPATIENT)
Dept: RADIATION ONCOLOGY | Facility: HOSPITAL | Age: 62
End: 2022-10-07
Attending: STUDENT IN AN ORGANIZED HEALTH CARE EDUCATION/TRAINING PROGRAM
Payer: COMMERCIAL

## 2022-10-07 ENCOUNTER — TELEPHONE (OUTPATIENT)
Dept: NEUROLOGY | Facility: CLINIC | Age: 62
End: 2022-10-07

## 2022-10-07 VITALS
HEART RATE: 78 BPM | OXYGEN SATURATION: 98 % | DIASTOLIC BLOOD PRESSURE: 87 MMHG | TEMPERATURE: 97.7 F | RESPIRATION RATE: 16 BRPM | SYSTOLIC BLOOD PRESSURE: 136 MMHG

## 2022-10-07 DIAGNOSIS — C61 PROSTATE CANCER (HCC): Primary | ICD-10-CM

## 2022-10-07 PROCEDURE — 99211 OFF/OP EST MAY X REQ PHY/QHP: CPT | Performed by: STUDENT IN AN ORGANIZED HEALTH CARE EDUCATION/TRAINING PROGRAM

## 2022-10-07 PROCEDURE — 99213 OFFICE O/P EST LOW 20 MIN: CPT | Performed by: STUDENT IN AN ORGANIZED HEALTH CARE EDUCATION/TRAINING PROGRAM

## 2022-10-07 NOTE — PROGRESS NOTES
Adrian Santiago 1960 is a 60 y o  male Maliha Augustine is a 60 y o  male with PMHx early onset Alzheimer Disease with GS 4+3, pT3a prostate cancer s/p RALP in July 2021  He then presented with elevated PSA to 0 2 and was referred for salvage radiation therapy  He completed a course of salvage RT to a dose of 6840cGy in 38 fractions on  5/5/22  He was last seen on 9/23/22  He is seen today following MRI pelvis, to review results  10/1/22 MRI pelvis w wo contrast  IMPRESSION:  1  Limited study demonstrating multiple lymph nodes within the left hemipelvis which were noted to demonstrate avid PSMA uptake on recent PET/CT      2  No evidence of discrete lymph node to correspond with focal PSMA activity in the deep right hemipelvis on recent PET/CT  Please note evaluation of this area is very limited on several sequences, particularly postcontrast sequences          10/25/22 Neuro  12/13/22 Urology        Follow up visit     Oncology History   Prostate cancer [facility])   7/28/2021 Initial Diagnosis    Prostate cancer ([facility] )     7/28/2021 Biopsy    [facility] LAB[facility]  DIAGNOSIS     Prostate (biopsy, [id number], 7/28/2021):    - Histologic Type: Adenocarcinoma (conventional, NOS)    - [place] Score, Dominant Nodule: 4+3=7 Grade group 3: 70% pattern 4 (Tertiary Pattern 5)    - Tumor Extent: Tumor dimension (max): 14mm    - Location, Dominant Nodule: Left, posterolateral, posterior    - Local Extent (8th Edition AJCC): Extraprostatic extension                                              Location and extent of extraprostatic extension: Left, posterolateral, base, nonfocal    - Margins (as per the report): Negative    - Seminal Vesicle Invasion (as per the report): None    - Lymphatic (small vessel Invasion (as per report): Absent    - Extent of Invasion (8th Edition AJCC) Primary Tumor: pT3: Extraprostatic extension or microscopic bladder neck invasion    - Regional Lymph Nodes: pNx: Cannot be assessed    - Summary Margins: Negative    - Additional Findings, Uninvolved Prostate:                                                High-grade prostatic intraepithelial neoplasia (PIN)                                               Prostatic intraductal adenocarcinoma  Alton Serrano MD    Addendum   At the request of Dr Ming Rivas, unstained slides from paraffin BLOCK A25 containing the patient's cancer cells were sent to [address] [place] [address]  for  [employer]  testing  Upon completion of testing, the [address] Laboratory report will be directly sent to the requesting physician as well as posted in the Media Tab of the patient's [facility] EMR by the [facility] Blood Pathology Department  3/14/2022 - 5/5/2022 Radiation      Treatments:  Course: C1    Plan ID Energy Fractions Dose per Fraction (cGy) Dose Correction (cGy) Total Dose Delivered (cGy) Elapsed Days   Prostate Bed 10X 38 / 38 180 0 6,840 52      Treatment Dates:  3/14/2022 - 5/5/2022  Review of Systems:  Review of Systems   Constitutional: Positive for fatigue (occasional)  Negative for chills and fever  HENT: Negative  Eyes:        Wears glasses   Respiratory: Negative  Cardiovascular: Negative  Gastrointestinal: Negative  Genitourinary: Negative for dysuria and urgency  Nocturia x 1   Musculoskeletal: Positive for back pain  Skin: Negative  Allergic/Immunologic: Negative  Neurological:        Memory loss   Hematological: Negative  Psychiatric/Behavioral: Positive for sleep disturbance  Clinical Trial: no    IPSS Questionnaire (AUA-7): Over the past month…    1)  How often have you had a sensation of not emptying your bladder completely after you finish urinating? 0 - Not at all   2)  How often have you had to urinate again less than two hours after you finished urinating? 0 - Not at all   3)  How often have you found you stopped and started again several times when you urinated?   0 - Not at all 4) How difficult have you found it to postpone urination? 0 - Not at all   5) How often have you had a weak urinary stream?  0 - Not at all   6) How often have you had to push or strain to begin urination? 0 - Not at all   7) How many times did you most typically get up to urinate from the time you went to bed until the time you got up in the morning?   1 - 1 time   Total Score:  1       Teaching    Covid Vaccine Status vaccinated x2    Health Maintenance   Topic Date Due   • Pneumococcal Vaccine: Pediatrics (0 to 5 Years) and At-Risk Patients (6 to 59 Years) (1 - PCV) Never done   • BMI: Followup Plan  10/16/2021   • Annual Physical  10/16/2021   • COVID-19 Vaccine (3 - Moderna risk series) 01/19/2022   • OT PLAN OF CARE  03/12/2022   • Influenza Vaccine (1) Never done   • Depression Screening  09/01/2023   • BMI: Adult  09/23/2023   • Colorectal Cancer Screening  06/24/2026   • DTaP,Tdap,and Td Vaccines (3 - Td or Tdap) 12/15/2030   • HIV Screening  Completed   • Hepatitis C Screening  Completed   • HIB Vaccine  Aged Out   • Hepatitis B Vaccine  Aged Out   • IPV Vaccine  Aged Out   • Hepatitis A Vaccine  Aged Out   • Meningococcal ACWY Vaccine  Aged Out   • HPV Vaccine  Aged Out     Patient Active Problem List   Diagnosis   • Allergic rhinitis   • Glaucoma   • Prediabetes   • Sciatica   • Short-term memory loss   • Mild cognitive impairment   • Abnormal EEG   • Increased prostate specific antigen (PSA) velocity   • Right hip pain   • Lumbar spondylosis   • Primary localized osteoarthritis of right hip   • Enlarged prostate   • Screening for prostate cancer   • Bilateral leg weakness   • Left hip pain   • Breast mass in male   • Furuncle of chest wall   • Cellulitis   • Gastroesophageal reflux disease   • S/P hip replacement, bilateral   • Prostate cancer ([address] )   • Early onset Alzheimer's dementia without behavioral disturbance ([address] )     Past Medical History:   Diagnosis Date   • Benign colon polyp    • Diverticulitis, colon    • Diverticulosis    • Hyperlipidemia    • Prediabetes    • Pro[facility] ([facility] )    • Renal calculi    • Short-term memory loss    • Vitamin D deficiency      Past Surgical History:   Procedure Laterality Date   • ABDOMINAL ADHESION SURGERY N/A 2021    Procedure: LYSIS ADHESIONS, LAPAROSCOPIC;  Surgeon: Migdalia Yarbrough MD;  [facility];  Service: Urology   • COLONOSCOPY     • ESOPHAGOGASTRODUODENOSCOPY     • HERNIA REPAIR     • JOINT REPLACEMENT Right     RTH   • KNEE ARTHROSCOPY     • KNEE SURGERY     • [place] LAP,PROSTATECTOMY,RADICAL,W/NERVE SPARE,INCL ROBOTIC N/A 2021    Procedure: ROBOTIC LAPAROSCOPIC RADICAL PROSTATECTOMY;  Surgeon: Migdalia Yarbrough MD;  Location: [facility];  Service: Urology   • PROSTATE BIOPSY     • TOTAL HIP ARTHROPLASTY Right 2019   • TOTAL HIP ARTHROPLASTY Left 2019     Family History   Problem Relation Age of Onset   • Dementia Mother    • Glaucoma Mother    • Glaucoma Father    • Prostate cancer Father    • Pulmonary embolism Father    • Hypertension Brother    • Prostate cancer Brother    • Colon cancer Maternal Grandfather      Social History     Socioeconomic History   • Marital status: /Civil Union     Spouse name: Not on file   • Number of children: Not on file   • Years of education: Not on file   • Highest education level: Not on file   Occupational History   • Occupation:    Tobacco Use   • Smoking status: Former Smoker     Types: Cigarettes     Quit date:      Years since quittin 7   • Smokeless tobacco: Never Used   • Tobacco comment: patient states he doesnt remember how much or for how long   Vaping Use   • Vaping Use: Never used   Substance and Sexual Activity   • Alcohol use: Yes     Comment: occasional beer and wine   • Drug use: Not Currently     Comment: years ago   • Sexual activity: Not Currently   Other Topics Concern   • Not on file   Social History Narrative    Daily caffeine consumption- coffee, 3-4 cups    Education level- some college     Social Determinants of Health     Financial Resource Strain: Not on file   Food Insecurity: Not on file   Transportation Needs: Not on file   Physical Activity: Not on file   Stress: Not on file   Social Connections: Not on file   Intimate Partner Violence: Not on file   Housing Stability: Not on file       Current Outpatient Medications:   •  Alphagan P 0 1 %, INSTILL 1 DROP IN BOTH EYES TWICE DAILY, Disp: , Rfl:   •  ALPHAGAN P 0 15 % ophthalmic solution, , Disp: , Rfl:   •  donepezil (ARICEPT) 10 mg tablet, Take 1 tablet (10 mg total) by mouth daily, Disp: 90 tablet, Rfl: 3  •  escitalopram (LEXAPRO) 10 mg tablet, Take 1 tablet (10 mg total) by mouth daily, Disp: 90 tablet, Rfl: 1  •  latanoprost (XALATAN) 0 005 % ophthalmic solution, PLACE 1 DROP IN BOTH EYES DAILY AT BEDTIME, Disp: , Rfl:   •  LORazepam (ATIVAN) 1 mg tablet, Take 1 tablet 1 hour prior to MRI and then take 1 tablet 15 minutes prior to MRI  Do not drive , Disp: 2 tablet, Rfl: 0  •  LUMIGAN 0 01 % ophthalmic drops, , Disp: , Rfl:   •  memantine (NAMENDA) 10 mg tablet, Take 1 tablet (10 mg total) by mouth 2 (two) times a day, Disp: 60 tablet, Rfl: 6  Allergies   Allergen Reactions   • Sulfa Antibiotics Other (See Comments)     Mother told him as child not to take     There were no vitals filed for this visit

## 2022-10-07 NOTE — TELEPHONE ENCOUNTER
LVM for patient to call office to confirm appointment with [facility] in our [facility] location

## 2022-10-10 NOTE — PROGRESS NOTES
Follow-up - Radiation Oncology   Iliana Traylor 1960 60 y o  male [id number]      History of Present Illness   Cancer Staging  No matching staging information was found for the patient  Mr Nilson Montes is a 60year old with PMHx early onset Alzheimer Disease with GS 4+3, pT3a prostate cancer s/p RALP in July 2021  He then presented with elevated PSA to 0 2 and was referred for salvage radiation therapy  Charlotte Miramontes completed a course of salvage RT to the prostate bed to a dose of 6840cGy in 38 fractions on  5/5/22  He was thereafter found to have further increase in PSA with PSMA PET demonstrating evidence of pelvic LN metastases  He returns today in follow-up       Interval History:   The patient was last seen in 9/23/22 to discuss PSMA PET  While there were some clear foci of PSMA uptake consistent with aj metastases, in the deep pelvis there was uncertainty as to whether uptake represented LNs or urinary excretion through the ureter  To better define this we opted for MRI Pelvis  MRI Pelvis (10/1/22) demonstrated no evidence of discrete lymph node to correspond to focal PSMA activity in the deep right hemipelvis on prior PET, though noting that as with CT, evaluation was limited  He is symptomatically unchanged from his last visit       Historical Information   Oncology History   Prostate cancer ([facility] )   7/28/2021 Initial Diagnosis    Prostate cancer ([facility] )     7/28/2021 Biopsy    [facility]  DIAGNOSIS     Prostate (biopsy, [id number], 7/28/2021):    - Histologic Type: Adenocarcinoma (conventional, NOS)    - Noble Score, Dominant Nodule: 4+3=7 Grade group 3: 70% pattern 4 (Tertiary Pattern 5)    - Tumor Extent: Tumor dimension (max): 14mm    - Location, Dominant Nodule: Left, posterolateral, posterior    - Local Extent (8th Edition AJCC): Extraprostatic extension                                              Location and extent of extraprostatic extension: Left, posterolateral, base, nonfocal    - Margins (as per the report): Negative    - Seminal Vesicle Invasion (as per the report): None    - Lymphatic (small vessel Invasion (as per report): Absent    - Extent of Invasion (8th Edition AJCC) Primary Tumor: pT3: Extraprostatic extension or microscopic bladder neck invasion    - Regional Lymph Nodes: pNx: Cannot be assessed    - Summary Margins: Negative    - Additional Findings, Uninvolved Prostate:                                                High-grade prostatic intraepithelial neoplasia (PIN)                                               Prostatic intraductal adenocarcinoma  Nilo Fields MD    Addendum   At the request of Dr Duglas Mcintosh, unstained slides from paraffin BLOCK A25 containing the patient's cancer cells were sent to [address] [place] [address]  for  [facility]  testing  Upon completion of testing, the [facility] report will be directly sent to the requesting physician as well as posted in the Media Tab of the patient's [employer] EMR by the [facility]  Pathology Department  3/14/2022 - 5/5/2022 Radiation      Treatments:  Course: C1    Plan ID Energy Fractions Dose per Fraction (cGy) Dose Correction (cGy) Total Dose Delivered (cGy) Elapsed Days   Prostate Bed 10X 38 / 38 180 0 6,840 52      Treatment Dates:  3/14/2022 - 5/5/2022              Past Medical History:   Diagnosis Date   • Benign colon polyp    • Diverticulitis, colon    • Diverticulosis    • Hyperlipidemia    • Prediabetes    • Prostate cancer ([facility] )    • Renal calculi    • Short-term memory loss    • Vitamin D deficiency      Past Surgical History:   Procedure Laterality Date   • ABDOMINAL ADHESION SURGERY N/A 7/28/2021    Procedure: LYSIS ADHESIONS, LAPAROSCOPIC;  Surgeon: Tonya Cochran MD;  Location: [facility];  Service: Urology   • COLONOSCOPY     • ESOPHAGOGASTRODUODENOSCOPY     • HERNIA REPAIR     • JOINT REPLACEMENT Right     RTH   • KNEE ARTHROSCOPY     • KNEE SURGERY     • DE LAP,PROSTATECTOMY,RADICAL,W/NERVE SPARE,INCL ROBOTIC N/A 2021    Procedure: ROBOTIC LAPAROSCOPIC RADICAL PROSTATECTOMY;  Surgeon: Jacobo Romeo MD;  Location: [facility];  Service: Urology   • PROSTATE BIOPSY     • TOTAL HIP ARTHROPLASTY Right 2019   • TOTAL HIP ARTHROPLASTY Left 2019       Social History   Social History     Substance and Sexual Activity   Alcohol Use Yes    Comment: occasional beer and wine     Social History     Substance and Sexual Activity   Drug Use Not Currently    Comment: years ago     Social History     Tobacco Use   Smoking Status Former Smoker   • Types: Cigarettes   • Quit date:    • Years since quittin 7   Smokeless Tobacco Never Used   Tobacco Comment    patient states he doesnt remember how much or for how long         Meds/Allergies     Current Outpatient Medications:   •  ALPHAGAN P 0 15 % ophthalmic solution, , Disp: , Rfl:   •  donepezil (ARICEPT) 10 mg tablet, Take 1 tablet (10 mg total) by mouth daily, Disp: 90 tablet, Rfl: 3  •  escitalopram (LEXAPRO) 10 mg tablet, Take 1 tablet (10 mg total) by mouth daily, Disp: 90 tablet, Rfl: 1  •  latanoprost (XALATAN) 0 005 % ophthalmic solution, PLACE 1 DROP IN BOTH EYES DAILY AT BEDTIME, Disp: , Rfl:   •  LUMIGAN 0 01 % ophthalmic drops, , Disp: , Rfl:   •  memantine (NAMENDA) 10 mg tablet, Take 1 tablet (10 mg total) by mouth 2 (two) times a day, Disp: 60 tablet, Rfl: 6  •  Alphagan P 0 1 %, INSTILL 1 DROP IN BOTH EYES TWICE DAILY (Patient not taking: Reported on 10/7/2022), Disp: , Rfl:   •  LORazepam (ATIVAN) 1 mg tablet, Take 1 tablet 1 hour prior to MRI and then take 1 tablet 15 minutes prior to MRI  Do not drive  (Patient not taking: Reported on 10/7/2022), Disp: 2 tablet, Rfl: 0  Allergies   Allergen Reactions   • Sulfa Antibiotics Other (See Comments)     Mother told him as child not to take     Review of Systems   Constitutional: Positive for fatigue (occasional)   Negative for chills and fever  HENT: Negative  Eyes:        Wears glasses   Respiratory: Negative  Cardiovascular: Negative  Gastrointestinal: Negative  Genitourinary: Negative for dysuria and urgency  Nocturia x 1   Musculoskeletal: Positive for back pain  Skin: Negative  Allergic/Immunologic: Negative  Neurological:        Memory loss   Hematological: Negative  Psychiatric/Behavioral: Positive for sleep disturbance          Clinical Trial: no     IPSS Questionnaire (AUA-7): Over the past month…     1)  How often have you had a sensation of not emptying your bladder completely after you finish urinating? 0 - Not at all   2)  How often have you had to urinate again less than two hours after you finished urinating? 0 - Not at all   3)  How often have you found you stopped and started again several times when you urinated? 0 - Not at all   4) How difficult have you found it to postpone urination? 0 - Not at all   5) How often have you had a weak urinary stream?  0 - Not at all   6) How often have you had to push or strain to begin urination? 0 - Not at all   7) How many times did you most typically get up to urinate from the time you went to bed until the time you got up in the morning? 1 - 1 time   Total Score:  1          OBJECTIVE:   /87 (BP Location: Left arm, Patient Position: Sitting, Cuff Size: Large)   Pulse 78   Temp 97 7 °F (36 5 °C)   Resp 16   SpO2 98%   Pain Assessment:  0  ECOG/Zubrod/WHO: 0 - Asymptomatic    Physical Exam   Well appearing  NAD  No increased work of breathing  No MALCOLM performed          RESULTS    Lab Results: No results found for this or any previous visit (from the past 672 hour(s))  Imaging Studies:MRI pelvis w wo contrast    Result Date: 10/4/2022  Narrative: MRI - PELVIS - WITH AND WITHOUT CONTRAST (MALE) INDICATION: 60 years / Male  C61: Malignant neoplasm of prostate  Status post prostatectomy  COMPARISON: CT abdomen pelvis 7/14/2021    PET/CT 9/15/2022  Prostate MRI 7/21/2020 TECHNIQUE:  The following pulse sequences were obtained prior to and following the administration of intravenous contrast:   Axial T1, axial, coronal, and sagittal T2 STIR, precontrast axial T1 with fat saturation, post-contrast axial and coronal T1 with  fat saturation  Sagittal T1 images were also obtained  Several sequences are degraded by motion and susceptibility artifacts  Artifact significantly degrades postcontrast images  IV Contrast:  8 mL of Gadobutrol injection (SINGLE-DOSE) FINDINGS: PROSTATE/SEMINAL VESICLES:  Status post prostatectomy  URINARY BLADDER:  Incompletely distended, unremarkable  VISUALIZED BOWEL:  No abnormal bowel distention  Several colonic diverticula are seen without evidence of acute inflammatory changes  PELVIC CAVITY:  Several lymph nodes are seen along the left pelvic sidewall corresponding with foci of avid PSMA uptake on prior PET/CT  Amongst the most conspicuous is a left common iliac node measuring 9 x 6 mm (6:13)  Prominent pelvic sidewall node measures 6 x 5 mm (6:25)  No clear lymph node or lesion is seen to correspond with the focus of PSMA activity within the right hemipelvis  Evaluation of this area is extremely limited on this noncontrast sequences secondary to motion artifact and susceptibility artifact  VESSELS: The visualized vasculature appears grossly patent within the confines of an artifact limited examination  No evidence of aneurysm  PELVIC WALL: Unremarkable  BONES:  No acute or suspicious osseous abnormality  Susceptibility artifact from bilateral hip prostheses is noted     Impression: 1  Limited study demonstrating multiple lymph nodes within the left hemipelvis which were noted to demonstrate avid PSMA uptake on recent PET/CT  2   No evidence of discrete lymph node to correspond with focal PSMA activity in the deep right hemipelvis on recent PET/CT    Please note evaluation of this area is very limited on several sequences, particularly postcontrast sequences  Workstation performed: [id number]     NM PET CT skull base to mid thigh    Result Date: 9/15/2022  Narrative: PYLARIFY PSMA PET/CT SCAN INDICATION:  C61: Malignant neoplasm of prostate   , status post prostatectomy 7/28/2021 and salvage radiation, rising PSA, restaging, PSA 1 3 on 8/31/2022 MODIFIER: PS COMPARISON: CT 7/14/2021, MRI prostate 7/21/2020 CELL TYPE:  Prostate adenocarcinoma, prostate biopsy 3/18/2020 TECHNIQUE:   9 7 mCi F-18 Pylarify PSMA administered IV  Multiplanar attenuation corrected and non attenuation corrected PET images were acquired 60 minutes post injection  Contiguous, low dose, axial CT sections were obtained from the vertex through the  femurs   Intravenous or oral contrast was not utilized  This examination, like all CT scans performed in the [facility], was performed utilizing techniques to minimize radiation dose exposure, including the use of iterative reconstruction and automated exposure control  FINDINGS: VISUALIZED BRAIN:   Stable prominent posterior horns of the lateral ventricles  No acute abnormalities are seen  HEAD/NECK:   There is a physiologic distribution of the radiotracer  No PSMA avid cervical adenopathy is seen  CT images: Unremarkable  CHEST:   No PSMA avid soft tissue lesions are seen  CT images: Unremarkable  ABDOMEN:   No PSMA avid soft tissue lesions are seen  CT images: Unremarkable  PELVIS: There are multiple small subcentimeter PSMA avid nodes in the left pelvis adjacent to the left common, external and internal iliac vessels, most concerning for aj metastases  Example node posterior to the left common iliac vein, image 3/226, measures 5 mm, SUV 8 6  Additional left pelvic nodes at the level of the left acetabulum and left femoral head have an SUV of 13 7  In the deep right pelvis at the level of the right femoral head, there is a PSMA avid focus of activity, SUV 6 4  Corresponding CT images are very limited due to significant artifact from bilateral hip arthroplasty hardware, and it is uncertain if this represents distal right ureteral activity versus an additional aj metastasis  CT images: Limited evaluation due to artifact from bilateral hip arthroplasty hardware  OSSEOUS STRUCTURES: No PSMA avid lesions are seen  CT images: Mild lumbar dextroscoliosis  Bilateral hip arthroplasty  Impression: 1  Multiple PSMA avid lesions in the left pelvis, most concerning for aj metastases  2   PSMA avid focus of activity in the deep right pelvis at the level of the femoral head  Corresponding CT images are very limited due to significant artifact from bilateral hip arthroplasty hardware, and it is uncertain if this represents distal right ureteral activity versus an additional aj metastasis  3   No PSMA avid metastases visualized in the neck, chest, upper abdomen or osseous structures  Workstation performed: [id number]       Assessment/Plan:  No orders of the defined types were placed in this encounter  We reviewed the patient's MRI Pelvis which confirms pelvic aj recurrence but does not clearly demonstrate evidence of abnormal LNs in the low pelvis overlapping with the prior RT field and current area of PSMA uptake  Based on this and the pattern of PSMA uptake on prior PET imaging I feel this is most likely due to urinary excretion through the ureter  As such I believe reirradiation to the pelvis would be feasible and may offer a chance for longterm disease control  First and foremost the patient will require initiation of ADT; we will arrange for short interval follow-up with urology to start ADT therapy  Following initiation I would offer reirradiation to the pelvis with curative intent  We discussed the risks and benefits of pelvic re-RT including the potential that even with re-treatment he would have progressive disease   Side effects could include fatigue, diarrhea, urinary discomfort, myelosuppression  The patient would like to make it through the holidays before re-starting any RT  As he will be on ADT I think this is reasonable  I will plan to see him back in 1/2023 to discuss radiation  Cady Pryor  10/10/2022,8:23 AM    Portions of the record may have been created with voice recognition software   Occasional wrong word or "sound a like" substitutions may have occurred due to the inherent limitations of voice recognition software   Read the chart carefully and recognize, using context, where substitutions have occurred

## 2022-10-12 ENCOUNTER — TELEPHONE (OUTPATIENT)
Dept: NEUROLOGY | Facility: CLINIC | Age: 62
End: 2022-10-12

## 2022-10-12 NOTE — TELEPHONE ENCOUNTER
Spoke with Iris Randolph Patient's wife  Charissa Ahumada Appointment confirmed with Nona in [facility]  Address confirmed ( [address]  [address]

## 2022-10-19 ENCOUNTER — OFFICE VISIT (OUTPATIENT)
Dept: UROLOGY | Facility: CLINIC | Age: 62
End: 2022-10-19
Payer: COMMERCIAL

## 2022-10-19 ENCOUNTER — TELEPHONE (OUTPATIENT)
Dept: UROLOGY | Facility: CLINIC | Age: 62
End: 2022-10-19

## 2022-10-19 VITALS
DIASTOLIC BLOOD PRESSURE: 76 MMHG | OXYGEN SATURATION: 95 % | BODY MASS INDEX: 25.92 KG/M2 | HEART RATE: 100 BPM | SYSTOLIC BLOOD PRESSURE: 138 MMHG | HEIGHT: 69 IN | WEIGHT: 175 LBS

## 2022-10-19 DIAGNOSIS — C61 PROSTATE CANCER (HCC): Primary | ICD-10-CM

## 2022-10-19 PROCEDURE — 99214 OFFICE O/P EST MOD 30 MIN: CPT | Performed by: PHYSICIAN ASSISTANT

## 2022-10-19 NOTE — LETTER
October 19, 2022     [place] Dancer, [address] [facility]    Patient: Jared Kanner   YOB: 1960   Date of Visit: 10/19/2022       Dear Dr Jenn Julio: Thank you for referring Bambi Sanchez to me for evaluation  Below are my notes for this consultation  If you have questions, please do not hesitate to call me  I look forward to following your patient along with you  Sincerely,        Regis Chowdhury MD        CC: No Recipients  [facility] [place], PA-C  10/19/2022  3:08 PM  Attested  1  Prostate cancer [facility])  PSA Total, Diagnostic    Testosterone    Ambulatory Referral to [facility]         Assessment and plan:       1  [place] 7, intraductal, prostate cancer status post robotic prostatectomy  - pT3a  [place] 4+3=7, intraductal, extracapsular extension, negative surgical margins   -pretreatment PSA:  5 6  - RALP 7/28/21  - PSA reoccurence 0 2 (2/8/22)  - salvage radiation completed ( 5/5/22)  - PSA rising now to 1 3 (8/31/22)      Patient seen in conjunction with Dr Maksim Ferro  We will plan to start ADT for 2-3 years  Will get preauthorization for Lupron and will return next week for nurse visit for administration  Risks/side effects reviewed  We will plan for presentation at tumor board  Radiation oncology planning for possible reinitiation of radiation therapy  Will return in 3 months with PSA and testosterone prior to visit    [facility] [place]      Chief Complaint       Prostatectomy follow-up      History of Present Illness     Jared Kanner is a 60 y o  Male returns in follow-up status post recent robotic prostatectomy  PMHx early onset Alzheimer Disease with GS 4+3, pT3a prostate cancer s/p RALP in July 2021   He then presented with elevated PSA to 0 2 and was referred for salvage radiation therapy  Miki Suggs completed a course of salvage RT to the prostate bed to a dose of 6840cGy in 38 fractions on  5/5/22  He was thereafter found to have further increase in PSA with PSMA PET demonstrating evidence of pelvic LN metastases  Patient with excellent urinary control  Review of Systems     Review of Systems   Constitutional: Negative for activity change and fatigue  HENT: Negative for congestion  Eyes: Negative for visual disturbance  Respiratory: Negative for shortness of breath and wheezing  Cardiovascular: Negative for chest pain and leg swelling  Gastrointestinal: Negative for abdominal pain  Genitourinary: Negative for dysuria, flank pain, hematuria and urgency  Musculoskeletal: Negative for back pain  Allergic/Immunologic: Negative for immunocompromised state  Neurological: Negative for dizziness and numbness  Psychiatric/Behavioral: Negative for dysphoric mood  All other systems reviewed and are negative  Allergies     Allergies   Allergen Reactions   • Sulfa Antibiotics Other (See Comments)     Mother told him as child not to take       Physical Exam     Physical Exam  Constitutional:       General: He is not in acute distress  Appearance: He is well-developed  HENT:      Head: Normocephalic and atraumatic  Cardiovascular:      Comments: Negative lower extremity edema  Pulmonary:      Effort: Pulmonary effort is normal       Breath sounds: Normal breath sounds  Abdominal:      Palpations: Abdomen is soft  Genitourinary:     Comments: Negative CVA tenderness, no abdominal incisions  Musculoskeletal:         General: Normal range of motion  Cervical back: Normal range of motion  Skin:     General: Skin is warm  Neurological:      Mental Status: He is alert and oriented to person, place, and time     Psychiatric:         Behavior: Behavior normal         all surgical incisions are well healed, no palpable hernia      Vital Signs  Vitals:    10/19/22 1318   BP: 138/76   Pulse: 100   SpO2: 95%   Weight: 79 4 kg (175 lb)   Height: 5' 9" (1 753 m)         Current Medications Current Outpatient Medications:   •  ALPHAGAN P 0 15 % ophthalmic solution, , Disp: , Rfl:   •  donepezil (ARICEPT) 10 mg tablet, Take 1 tablet (10 mg total) by mouth daily, Disp: 90 tablet, Rfl: 3  •  escitalopram (LEXAPRO) 10 mg tablet, Take 1 tablet (10 mg total) by mouth daily, Disp: 90 tablet, Rfl: 1  •  latanoprost (XALATAN) 0 005 % ophthalmic solution, PLACE 1 DROP IN BOTH EYES DAILY AT BEDTIME, Disp: , Rfl:   •  LUMIGAN 0 01 % ophthalmic drops, , Disp: , Rfl:   •  memantine (NAMENDA) 10 mg tablet, Take 1 tablet (10 mg total) by mouth 2 (two) times a day, Disp: 60 tablet, Rfl: 6  •  Alphagan P 0 1 %, INSTILL 1 DROP IN BOTH EYES TWICE DAILY (Patient not taking: No sig reported), Disp: , Rfl:   •  LORazepam (ATIVAN) 1 mg tablet, Take 1 tablet 1 hour prior to MRI and then take 1 tablet 15 minutes prior to MRI  Do not drive   (Patient not taking: No sig reported), Disp: 2 tablet, Rfl: 0      Active Problems     Patient Active Problem List   Diagnosis   • Allergic rhinitis   • Glaucoma   • Prediabetes   • Sciatica   • Short-term memory loss   • Mild cognitive impairment   • Abnormal EEG   • Increased prostate specific antigen (PSA) velocity   • Right hip pain   • Lumbar spondylosis   • Primary localized osteoarthritis of right hip   • Enlarged prostate   • Screening for prostate cancer   • Bilateral leg weakness   • Left hip pain   • Breast mass in male   • Furuncle of chest wall   • Cellulitis   • Gastroesophageal reflux disease   • S/P hip replacement, bilateral   • Prostate cancer (HCC)   • Early onset Alzheimer's dementia without behavioral disturbance ([facility] [address] )         Past Medical History     Past Medical History:   Diagnosis Date   • Benign colon polyp    • Diverticulitis, colon    • Diverticulosis    • Hyperlipidemia    • Prediabetes    • Prostate cancer ([facility] [address] )    • Renal calculi    • Short-term memory loss    • Vitamin D deficiency          Surgical History     Past Surgical History:   Procedure Laterality Date   • ABDOMINAL ADHESION SURGERY N/A 2021    Procedure: LYSIS ADHESIONS, LAPAROSCOPIC;  Surgeon: Verner Squires, MD;  [facility];  Service: Urology   • COLONOSCOPY     • ESOPHAGOGASTRODUODENOSCOPY     • HERNIA REPAIR     • JOINT REPLACEMENT Right     RTH   • KNEE ARTHROSCOPY     • KNEE SURGERY     • [place] LAP,PROSTATECTOMY,RADICAL,W/NERVE SPARE,INCL ROBOTIC N/A 2021    Procedure: ROBOTIC LAPAROSCOPIC RADICAL PROSTATECTOMY;  Surgeon: Verner Squires, MD;  [facility];  Service: Urology   • PROSTATE BIOPSY     • TOTAL HIP ARTHROPLASTY Right 2019   • TOTAL HIP ARTHROPLASTY Left 2019         Family History     Family History   Problem Relation Age of Onset   • Dementia Mother    • Glaucoma Mother    • Glaucoma Father    • Prostate cancer Father    • Pulmonary embolism Father    • Hypertension Brother    • Prostate cancer Brother    • Colon cancer Maternal Grandfather          Social History     Social History     Social History     Tobacco Use   Smoking Status Former Smoker   • Types: Cigarettes   • Quit date:    • Years since quittin 8   Smokeless Tobacco Never Used   Tobacco Comment    patient states he doesnt remember how much or for how long         Pertinent Lab Values     Lab Results   Component Value Date    CREATININE 0 84 2021       Lab Results   Component Value Date    PSA 1 3 2022    PSA 0 6 2022    PSA 0 2 2022       @RESULTRCNT(1H])@      Pertinent Imaging      - n/a    Portions of the record may have been created with voice recognition software   Occasional wrong word or "sound a like" substitutions may have occurred due to the inherent limitations of voice recognition software   Read the chart carefully and recognize, using context, where substitutions have occurred  Attestation signed by Verner Squires, MD at 10/19/2022  4:37 PM:  I supervised the Advanced Practitioner  ? I performed, in its entirety, the history, exam, and assessment/plan component of the visit  I agree with the Advanced Practitioner's note with the following additions/exceptions:      I personally saw examine bryson met with his wife  Patient with prior prostatectomy with T3 disease, initially obtained a undetectable postoperative PSA  Subsequent PSA failure now having completed salvage radiation  PSA has unfortunately continued to rise  PMS a has now revealed regional aj metastatic disease, low volume, oligometastatic disease  At this point I recommended androgen deprivation therapy as the standard of care  There was some discussion as to possibility of additional radiation  It is not clear to me if the patient did receive wide field radiation in the salvage setting or if this was localized to the prostatic bed  I will follow-up with Dr Johnny Amaro in the form of a tumor board discussion  The patient understands that hormonal ablation can be achieved either surgically with orchiectomy or medically with [facility] agonists or antagonists  He understands that hormone treatment may control the cancer but will probably not cure it  We discussed the risks, benefits, possible side effects and alternatives to hormonal therapy  The possible side effects include but are not limited to weight gain (usually subcutaneous fat), osteopenia, hot flashes/temperature dysregulation, impotence, decreased libido, heart attack, insulin-resistance, fatigue, hyperlipidemia, metabolic syndrome and its attendant risks, the emergence of hormone-independent prostate cancer or the need for further treatment  He also understands there may be some association with androgen deprivation therapy and cardiovascular disease  In addition to discussing this, I gave him the opportunity to explore this further with an internist or cardiologist first before beginning therapy    We also spoke of quality of life issues, costs and the need to temporarily take an oral anti-androgen when starting [facility] therapy  His questions were answered to his satisfaction and he voiced understanding  He will return for a RN visit for Lupron injection  In addition, I have prescribed calcium and vitamin D supplements for bone health during therapy  He will return in 3 months for his next PSA and testosterone                Ru Gallardo MD 10/19/22

## 2022-10-19 NOTE — TELEPHONE ENCOUNTER
Patient needs a Lupron appointment      Return for rn visit next week for lupron, return 3 months psa and testotserone with MANJEET

## 2022-10-19 NOTE — PROGRESS NOTES
1  Prostate cancer ([facility] [address] )  PSA Total, Diagnostic    Testosterone    Ambulatory Referral to [facility]         Assessment and plan:       1  [place] 7, intraductal, prostate cancer status post robotic prostatectomy  - pT3a  [place] 4+3=7, intraductal, extracapsular extension, negative surgical margins   -pretreatment PSA:  5 6  - RALP 7/28/21  - PSA reoccurence 0 2 (2/8/22)  - salvage radiation completed ( 5/5/22)  - PSA rising now to 1 3 (8/31/22)      Patient seen in conjunction with Dr Elmer Medrano  We will plan to start ADT for 2-3 years  Will get preauthorization for Lupron and will return next week for nurse visit for administration  Risks/side effects reviewed  We will plan for presentation at tumor board  Radiation oncology planning for possible reinitiation of radiation therapy  Will return in 3 months with PSA and testosterone prior to visit    [place]      Chief Complaint       Prostatectomy follow-up      History of Present Illness     Adrian Santiago is a 60 y o  Male returns in follow-up status post recent robotic prostatectomy  PMHx early onset Alzheimer Disease with GS 4+3, pT3a prostate cancer s/p RALP in July 2021  He then presented with elevated PSA to 0 2 and was referred for salvage radiation therapy  Lilo Geller completed a course of salvage RT to the prostate bed to a dose of 6840cGy in 38 fractions on  5/5/22  He was thereafter found to have further increase in PSA with PSMA PET demonstrating evidence of pelvic LN metastases  Patient with excellent urinary control  Review of Systems     Review of Systems   Constitutional: Negative for activity change and fatigue  HENT: Negative for congestion  Eyes: Negative for visual disturbance  Respiratory: Negative for shortness of breath and wheezing  Cardiovascular: Negative for chest pain and leg swelling  Gastrointestinal: Negative for abdominal pain     Genitourinary: Negative for dysuria, flank pain, hematuria and urgency  Musculoskeletal: Negative for back pain  Allergic/Immunologic: Negative for immunocompromised state  Neurological: Negative for dizziness and numbness  Psychiatric/Behavioral: Negative for dysphoric mood  All other systems reviewed and are negative  Allergies     Allergies   Allergen Reactions   • Sulfa Antibiotics Other (See Comments)     Mother told him as child not to take       Physical Exam     Physical Exam  Constitutional:       General: He is not in acute distress  Appearance: He is well-developed  HENT:      Head: Normocephalic and atraumatic  Cardiovascular:      Comments: Negative lower extremity edema  Pulmonary:      Effort: Pulmonary effort is normal       Breath sounds: Normal breath sounds  Abdominal:      Palpations: Abdomen is soft  Genitourinary:     Comments: Negative CVA tenderness, no abdominal incisions  Musculoskeletal:         General: Normal range of motion  Cervical back: Normal range of motion  Skin:     General: Skin is warm  Neurological:      Mental Status: He is alert and oriented to person, place, and time     Psychiatric:         Behavior: Behavior normal         all surgical incisions are well healed, no palpable hernia      Vital Signs  Vitals:    10/19/22 1318   BP: 138/76   Pulse: 100   SpO2: 95%   Weight: 79 4 kg (175 lb)   Height: 5' 9" (1 753 m)         Current Medications       Current Outpatient Medications:   •  ALPHAGAN P 0 15 % ophthalmic solution, , Disp: , Rfl:   •  donepezil (ARICEPT) 10 mg tablet, Take 1 tablet (10 mg total) by mouth daily, Disp: 90 tablet, Rfl: 3  •  escitalopram (LEXAPRO) 10 mg tablet, Take 1 tablet (10 mg total) by mouth daily, Disp: 90 tablet, Rfl: 1  •  latanoprost (XALATAN) 0 005 % ophthalmic solution, PLACE 1 DROP IN BOTH EYES DAILY AT BEDTIME, Disp: , Rfl:   •  LUMIGAN 0 01 % ophthalmic drops, , Disp: , Rfl:   •  memantine (NAMENDA) 10 mg tablet, Take 1 tablet (10 mg total) by mouth 2 (two) times a day, Disp: 60 tablet, Rfl: 6  •  Alphagan P 0 1 %, INSTILL 1 DROP IN BOTH EYES TWICE DAILY (Patient not taking: No sig reported), Disp: , Rfl:   •  LORazepam (ATIVAN) 1 mg tablet, Take 1 tablet 1 hour prior to MRI and then take 1 tablet 15 minutes prior to MRI  Do not drive   (Patient not taking: No sig reported), Disp: 2 tablet, Rfl: 0      Active Problems     Patient Active Problem List   Diagnosis   • Allergic rhinitis   • Glaucoma   • Prediabetes   • Sciatica   • Short-term memory loss   • Mild cognitive impairment   • Abnormal EEG   • Increased prostate specific antigen (PSA) velocity   • Right hip pain   • Lumbar spondylosis   • Primary localized osteoarthritis of right hip   • Enlarged prostate   • Screening for prostate cancer   • Bilateral leg weakness   • Left hip pain   • Breast mass in male   • Furuncle of chest wall   • Cellulitis   • Gastroesophageal reflux disease   • S/P hip replacement, bilateral   • Prostate cancer ([facility] [address] )   • Early onset Alzheimer's dementia without behavioral disturbance ([facility] [address] )         Past Medical History     Past Medical History:   Diagnosis Date   • Benign colon polyp    • Diverticulitis, colon    • Diverticulosis    • Hyperlipidemia    • Prediabetes    • Prostate cancer ([facility] [address] )    • Renal calculi    • Short-term memory loss    • Vitamin D deficiency          Surgical History     Past Surgical History:   Procedure Laterality Date   • ABDOMINAL ADHESION SURGERY N/A 7/28/2021    Procedure: LYSIS ADHESIONS, LAPAROSCOPIC;  Surgeon: Garrett Judd MD;  [facility];  Service: Urology   • COLONOSCOPY     • ESOPHAGOGASTRODUODENOSCOPY     • HERNIA REPAIR     • JOINT REPLACEMENT Right     RTH   • KNEE ARTHROSCOPY     • KNEE SURGERY     • [place] LAP,PROSTATECTOMY,RADICAL,W/NERVE SPARE,INCL ROBOTIC N/A 7/28/2021    Procedure: ROBOTIC LAPAROSCOPIC RADICAL PROSTATECTOMY;  Surgeon: Garrett Judd MD;  [facility];  Service: Urology   • PROSTATE BIOPSY     • TOTAL HIP ARTHROPLASTY Right 2019   • TOTAL HIP ARTHROPLASTY Left 2019         Family History     Family History   Problem Relation Age of Onset   • Dementia Mother    • Glaucoma Mother    • Glaucoma Father    • Prostate cancer Father    • Pulmonary embolism Father    • Hypertension Brother    • Prostate cancer Brother    • Colon cancer Maternal Grandfather          Social History     Social History     Social History     Tobacco Use   Smoking Status Former Smoker   • Types: Cigarettes   • Quit date:    • Years since quittin 8   Smokeless Tobacco Never Used   Tobacco Comment    patient states he doesnt remember how much or for how long         Pertinent Lab Values     Lab Results   Component Value Date    CREATININE 0 84 2021       Lab Results   Component Value Date    PSA 1 3 2022    PSA 0 6 2022    PSA 0 2 2022       @RESULTRCNT(1H])@      Pertinent Imaging      - n/a    Portions of the record may have been created with voice recognition software   Occasional wrong word or "sound a like" substitutions may have occurred due to the inherent limitations of voice recognition software   Read the chart carefully and recognize, using context, where substitutions have occurred

## 2022-10-24 ENCOUNTER — PATIENT OUTREACH (OUTPATIENT)
Dept: HEMATOLOGY ONCOLOGY | Facility: CLINIC | Age: 62
End: 2022-10-24

## 2022-10-24 NOTE — PROGRESS NOTES
Receved staff message request from Jessica Guadalupe PA-C, for pt to be added to next  working Group, Dr Bobby Ashton will be presenting    Chart reviewed and documentation added to agenda

## 2022-10-25 ENCOUNTER — OFFICE VISIT (OUTPATIENT)
Dept: NEUROLOGY | Facility: CLINIC | Age: 62
End: 2022-10-25
Payer: COMMERCIAL

## 2022-10-25 VITALS
HEIGHT: 69 IN | RESPIRATION RATE: 18 BRPM | WEIGHT: 177.6 LBS | HEART RATE: 71 BPM | OXYGEN SATURATION: 97 % | TEMPERATURE: 96.6 F | BODY MASS INDEX: 26.31 KG/M2 | SYSTOLIC BLOOD PRESSURE: 128 MMHG | DIASTOLIC BLOOD PRESSURE: 72 MMHG

## 2022-10-25 DIAGNOSIS — F02.80 EARLY ONSET ALZHEIMER'S DEMENTIA WITHOUT BEHAVIORAL DISTURBANCE (HCC): Primary | ICD-10-CM

## 2022-10-25 DIAGNOSIS — G30.0 EARLY ONSET ALZHEIMER'S DEMENTIA WITHOUT BEHAVIORAL DISTURBANCE (HCC): Primary | ICD-10-CM

## 2022-10-25 PROCEDURE — 99215 OFFICE O/P EST HI 40 MIN: CPT

## 2022-10-25 RX ORDER — ESCITALOPRAM OXALATE 10 MG/1
10 TABLET ORAL DAILY
Qty: 90 TABLET | Refills: 1 | Status: SHIPPED | OUTPATIENT
Start: 2022-10-25

## 2022-10-25 RX ORDER — MEMANTINE HYDROCHLORIDE 10 MG/1
10 TABLET ORAL 2 TIMES DAILY
Qty: 180 TABLET | Refills: 1 | Status: SHIPPED | OUTPATIENT
Start: 2022-10-25

## 2022-10-25 RX ORDER — DONEPEZIL HYDROCHLORIDE 10 MG/1
10 TABLET, FILM COATED ORAL DAILY
Qty: 90 TABLET | Refills: 1 | Status: SHIPPED | OUTPATIENT
Start: 2022-10-25

## 2022-10-25 NOTE — PATIENT INSTRUCTIONS
Continue Donepezil 10 mg daily and Namenda 10 mg twice daily and Lexapro 10 mg daily    Patient was encouraged to increase mind stimulating activities such as reading, crosswords, word searches, puzzles, Soduku, solitaire, coloring and other brain games  We also discussed the importance of staying physically active and eating a health diet such as the Mediterranean or MIND diet  Discussed air tags, walkie talkie, lanyard/tags as safety precautions while walking dogs or running    Consider referral in the future to psychology/talk therapy or psychiatry    Discussed some basic Do’s when it comes to communication with someone with dementia:  Give short, one sentence explanations  Allow plenty of time for comprehension, and then triple it  Repeat instructions or sentences exactly the same way  Avoid insistence  Try again later  Agree with them or distract them to a different subject or activity  Accept the blame when something’s wrong (even if it’s fantasy)  Leave the room, if necessary, to avoid confrontations  Respond to the feelings rather than the words  Be patient and cheerful and reassuring  Do go with the flow  Practice 100% forgiveness  Memory loss progresses daily  Here are some Don’ts:  Don’t reason  Don’t argue  Don’t confront  Don’t remind them they forget  Don’t question recent memory  Don’t take it personally  Things that we know are helpful for thinking and memory   Exercise program: gradually increase your physical activity over time  Start small and be patient  Aerobic (cardio) activity is best but incorporate balance, strength and flexibility training as well  Try to get at least 30min 3 times per week   Diet: Mediterranean diet (colorful fruits and vegetables, olive oil, fish, whole grains, very little red meet is any), MIND diet, anti-inflammatory diet     Stay well hydrated: drink 6-8 glasses of water per day   What's good for your heart is good for your brain  Avoid high-salt foods   Sleep: aim for at least 7-8 hours per night   Avoid alcohol and medications like Benadryl (Tylenol PM) or other sedating drugs  Stress management/mindfulness practice:   Talk with our social workers about finding a cognitive behavioral therapists  Try a smart phone terrance like “Lengow” or “mindfulness for beginners”   Try “curable” for pain    Take a course in Mindfulness Based Stress Reduction (MBSR)   Helen manzano  Read or listen to an audiobook about it:  Mindfulness for beginners   10% happier  The happiness advantage   Social engagement:   Stay in touch with family and friends   Plan a few specific activities for your social health every week   Join a local support group   Volunteer! www [email or web address] or call [phone]     MIND diet score:  1 point for each component      Green leafy vegetables: at least 6 per week  Other vegetable: at least 1 per day   Berries: at least 2 per week  Red meat: fewer than 4 per week   Fish: at least 1 per week   Poultry: at least 2 per week  Beans: at least 3 per week  Nuts: at least 5 per week  Fast or fried food: less than 1 per week  Olive oil   Butter less: less than 1 table-spoon per day   Cheese: less than 1 serving per week   Pastries/sweets: less than 5 servings per week  Alcohol: no more than 1 serving/ day

## 2022-10-25 NOTE — PROGRESS NOTES
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  Since his last visit he has been stable with both good and bad days  He is remaining physically active, participating in reading and remains independent of all ADLs  His mood can be liable  Today he continues to decline to participate in MoCA testing or memory questioning  He denies any neurologic complaints today  Recommendations and plan discussed with both the patient and his spouse as outlined below:    -Continue Donepezil 10 mg daily and Namenda 10 mg twice daily and Lexapro 10 mg daily    -Patient was encouraged to increase mind stimulating activities such as reading, crosswords, word searches, puzzles, Soduku, solitaire, coloring and other brain games  We also discussed the importance of staying physically active and eating a health diet such as the [address] or MIND diet       -Discussed air tags, walkie talkie, lanyard/tags as safety precautions while walking dogs or running    -Consider referral in the future to psychology/talk therapy or psychiatry    -Discussed some basic Do’s when it comes to communication with someone with dementia:  · Give short, one sentence explanations  · Allow plenty of time for comprehension, and then triple it  · Repeat instructions or sentences exactly the same way  · Avoid insistence  Try again later  · Agree with them or distract them to a different subject or activity    · Accept the blame when something’s wrong (even if it’s fantasy)  · Leave the room, if necessary, to avoid confrontations  · Respond to the feelings rather than the words  · Be patient and cheerful and reassuring  Do go with the flow  · Practice 100% forgiveness  Memory loss progresses daily     -Here are some Don’ts:  · Don’t reason  · Don’t argue  · Don’t confront  · Don’t remind them they forget  · Don’t question recent memory  · Don’t take it personally     -Things that we know are helpful for thinking and memory   1  Exercise program: gradually increase your physical activity over time  Start small and be patient  Aerobic (cardio) activity is best but incorporate balance, strength and flexibility training as well    a  Try to get at least 30min 3 times per week   2  Diet: Mediterranean diet (colorful fruits and vegetables, olive oil, fish, whole grains, very little red meet is any), MIND diet, anti-inflammatory diet  a  Stay well hydrated: drink 6-8 glasses of water per day   b  What's good for your heart is good for your brain  c  Avoid high-salt foods   3  Sleep: aim for at least 7-8 hours per night   a  Avoid alcohol and medications like Benadryl (Tylenol PM) or other sedating drugs  4  Stress management/mindfulness practice:   a  Talk with our social workers about finding a cognitive behavioral therapists  b  Try a smart phone terrance like “MD Lingo” or “mindfulness for beginners”   i  Try “curable” for pain    c  Take a course in Mindfulness Based Stress Reduction (MBSR)   i  Helen valentino  Read or listen to an audiobook about it:  i  Mindfulness for beginners   ii  10% happier  iii  The happiness advantage   5  Social engagement:   a  Stay in touch with family and friends   b  Plan a few specific activities for your social health every week   c  Mark Mayorga a local support group   d  Volunteer! www [email or web address] or call [phone]     MIND diet score:  1 point for each component      · Green leafy vegetables: at least 6 per week  · Other vegetable: at least 1 per day   · Berries: at least 2 per week  · Red meat: fewer than 4 per week   · Fish: at least 1 per week   · Poultry: at least 2 per week  · Beans: at least 3 per week  · Nuts: at least 5 per week  · Fast or fried food: less than 1 per week  · Olive oil   · Butter less: less than 1 table-spoon per day   · Cheese: less than 1 serving per week   · Pastries/sweets: less than 5 servings per week  · Alcohol: no more than 1 serving/ day        Diagnoses and all orders for this visit:    Early onset Alzheimer's dementia without behavioral disturbance (HCC)  -     donepezil (ARICEPT) 10 mg tablet; Take 1 tablet (10 mg total) by mouth daily  -     memantine (NAMENDA) 10 mg tablet; Take 1 tablet (10 mg total) by mouth 2 (two) times a day  -     escitalopram (LEXAPRO) 10 mg tablet; Take 1 tablet (10 mg total) by mouth daily       I have spent a total of 50 minutes in face to face time and chart review with this patient and his spouse today  Subjective:    ALEAH Susan Cameron is a 60year old male known to the practice for cognitive dysfunction felt to be secondary to early onset of Alzheimer's disease beginning almost 4 years ago  John Sierra is accompanied by his wife Dread Ellis today  He was last seen 6 months ago 4/25/22  He is maintained on Namenda 10 mg twice daily and Aricept 10 mg daily, as well as Lexapro 10 mg daily  In the past he has completed a MRI 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:     1  No significant white matter signal abnormality  2  NeuroQuant analysis was performed: Low hippocampal volume and enlarged inferior lateral and overall ventricular system  There are abnormal hippocampal occupancy score and small entorhinal cortex  There has been about 22% hippocampal volume reduction compared to 3/8/2018, which is greater than expected volume loss for this time interval suggesting mesial temporal lobe focused neurodegeneration     Unfortunately, he also has a hx of prostate cancer in the past that has recently reoccurred and is now showing evidence of pelvic metastases  He is undergoing treatment for this as well at this time  Since his last visit he has been stable with both good and bad days  He remains independent with all ADLs without reminders  His wife does the majority of the cooking, but he always takes care of the clean up I e dishes, vacuuming etc  He is not working or driving, but is doing more reading at home  He does not care for many other mind stimulating activities such as puzzles  He also tells me he is trying to be more physically active and is getting back into running (only around their yard and neighborhood)  He takes the dogs on walks and has never gotten lost  He is sleeping adequately and has a good appetite  His mood is very liable but he and his wife deny aggression or agitation  He becomes very easily frustrated if he cannot recall something, or is questioned about his memory  His wife notes they are having more arguments about little things, usually triggered by her correcting something he said, or reminding him to complete a task  He doesn't like to be "bossed around"  He denies any hallucinations  Today he continues to decline to participate in MoCA testing  He was contacted to schedule neuropsychological testing in the past, however he has declined to participate in this      He denies any neurologic complaints today  The following portions of the patient's history were reviewed and updated as appropriate: allergies, current medications, past family history, past medical history, past social history and past surgical history  Objective:    Blood pressure 128/72, pulse 71, temperature (!) 96 6 °F (35 9 °C), temperature source Tympanic, resp  rate 18, height 5' 9" (1 753 m), weight 80 6 kg (177 lb 9 6 oz), SpO2 97 %      Neurological Exam    On neurological examination patient is alert, awake, oriented and in no distress  Speech is fluent without dysarthria or aphasia  He attempted to complete a [place] Cognitive Assessment today however became extremely frustrated with the first activity and then declined to complete the rest  He also declines to participate in any of my brief memory questioning in office and states "I'm done"  He did allow me to complete his physical exam  Cranial nerves 2-12 were symmetrically intact bilaterally  No evidence of any focal weakness or sensory loss in the upper or lower extremities  Motor testing reveals 5/5 strength of the bilateral upper and lower extremities  There was no pronator drift  No fasciculations present  No abnormal involuntary movements  Finger- to-nose reveals no tremor or ataxia and intact proprioceptive function, no dysmetria was noted  Sensation was intact to vibration, light touch, and temperature in bilateral upper and lower extremities  Deep tendon reflexes were 2+ and symmetric in the bilateral upper and lower extremities  He is able to rise easily without assistance from a seated position  Casual gait is normal including stance, stride, and arm swing  ROS:    Review of Systems   Constitutional: Negative  Negative for appetite change and fever  HENT: Negative  Negative for hearing loss, tinnitus, trouble swallowing and voice change  Eyes: Negative  Negative for photophobia, pain and visual disturbance  Respiratory: Negative  Negative for shortness of breath  Cardiovascular: Negative  Negative for palpitations  Gastrointestinal: Negative  Negative for nausea and vomiting  Endocrine: Negative  Negative for cold intolerance  Genitourinary: Negative  Negative for dysuria, frequency and urgency  Musculoskeletal: Negative  Negative for gait problem, myalgias and neck pain  Skin: Negative  Negative for rash  Allergic/Immunologic: Negative  Neurological: Negative    Negative for dizziness, tremors, seizures, syncope, facial asymmetry, speech difficulty, weakness, light-headedness, numbness and headaches  Hematological: Negative  Does not bruise/bleed easily  Psychiatric/Behavioral: Positive for confusion  Negative for hallucinations and sleep disturbance  Memory issues     Reviewed ROS as entered by medical assistant

## 2022-10-26 NOTE — ASSESSMENT & PLAN NOTE
Jaycee Singh is a 60year old male known to the practice for cognitive dysfunction felt to be secondary to early onset of Alzheimer's disease beginning almost 4 years ago  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  Since his last visit he has been stable with both good and bad days  He is remaining physically active, participating in reading and remains independent of all ADLs  His mood can be liable  Today he continues to decline to participate in MoCA testing or memory questioning  He denies any neurologic complaints today  Recommendations and plan discussed with both the patient and his spouse as outlined below:    -Continue Donepezil 10 mg daily and Namenda 10 mg twice daily and Lexapro 10 mg daily    -Patient was encouraged to increase mind stimulating activities such as reading, crosswords, word searches, puzzles, Soduku, solitaire, coloring and other brain games  We also discussed the importance of staying physically active and eating a health diet such as the [address] or MIND diet       -Discussed air tags, walkie talkie, lanyard/tags as safety precautions while walking dogs or running    -Consider referral in the future to psychology/talk therapy or psychiatry    -Discussed some basic Do’s when it comes to communication with someone with dementia:  · Give short, one sentence explanations  · Allow plenty of time for comprehension, and then triple it  · Repeat instructions or sentences exactly the same way  · Avoid insistence  Try again later  · Agree with them or distract them to a different subject or activity  · Accept the blame when something’s wrong (even if it’s fantasy)  · Leave the room, if necessary, to avoid confrontations  · Respond to the feelings rather than the words    · Be patient and cheerful and reassuring  Do go with the flow  · Practice 100% forgiveness  Memory loss progresses daily     -Here are some Don’ts:  · Don’t reason  · Don’t argue  · Don’t confront  · Don’t remind them they forget  · Don’t question recent memory  · Don’t take it personally     -Things that we know are helpful for thinking and memory   1  Exercise program: gradually increase your physical activity over time  Start small and be patient  Aerobic (cardio) activity is best but incorporate balance, strength and flexibility training as well    a  Try to get at least 30min 3 times per week   2  Diet: Mediterranean diet (colorful fruits and vegetables, olive oil, fish, whole grains, very little red meet is any), MIND diet, anti-inflammatory diet  a  Stay well hydrated: drink 6-8 glasses of water per day   b  What's good for your heart is good for your brain  c  Avoid high-salt foods   3  Sleep: aim for at least 7-8 hours per night   a  Avoid alcohol and medications like Benadryl (Tylenol PM) or other sedating drugs  4  Stress management/mindfulness practice:   a  Talk with our social workers about finding a cognitive behavioral therapists  b  Try a smart phone terrance like “[employer]” or “mindfulness for beginners”   i  Try “curable” for pain    c  Take a course in Mindfulness Based Stress Reduction (MBSR)   i  Helen valentino  Read or listen to an audiobook about it:  i  Mindfulness for beginners   ii  10% happier  iii  The happiness advantage   5  Social engagement:   a  Stay in touch with family and friends   b  Plan a few specific activities for your social health every week   orly darling local support group   d  Volunteer! www [email or web address] or call [phone]     MIND diet score:  1 point for each component      · Green leafy vegetables: at least 6 per week  · Other vegetable: at least 1 per day   · Berries: at least 2 per week  · Red meat: fewer than 4 per week   · Fish: at least 1 per week · Poultry: at least 2 per week  · Beans: at least 3 per week  · Nuts: at least 5 per week  · Fast or fried food: less than 1 per week  · Olive oil   · Butter less: less than 1 table-spoon per day   · Cheese: less than 1 serving per week   · Pastries/sweets: less than 5 servings per week  · Alcohol: no more than 1 serving/ day

## 2022-10-27 ENCOUNTER — PROCEDURE VISIT (OUTPATIENT)
Dept: UROLOGY | Facility: CLINIC | Age: 62
End: 2022-10-27
Payer: COMMERCIAL

## 2022-10-27 VITALS
DIASTOLIC BLOOD PRESSURE: 90 MMHG | BODY MASS INDEX: 26.22 KG/M2 | SYSTOLIC BLOOD PRESSURE: 140 MMHG | HEIGHT: 69 IN | WEIGHT: 177 LBS

## 2022-10-27 DIAGNOSIS — C61 PROSTATE CANCER (HCC): Primary | ICD-10-CM

## 2022-10-27 PROCEDURE — 96402 CHEMO HORMON ANTINEOPL SQ/IM: CPT

## 2022-10-27 NOTE — PROGRESS NOTES
10/27/2022  Iris Wing is a 60 y o  male  [id number]    Diagnosis:  Chief Complaint     Prostate Cancer          Patient presents for Lupron injection managed by Dr Vincent Salamanca:  Return in January 2023 as scheduled      Medication administration:    Spoke with patient and reviewed action of Lupron and what side effects he can expect or experience  Patient also provided with prostate cancer book along with Lupron pamphlet to refer to  Answered all questions patient had prior to administering injection  Lupron administered to R ventrogluteal IM without incident        Administrations This Visit     leuprolide (LUPRON DEPOT 6 MONTH KIT) IM injection kit 45 mg     Admin Date  10/27/2022 Action  Given Dose  45 mg Route  Intramuscular Administered By  Liberty Kelsey RN                Vitals:    10/27/22 1008   BP: 140/90   BP Location: Left arm   Weight: 80 3 kg (177 lb)   Height: 5' 9" (1 753 m)         Rufino Solis

## 2022-10-31 ENCOUNTER — TELEPHONE (OUTPATIENT)
Dept: NEUROLOGY | Facility: CLINIC | Age: 62
End: 2022-10-31

## 2022-10-31 NOTE — TELEPHONE ENCOUNTER
Completed, signed, faxed and [place] at the  [place] [facility] into [place]

## 2022-10-31 NOTE — TELEPHONE ENCOUNTER
Fax received from [address] [place] [address] requesting form to be completed for patients disability  Scanned into media and forwarded to MA

## 2022-11-01 ENCOUNTER — DOCUMENTATION (OUTPATIENT)
Dept: HEMATOLOGY ONCOLOGY | Facility: CLINIC | Age: 62
End: 2022-11-01

## 2022-11-01 NOTE — PROGRESS NOTES
Oncology Navigator Note: Multidisciplinary Urology Case Review 11/1/2022        Diagnosis: Susan Campbell is a 60 y o  male who was presented at the Urology Oncology Multidisciplinary Tumor Conference today  Soheila 7, intraductal, prostate cancer status post robotic prostatectomy 7/28/2021, pT3a  Soheila 4+3=7, intraductal, extracapsular extension, negative surgical margins   Pretreatment PSA:  5 6  PSA reoccurence 0 2 (2/8/22)- salvage radiation completed ( 5/5/22)   PSA rising now to 1 3 (8/31/22)  PSMA Pet 9/15/22 showed multiple PSMA avid lesions in the left pelvis  Review by Dr Meche Gaytan and activity seen on PSMA in deep right pelvis is unlikely metastatic      Recommendations of Group: Long-term ADT (initiated 10/27/22), Pelvic radiation to left pelvis to start after the holidays      Upcoming Appointments:    Future Appointments   Date Time Provider Carlos Enrique Ortega   1/12/2023  3:00 PM AN [address] [facility]   1/12/2023  3:30 PM Michelle Ireland MD AN Rad Onc [facility]   1/19/2023 11:30 AM Omar Burroughs MD URO [facility]   4/26/2023  9:15 AM [address] HERBERT Krishnan NEURO [facility]            Patient was discussed at the Multidisciplinary Urology Case review on 11/1/2022      NCCN guidelines were available for review  The final treatment plan will be left to the discretion of the patient and the treating physician  DISCLAIMERS:  TO THE TREATING PHYSICIAN:  This conference is a meeting of clinicians from various specialty areas who evaluate and discuss patients for whom a multidisciplinary treatment approach is being considered  Please note that the above opinion was a consensus of the conference attendees and is intended only to assist in quality care of the discussed patient    The responsibility for follow up on the input given during the conference, along with any final decisions regarding plan of care, is that of the patient and the patient's provider  Accordingly, appointments have only been recommended based on this information and have NOT been scheduled unless otherwise noted  TO THE PATIENT:  This summary is a brief record of major aspects of your cancer treatment  You may choose to share a copy with any of your doctors or nurses  However, this is not a detailed or comprehensive record of your care

## 2022-12-08 ENCOUNTER — APPOINTMENT (OUTPATIENT)
Dept: RADIATION ONCOLOGY | Facility: HOSPITAL | Age: 62
End: 2022-12-08
Attending: STUDENT IN AN ORGANIZED HEALTH CARE EDUCATION/TRAINING PROGRAM

## 2022-12-13 ENCOUNTER — IOP CHECK (OUTPATIENT)
Dept: URBAN - METROPOLITAN AREA CLINIC 6 | Facility: CLINIC | Age: 62
End: 2022-12-13

## 2022-12-13 DIAGNOSIS — H25.813: ICD-10-CM

## 2022-12-13 DIAGNOSIS — H40.1131: ICD-10-CM

## 2022-12-13 PROCEDURE — 92012 INTRM OPH EXAM EST PATIENT: CPT

## 2022-12-13 PROCEDURE — 92083 EXTENDED VISUAL FIELD XM: CPT

## 2022-12-13 ASSESSMENT — TONOMETRY
OD_IOP_MMHG: 26
OS_IOP_MMHG: 24

## 2022-12-13 ASSESSMENT — VISUAL ACUITY
OS_CC: 20/30
OD_CC: 20/40

## 2023-01-12 ENCOUNTER — RADIATION ONCOLOGY FOLLOW-UP (OUTPATIENT)
Dept: RADIATION ONCOLOGY | Facility: HOSPITAL | Age: 63
End: 2023-01-12
Attending: STUDENT IN AN ORGANIZED HEALTH CARE EDUCATION/TRAINING PROGRAM

## 2023-01-12 VITALS
SYSTOLIC BLOOD PRESSURE: 128 MMHG | HEART RATE: 81 BPM | WEIGHT: 176.6 LBS | OXYGEN SATURATION: 97 % | BODY MASS INDEX: 26.08 KG/M2 | TEMPERATURE: 97 F | RESPIRATION RATE: 18 BRPM | DIASTOLIC BLOOD PRESSURE: 74 MMHG

## 2023-01-12 DIAGNOSIS — C61 PROSTATE CANCER (HCC): Primary | ICD-10-CM

## 2023-01-12 NOTE — PROGRESS NOTES
Tami Bound 1960 is a 60 y o  male with PMHx early onset Alzheimer Disease with GS 4+3, pT3a prostate cancer s/p RALP in 2021  He then presented with elevated PSA to 0 2 and was referred for salvage radiation therapy  He completed a course of salvage RT to a dose of 6840cGy in 38 fractions on  22  He was last seen on 10/07/22  He is seen today in follow up        Lab Results   Component Value Date    PSA 1 3 2022    PSA 0 6 2022    PSA 0 2 2022        10/19/22 - Urology, Samm  Pt to start on ADT for 2-3 years  Pt will follow up with PSA      10/27/22 - pt recv'd Lupron injection      22 Multidisciplinary Urology Case Review  Recommendations of Group: Long-term ADT (initiated 10/27/22), Pelvic radiation to left pelvis to start after the holidays      Upcomin23 - UrologyMatilda Gave          Oncology History   Prostate cancer [facility])   2021 Initial Diagnosis    Prostate cancer ([facility] [address] )     2021 Biopsy    [facility] REFERENCE LABORATORIES  DIAGNOSIS     Prostate (biopsy, [id number], 2021):    - Histologic Type: Adenocarcinoma (conventional, NOS)    - Soheila Score, Dominant Nodule: 4+3=7 Grade group 3: 70% pattern 4 (Tertiary Pattern 5)    - Tumor Extent: Tumor dimension (max): 14mm    - Location, Dominant Nodule: Left, posterolateral, posterior    - Local Extent (8th Edition AJCC): Extraprostatic extension                                              Location and extent of extraprostatic extension: Left, posterolateral, base, nonfocal    - Margins (as per the report): Negative    - Seminal Vesicle Invasion (as per the report): None    - Lymphatic (small vessel Invasion (as per report): Absent    - Extent of Invasion (8th Edition AJCC) Primary Tumor: pT3: Extraprostatic extension or microscopic bladder neck invasion    - Regional Lymph Nodes: pNx: Cannot be assessed    - Summary Margins: Negative    - Additional Findings, Uninvolved Prostate: High-grade prostatic intraepithelial neoplasia (PIN)                                               Prostatic intraductal adenocarcinoma  Prosper Mcgraw MD    Addendum   At the request of Dr Sunshine Mccormick, unstained slides from paraffin BLOCK A25 containing the patient's cancer cells were sent to [address] [place] [address]  for  Prolaris  testing  Upon completion of testing, the [address] Laboratory report will be directly sent to the requesting physician as well as posted in the Media Tab of the patient's [employer] EMR by the [facility] Pathology Department  3/14/2022 - 5/5/2022 Radiation      Treatments:  Course: C1    Plan ID Energy Fractions Dose per Fraction (cGy) Dose Correction (cGy) Total Dose Delivered (cGy) Elapsed Days   Prostate Bed 10X 38 / 38 180 0 6,840 52      Treatment Dates:  3/14/2022 - 5/5/2022  Review of Systems:  Review of Systems   Constitutional: Positive for diaphoresis (sweats often since lupron)  HENT: Negative  Eyes: Negative  Respiratory: Negative  Cardiovascular: Negative  Gastrointestinal: Negative for diarrhea  Endocrine: Positive for heat intolerance (hot flashes at times)  Genitourinary: Positive for frequency (nocturia x1-2)  Musculoskeletal: Positive for back pain (occasional )  Skin: Negative  Allergic/Immunologic: Negative  Neurological: Negative  Hematological: Negative  Psychiatric/Behavioral: Positive for confusion (hx of alzheimers)  Clinical Trial: no    IPSS Questionnaire (AUA-7): Over the past month…    1)  How often have you had a sensation of not emptying your bladder completely after you finish urinating? 0 - Not at all   2)  How often have you had to urinate again less than two hours after you finished urinating? 3 - About half the time   3)  How often have you found you stopped and started again several times when you urinated?   0 - Not at all   4) How difficult have you found it to postpone urination? 0 - Not at all   5) How often have you had a weak urinary stream?  0 - Not at all   6) How often have you had to push or strain to begin urination? 0 - Not at all   7) How many times did you most typically get up to urinate from the time you went to bed until the time you got up in the morning?   2 - 2 times   Total Score:  5         Health Maintenance   Topic Date Due   • Pneumococcal Vaccine: Pediatrics (0 to 5 Years) and At-Risk Patients (6 to 59 Years) (1 - PCV) Never done   • BMI: Followup Plan  10/16/2021   • Annual Physical  10/16/2021   • COVID-19 Vaccine (3 - Moderna risk series) 01/19/2022   • OT PLAN OF CARE  03/12/2022   • Influenza Vaccine (1) Never done   • BMI: Adult  10/27/2023   • Depression Screening  01/12/2024   • Colorectal Cancer Screening  06/24/2026   • DTaP,Tdap,and Td Vaccines (3 - Td or Tdap) 12/15/2030   • HIV Screening  Completed   • Hepatitis C Screening  Completed   • HIB Vaccine  Aged Out   • IPV Vaccine  Aged Out   • Hepatitis A Vaccine  Aged Out   • Meningococcal ACWY Vaccine  Aged Out   • HPV Vaccine  Aged Out     Patient Active Problem List   Diagnosis   • Allergic rhinitis   • Glaucoma   • Prediabetes   • Sciatica   • Short-term memory loss   • Mild cognitive impairment   • Abnormal EEG   • Increased prostate specific antigen (PSA) velocity   • Right hip pain   • Lumbar spondylosis   • Primary localized osteoarthritis of right hip   • Enlarged prostate   • Screening for prostate cancer   • Bilateral leg weakness   • Left hip pain   • Breast mass in male   • Furuncle of chest wall   • Cellulitis   • Gastroesophageal reflux disease   • S/P hip replacement, bilateral   • Prostate cancer ([facility] [address] )   • Early onset Alzheimer's dementia without behavioral disturbance ([facility] [address] )     Past Medical History:   Diagnosis Date   • Benign colon polyp    • Diverticulitis, colon    • Diverticulosis    • Hyperlipidemia    • Prediabetes    • Prostate cancer [facility])    • Renal calculi    • Short-term memory loss    • Vitamin D deficiency      Past Surgical History:   Procedure Laterality Date   • ABDOMINAL ADHESION SURGERY N/A 2021    Procedure: LYSIS ADHESIONS, LAPAROSCOPIC;  Surgeon: Kyle Zafar MD;  [facility];  Service: Urology   • COLONOSCOPY     • ESOPHAGOGASTRODUODENOSCOPY     • HERNIA REPAIR     • JOINT REPLACEMENT Right     RTH   • KNEE ARTHROSCOPY     • KNEE SURGERY     • AL LAPS SURG [id number] RPBIC RAD W/NRV SPARING ROBOT N/A 2021    Procedure: ROBOTIC LAPAROSCOPIC RADICAL PROSTATECTOMY;  Surgeon: Kyle Zafar MD;  [facility];  Service: Urology   • PROSTATE BIOPSY     • TOTAL HIP ARTHROPLASTY Right 2019   • TOTAL HIP ARTHROPLASTY Left 2019     Family History   Problem Relation Age of Onset   • Dementia Mother    • Glaucoma Mother    • Glaucoma Father    • Prostate cancer Father    • Pulmonary embolism Father    • Hypertension Brother    • Prostate cancer Brother    • Colon cancer Maternal Grandfather      Social History     Socioeconomic History   • Marital status: /Civil Union     Spouse name: Not on file   • Number of children: Not on file   • Years of education: Not on file   • Highest education level: Not on file   Occupational History   • Occupation:    Tobacco Use   • Smoking status: Former     Types: Cigarettes     Quit date:      Years since quittin 0   • Smokeless tobacco: Never   • Tobacco comments:     patient states he doesnt remember how much or for how long   Vaping Use   • Vaping Use: Never used   Substance and Sexual Activity   • Alcohol use: Yes     Comment: occasional beer and wine   • Drug use: Not Currently     Comment: years ago   • Sexual activity: Not Currently   Other Topics Concern   • Not on file   Social History Narrative    Daily caffeine consumption- coffee, 3-4 cups    Education level- some college     Social Determinants of Health     Financial Resource Strain: Not on file   Food Insecurity: Not on file   Transportation Needs: Not on file   Physical Activity: Not on file   Stress: Not on file   Social Connections: Not on file   Intimate Partner Violence: Not on file   Housing Stability: Not on file       Current Outpatient Medications:   •  Alphagan P 0 1 %, INSTILL 1 DROP IN BOTH EYES TWICE DAILY, Disp: , Rfl:   •  donepezil (ARICEPT) 10 mg tablet, Take 1 tablet (10 mg total) by mouth daily, Disp: 90 tablet, Rfl: 1  •  escitalopram (LEXAPRO) 10 mg tablet, Take 1 tablet (10 mg total) by mouth daily, Disp: 90 tablet, Rfl: 1  •  LUMIGAN 0 01 % ophthalmic drops, , Disp: , Rfl:   •  memantine (NAMENDA) 10 mg tablet, Take 1 tablet (10 mg total) by mouth 2 (two) times a day, Disp: 180 tablet, Rfl: 1  Allergies   Allergen Reactions   • Sulfa Antibiotics Other (See Comments)     Mother told him as child not to take     Vitals:    01/12/23 1514   BP: 128/74   BP Location: Left arm   Pulse: 81   Resp: 18   Temp: (!) 97 °F (36 1 °C)   TempSrc: Temporal   SpO2: 97%   Weight: 80 1 kg (176 lb 9 6 oz)

## 2023-01-13 NOTE — PROGRESS NOTES
Follow-up - Radiation Oncology   Indio Villagomez 1960 60 y o  male [id number]      History of Present Illness   Cancer Staging   No matching staging information was found for the patient  Mr Nilson Montes is a 60year old with PMHx early onset Alzheimer Disease with GS 4+3, pT3a prostate cancer s/p RALP in July 2021  He then presented with elevated PSA to 0 2 and was referred for salvage radiation therapy  Teofilo Mcgovern completed a course of salvage RT to the prostate bed to a dose of 6840cGy in 38 fractions on  5/5/22  He was thereafter found to have further increase in PSA with PSMA PET demonstrating evidence of pelvic LN metastases  He returns today in follow-up       Interval History:   The patient was last seen on 10/7/22, at that time with plan to initiate ADT and return after the holidays for reconsideration of pelvic RT  He was started on ADT (10/27/22)  Currently he is feeling well overall and remains symptomatically stable from his last visit  He has good urinary control, no diarrhea or loose stool  AUA is 1         Historical Information   Oncology History   Prostate cancer ([facility] )   7/28/2021 Initial Diagnosis    Prostate cancer ([facility] )     7/28/2021 Biopsy    [facility] REFERENCE LABORATORIES  DIAGNOSIS     Prostate (biopsy, [id number], 7/28/2021):    - Histologic Type: Adenocarcinoma (conventional, NOS)    - Soheila Score, Dominant Nodule: 4+3=7 Grade group 3: 70% pattern 4 (Tertiary Pattern 5)    - Tumor Extent: Tumor dimension (max): 14mm    - Location, Dominant Nodule: Left, posterolateral, posterior    - Local Extent (8th Edition AJCC): Extraprostatic extension                                              Location and extent of extraprostatic extension: Left, posterolateral, base, nonfocal    - Margins (as per the report): Negative    - Seminal Vesicle Invasion (as per the report): None    - Lymphatic (small vessel Invasion (as per report): Absent    - Extent of Invasion (8th Edition AJCC) Primary Tumor: pT3: Extraprostatic extension or microscopic bladder neck invasion    - Regional Lymph Nodes: pNx: Cannot be assessed    - Summary Margins: Negative    - Additional Findings, Uninvolved Prostate:                                                High-grade prostatic intraepithelial neoplasia (PIN)                                               Prostatic intraductal adenocarcinoma  Fabiola Payne MD    Addendum   At the request of Dr Jacinto Wall, unstained slides from paraffin BLOCK A25 containing the patient's cancer cells were sent to [address] [place] [address]  for  [facility]  testing  Upon completion of testing, [facility] report will be directly sent to the requesting physician as well as posted in the Media Tab of the patient's [employer] EMR by the [facility]  Pathology Department  3/14/2022 - 5/5/2022 Radiation      Treatments:  Course: C1    Plan ID Energy Fractions Dose per Fraction (cGy) Dose Correction (cGy) Total Dose Delivered (cGy) Elapsed Days   Prostate Bed 10X 38 / 38 180 0 6,840 52      Treatment Dates:  3/14/2022 - 5/5/2022              Past Medical History:   Diagnosis Date   • Benign colon polyp    • Diverticulitis, colon    • Diverticulosis    • Hyperlipidemia    • Prediabetes    • Prostate cancer ([facility] [address] )    • Renal calculi    • Short-term memory loss    • Vitamin D deficiency      Past Surgical History:   Procedure Laterality Date   • ABDOMINAL ADHESION SURGERY N/A 7/28/2021    Procedure: LYSIS ADHESIONS, LAPAROSCOPIC;  Surgeon: Kyle Zafar MD;  [facility];  Service: Urology   • COLONOSCOPY     • ESOPHAGOGASTRODUODENOSCOPY     • HERNIA REPAIR     • JOINT REPLACEMENT Right     RTH   • KNEE ARTHROSCOPY     • KNEE SURGERY     • GA LAPS SURG [id number] RPBIC RAD W/NRV SPARING ROBOT N/A 7/28/2021    Procedure: ROBOTIC LAPAROSCOPIC RADICAL PROSTATECTOMY;  Surgeon: Kyle Zafar MD;  [facility];  Service: Urology   • PROSTATE BIOPSY     • TOTAL HIP ARTHROPLASTY Right 2019   • TOTAL HIP ARTHROPLASTY Left 2019       Social History   Social History     Substance and Sexual Activity   Alcohol Use Yes    Comment: occasional beer and wine     Social History     Substance and Sexual Activity   Drug Use Not Currently    Comment: years ago     Social History     Tobacco Use   Smoking Status Former   • Types: Cigarettes   • Quit date:    • Years since quittin 0   Smokeless Tobacco Never   Tobacco Comments    patient states he doesnt remember how much or for how long         Meds/Allergies     Current Outpatient Medications:   •  Alphagan P 0 1 %, INSTILL 1 DROP IN BOTH EYES TWICE DAILY, Disp: , Rfl:   •  donepezil (ARICEPT) 10 mg tablet, Take 1 tablet (10 mg total) by mouth daily, Disp: 90 tablet, Rfl: 1  •  escitalopram (LEXAPRO) 10 mg tablet, Take 1 tablet (10 mg total) by mouth daily, Disp: 90 tablet, Rfl: 1  •  LUMIGAN 0 01 % ophthalmic drops, , Disp: , Rfl:   •  memantine (NAMENDA) 10 mg tablet, Take 1 tablet (10 mg total) by mouth 2 (two) times a day, Disp: 180 tablet, Rfl: 1  Allergies   Allergen Reactions   • Sulfa Antibiotics Other (See Comments)     Mother told him as child not to take     Review of Systems   Constitutional: Positive for fatigue (occasional)  Negative for chills and fever  HENT: Negative     Eyes:        Wears glasses   Respiratory: Negative     Cardiovascular: Negative     Gastrointestinal: Negative     Genitourinary: Negative for dysuria and urgency          Nocturia x 1   Musculoskeletal: Positive for back pain  Skin: Negative     Allergic/Immunologic: Negative     Neurological:        Memory loss   Hematological: Negative     Psychiatric/Behavioral: Positive for sleep disturbance          Clinical Trial: no     IPSS Questionnaire (AUA-7): Over the past month…     1)  How often have you had a sensation of not emptying your bladder completely after you finish urinating?   0 - Not at all   2) [facility] often have you had to urinate again less than two hours after you finished urinating? 0 - Not at all   3)  How often have you found you stopped and started again several times when you urinated? 0 - Not at all   4) How difficult have you found it to postpone urination?  0 - Not at all   5) How often have you had a weak urinary stream?  0 - Not at all   6) How often have you had to push or strain to begin urination?  0 - Not at all   7) How many times did you most typically get up to urinate from the time you went to bed until the time you got up in the morning?  1 - 1 time   Total Score:  1        OBJECTIVE:   /74 (BP Location: Left arm)   Pulse 81   Temp (!) 97 °F (36 1 °C) (Temporal)   Resp 18   Wt 80 1 kg (176 lb 9 6 oz)   SpO2 97%   BMI 26 08 kg/m²   Pain Assessment:  0  ECOG/Zubrod/WHO: 1 - Symptomatic but completely ambulatory    Physical Exam   Well appearing  NAD  No increased work of breathing  Extremities warm and well perfused  No MALCOLM performed  RESULTS    Lab Results: No results found for this or any previous visit (from the past 672 hour(s))  Imaging Studies:No results found  Assessment/Plan:  No orders of the defined types were placed in this encounter  We again discussed the role of pelvic RT in the salvage treatment of his recurrent prostate cancer  As his prior course of therapy was to the prostate bed alone and his prior imaging suggested recurrence outside of prior field we discussed that RT would likely be able to safely treat the entirety of his recurrent disease  We discussed the risks and benefits of this approach including the possible acute and late effects of pelvic RT  These include, but are not limited to, fatigue, diarrhea, nausea, urinary frequency, myelosuppression, bowel injury, proctitis, and cystitis  The patient and his wife were given the opportunity to ask multiple questions, all of which were answered to his satisfaction   He was amenable to proceeding as described; an informed consent was signed at today's visit  He will return next week for CT simulation at our [address] for use of the [facility] protocol scanner  I will plan to begin RT shortly thereafter  Romulo Mitchell MD  2/09/6562,2:79 AM    Portions of the record may have been created with voice recognition software   Occasional wrong word or "sound a like" substitutions may have occurred due to the inherent limitations of voice recognition software   Read the chart carefully and recognize, using context, where substitutions have occurred

## 2023-01-16 ENCOUNTER — APPOINTMENT (OUTPATIENT)
Dept: RADIATION ONCOLOGY | Facility: CLINIC | Age: 63
End: 2023-01-16
Attending: STUDENT IN AN ORGANIZED HEALTH CARE EDUCATION/TRAINING PROGRAM

## 2023-01-16 ENCOUNTER — APPOINTMENT (OUTPATIENT)
Dept: RADIATION ONCOLOGY | Facility: HOSPITAL | Age: 63
End: 2023-01-16
Attending: STUDENT IN AN ORGANIZED HEALTH CARE EDUCATION/TRAINING PROGRAM

## 2023-01-16 ENCOUNTER — TELEPHONE (OUTPATIENT)
Dept: NEUROLOGY | Facility: CLINIC | Age: 63
End: 2023-01-16

## 2023-01-16 ENCOUNTER — TELEPHONE (OUTPATIENT)
Dept: UROLOGY | Facility: CLINIC | Age: 63
End: 2023-01-16

## 2023-01-16 NOTE — TELEPHONE ENCOUNTER
Called patient and left message  Patient is required to get a PSA/TESTO  done prior to his appointment,  The script is in the chart and they can go to any [facility] lab to have it done  I left the call centers number for any questions at [phone]

## 2023-01-16 NOTE — TELEPHONE ENCOUNTER
Okay to copy prior form under media   He has early onset Alzheimer's and is never expected to return to work

## 2023-01-16 NOTE — TELEPHONE ENCOUNTER
Fax received from [address] [place] [address] with formt o be completed by provider for continuing disability  Scanned into media and forwarded for completion

## 2023-01-17 NOTE — TELEPHONE ENCOUNTER
Spoke with patient's wife and informed her there is no evidence patient had labs done in December  She states it may have been her who had labs, not patient    He will go for labs tomorrow AM

## 2023-01-17 NOTE — TELEPHONE ENCOUNTER
Per patient's wife phone call, patient had the labs done in December at the [address] [place] [address]

## 2023-01-18 ENCOUNTER — IOP CHECK (OUTPATIENT)
Dept: URBAN - METROPOLITAN AREA CLINIC 6 | Facility: CLINIC | Age: 63
End: 2023-01-18

## 2023-01-18 ENCOUNTER — APPOINTMENT (OUTPATIENT)
Dept: LAB | Facility: CLINIC | Age: 63
End: 2023-01-18

## 2023-01-18 DIAGNOSIS — C61 PROSTATE CANCER (HCC): ICD-10-CM

## 2023-01-18 DIAGNOSIS — H40.1131: ICD-10-CM

## 2023-01-18 LAB
PSA SERPL-MCNC: <0.1 NG/ML (ref 0–4)
TESTOST SERPL-MCNC: 10 NG/DL (ref 95–948)

## 2023-01-18 PROCEDURE — 92012 INTRM OPH EXAM EST PATIENT: CPT

## 2023-01-18 ASSESSMENT — TONOMETRY
OD_IOP_MMHG: 17
OS_IOP_MMHG: 28

## 2023-01-18 ASSESSMENT — VISUAL ACUITY
OS_CC: 20/25-2
OD_CC: 20/30

## 2023-01-19 ENCOUNTER — TELEPHONE (OUTPATIENT)
Dept: UROLOGY | Facility: CLINIC | Age: 63
End: 2023-01-19

## 2023-01-19 ENCOUNTER — OFFICE VISIT (OUTPATIENT)
Dept: UROLOGY | Facility: CLINIC | Age: 63
End: 2023-01-19

## 2023-01-19 VITALS
HEART RATE: 89 BPM | BODY MASS INDEX: 26.16 KG/M2 | RESPIRATION RATE: 16 BRPM | HEIGHT: 69 IN | SYSTOLIC BLOOD PRESSURE: 130 MMHG | OXYGEN SATURATION: 96 % | DIASTOLIC BLOOD PRESSURE: 70 MMHG | WEIGHT: 176.6 LBS

## 2023-01-19 DIAGNOSIS — C61 PROSTATE CANCER (HCC): Primary | ICD-10-CM

## 2023-01-19 NOTE — TELEPHONE ENCOUNTER
Patient is scheduled for 6/5/23 for Lupron  Provider note:    Return for Alexandre Said late may w PSA and for 2nd Lupron

## 2023-01-19 NOTE — PROGRESS NOTES
Referring Physician: Kim Montgomery MD  A copy of this note was sent to the referring physician  Diagnoses and all orders for this visit:    Prostate cancer ([facility] [address] )  -     PSA Total, Diagnostic; Future            Assessment and plan:         1  Prostate cancer with aj recurrence status post robotic prostatectomy  - pT3a  Soheila 4+3=7, intraductal, extracapsular extension, negative surgical margins   -pretreatment PSA:  5 6  - RALP 7/28/21  - PSA reoccurence 0 2 (2/8/22)  - salvage radiation completed ( 5/5/22)  - PSA rising now to 1 3 (8/31/22)  -PMS a PET/CT, oligo aj disease in the left pelvis, no definitive bony metastasis  -Lupron initiated (2 to 3-year duration recommended) October 27, 2022  - Current PSA undetectable    Malika Roque is doing very well  It was great to see his PSA come down to undetectable levels following initiation of Lupron for aj recurrence after his robotic prostatectomy for T3 intraductal disease  He is tolerating his androgen deprivation well  I recommended evening primrose oil for his hot flashes  In general I recommended 3-year duration however since he is getting radiation to his aj field 2 years may be reasonable  He will return in late May for his second dose of Lupron with our advanced practitioner team with a PSA prior to the visit  He will continue his follow-up with Dr Marisa Peterson first salvage radiation to his lymph nodes as well      Tammy Kurtz MD      Chief Complaint     Follow-up recurrent prostate cancer      History of Present Illness     Suze Lunsford is a 60 y o  returns in follow-up for recurrent prostate cancer    Detailed Urologic History     - please refer to HPI    Review of Systems     Review of Systems   Constitutional: Negative for activity change and fatigue  HENT: Negative for congestion  Eyes: Negative for visual disturbance  Respiratory: Negative for shortness of breath and wheezing      Cardiovascular: Negative for chest pain and leg swelling  Gastrointestinal: Negative for abdominal pain  Endocrine: Negative for polyuria  Genitourinary: Negative for dysuria, flank pain, hematuria and urgency  Musculoskeletal: Negative for back pain  Allergic/Immunologic: Negative for immunocompromised state  Neurological: Negative for dizziness and numbness  Psychiatric/Behavioral: Negative for dysphoric mood  All other systems reviewed and are negative  Allergies     Allergies   Allergen Reactions   • Sulfa Antibiotics Other (See Comments)     Mother told him as child not to take       Physical Exam       Physical Exam  Constitutional:       General: He is not in acute distress  Appearance: He is well-developed  HENT:      Head: Normocephalic and atraumatic  Cardiovascular:      Comments: Negative lower extremity edema  Pulmonary:      Effort: Pulmonary effort is normal       Breath sounds: Normal breath sounds  Abdominal:      Palpations: Abdomen is soft  Musculoskeletal:         General: Normal range of motion  Cervical back: Normal range of motion  Skin:     General: Skin is warm  Neurological:      Mental Status: He is alert and oriented to person, place, and time     Psychiatric:         Behavior: Behavior normal            Vital Signs  Vitals:    01/19/23 1132   BP: 130/70   BP Location: Left arm   Patient Position: Sitting   Cuff Size: Large   Pulse: 89   Resp: 16   SpO2: 96%   Weight: 80 1 kg (176 lb 9 6 oz)   Height: 5' 9" (1 753 m)         Current Medications       Current Outpatient Medications:   •  Alphagan P 0 1 %, INSTILL 1 DROP IN BOTH EYES TWICE DAILY, Disp: , Rfl:   •  donepezil (ARICEPT) 10 mg tablet, Take 1 tablet (10 mg total) by mouth daily, Disp: 90 tablet, Rfl: 1  •  escitalopram (LEXAPRO) 10 mg tablet, Take 1 tablet (10 mg total) by mouth daily, Disp: 90 tablet, Rfl: 1  •  LUMIGAN 0 01 % ophthalmic drops, , Disp: , Rfl:   •  memantine (NAMENDA) 10 mg tablet, Take 1 tablet (10 mg total) by mouth 2 (two) times a day, Disp: 180 tablet, Rfl: 1      Active Problems     Patient Active Problem List   Diagnosis   • Allergic rhinitis   • Glaucoma   • Prediabetes   • Sciatica   • Short-term memory loss   • Mild cognitive impairment   • Abnormal EEG   • Increased prostate specific antigen (PSA) velocity   • Right hip pain   • Lumbar spondylosis   • Primary localized osteoarthritis of right hip   • Enlarged prostate   • Screening for prostate cancer   • Bilateral leg weakness   • Left hip pain   • Breast mass in male   • Furuncle of chest wall   • Cellulitis   • Gastroesophageal reflux disease   • S/P hip replacement, bilateral   • Prostate cancer (HCC)   • Early onset Alzheimer's dementia without behavioral disturbance ([facility] [address] )         Past Medical History     Past Medical History:   Diagnosis Date   • Benign colon polyp    • Diverticulitis, colon    • Diverticulosis    • Hyperlipidemia    • Prediabetes    • Prostate cancer ([facility] )    • Renal calculi    • Short-term memory loss    • Vitamin D deficiency          Surgical History     Past Surgical History:   Procedure Laterality Date   • ABDOMINAL ADHESION SURGERY N/A 7/28/2021    Procedure: Jayjay Moses;  Surgeon: Daniel Packer MD;  Location: [facility];  Service: Urology   • COLONOSCOPY     • ESOPHAGOGASTRODUODENOSCOPY     • HERNIA REPAIR     • JOINT REPLACEMENT Right     RTH   • KNEE ARTHROSCOPY     • KNEE SURGERY     • MN LAPS SURG [id number] RPBIC RAD W/NRV SPARING ROBOT N/A 7/28/2021    Procedure: ROBOTIC LAPAROSCOPIC RADICAL PROSTATECTOMY;  Surgeon: Daniel Packer MD;  Location: [facility];  Service: Urology   • PROSTATE BIOPSY     • TOTAL HIP ARTHROPLASTY Right 05/17/2019   • TOTAL HIP ARTHROPLASTY Left 08/20/2019         Family History     Family History   Problem Relation Age of Onset   • Dementia Mother    • Glaucoma Mother    • Glaucoma Father    • Prostate cancer Father    • Pulmonary embolism Father    • Hypertension Brother    • Prostate cancer Brother    • Colon cancer Maternal Grandfather          Social History     Social History     Social History     Tobacco Use   Smoking Status Former   • Types: Cigarettes   • Quit date:    • Years since quittin 0   Smokeless Tobacco Never   Tobacco Comments    patient states he doesnt remember how much or for how long         Pertinent Lab Values     Lab Results   Component Value Date    CREATININE 0 84 2021       Lab Results   Component Value Date    PSA <0 1 2023    PSA 1 3 2022    PSA 0 6 2022       @RESULTRCNT(1H])@      Pertinent Imaging      - n/a    Portions of the record may have been created with voice recognition software  Occasional wrong word or "sound a like" substitutions may have occurred due to the inherent limitations of voice recognition software  Read the chart carefully and recognize, using context, where substitutions have occurred

## 2023-01-26 ENCOUNTER — APPOINTMENT (OUTPATIENT)
Dept: RADIATION ONCOLOGY | Facility: HOSPITAL | Age: 63
End: 2023-01-26
Attending: STUDENT IN AN ORGANIZED HEALTH CARE EDUCATION/TRAINING PROGRAM

## 2023-01-29 NOTE — TELEPHONE ENCOUNTER
Noted on Excel spreadsheet for DOS 6/5/23  No further action required at this time  Current insurance benefits were E-verified via [employer] as [employer] as primary coverage  LUPRON 45mg - [employer] - NO authorization required  Office stock okay to use  *VLD 1/29/23

## 2023-01-30 ENCOUNTER — APPOINTMENT (OUTPATIENT)
Dept: RADIATION ONCOLOGY | Facility: HOSPITAL | Age: 63
End: 2023-01-30
Attending: STUDENT IN AN ORGANIZED HEALTH CARE EDUCATION/TRAINING PROGRAM

## 2023-01-31 ENCOUNTER — APPOINTMENT (OUTPATIENT)
Dept: RADIATION ONCOLOGY | Facility: HOSPITAL | Age: 63
End: 2023-01-31
Attending: STUDENT IN AN ORGANIZED HEALTH CARE EDUCATION/TRAINING PROGRAM

## 2023-02-01 ENCOUNTER — APPOINTMENT (OUTPATIENT)
Dept: RADIATION ONCOLOGY | Facility: HOSPITAL | Age: 63
End: 2023-02-01
Attending: STUDENT IN AN ORGANIZED HEALTH CARE EDUCATION/TRAINING PROGRAM

## 2023-02-02 ENCOUNTER — APPOINTMENT (OUTPATIENT)
Dept: RADIATION ONCOLOGY | Facility: HOSPITAL | Age: 63
End: 2023-02-02
Attending: STUDENT IN AN ORGANIZED HEALTH CARE EDUCATION/TRAINING PROGRAM

## 2023-02-03 ENCOUNTER — APPOINTMENT (OUTPATIENT)
Dept: RADIATION ONCOLOGY | Facility: HOSPITAL | Age: 63
End: 2023-02-03
Attending: STUDENT IN AN ORGANIZED HEALTH CARE EDUCATION/TRAINING PROGRAM

## 2023-02-06 ENCOUNTER — APPOINTMENT (OUTPATIENT)
Dept: RADIATION ONCOLOGY | Facility: HOSPITAL | Age: 63
End: 2023-02-06
Attending: STUDENT IN AN ORGANIZED HEALTH CARE EDUCATION/TRAINING PROGRAM

## 2023-02-07 ENCOUNTER — APPOINTMENT (OUTPATIENT)
Dept: RADIATION ONCOLOGY | Facility: HOSPITAL | Age: 63
End: 2023-02-07
Attending: STUDENT IN AN ORGANIZED HEALTH CARE EDUCATION/TRAINING PROGRAM

## 2023-02-08 ENCOUNTER — APPOINTMENT (OUTPATIENT)
Dept: RADIATION ONCOLOGY | Facility: HOSPITAL | Age: 63
End: 2023-02-08
Attending: STUDENT IN AN ORGANIZED HEALTH CARE EDUCATION/TRAINING PROGRAM

## 2023-02-09 ENCOUNTER — APPOINTMENT (OUTPATIENT)
Dept: RADIATION ONCOLOGY | Facility: HOSPITAL | Age: 63
End: 2023-02-09
Attending: STUDENT IN AN ORGANIZED HEALTH CARE EDUCATION/TRAINING PROGRAM

## 2023-02-10 ENCOUNTER — APPOINTMENT (OUTPATIENT)
Dept: RADIATION ONCOLOGY | Facility: HOSPITAL | Age: 63
End: 2023-02-10
Attending: STUDENT IN AN ORGANIZED HEALTH CARE EDUCATION/TRAINING PROGRAM

## 2023-02-13 ENCOUNTER — APPOINTMENT (OUTPATIENT)
Dept: RADIATION ONCOLOGY | Facility: HOSPITAL | Age: 63
End: 2023-02-13
Attending: STUDENT IN AN ORGANIZED HEALTH CARE EDUCATION/TRAINING PROGRAM

## 2023-02-14 ENCOUNTER — APPOINTMENT (OUTPATIENT)
Dept: RADIATION ONCOLOGY | Facility: HOSPITAL | Age: 63
End: 2023-02-14
Attending: STUDENT IN AN ORGANIZED HEALTH CARE EDUCATION/TRAINING PROGRAM

## 2023-02-15 ENCOUNTER — APPOINTMENT (OUTPATIENT)
Dept: RADIATION ONCOLOGY | Facility: HOSPITAL | Age: 63
End: 2023-02-15
Attending: STUDENT IN AN ORGANIZED HEALTH CARE EDUCATION/TRAINING PROGRAM

## 2023-02-16 ENCOUNTER — APPOINTMENT (OUTPATIENT)
Dept: RADIATION ONCOLOGY | Facility: HOSPITAL | Age: 63
End: 2023-02-16
Attending: STUDENT IN AN ORGANIZED HEALTH CARE EDUCATION/TRAINING PROGRAM

## 2023-02-17 ENCOUNTER — APPOINTMENT (OUTPATIENT)
Dept: RADIATION ONCOLOGY | Facility: HOSPITAL | Age: 63
End: 2023-02-17
Attending: STUDENT IN AN ORGANIZED HEALTH CARE EDUCATION/TRAINING PROGRAM

## 2023-02-20 ENCOUNTER — TELEPHONE (OUTPATIENT)
Dept: RADIATION ONCOLOGY | Facility: HOSPITAL | Age: 63
End: 2023-02-20

## 2023-02-20 ENCOUNTER — APPOINTMENT (OUTPATIENT)
Dept: RADIATION ONCOLOGY | Facility: HOSPITAL | Age: 63
End: 2023-02-20
Attending: STUDENT IN AN ORGANIZED HEALTH CARE EDUCATION/TRAINING PROGRAM

## 2023-02-20 NOTE — TELEPHONE ENCOUNTER
Call from patient's spouse, Lor Sexton, patient could be heard in the background  She reports that he's still not feeling well  He did not come in for his radiation treatment on 2/17 because of this and patient feels he will not be able to drink the water needed for his radiation treatment this morning  She states patient's symptoms started last Thursday, 2/16/23 with stomach gurgling and he complained that he didn't feel right  Friday and Saturday, he continued with c/o stomach not feeling right  She states that he did eat some noodles on Friday Sunday he had at least 2-3 episodes diarrhea  He ate some crackers and she tried to keep him hydrated, he drank some water and Gatorade  Later in the day, he complained of feeling constipated, he had passed small amount of hard stool  She reports giving him Dulcolax last night  She states that he has had nausea every day, no vomiting  She states that he woke up this morning crying, that he doesn't feel good at all  He continues to complain that his stomach doesn't feel right, + nausea, no vomiting  He did have diarrhea stool this morning  He complains of feeling tired  She denies giving him any other medication over the past 3-4 days for his symptoms other than the Dulcolax  He has no fevers/chills  He denies feeling dizzy or lightheaded  Patient and Lor Sexton informed that I will notify Dr Orestes Kimble of above and get back to her with further instructions  She verbalized understanding  Call back # [phone]    Routed to provider

## 2023-02-20 NOTE — TELEPHONE ENCOUNTER
Called  Patient to rest and remain hydrated  Will go to PCP or ED if symptoms persist or he is unable to take fluid

## 2023-02-21 ENCOUNTER — APPOINTMENT (OUTPATIENT)
Dept: RADIATION ONCOLOGY | Facility: HOSPITAL | Age: 63
End: 2023-02-21
Attending: STUDENT IN AN ORGANIZED HEALTH CARE EDUCATION/TRAINING PROGRAM

## 2023-02-22 ENCOUNTER — OFFICE VISIT (OUTPATIENT)
Dept: FAMILY MEDICINE CLINIC | Facility: OTHER | Age: 63
End: 2023-02-22

## 2023-02-22 ENCOUNTER — APPOINTMENT (OUTPATIENT)
Dept: RADIATION ONCOLOGY | Facility: HOSPITAL | Age: 63
End: 2023-02-22
Attending: STUDENT IN AN ORGANIZED HEALTH CARE EDUCATION/TRAINING PROGRAM

## 2023-02-22 ENCOUNTER — APPOINTMENT (OUTPATIENT)
Dept: LAB | Facility: CLINIC | Age: 63
End: 2023-02-22

## 2023-02-22 VITALS
SYSTOLIC BLOOD PRESSURE: 150 MMHG | BODY MASS INDEX: 24.44 KG/M2 | HEART RATE: 63 BPM | RESPIRATION RATE: 14 BRPM | DIASTOLIC BLOOD PRESSURE: 72 MMHG | OXYGEN SATURATION: 99 % | HEIGHT: 69 IN | TEMPERATURE: 98.4 F | WEIGHT: 165 LBS

## 2023-02-22 DIAGNOSIS — G30.0 EARLY ONSET ALZHEIMER'S DEMENTIA WITHOUT BEHAVIORAL DISTURBANCE (HCC): ICD-10-CM

## 2023-02-22 DIAGNOSIS — C61 PROSTATE CANCER METASTATIC TO PELVIS (HCC): ICD-10-CM

## 2023-02-22 DIAGNOSIS — D84.9 IMMUNOCOMPROMISED (HCC): ICD-10-CM

## 2023-02-22 DIAGNOSIS — C61 PROSTATE CANCER (HCC): ICD-10-CM

## 2023-02-22 DIAGNOSIS — R11.0 NAUSEA: ICD-10-CM

## 2023-02-22 DIAGNOSIS — R19.7 DIARRHEA, UNSPECIFIED TYPE: ICD-10-CM

## 2023-02-22 DIAGNOSIS — R19.7 DIARRHEA, UNSPECIFIED TYPE: Primary | ICD-10-CM

## 2023-02-22 DIAGNOSIS — F02.80 EARLY ONSET ALZHEIMER'S DEMENTIA WITHOUT BEHAVIORAL DISTURBANCE (HCC): ICD-10-CM

## 2023-02-22 DIAGNOSIS — C79.89 PROSTATE CANCER METASTATIC TO PELVIS (HCC): ICD-10-CM

## 2023-02-22 DIAGNOSIS — Z92.3 HISTORY OF RADIATION THERAPY: ICD-10-CM

## 2023-02-22 LAB
ALBUMIN SERPL BCP-MCNC: 4.4 G/DL (ref 3.5–5)
ALP SERPL-CCNC: 53 U/L (ref 34–104)
ALT SERPL W P-5'-P-CCNC: 26 U/L (ref 7–52)
ANION GAP SERPL CALCULATED.3IONS-SCNC: 8 MMOL/L (ref 4–13)
AST SERPL W P-5'-P-CCNC: 20 U/L (ref 13–39)
BASOPHILS # BLD AUTO: 0.04 THOUSANDS/ÂΜL (ref 0–0.1)
BASOPHILS NFR BLD AUTO: 1 % (ref 0–1)
BILIRUB SERPL-MCNC: 0.77 MG/DL (ref 0.2–1)
BUN SERPL-MCNC: 9 MG/DL (ref 5–25)
CALCIUM SERPL-MCNC: 9.8 MG/DL (ref 8.4–10.2)
CHLORIDE SERPL-SCNC: 104 MMOL/L (ref 96–108)
CO2 SERPL-SCNC: 28 MMOL/L (ref 21–32)
CREAT SERPL-MCNC: 0.92 MG/DL (ref 0.6–1.3)
CRP SERPL QL: 1.2 MG/L
EOSINOPHIL # BLD AUTO: 0.21 THOUSAND/ÂΜL (ref 0–0.61)
EOSINOPHIL NFR BLD AUTO: 5 % (ref 0–6)
ERYTHROCYTE [DISTWIDTH] IN BLOOD BY AUTOMATED COUNT: 12.8 % (ref 11.6–15.1)
GFR SERPL CREATININE-BSD FRML MDRD: 88 ML/MIN/1.73SQ M
GLUCOSE SERPL-MCNC: 104 MG/DL (ref 65–140)
HCT VFR BLD AUTO: 44.6 % (ref 36.5–49.3)
HGB BLD-MCNC: 14.9 G/DL (ref 12–17)
IMM GRANULOCYTES # BLD AUTO: 0.02 THOUSAND/UL (ref 0–0.2)
IMM GRANULOCYTES NFR BLD AUTO: 1 % (ref 0–2)
LYMPHOCYTES # BLD AUTO: 0.57 THOUSANDS/ÂΜL (ref 0.6–4.47)
LYMPHOCYTES NFR BLD AUTO: 13 % (ref 14–44)
MCH RBC QN AUTO: 30.7 PG (ref 26.8–34.3)
MCHC RBC AUTO-ENTMCNC: 33.4 G/DL (ref 31.4–37.4)
MCV RBC AUTO: 92 FL (ref 82–98)
MONOCYTES # BLD AUTO: 0.58 THOUSAND/ÂΜL (ref 0.17–1.22)
MONOCYTES NFR BLD AUTO: 13 % (ref 4–12)
NEUTROPHILS # BLD AUTO: 3.02 THOUSANDS/ÂΜL (ref 1.85–7.62)
NEUTS SEG NFR BLD AUTO: 67 % (ref 43–75)
NRBC BLD AUTO-RTO: 0 /100 WBCS
PLATELET # BLD AUTO: 244 THOUSANDS/UL (ref 149–390)
PMV BLD AUTO: 8.8 FL (ref 8.9–12.7)
POTASSIUM SERPL-SCNC: 4.3 MMOL/L (ref 3.5–5.3)
PROCALCITONIN SERPL-MCNC: <0.05 NG/ML
PROT SERPL-MCNC: 7.2 G/DL (ref 6.4–8.4)
RBC # BLD AUTO: 4.86 MILLION/UL (ref 3.88–5.62)
SODIUM SERPL-SCNC: 140 MMOL/L (ref 135–147)
WBC # BLD AUTO: 4.44 THOUSAND/UL (ref 4.31–10.16)

## 2023-02-22 NOTE — PROGRESS NOTES
Name: Acacia Levine      : 1960      MRN: [id number]  Encounter Provider: Qiana Chaney MD  Encounter Date: 2023   Encounter department: [address] [place] [address]      1  Diarrhea, unspecified type  -     XR abdomen complete inc upright and/or decubitus; Future; Expected date: 2023  -     CBC and differential; Future  -     C-reactive protein; Future  -     Comprehensive metabolic panel; Future  -     Clostridium difficile toxin by PCR; Future  -     Stool culture; Future  -     Procalcitonin; Future    2  Prostate cancer metastatic to pelvis (HCC)  -     XR abdomen complete inc upright and/or decubitus; Future; Expected date: 2023  -     CBC and differential; Future  -     C-reactive protein; Future  -     Comprehensive metabolic panel; Future  -     Clostridium difficile toxin by PCR; Future  -     Stool culture; Future  -     Procalcitonin; Future  -     Ambulatory Referral to Nutrition Services for Oncology; Future    3  Nausea  -     XR abdomen complete inc upright and/or decubitus; Future; Expected date: 2023  -     CBC and differential; Future  -     C-reactive protein; Future  -     Comprehensive metabolic panel; Future  -     Clostridium difficile toxin by PCR; Future  -     Stool culture; Future  -     Procalcitonin; Future    4  History of radiation therapy  -     XR abdomen complete inc upright and/or decubitus; Future; Expected date: 2023    5  Prostate cancer ([facility] [address] )    6  Early onset Alzheimer's dementia without behavioral disturbance ([facility] [address] )    7  Immunocompromised [facility])        Mr Raleigh Johnson is a 60year old male with metastatic prostate cancer, currently undergoing active treatment plan, presenting for 6 days of chronic watery diarrhea       - DDx: infection vs partial obstruction vs iatrogenic   - Concern for stool ball with overflow incontinence    - Non-acute abdomen without tenderness to palpation    - Patient immunocompromised     Plan:  - Abdominal Xray to evaluate stool burden or evidence of partial obstruction    - Labs:    - CBC, CRP, Procal to evaluate for evidence of acute infection    - CMP to evaluate for electrolyte abnormalities with repeated episodes of diarrhea    - C  Diff test and stool bacterial culture to assess for infection     Patient to follow up in 1 week to review results and discuss symptoms  Patient counseled to eat a bland diet and drink plenty of water and electrolyte-rich fluids  Subjective      Patient presents today for an acute visit, with concerns of diarrhea since last Thursday  Today he reports that his stomach started bothering him last Thursday  He was supposed to have Radiation therapy on Friday, but had to cancel 2/2 nausea and diarrhea without vomiting  This has been unchanged since it started  He reports his diarrhea has been watery, brown and yellow, with some "small turds "     Patient's wife reports she was originally going to give patient something OTC to stop the diarrhea, but he reported feeling "bound up" concomitantly, so she gave him one dose of OTC dulcolax on Friday  Patient endorses an appetite, but says food "goes right through him " He has been eating mostly broth, water, tea, crackers, and chicken soup  He endorses significant fatigue  Patient is currently being treated with radiation therapy for metastatic prostate cancer  He gets quarterly injections - Oncologist administered  Last given in December  Unable to recall name of injection  Denies any reaction to injection in past          Review of Systems   Constitutional: Positive for fatigue  Negative for chills, diaphoresis and fever  HENT: Negative for ear pain and sore throat  Eyes: Negative for pain and visual disturbance  Respiratory: Negative for cough and shortness of breath  Cardiovascular: Negative for chest pain and palpitations     Gastrointestinal: Positive for constipation, diarrhea and nausea  Negative for abdominal pain, blood in stool and vomiting  Endocrine: Negative for polydipsia and polyuria  Genitourinary: Negative for dysuria and hematuria  Musculoskeletal: Negative for arthralgias and back pain  Skin: Negative for color change and rash  Neurological: Negative for seizures and syncope  All other systems reviewed and are negative  Current Outpatient Medications on File Prior to Visit   Medication Sig   • Alphagan P 0 1 % INSTILL 1 DROP IN BOTH EYES TWICE DAILY   • donepezil (ARICEPT) 10 mg tablet Take 1 tablet (10 mg total) by mouth daily   • escitalopram (LEXAPRO) 10 mg tablet Take 1 tablet (10 mg total) by mouth daily   • LUMIGAN 0 01 % ophthalmic drops    • memantine (NAMENDA) 10 mg tablet Take 1 tablet (10 mg total) by mouth 2 (two) times a day       Objective     /72   Pulse 63   Temp 98 4 °F (36 9 °C)   Resp 14   Ht 5' 9" (1 753 m)   Wt 74 8 kg (165 lb)   SpO2 99%   BMI 24 37 kg/m²     Physical Exam  Vitals reviewed  Constitutional:       General: He is not in acute distress  Appearance: Normal appearance  He is normal weight  He is not ill-appearing, toxic-appearing or diaphoretic  HENT:      Head: Normocephalic and atraumatic  Nose: No congestion or rhinorrhea  Mouth/Throat:      Mouth: Mucous membranes are moist    Eyes:      General: No scleral icterus  Right eye: No discharge  Left eye: No discharge  Conjunctiva/sclera: Conjunctivae normal    Cardiovascular:      Rate and Rhythm: Normal rate and regular rhythm  Pulses: Normal pulses  Heart sounds: Normal heart sounds  No murmur heard  No gallop  Pulmonary:      Effort: Pulmonary effort is normal       Breath sounds: Normal breath sounds  Chest:      Chest wall: No tenderness  Abdominal:      General: Abdomen is flat  Bowel sounds are normal  There is no distension  Palpations: There is no fluid wave or mass        Tenderness: There is no abdominal tenderness  There is no guarding or rebound  Negative signs include Gonzalez's sign and McBurney's sign  Comments: Abdomen is firm, but not rigid  No tenderness to palpation  Musculoskeletal:         General: No swelling, tenderness, deformity or signs of injury  Right lower leg: No edema  Left lower leg: No edema  Skin:     General: Skin is warm and dry  Capillary Refill: Capillary refill takes less than 2 seconds  Findings: No bruising, erythema, lesion or rash  Neurological:      Mental Status: He is alert  Mental status is at baseline  Sensory: No sensory deficit  Motor: No weakness  Gait: Gait normal    Psychiatric:         Attention and Perception: Attention normal          Mood and Affect: Mood normal          Speech: Speech normal          Behavior: Behavior normal  Behavior is cooperative  Thought Content: Thought content normal          Cognition and Memory: Memory is impaired           Judgment: Judgment normal        Wyatt Del Angel MD   [facility] PGY-1

## 2023-02-23 ENCOUNTER — APPOINTMENT (OUTPATIENT)
Dept: RADIATION ONCOLOGY | Facility: HOSPITAL | Age: 63
End: 2023-02-23
Attending: STUDENT IN AN ORGANIZED HEALTH CARE EDUCATION/TRAINING PROGRAM

## 2023-02-23 ENCOUNTER — TELEPHONE (OUTPATIENT)
Dept: NUTRITION | Facility: CLINIC | Age: 63
End: 2023-02-23

## 2023-02-23 ENCOUNTER — HOSPITAL ENCOUNTER (OUTPATIENT)
Dept: RADIOLOGY | Facility: HOSPITAL | Age: 63
Discharge: HOME | End: 2023-02-23

## 2023-02-23 ENCOUNTER — APPOINTMENT (OUTPATIENT)
Dept: LAB | Facility: CLINIC | Age: 63
End: 2023-02-23

## 2023-02-23 DIAGNOSIS — Z92.3 HISTORY OF RADIATION THERAPY: ICD-10-CM

## 2023-02-23 DIAGNOSIS — R11.0 NAUSEA: ICD-10-CM

## 2023-02-23 DIAGNOSIS — R19.7 DIARRHEA, UNSPECIFIED TYPE: ICD-10-CM

## 2023-02-23 DIAGNOSIS — C61 PROSTATE CANCER METASTATIC TO PELVIS (HCC): ICD-10-CM

## 2023-02-23 DIAGNOSIS — C79.89 PROSTATE CANCER METASTATIC TO PELVIS (HCC): ICD-10-CM

## 2023-02-23 NOTE — TELEPHONE ENCOUNTER
Received notification by Dr Brook Mueller on 2/22/23 that pt has triggered for oncology nutrition care (reason for referral: Ambulatory referral for Prostate cancer metastatic to pelvis)  Eric Bear today to establish care  No answer  Left voice message with the reason for today's call and this RD’s contact information asking that Alejo Leo call back as able/desired

## 2023-02-24 ENCOUNTER — APPOINTMENT (OUTPATIENT)
Dept: RADIATION ONCOLOGY | Facility: HOSPITAL | Age: 63
End: 2023-02-24
Attending: STUDENT IN AN ORGANIZED HEALTH CARE EDUCATION/TRAINING PROGRAM

## 2023-02-24 ENCOUNTER — HOSPITAL ENCOUNTER (EMERGENCY)
Facility: HOSPITAL | Age: 63
Discharge: HOME | End: 2023-02-24
Attending: EMERGENCY MEDICINE

## 2023-02-24 VITALS
OXYGEN SATURATION: 100 % | SYSTOLIC BLOOD PRESSURE: 144 MMHG | DIASTOLIC BLOOD PRESSURE: 69 MMHG | HEART RATE: 55 BPM | TEMPERATURE: 97.9 F | RESPIRATION RATE: 18 BRPM

## 2023-02-24 DIAGNOSIS — R19.7 DIARRHEA: Primary | ICD-10-CM

## 2023-02-24 LAB
ALBUMIN SERPL BCP-MCNC: 4.4 G/DL (ref 3.5–5)
ALP SERPL-CCNC: 47 U/L (ref 34–104)
ALT SERPL W P-5'-P-CCNC: 25 U/L (ref 7–52)
ANION GAP SERPL CALCULATED.3IONS-SCNC: 8 MMOL/L (ref 4–13)
AST SERPL W P-5'-P-CCNC: 21 U/L (ref 13–39)
BASOPHILS # BLD AUTO: 0.03 THOUSANDS/ÂΜL (ref 0–0.1)
BASOPHILS NFR BLD AUTO: 1 % (ref 0–1)
BILIRUB SERPL-MCNC: 0.44 MG/DL (ref 0.2–1)
BUN SERPL-MCNC: 11 MG/DL (ref 5–25)
C DIFF TOX GENS STL QL NAA+PROBE: NEGATIVE
CALCIUM SERPL-MCNC: 9.5 MG/DL (ref 8.4–10.2)
CAMPYLOBACTER DNA SPEC NAA+PROBE: NORMAL
CHLORIDE SERPL-SCNC: 104 MMOL/L (ref 96–108)
CO2 SERPL-SCNC: 28 MMOL/L (ref 21–32)
CREAT SERPL-MCNC: 0.91 MG/DL (ref 0.6–1.3)
EOSINOPHIL # BLD AUTO: 0.24 THOUSAND/ÂΜL (ref 0–0.61)
EOSINOPHIL NFR BLD AUTO: 6 % (ref 0–6)
ERYTHROCYTE [DISTWIDTH] IN BLOOD BY AUTOMATED COUNT: 12.6 % (ref 11.6–15.1)
GFR SERPL CREATININE-BSD FRML MDRD: 89 ML/MIN/1.73SQ M
GLUCOSE SERPL-MCNC: 88 MG/DL (ref 65–140)
HCT VFR BLD AUTO: 40.5 % (ref 36.5–49.3)
HGB BLD-MCNC: 13.6 G/DL (ref 12–17)
IMM GRANULOCYTES # BLD AUTO: 0.02 THOUSAND/UL (ref 0–0.2)
IMM GRANULOCYTES NFR BLD AUTO: 1 % (ref 0–2)
LYMPHOCYTES # BLD AUTO: 0.59 THOUSANDS/ÂΜL (ref 0.6–4.47)
LYMPHOCYTES NFR BLD AUTO: 15 % (ref 14–44)
MAGNESIUM SERPL-MCNC: 2.1 MG/DL (ref 1.9–2.7)
MCH RBC QN AUTO: 31.1 PG (ref 26.8–34.3)
MCHC RBC AUTO-ENTMCNC: 33.6 G/DL (ref 31.4–37.4)
MCV RBC AUTO: 93 FL (ref 82–98)
MONOCYTES # BLD AUTO: 0.52 THOUSAND/ÂΜL (ref 0.17–1.22)
MONOCYTES NFR BLD AUTO: 13 % (ref 4–12)
NEUTROPHILS # BLD AUTO: 2.52 THOUSANDS/ÂΜL (ref 1.85–7.62)
NEUTS SEG NFR BLD AUTO: 64 % (ref 43–75)
NRBC BLD AUTO-RTO: 0 /100 WBCS
PLATELET # BLD AUTO: 212 THOUSANDS/UL (ref 149–390)
PMV BLD AUTO: 8.7 FL (ref 8.9–12.7)
POTASSIUM SERPL-SCNC: 3.7 MMOL/L (ref 3.5–5.3)
PROT SERPL-MCNC: 7.4 G/DL (ref 6.4–8.4)
RBC # BLD AUTO: 4.37 MILLION/UL (ref 3.88–5.62)
SALMONELLA DNA SPEC QL NAA+PROBE: NORMAL
SHIGA TOXIN STX GENE SPEC NAA+PROBE: NORMAL
SHIGELLA DNA SPEC QL NAA+PROBE: NORMAL
SODIUM SERPL-SCNC: 140 MMOL/L (ref 135–147)
WBC # BLD AUTO: 3.92 THOUSAND/UL (ref 4.31–10.16)

## 2023-02-24 RX ORDER — DICYCLOMINE HCL 20 MG
20 TABLET ORAL ONCE
Status: COMPLETED | OUTPATIENT
Start: 2023-02-24 | End: 2023-02-24

## 2023-02-24 RX ORDER — SODIUM CHLORIDE, SODIUM GLUCONATE, SODIUM ACETATE, POTASSIUM CHLORIDE, MAGNESIUM CHLORIDE, SODIUM PHOSPHATE, DIBASIC, AND POTASSIUM PHOSPHATE .53; .5; .37; .037; .03; .012; .00082 G/100ML; G/100ML; G/100ML; G/100ML; G/100ML; G/100ML; G/100ML
1000 INJECTION, SOLUTION INTRAVENOUS ONCE
Status: COMPLETED | OUTPATIENT
Start: 2023-02-24 | End: 2023-02-24

## 2023-02-24 RX ADMIN — SODIUM CHLORIDE, SODIUM GLUCONATE, SODIUM ACETATE, POTASSIUM CHLORIDE, MAGNESIUM CHLORIDE, SODIUM PHOSPHATE, DIBASIC, AND POTASSIUM PHOSPHATE 1000 ML: .53; .5; .37; .037; .03; .012; .00082 INJECTION, SOLUTION INTRAVENOUS at 19:39

## 2023-02-24 RX ADMIN — DICYCLOMINE HYDROCHLORIDE 20 MG: 20 TABLET ORAL at 19:38

## 2023-02-24 NOTE — ED PROVIDER NOTES
History  Chief Complaint   Patient presents with   • Diarrhea     Pt reports diarrhea x 1 week  Had OP labs and x-rays yesterday but didn't hear from doctor so came to ER       History provided by:  Patient and spouse   used: No    Diarrhea  Associated symptoms: no abdominal pain, no fever, no headaches and no vomiting    27-year-old male with a history of prostate cancer undergoing radiation therapy presented for evaluation of diarrhea for about a week  States he is having 3-4 watery stools a day without blood without any associated abdominal cramping  No nausea or vomiting  States he feels just generally fatigued  States he still eating and drinking  No travel or sick contacts  No recent antibiotics  No change in diet or any raw foods  No change in medications  Was seen by PCP 2 days ago and sent for outpatient lab work and obstruction series  Lab work is unremarkable  Obstruction series shows normal bowel gas pattern  C  difficile test was negative  Stool culture is still pending  On exam he appears in no distress  His oropharynx is clear and moist   Abdomen soft and nontender  States he just feels rundown  Plan repeat labs, fluids, Bentyl, reevaluate  Doubt infectious diarrhea  Patient reports he has a few more radiation treatments and then they are planning a PET-CT to rule out any ongoing metastasis  Prior to Admission Medications   Prescriptions Last Dose Informant Patient Reported? Taking?    Alphagan P 0 1 %   Yes No   Sig: INSTILL 1 DROP IN BOTH EYES TWICE DAILY   LUMIGAN 0 01 % ophthalmic drops   Yes No   donepezil (ARICEPT) 10 mg tablet   No No   Sig: Take 1 tablet (10 mg total) by mouth daily   escitalopram (LEXAPRO) 10 mg tablet   No No   Sig: Take 1 tablet (10 mg total) by mouth daily   memantine (NAMENDA) 10 mg tablet   No No   Sig: Take 1 tablet (10 mg total) by mouth 2 (two) times a day      Facility-Administered Medications: None       Past Medical History:   Diagnosis Date   • Benign colon polyp    • Diverticulitis, colon    • Diverticulosis    • Hyperlipidemia    • Prediabetes    • Prostate cancer ([address] )    • Renal calculi    • Short-term memory loss    • Vitamin D deficiency        Past Surgical History:   Procedure Laterality Date   • ABDOMINAL ADHESION SURGERY N/A 2021    Procedure: LYSIS ADHESIONS, LAPAROSCOPIC;  Surgeon: Gustavo Chen MD;  Location: [facility];  Service: Urology   • COLONOSCOPY     • ESOPHAGOGASTRODUODENOSCOPY     • HERNIA REPAIR     • JOINT REPLACEMENT Right     RTH   • KNEE ARTHROSCOPY     • KNEE SURGERY     • UT LAPS SURG [id number] RPBIC RAD W/NRV SPARING ROBOT N/A 2021    Procedure: ROBOTIC LAPAROSCOPIC RADICAL PROSTATECTOMY;  Surgeon: Gustavo Chen MD;  Location: [facility];  Service: Urology   • PROSTATE BIOPSY     • TOTAL HIP ARTHROPLASTY Right 2019   • TOTAL HIP ARTHROPLASTY Left 2019       Family History   Problem Relation Age of Onset   • Dementia Mother    • Glaucoma Mother    • Glaucoma Father    • Prostate cancer Father    • Pulmonary embolism Father    • Hypertension Brother    • Prostate cancer Brother    • Colon cancer Maternal Grandfather      I have reviewed and agree with the history as documented  E-Cigarette/Vaping   • E-Cigarette Use Never User      E-Cigarette/Vaping Substances   • Nicotine No    • THC No    • CBD No    • Flavoring No    • Other No    • Unknown No      Social History     Tobacco Use   • Smoking status: Former     Types: Cigarettes     Quit date:      Years since quittin 1   • Smokeless tobacco: Never   • Tobacco comments:     patient states he doesnt remember how much or for how long   Vaping Use   • Vaping Use: Never used   Substance Use Topics   • Alcohol use: Yes     Comment: occasional beer and wine   • Drug use: Not Currently     Comment: years ago       Review of Systems   Constitutional: Positive for fatigue   Negative for activity change, appetite change and fever  Respiratory: Negative for chest tightness and shortness of breath  Cardiovascular: Negative for chest pain  Gastrointestinal: Positive for diarrhea  Negative for abdominal pain, blood in stool, nausea and vomiting  Genitourinary: Negative for difficulty urinating and dysuria  Musculoskeletal: Negative for back pain and neck pain  Skin: Negative for color change and rash  Neurological: Negative for dizziness and headaches  All other systems reviewed and are negative  Physical Exam  Physical Exam  Vitals and nursing note reviewed  Constitutional:       Appearance: Normal appearance  HENT:      Head: Normocephalic and atraumatic  Mouth/Throat:      Mouth: Mucous membranes are moist       Pharynx: Oropharynx is clear  Cardiovascular:      Rate and Rhythm: Normal rate and regular rhythm  Pulmonary:      Effort: Pulmonary effort is normal  No respiratory distress  Abdominal:      General: There is no distension  Palpations: Abdomen is soft  Tenderness: There is no abdominal tenderness  Musculoskeletal:         General: No swelling or deformity  Normal range of motion  Cervical back: Normal range of motion and neck supple  Skin:     General: Skin is warm and dry  Capillary Refill: Capillary refill takes less than 2 seconds  Neurological:      General: No focal deficit present  Mental Status: He is alert and oriented to person, place, and time     Psychiatric:         Mood and Affect: Mood normal          Behavior: Behavior normal          Vital Signs  ED Triage Vitals   Temperature Pulse Respirations Blood Pressure SpO2   02/24/23 1810 02/24/23 1810 02/24/23 1810 02/24/23 1810 02/24/23 1810   97 9 °F (36 6 °C) 64 18 146/80 99 %      Temp Source Heart Rate Source Patient Position - Orthostatic VS BP Location FiO2 (%)   02/24/23 1810 02/24/23 1810 02/24/23 1810 02/24/23 1810 --   Oral Monitor Sitting Right arm       Pain Score 02/24/23 2000       No Pain           Vitals:    02/24/23 1810 02/24/23 2000   BP: 146/80 132/79   Pulse: 64 (!) 53   Patient Position - Orthostatic VS: Sitting Sitting         Visual Acuity      ED Medications  Medications   dicyclomine (BENTYL) tablet 20 mg (20 mg Oral Given 2/24/23 1938)   multi-electrolyte (PLASMALYTE-A/ISOLYTE-S PH 7 4) IV solution 1,000 mL (1,000 mL Intravenous New Bag 2/24/23 1939)       Diagnostic Studies  Results Reviewed     Procedure Component Value Units Date/Time    Comprehensive metabolic panel [[id number]] Collected: 02/24/23 1929    Lab Status: Final result Specimen: Blood from Arm, Left Updated: 02/24/23 2004     Sodium 140 mmol/L      Potassium 3 7 mmol/L      Chloride 104 mmol/L      CO2 28 mmol/L      ANION GAP 8 mmol/L      BUN 11 mg/dL      Creatinine 0 91 mg/dL      Glucose 88 mg/dL      Calcium 9 5 mg/dL      AST 21 U/L      ALT 25 U/L      Alkaline Phosphatase 47 U/L      Total Protein 7 4 g/dL      Albumin 4 4 g/dL      Total Bilirubin 0 44 mg/dL      eGFR 89 ml/min/1 73sq m     Narrative:      [place] guidelines for Chronic Kidney Disease (CKD):   •  Stage 1 with normal or high GFR (GFR > 90 mL/min/1 73 square meters)  •  Stage 2 Mild CKD (GFR = 60-89 mL/min/1 73 square meters)  •  Stage 3A Moderate CKD (GFR = 45-59 mL/min/1 73 square meters)  •  Stage 3B Moderate CKD (GFR = 30-44 mL/min/1 73 square meters)  •  Stage 4 Severe CKD (GFR = 15-29 mL/min/1 73 square meters)  •  Stage 5 End Stage CKD (GFR <15 mL/min/1 73 square meters)  Note: GFR calculation is accurate only with a steady state creatinine    Magnesium [[id number]]  (Normal) Collected: 02/24/23 1929    Lab Status: Final result Specimen: Blood from Arm, Left Updated: 02/24/23 2004     Magnesium 2 1 mg/dL     CBC and differential [[id number]]  (Abnormal) Collected: 02/24/23 1929    Lab Status: Final result Specimen: Blood from Arm, Left Updated: 02/24/23 1939     WBC 3 92 Thousand/uL RBC 4 37 Million/uL      Hemoglobin 13 6 g/dL      Hematocrit 40 5 %      MCV 93 fL      MCH 31 1 pg      MCHC 33 6 g/dL      RDW 12 6 %      MPV 8 7 fL      Platelets 968 Thousands/uL      nRBC 0 /100 WBCs      Neutrophils Relative 64 %      Immat GRANS % 1 %      Lymphocytes Relative 15 %      Monocytes Relative 13 %      Eosinophils Relative 6 %      Basophils Relative 1 %      Neutrophils Absolute 2 52 Thousands/µL      Immature Grans Absolute 0 02 Thousand/uL      Lymphocytes Absolute 0 59 Thousands/µL      Monocytes Absolute 0 52 Thousand/µL      Eosinophils Absolute 0 24 Thousand/µL      Basophils Absolute 0 03 Thousands/µL                  No orders to display              Procedures  Procedures         ED Course  ED Course as of 02/24/23 2106 Fri Feb 24, 2023 1942 WBC(!): 3 92   2100 Patient reports no significant change in symptoms  Discussed lab results  Mild leukopenia  Recommended continuing to increase diet  Noted BRAT diet as well as yogurt and probiotics  Stool culture still pending  We will follow-up with PCP  Medical Decision Making  70-year-old male undergoing radiation treatment for prostate cancer presented for evaluation of about a week of diarrhea  Had seen PCP 2 days ago and had outpatient lab work, abdominal x-ray which were essentially unremarkable  He has no significant change in symptoms  Still having 3-4 loose to watery stools daily  No blood  His C  difficile testing was negative  No recent antibiotics  States he just feels fatigued, somewhat decreased appetite  No abdominal tenderness on exam   His lab work was unremarkable here  He was given 1 L of Isolyte and Bentyl without any significant changes  Stable for discharge home  Discussed dietary recommendations and PCP follow-up  Return if worse  Diarrhea: acute illness or injury  Amount and/or Complexity of Data Reviewed  Labs: ordered   Decision-making details documented in ED Course  Risk  Prescription drug management  Disposition  Final diagnoses:   Diarrhea     Time reflects when diagnosis was documented in both MDM as applicable and the Disposition within this note     Time User Action Codes Description Comment    2/24/2023  9:04 PM Heidi November Add [R19 7] Diarrhea       ED Disposition     ED Disposition   Discharge    Condition   Stable    Date/Time   Fri Feb 24, 2023  9:04 PM    Comment   [facility] [place] discharge to home/self care  Follow-up Information     Follow up With Specialties Details Why Contact Info Additional Information    [facility] Emergency Medicine  If symptoms worsen [address] Emergency Department,  [address], [place], [place], [address]          Patient's Medications   Discharge Prescriptions    No medications on file       No discharge procedures on file      PDMP Review       Value Time User    PDMP Reviewed  Yes 9/23/2022  1:58 PM Traci San MD          ED Provider  Electronically Signed by           Debbie Negrete MD  02/24/23 6521 32

## 2023-02-27 ENCOUNTER — APPOINTMENT (OUTPATIENT)
Dept: RADIATION ONCOLOGY | Facility: HOSPITAL | Age: 63
End: 2023-02-27
Attending: STUDENT IN AN ORGANIZED HEALTH CARE EDUCATION/TRAINING PROGRAM

## 2023-02-27 ENCOUNTER — TELEPHONE (OUTPATIENT)
Dept: FAMILY MEDICINE CLINIC | Facility: OTHER | Age: 63
End: 2023-02-27

## 2023-02-27 DIAGNOSIS — R19.7 DIARRHEA, UNSPECIFIED TYPE: Primary | ICD-10-CM

## 2023-02-27 RX ORDER — LOPERAMIDE HYDROCHLORIDE 2 MG/1
2 TABLET ORAL 4 TIMES DAILY PRN
Qty: 16 TABLET | Refills: 0 | Status: SHIPPED | OUTPATIENT
Start: 2023-02-27 | End: 2023-03-03

## 2023-02-27 NOTE — TELEPHONE ENCOUNTER
Pt's spouse inquiring about lab results  Pt still having diarrhea and stomach discomfort  Please advise  Thank you

## 2023-02-27 NOTE — TELEPHONE ENCOUNTER
Second attempt made today to reach Aaliyah Melo to establish care and discuss his nutrition  Spoke with wife, Isabela Pineda, today because pt was not available  Wife reports pt has been having diarrhea for the past 8-9 days  She reports that he did not have any diarrhea starting yesterday and pt is now on a low- fiber diet (was eating a high fiber diet prior)  Today's BM was a little bit formed  Isabela Pineda reports that pt has early stages of Alzhemier's  Discussed oncology nutrition services available (options for in-person and phone consultation) and the benefits of meeting for a consultation  Wife reports she wants to hold off on setting up an appt at this time since he has been doing a little bit better  Education provided on a low-fiber diet for diarrhea and discussed tips on how to slowly incorporate fiber back into diet (as tolerated) once medically appropriate  Wife appreciative of discussion  Will e-mail her handouts to support today's discussion  E-mailed to pt ([email or web address]'): [employer] Eating Hints Book, Diarrhea handout, Low Fiber Diet handout, Fiber for Bowel Management, and 5 Menus to Increase Fiber    Wife has RD contact info and will reach out on a prn basis

## 2023-02-27 NOTE — TELEPHONE ENCOUNTER
Wife called back and was notified of the lab results from dr [place] Torres  She said they did take patient to get a Xray done and the report is in his chart  But they would like the results   He does have an upcoming appointment on 3/1/2023 with   Dr Valerie Cruz to review all

## 2023-02-27 NOTE — PROGRESS NOTES
S:     Patient called that he is till experiencing diarrhea and stomach discomfort.      O:     Component      Latest Ref Rng & Units 2/24/2023   WBC      4.31 - 10.16 Thousand/uL 3.92 (L)   Red Blood Cell Count      3.88 - 5.62 Million/uL 4.37   Hemoglobin      12.0 - 17.0 g/dL 13.6   HCT      36.5 - 49.3 % 40.5   MCV      82 - 98 fL 93   MCH      26.8 - 34.3 pg 31.1   MCHC      31.4 - 37.4 g/dL 33.6   RDW      11.6 - 15.1 % 12.6   MPV      8.9 - 12.7 fL 8.7 (L)   Platelet Count      294 - 390 Thousands/uL 212   nRBC      /100 WBCs 0   Neutrophils %      43 - 75 % 64   Immat GRANS %      0 - 2 % 1   Lymphocytes Relative      14 - 44 % 15   Monocytes Relative      4 - 12 % 13 (H)   Eosinophils      0 - 6 % 6   Basophils Relative      0 - 1 % 1   Absolute Neutrophils      1.85 - 7.62 Thousands/µL 2.52   Immature Grans Absolute      0.00 - 0.20 Thousand/uL 0.02   Lymphocytes Absolute      0.60 - 4.47 Thousands/µL 0.59 (L)   Absolute Monocytes      0.17 - 1.22 Thousand/µL 0.52   Absolute Eosinophils      0.00 - 0.61 Thousand/µL 0.24   Basophils Absolute      0.00 - 0.10 Thousands/µL 0.03     Component      Latest Ref Rng & Units 2/24/2023   Magnesium      1.9 - 2.7 mg/dL 2.1     Component      Latest Ref Rng & Units 2/24/2023   Sodium      135 - 147 mmol/L 140   Potassium      3.5 - 5.3 mmol/L 3.7   Chloride      96 - 108 mmol/L 104   CO2      21 - 32 mmol/L 28   Anion Gap      4 - 13 mmol/L 8   BUN      5 - 25 mg/dL 11   Creatinine      0.60 - 1.30 mg/dL 0.91   Glucose, Random      65 - 140 mg/dL 88   Calcium      8.4 - 10.2 mg/dL 9.5   AST      13 - 39 U/L 21   ALT      7 - 52 U/L 25   Alkaline Phosphatase      34 - 104 U/L 47   Total Protein      6.4 - 8.4 g/dL 7.4   Albumin      3.5 - 5.0 g/dL 4.4   TOTAL BILIRUBIN      0.20 - 1.00 mg/dL 0.44   eGFR      ml/min/1.73sq [facility]

## 2023-02-28 ENCOUNTER — APPOINTMENT (OUTPATIENT)
Dept: RADIATION ONCOLOGY | Facility: HOSPITAL | Age: 63
End: 2023-02-28
Attending: STUDENT IN AN ORGANIZED HEALTH CARE EDUCATION/TRAINING PROGRAM

## 2023-03-01 ENCOUNTER — OFFICE VISIT (OUTPATIENT)
Dept: FAMILY MEDICINE CLINIC | Facility: OTHER | Age: 63
End: 2023-03-01

## 2023-03-01 ENCOUNTER — APPOINTMENT (OUTPATIENT)
Dept: RADIATION ONCOLOGY | Facility: HOSPITAL | Age: 63
End: 2023-03-01
Attending: STUDENT IN AN ORGANIZED HEALTH CARE EDUCATION/TRAINING PROGRAM

## 2023-03-01 VITALS
WEIGHT: 164.6 LBS | HEIGHT: 69 IN | TEMPERATURE: 97.8 F | DIASTOLIC BLOOD PRESSURE: 70 MMHG | RESPIRATION RATE: 18 BRPM | SYSTOLIC BLOOD PRESSURE: 108 MMHG | BODY MASS INDEX: 24.38 KG/M2 | HEART RATE: 73 BPM

## 2023-03-01 DIAGNOSIS — G30.0 EARLY ONSET ALZHEIMER'S DEMENTIA WITHOUT BEHAVIORAL DISTURBANCE (HCC): ICD-10-CM

## 2023-03-01 DIAGNOSIS — Z92.3 HISTORY OF RADIATION THERAPY: ICD-10-CM

## 2023-03-01 DIAGNOSIS — R19.7 DIARRHEA, UNSPECIFIED TYPE: ICD-10-CM

## 2023-03-01 DIAGNOSIS — R11.0 NAUSEA: ICD-10-CM

## 2023-03-01 DIAGNOSIS — D84.9 IMMUNOCOMPROMISED (HCC): ICD-10-CM

## 2023-03-01 DIAGNOSIS — F02.80 EARLY ONSET ALZHEIMER'S DEMENTIA WITHOUT BEHAVIORAL DISTURBANCE (HCC): ICD-10-CM

## 2023-03-01 DIAGNOSIS — C61 PROSTATE CANCER (HCC): ICD-10-CM

## 2023-03-01 DIAGNOSIS — A08.4 VIRAL GASTROENTERITIS: Primary | ICD-10-CM

## 2023-03-01 PROBLEM — M16.12 ARTHRITIS OF LEFT HIP: Status: ACTIVE | Noted: 2019-08-20

## 2023-03-01 NOTE — PROGRESS NOTES
Name: Nelly Vizcarra      : 1960      MRN: [id number]  Encounter Provider: Loni Varghese MD  Encounter Date: 3/1/2023   Encounter department: [address] [place] [address] Dr [place]fall  Viral gastroenteritis    2  Early onset Alzheimer's dementia without behavioral disturbance [facility])  -     Ambulatory Referral to Social Work Care Management Program; Future    3  Prostate cancer ([facility] [address] )    4  Immunocompromised ([facility] [address] )    5  History of radiation therapy    6  Nausea    7  Diarrhea, unspecified type          Mr Jp Zuniga is a 60year old male with metastatic prostate cancer currently undergoing treatment with radiation therapy  Patient is having improvement of his frequent diarrhea  Imaging studies did not reveal any evidence of obstruction, and lab studies did not support an acute bacterial infection or metabolic derrangement  His symptoms are improving slowly, and he's been able to resume his routines  Suspect symptoms were likely 2/2 acute viral gastroenteritis  Patient encouraged to continue to stay well hydrated and to call or return to office if he experiences regression of his symptoms  Patient to follow up in 4 weeks for annual physical        Subjective      Patient presents today for follow-up on his chronic diarrhea  Patient reports his symptoms are slowly improving, though he is still having some episodic diarrhea  Patient endorses fatigue, but denies fevers, chills, nausea, vomiting, or abdominal pain  He's been able to eat more of the foods he enjoys, and has been staying well hydrated  He has resumed going for his radiation treatments, which he previously had to miss 2/2 chronic diarrhea  Patient's wife is asking for recommendations on memory-care day care services for the patient  She will be starting group therapy soon to help with her caretaker burnout  Review of Systems   Constitutional: Positive for fatigue  Negative for unexpected weight change  HENT: Negative for congestion and sore throat  Eyes: Negative for redness  Respiratory: Negative for shortness of breath  Cardiovascular: Negative for chest pain and leg swelling  Gastrointestinal: Positive for diarrhea  Negative for abdominal pain, constipation, nausea and vomiting  Endocrine: Negative for polydipsia  Genitourinary: Negative for decreased urine volume  Musculoskeletal: Negative for gait problem  Skin: Negative for color change and rash  Allergic/Immunologic: Positive for immunocompromised state  Neurological: Negative for speech difficulty  Hematological: Negative for adenopathy  Psychiatric/Behavioral: Positive for confusion  Current Outpatient Medications on File Prior to Visit   Medication Sig   • Alphagan P 0 1 % INSTILL 1 DROP IN BOTH EYES TWICE DAILY   • donepezil (ARICEPT) 10 mg tablet Take 1 tablet (10 mg total) by mouth daily   • escitalopram (LEXAPRO) 10 mg tablet Take 1 tablet (10 mg total) by mouth daily   • loperamide (Imodium A-D) 2 MG tablet Take 1 tablet (2 mg total) by mouth 4 (four) times a day as needed for diarrhea for up to 4 days   • LUMIGAN 0 01 % ophthalmic drops    • memantine (NAMENDA) 10 mg tablet Take 1 tablet (10 mg total) by mouth 2 (two) times a day       Objective     /70   Pulse 73   Temp 97 8 °F (36 6 °C)   Resp 18   Ht 5' 9" (1 753 m)   Wt 74 7 kg (164 lb 9 6 oz)   BMI 24 31 kg/m²     Physical Exam  Vitals reviewed  Constitutional:       General: He is awake  He is not in acute distress  Appearance: Normal appearance  He is normal weight  He is not ill-appearing, toxic-appearing or diaphoretic  HENT:      Head: Normocephalic and atraumatic  Nose: Nose normal  No congestion or rhinorrhea  Mouth/Throat:      Mouth: Mucous membranes are moist    Eyes:      General: No scleral icterus  Right eye: No discharge  Left eye: No discharge        Conjunctiva/sclera: Conjunctivae normal  Cardiovascular:      Rate and Rhythm: Normal rate and regular rhythm  Pulses: Normal pulses  Heart sounds: Normal heart sounds  No murmur heard  No gallop  Pulmonary:      Effort: Pulmonary effort is normal  No respiratory distress  Breath sounds: Normal breath sounds  No stridor  No wheezing, rhonchi or rales  Chest:      Chest wall: No tenderness  Abdominal:      General: Abdomen is flat  Bowel sounds are normal  There is no distension  Palpations: Abdomen is soft  Tenderness: There is no abdominal tenderness  There is no guarding  Musculoskeletal:         General: No swelling, tenderness, deformity or signs of injury  Cervical back: Neck supple  No tenderness  Right lower leg: No edema  Left lower leg: No edema  Lymphadenopathy:      Cervical: No cervical adenopathy  Skin:     General: Skin is warm and dry  Capillary Refill: Capillary refill takes less than 2 seconds  Coloration: Skin is not jaundiced or pale  Findings: No bruising, erythema, lesion or rash  Neurological:      Mental Status: He is alert  Mental status is at baseline  Sensory: No sensory deficit  Motor: No weakness  Gait: Gait normal    Psychiatric:         Attention and Perception: Attention normal          Mood and Affect: Mood normal          Speech: Speech normal          Behavior: Behavior normal  Behavior is cooperative  Thought Content: Thought content normal          Cognition and Memory: Memory is impaired           Judgment: Judgment normal            Component      Latest Ref Rng & Units 2/24/2023   WBC      4 31 - 10 16 Thousand/uL 3 92 (L)   Red Blood Cell Count      3 88 - 5 62 Million/uL 4 37   Hemoglobin      12 0 - 17 0 g/dL 13 6   HCT      36 5 - 49 3 % 40 5   MCV      82 - 98 fL 93   MCH      26 8 - 34 3 pg 31 1   MCHC      31 4 - 37 4 g/dL 33 6   RDW      11 6 - 15 1 % 12 6   MPV      8 9 - 12 7 fL 8 7 (L)   Platelet Count      466 - 390 Thousands/uL 212   nRBC      /100 WBCs 0   Neutrophils %      43 - 75 % 64   Immat GRANS %      0 - 2 % 1   Lymphocytes Relative      14 - 44 % 15   Monocytes Relative      4 - 12 % 13 (H)   Eosinophils      0 - 6 % 6   Basophils Relative      0 - 1 % 1   Absolute Neutrophils      1 85 - 7 62 Thousands/µL 2 52   Immature Grans Absolute      0 00 - 0 20 Thousand/uL 0 02   Lymphocytes Absolute      0 60 - 4 47 Thousands/µL 0 59 (L)   Absolute Monocytes      0 17 - 1 22 Thousand/µL 0 52   Absolute Eosinophils      0 00 - 0 61 Thousand/µL 0 24   Basophils Absolute      0 00 - 0 10 Thousands/µL 0 03     Component      Latest Ref Rng & Units 2/24/2023   Sodium      135 - 147 mmol/L 140   Potassium      3 5 - 5 3 mmol/L 3 7   Chloride      96 - 108 mmol/L 104   CO2      21 - 32 mmol/L 28   Anion Gap      4 - 13 mmol/L 8   BUN      5 - 25 mg/dL 11   Creatinine      0 60 - 1 30 mg/dL 0 91   Glucose, Random      65 - 140 mg/dL 88   Calcium      8 4 - 10 2 mg/dL 9 5   AST      13 - 39 U/L 21   ALT      7 - 52 U/L 25   Alkaline Phosphatase      34 - 104 U/L 47   Total Protein      6 4 - 8 4 g/dL 7 4   Albumin      3 5 - 5 0 g/dL 4 4   TOTAL BILIRUBIN      0 20 - 1 00 mg/dL 0 44   eGFR      ml/min/1 73sq m 89     Component      Latest Ref Rng & Units 2/23/2023   Salmonella sp PCR      None Detected None Detected   Shigella sp/Enteroinvasive E  coli (EIEC) PCR      None Detected None Detected   Campylobacter sp (jejuni and coli) PCR      None Detected None Detected   Shiga toxin 1/Shiga toxin 2 genes PCR      None Detected None Detected     Component      Latest Ref Rng & Units 2/23/2023   C difficile toxin by PCR      Negative Negative     Component      Latest Ref Rng & Units 2/22/2023   Procalcitonin      <=0 25 ng/ml <0 05     Component      Latest Ref Rng & Units 2/22/2023   C-REACTIVE PROTEIN      <3 0 mg/L 1 2     Abdominal X-Ray 2/23/2023     "FINDINGS:     There is a nonobstructive bowel gas pattern       No free air beneath the hemidiaphragms       No pathologic calcifications or soft tissue masses evident       Mild degenerative changes at L4-L5  Bilateral total hip arthroplasty without evident complication      Examination of the chest reveals a normal cardiomediastinal silhouette   Lungs are clear      IMPRESSION:     Nonobstructive bowel gas pattern         Workstation performed: [id number]"Anca Urena MD   [facility] PGY-1

## 2023-03-01 NOTE — PATIENT INSTRUCTIONS
Chronic Diarrhea   WHAT YOU NEED TO KNOW:   Diarrhea is chronic when it lasts more than 4 weeks  You may have 3 or more episodes of diarrhea each day  DISCHARGE INSTRUCTIONS:   Return to the emergency department if:   Your skin, mouth, and tongue are dry, and you feel very thirsty  You have blood or pus in your bowel movement  You have trouble eating, drinking, or keeping food down  You have severe abdominal pain  You feel lightheaded, weak, or you faint  Your heart beats faster than normal or you have trouble breathing  You are confused or cannot think clearly  Contact your healthcare provider if:   You have a fever  You have new symptoms  Your symptoms do not improve, or they get worse  You have questions or concerns about your condition or care  Medicines: You may be given medicine to slow your diarrhea, or treat an infection  You may also be given medicines to decrease inflammation in your intestines  Take your medicine as directed  Contact your healthcare provider if you think your medicine is not helping or if you have side effects  Tell your provider if you are allergic to any medicine  Keep a list of the medicines, vitamins, and herbs you take  Include the amounts, and when and why you take them  Bring the list or the pill bottles to follow-up visits  Carry your medicine list with you in case of an emergency  Follow up with your doctor as directed:  Write down your questions so you remember to ask them during your visits  Self-care:   Drink more liquids  to replace body fluids lost through diarrhea  You may also need to drink an oral rehydration solution (ORS)  An ORS has the right amounts of sugar, salt, and minerals in water to replace body fluids  ORS can be found at most grocery stores or pharmacies  Ask how much liquid to drink each day and which liquids are best for you  Do not drink liquids with caffeine or alcohol   These can increase your risk for dehydration  Do not drink or eat foods that may make your symptoms worse  These include milk and dairy products, greasy and fatty foods, spicy foods, caffeine, and alcohol  Keep a food diary to see if your symptoms are caused by certain foods  Bring this to your follow-up visits  Eat foods that are easy to digest   These include bananas, boiled potatoes, cooked carrots, cooked chicken, plain rice, and toast  You can also try yogurt and applesauce  Wash your hands often  Germs on your hands can get into your mouth and cause diarrhea  Use soap and water  Use an alcohol-based hand rub if soap and water are not available  Wash your hands after you use the bathroom, change a child's diaper, or sneeze  Wash your hands before you prepare or eat food  © Copyright [employer] 2022 Information is for End User's use only and may not be sold, redistributed or otherwise used for commercial purposes  The above information is an  only  It is not intended as medical advice for individual conditions or treatments  Talk to your doctor, nurse or pharmacist before following any medical regimen to see if it is safe and effective for you

## 2023-03-02 ENCOUNTER — APPOINTMENT (OUTPATIENT)
Dept: RADIATION ONCOLOGY | Facility: HOSPITAL | Age: 63
End: 2023-03-02
Attending: STUDENT IN AN ORGANIZED HEALTH CARE EDUCATION/TRAINING PROGRAM

## 2023-03-02 DIAGNOSIS — G30.0 EARLY ONSET ALZHEIMER'S DEMENTIA WITHOUT BEHAVIORAL DISTURBANCE (HCC): ICD-10-CM

## 2023-03-02 DIAGNOSIS — F02.80 EARLY ONSET ALZHEIMER'S DEMENTIA WITHOUT BEHAVIORAL DISTURBANCE (HCC): ICD-10-CM

## 2023-03-02 DIAGNOSIS — C61 PROSTATE CANCER (HCC): Primary | ICD-10-CM

## 2023-03-03 ENCOUNTER — APPOINTMENT (OUTPATIENT)
Dept: RADIATION ONCOLOGY | Facility: HOSPITAL | Age: 63
End: 2023-03-03
Attending: STUDENT IN AN ORGANIZED HEALTH CARE EDUCATION/TRAINING PROGRAM

## 2023-03-05 ENCOUNTER — PATIENT OUTREACH (OUTPATIENT)
Dept: CASE MANAGEMENT | Facility: HOSPITAL | Age: 63
End: 2023-03-05

## 2023-03-06 ENCOUNTER — APPOINTMENT (OUTPATIENT)
Dept: RADIATION ONCOLOGY | Facility: HOSPITAL | Age: 63
End: 2023-03-06
Attending: STUDENT IN AN ORGANIZED HEALTH CARE EDUCATION/TRAINING PROGRAM

## 2023-03-06 ENCOUNTER — PATIENT OUTREACH (OUTPATIENT)
Dept: FAMILY MEDICINE CLINIC | Facility: OTHER | Age: 63
End: 2023-03-06

## 2023-03-06 NOTE — PROGRESS NOTES
MSW received referral and chart reviewed  MSW will initate initial assessment and DT screening  MSW will follow

## 2023-03-06 NOTE — PROGRESS NOTES
Received referral to outreach pt, however, pt is under [facility]  SW cancelled referral to self, PCP aware/

## 2023-03-07 ENCOUNTER — APPOINTMENT (OUTPATIENT)
Dept: RADIATION ONCOLOGY | Facility: HOSPITAL | Age: 63
End: 2023-03-07
Attending: STUDENT IN AN ORGANIZED HEALTH CARE EDUCATION/TRAINING PROGRAM

## 2023-03-08 ENCOUNTER — APPOINTMENT (OUTPATIENT)
Dept: RADIATION ONCOLOGY | Facility: HOSPITAL | Age: 63
End: 2023-03-08
Attending: STUDENT IN AN ORGANIZED HEALTH CARE EDUCATION/TRAINING PROGRAM

## 2023-03-22 ENCOUNTER — TELEPHONE (OUTPATIENT)
Dept: FAMILY MEDICINE CLINIC | Facility: OTHER | Age: 63
End: 2023-03-22

## 2023-03-22 NOTE — TELEPHONE ENCOUNTER
The patient's wife came into the office stating she needs a letter for jury duty  She received a letter stating she is getting summons and she is her 's main caregiver  The patient can not be left alone because of his dementia  Can you provide her with a letter to excuse her from jury duty? She will need the letter by Friday  Please advise    Thank you!

## 2023-03-22 NOTE — LETTER
Date: 3/23/2023    To whom it may concern: This is to certify that Lennox Cord,  of Zoë Nunn, has been under my care for the following diagnosis: Alzheimer's Dementia      I feel that Zoë Nunn is unable to serve on [address] [place] [address] Duty at this time as she is her 's primary medical caregiver             Sincerely,      Sang Romero MD

## 2023-03-24 ENCOUNTER — OFFICE VISIT (OUTPATIENT)
Dept: FAMILY MEDICINE CLINIC | Facility: OTHER | Age: 63
End: 2023-03-24

## 2023-03-24 VITALS
TEMPERATURE: 98 F | DIASTOLIC BLOOD PRESSURE: 78 MMHG | SYSTOLIC BLOOD PRESSURE: 110 MMHG | RESPIRATION RATE: 18 BRPM | BODY MASS INDEX: 23.11 KG/M2 | HEIGHT: 69 IN | HEART RATE: 115 BPM | OXYGEN SATURATION: 98 % | WEIGHT: 156 LBS

## 2023-03-24 DIAGNOSIS — R11.0 NAUSEA: ICD-10-CM

## 2023-03-24 DIAGNOSIS — R19.4 FREQUENT BOWEL MOVEMENTS: ICD-10-CM

## 2023-03-24 DIAGNOSIS — R10.13 EPIGASTRIC PAIN: Primary | ICD-10-CM

## 2023-03-24 RX ORDER — ONDANSETRON 4 MG/1
4 TABLET, FILM COATED ORAL EVERY 8 HOURS PRN
Qty: 20 TABLET | Refills: 0 | Status: SHIPPED | OUTPATIENT
Start: 2023-03-24

## 2023-03-24 RX ORDER — ALUMINUM HYDROXIDE, MAGNESIUM HYDROXIDE, SIMETHICONE 400; 400; 40 MG/10ML; MG/10ML; MG/10ML
30 SUSPENSION ORAL
Qty: 710 ML | Refills: 0 | Status: SHIPPED | OUTPATIENT
Start: 2023-03-24

## 2023-03-24 NOTE — PATIENT INSTRUCTIONS
Abdominal Pain   AMBULATORY CARE:   Abdominal pain  may be felt anywhere between the bottom of your rib cage and your groin  Acute pain usually lasts less than 3 months  Chronic pain lasts longer than 3 months  Your pain may be sharp or dull  The pain may stay in the same place or move around  You may have the pain all the time, or it may come and go  Depending on the cause, you may also have nausea, vomiting, fever, or diarrhea  Call your local emergency number (911 in the [address] [facility]) if:   You have chest pain or shortness of breath  Seek care immediately if:   You have pulsing pain in your upper abdomen or lower back that suddenly becomes constant  Your pain is in the right lower abdominal area and worsens with movement  You have a fever over 100 4°F (38°C) or shaking chills  You are vomiting and cannot keep food or liquids down  Your pain does not improve or gets worse over the next 8 to 12 hours  You see blood in your vomit or bowel movements, or they look black and tarry  Your skin or the whites of your eyes turn yellow  You are a woman and have a large amount of vaginal bleeding that is not your monthly period  Call your doctor if:   You have pain in your lower back  You are a man and have pain in your testicles  You have pain when you urinate  You have questions or concerns about your condition or care  The cause of your abdominal pain  may not be found  The following are common causes:  Overeating, gas pains, or food poisoning    Constipation or diarrhea    An injury    Appendicitis, a hernia, or an ulcer    Infection or a blockage    A liver, gallbladder, or kidney condition    Treatment for abdominal pain  may include any of the following:  Medicines  may be given to calm your stomach or prevent vomiting  Prescription pain medicine  may be given  Ask your healthcare provider how to take this medicine safely  Some prescription pain medicines contain acetaminophen  Do not take other medicines that contain acetaminophen without talking to your healthcare provider  Too much acetaminophen may cause liver damage  Prescription pain medicine may cause constipation  Ask your healthcare provider how to prevent or treat constipation  Relaxation therapy  may be used along with pain medicine  Surgery  may be needed, depending on the cause  Manage or prevent abdominal pain:   Apply heat  on your abdomen for 20 to 30 minutes every 2 hours for as many days as directed  Heat helps decrease pain and muscle spasms  Make changes to the foods you eat, if needed  Do not eat foods that cause abdominal pain or other symptoms  Eat small meals more often  The following changes may also help:    Eat more high-fiber foods if you are constipated  High-fiber foods include fruits, vegetables, whole-grain foods, and legumes such as patel beans  Do not eat foods that cause gas if you have bloating  Examples include broccoli, cabbage, beans, and carbonated drinks  Do not eat foods or drinks that contain sorbitol or fructose if you have diarrhea and bloating  Some examples are fruit juices, candy, jelly, and sugar-free gum  Do not eat high-fat foods  Examples include fried foods, cheeseburgers, hot dogs, and desserts  Make changes to the liquids you drink, if needed  Do not drink liquids that cause pain or make it worse, such as orange juice  Drink liquids throughout the day to stay hydrated  The following changes may also help:    Drink more liquids to prevent dehydration from diarrhea or vomiting  Ask your healthcare provider how much liquid to drink each day and which liquids are best for you  Limit or do not have caffeine  Caffeine may make symptoms such as heartburn or nausea worse  Limit or do not drink alcohol  Alcohol can make your abdominal pain worse  Ask your healthcare provider if it is okay for you to drink alcohol   Also ask how much is okay for you to drink  A drink of alcohol is 12 ounces of beer, ½ ounce of liquor, or 5 ounces of wine  Keep a diary of your abdominal pain  A diary may help your healthcare provider learn what is causing your pain  Include when the pain happens, how long it lasts, and what the pain feels like  Write down any other symptoms you have with abdominal pain  Also write down what you eat, and what symptoms you have after you eat  Manage stress  Stress may cause abdominal pain  Your healthcare provider may recommend relaxation techniques and deep breathing exercises to help decrease your stress  Your healthcare provider may recommend you talk to someone about your stress or anxiety, such as a counselor or a friend  Get plenty of sleep  Exercise regularly  Do not smoke  Nicotine and other chemicals in cigarettes can damage your esophagus and stomach  Ask your healthcare provider for information if you currently smoke and need help to quit  E-cigarettes or smokeless tobacco still contain nicotine  Talk to your healthcare provider before you use these products  Follow up with your doctor as directed:  Write down your questions so you remember to ask them during your visits  © Copyright [facility] 2022 Information is for End User's use only and may not be sold, redistributed or otherwise used for commercial purposes  The above information is an  only  It is not intended as medical advice for individual conditions or treatments  Talk to your doctor, nurse or pharmacist before following any medical regimen to see if it is safe and effective for you

## 2023-03-24 NOTE — PROGRESS NOTES
Name: Precious Garcia      : 1960      MRN: [id number]  Encounter Provider: Makenna Ramirez MD  Encounter Date: 3/24/2023   Encounter department: [address] [place] [address] Dr     1  Epigastric pain  -     aluminum-magnesium hydroxide-simethicone (MAALOX) 157-204-27 MG/5ML SUSP; Take 30 mL by mouth 4 (four) times a day (before meals and at bedtime)  -     Lipase; Future  -     Comprehensive metabolic panel; Future  -     CBC and differential; Future    2  Frequent bowel movements  -     Comprehensive metabolic panel; Future    3  Nausea  -     aluminum-magnesium hydroxide-simethicone (MAALOX) 869-521-33 MG/5ML SUSP; Take 30 mL by mouth 4 (four) times a day (before meals and at bedtime)  -     Lipase; Future  -     Comprehensive metabolic panel; Future  -     CBC and differential; Future  -     ondansetron (ZOFRAN) 4 mg tablet; Take 1 tablet (4 mg total) by mouth every 8 (eight) hours as needed for nausea or vomiting  Symptoms likely related to radiation therapy  Seems mostly like gas pain  Will send ondansetron and Maalox to pharmacy for symptom management  Will also check lipase and repeat CMP and CBC  If no improvement, may benefit from antispasmodic and/or referral to GI for further evaluation  Subjective      Patient presents with his wife and daughter for evaluation of continued abdominal pain, diarrhea, nausea, and loss of appetite  He completed radiation for prostate cancer on 3/8 and continues to have GI symptoms since his last visit in our office on 3/1  He reports central abdominal pain with recurrent gurgling which lasts for seconds to minutes at a time as well as nausea, dry heaving, and frequent small loose stools which are nonbloody  He endorses weight loss and fatigue but denies actual vomiting and reports mild improvement of his vomiting and diarrhea during the last month      Review of Systems   Constitutional: Positive for appetite change, fatigue and "unexpected weight change  Cardiovascular: Negative for chest pain  Gastrointestinal: Positive for abdominal pain, diarrhea (loose) and nausea  Negative for blood in stool, constipation and vomiting  Genitourinary: Negative for difficulty urinating  Current Outpatient Medications on File Prior to Visit   Medication Sig   • Alphagan P 0 1 % INSTILL 1 DROP IN BOTH EYES TWICE DAILY   • donepezil (ARICEPT) 10 mg tablet Take 1 tablet (10 mg total) by mouth daily   • escitalopram (LEXAPRO) 10 mg tablet Take 1 tablet (10 mg total) by mouth daily   • LUMIGAN 0 01 % ophthalmic drops    • memantine (NAMENDA) 10 mg tablet Take 1 tablet (10 mg total) by mouth 2 (two) times a day       Objective     /78   Pulse (!) 115   Temp 98 °F (36 7 °C)   Resp 18   Ht 5' 9\" (1 753 m)   Wt 70 8 kg (156 lb)   SpO2 98%   BMI 23 04 kg/m²     Physical Exam  Vitals reviewed  Constitutional:       General: He is not in acute distress  Appearance: Normal appearance  He is well-developed  He is not diaphoretic  HENT:      Head: Normocephalic and atraumatic  Right Ear: External ear normal       Left Ear: External ear normal       Nose: Nose normal       Mouth/Throat:      Mouth: Mucous membranes are moist       Pharynx: Oropharynx is clear  Eyes:      Extraocular Movements: Extraocular movements intact  Conjunctiva/sclera: Conjunctivae normal       Pupils: Pupils are equal, round, and reactive to light  Cardiovascular:      Rate and Rhythm: Normal rate and regular rhythm  Pulses: Normal pulses  Heart sounds: Normal heart sounds  No murmur heard  No friction rub  No gallop  Pulmonary:      Effort: Pulmonary effort is normal  No respiratory distress  Breath sounds: Normal breath sounds  No stridor  No wheezing, rhonchi or rales  Abdominal:      General: Abdomen is flat  Bowel sounds are increased  There is no distension  Palpations: Abdomen is soft  There is no mass        " Tenderness: There is no abdominal tenderness  There is no right CVA tenderness, left CVA tenderness or guarding  Musculoskeletal:         General: Normal range of motion  Cervical back: Normal range of motion and neck supple  Right lower leg: No edema  Left lower leg: No edema  Lymphadenopathy:      Cervical: No cervical adenopathy  Skin:     General: Skin is warm and dry  Findings: No rash  Neurological:      General: No focal deficit present  Mental Status: He is alert  Mental status is at baseline     Psychiatric:         Mood and Affect: Mood normal          Behavior: Behavior normal        Marni Rodríguez MD

## 2023-03-27 ENCOUNTER — LAB (OUTPATIENT)
Dept: LAB | Facility: AMBULARY SURGERY CENTER | Age: 63
End: 2023-03-27

## 2023-03-27 DIAGNOSIS — R11.0 NAUSEA: ICD-10-CM

## 2023-03-27 DIAGNOSIS — R19.4 FREQUENT BOWEL MOVEMENTS: ICD-10-CM

## 2023-03-27 DIAGNOSIS — R10.13 EPIGASTRIC PAIN: ICD-10-CM

## 2023-03-27 LAB
ALBUMIN SERPL BCP-MCNC: 3.3 G/DL (ref 3.5–5)
ALP SERPL-CCNC: 53 U/L (ref 46–116)
ALT SERPL W P-5'-P-CCNC: 46 U/L (ref 12–78)
ANION GAP SERPL CALCULATED.3IONS-SCNC: 3 MMOL/L (ref 4–13)
AST SERPL W P-5'-P-CCNC: 13 U/L (ref 5–45)
BASOPHILS # BLD AUTO: 0.02 THOUSANDS/ÂΜL (ref 0–0.1)
BASOPHILS NFR BLD AUTO: 1 % (ref 0–1)
BILIRUB SERPL-MCNC: 0.64 MG/DL (ref 0.2–1)
BUN SERPL-MCNC: 12 MG/DL (ref 5–25)
CALCIUM ALBUM COR SERPL-MCNC: 9.8 MG/DL (ref 8.3–10.1)
CALCIUM SERPL-MCNC: 9.2 MG/DL (ref 8.3–10.1)
CHLORIDE SERPL-SCNC: 102 MMOL/L (ref 96–108)
CO2 SERPL-SCNC: 30 MMOL/L (ref 21–32)
CREAT SERPL-MCNC: 0.87 MG/DL (ref 0.6–1.3)
EOSINOPHIL # BLD AUTO: 0.06 THOUSAND/ÂΜL (ref 0–0.61)
EOSINOPHIL NFR BLD AUTO: 2 % (ref 0–6)
ERYTHROCYTE [DISTWIDTH] IN BLOOD BY AUTOMATED COUNT: 12.6 % (ref 11.6–15.1)
GFR SERPL CREATININE-BSD FRML MDRD: 91 ML/MIN/1.73SQ M
GLUCOSE SERPL-MCNC: 116 MG/DL (ref 65–140)
HCT VFR BLD AUTO: 40.7 % (ref 36.5–49.3)
HGB BLD-MCNC: 13.5 G/DL (ref 12–17)
IMM GRANULOCYTES # BLD AUTO: 0.01 THOUSAND/UL (ref 0–0.2)
IMM GRANULOCYTES NFR BLD AUTO: 0 % (ref 0–2)
LIPASE SERPL-CCNC: 100 U/L (ref 73–393)
LYMPHOCYTES # BLD AUTO: 0.28 THOUSANDS/ÂΜL (ref 0.6–4.47)
LYMPHOCYTES NFR BLD AUTO: 7 % (ref 14–44)
MCH RBC QN AUTO: 31 PG (ref 26.8–34.3)
MCHC RBC AUTO-ENTMCNC: 33.2 G/DL (ref 31.4–37.4)
MCV RBC AUTO: 94 FL (ref 82–98)
MONOCYTES # BLD AUTO: 0.88 THOUSAND/ÂΜL (ref 0.17–1.22)
MONOCYTES NFR BLD AUTO: 22 % (ref 4–12)
NEUTROPHILS # BLD AUTO: 2.75 THOUSANDS/ÂΜL (ref 1.85–7.62)
NEUTS SEG NFR BLD AUTO: 68 % (ref 43–75)
NRBC BLD AUTO-RTO: 0 /100 WBCS
PLATELET # BLD AUTO: 327 THOUSANDS/UL (ref 149–390)
PMV BLD AUTO: 9.7 FL (ref 8.9–12.7)
POTASSIUM SERPL-SCNC: 3.8 MMOL/L (ref 3.5–5.3)
PROT SERPL-MCNC: 6.8 G/DL (ref 6.4–8.4)
RBC # BLD AUTO: 4.35 MILLION/UL (ref 3.88–5.62)
SODIUM SERPL-SCNC: 135 MMOL/L (ref 135–147)
WBC # BLD AUTO: 4 THOUSAND/UL (ref 4.31–10.16)

## 2023-03-29 ENCOUNTER — TELEPHONE (OUTPATIENT)
Dept: FAMILY MEDICINE CLINIC | Facility: OTHER | Age: 63
End: 2023-03-29

## 2023-03-29 NOTE — TELEPHONE ENCOUNTER
Pt wife states Deanna Bearden is still experiencing some epigastric pain  States he is taking the medications but the pain is still there and not much better  Would like to know what PCP recommends

## 2023-03-30 ENCOUNTER — TELEPHONE (OUTPATIENT)
Dept: FAMILY MEDICINE CLINIC | Facility: OTHER | Age: 63
End: 2023-03-30

## 2023-03-30 NOTE — TELEPHONE ENCOUNTER
----- Message from Jany Chowdhury MD sent at 3/30/2023  6:54 AM EDT -----  Please inform patient that labs are not concerning  Lipase is normal  Metabolic panel is essentially normal and blood counts are stable with mildly low white blood cells

## 2023-03-30 NOTE — RESULT ENCOUNTER NOTE
Please inform patient that labs are not concerning  Lipase is normal  Metabolic panel is essentially normal and blood counts are stable with mildly low white blood cells

## 2023-04-19 ENCOUNTER — IOP CHECK (OUTPATIENT)
Dept: URBAN - METROPOLITAN AREA CLINIC 6 | Facility: CLINIC | Age: 63
End: 2023-04-19

## 2023-04-19 DIAGNOSIS — H40.1131: ICD-10-CM

## 2023-04-19 PROCEDURE — 92012 INTRM OPH EXAM EST PATIENT: CPT

## 2023-04-19 ASSESSMENT — TONOMETRY
OD_IOP_MMHG: 18
OS_IOP_MMHG: 19

## 2023-04-19 ASSESSMENT — VISUAL ACUITY
OD_CC: 20/50+2
OS_CC: 20/40

## 2023-04-28 ENCOUNTER — OFFICE VISIT (OUTPATIENT)
Dept: NEUROLOGY | Facility: CLINIC | Age: 63
End: 2023-04-28

## 2023-04-28 ENCOUNTER — TELEPHONE (OUTPATIENT)
Dept: NEUROLOGY | Facility: CLINIC | Age: 63
End: 2023-04-28

## 2023-04-28 VITALS
SYSTOLIC BLOOD PRESSURE: 132 MMHG | HEART RATE: 67 BPM | TEMPERATURE: 97.4 F | HEIGHT: 69 IN | BODY MASS INDEX: 22.05 KG/M2 | DIASTOLIC BLOOD PRESSURE: 65 MMHG | WEIGHT: 148.9 LBS

## 2023-04-28 DIAGNOSIS — F02.80 EARLY ONSET ALZHEIMER'S DEMENTIA WITHOUT BEHAVIORAL DISTURBANCE (HCC): Primary | ICD-10-CM

## 2023-04-28 DIAGNOSIS — G30.0 EARLY ONSET ALZHEIMER'S DEMENTIA WITHOUT BEHAVIORAL DISTURBANCE (HCC): Primary | ICD-10-CM

## 2023-04-28 RX ORDER — BRIMONIDINE TARTRATE/TIMOLOL 0.2%-0.5%
DROPS OPHTHALMIC (EYE)
COMMUNITY
Start: 2023-03-15

## 2023-04-28 RX ORDER — ESCITALOPRAM OXALATE 20 MG/1
10 TABLET ORAL DAILY
Qty: 30 TABLET | Refills: 5 | Status: SHIPPED | OUTPATIENT
Start: 2023-04-28

## 2023-04-28 RX ORDER — SODIUM, POTASSIUM,MAG SULFATES 17.5-3.13G
SOLUTION, RECONSTITUTED, ORAL ORAL
COMMUNITY
Start: 2023-04-26

## 2023-04-28 RX ORDER — TRAMADOL HYDROCHLORIDE 50 MG/1
TABLET ORAL
COMMUNITY
Start: 2023-04-05

## 2023-04-28 NOTE — PATIENT INSTRUCTIONS
-Continue Donepezil 10 mg daily and Namenda 10 mg twice daily    - Increase Lexapro from 10 mg daily to 20 mg daily     -Patient was encouraged to increase mind stimulating activities such as reading, crosswords, word searches, puzzles, Soduku, solitaire, coloring and other brain games  We also discussed the importance of staying physically active and eating a health diet such as the [address] or MIND diet        -Discussed air tags, walkie talkie, lanyard/tags as safety precautions while walking dogs or running     -Consider referral in the future to psychology/talk therapy or psychiatry    -Social workers to call with any available resources      -Discussed some basic Dos when it comes to communication with someone with dementia:  Give short, one sentence explanations  Allow plenty of time for comprehension, and then triple it  Repeat instructions or sentences exactly the same way  Avoid insistence  Try again later  Agree with them or distract them to a different subject or activity  Accept the blame when somethings wrong (even if its fantasy)  Leave the room, if necessary, to avoid confrontations  Respond to the feelings rather than the words  Be patient and cheerful and reassuring  Do go with the flow  Practice 100% forgiveness  Memory loss progresses daily      -Here are some Donts:  Dont reason  Dont argue  Dont confront  Dont remind them they forget  Dont question recent memory  Dont take it personally      -Things that we know are helpful for thinking and memory   Exercise program: gradually increase your physical activity over time  Start small and be patient  Aerobic (cardio) activity is best but incorporate balance, strength and flexibility training as well  Try to get at least 30min 3 times per week   Diet: Mediterranean diet (colorful fruits and vegetables, olive oil, fish, whole grains, very little red meet is any), MIND diet, anti-inflammatory diet     Stay well hydrated: drink 6-8 glasses of water per day   What's good for your heart is good for your brain  Avoid high-salt foods   Sleep: aim for at least 7-8 hours per night   Avoid alcohol and medications like Benadryl (Tylenol PM) or other sedating drugs  Stress management/mindfulness practice:   Talk with our social workers about finding a cognitive behavioral therapists  Try a smart phone terrance like BasisCode or mindfulness for beginners   Try curable for pain    Take a course in Mindfulness Based Stress Reduction (MBSR)   Helen manzano  Read or listen to an audiobook about it:  Mindfulness for beginners   10% happier  The happiness advantage   Social engagement:   Stay in touch with family and friends   Plan a few specific activities for your social health every week   Join a local support group   Volunteer! www [facility] [email or web address] or call [phone]     MIND diet score:  1 point for each component      Green leafy vegetables: at least 6 per week  Other vegetable: at least 1 per day   Berries: at least 2 per week  Red meat: fewer than 4 per week   Fish: at least 1 per week   Poultry: at least 2 per week  Beans: at least 3 per week  Nuts: at least 5 per week  Fast or fried food: less than 1 per week  Olive oil   Butter less: less than 1 table-spoon per day   Cheese: less than 1 serving per week   Pastries/sweets: less than 5 servings per week  Alcohol: no more than 1 serving/ day

## 2023-04-28 NOTE — TELEPHONE ENCOUNTER
----- Message from Oz Roweanda sent at 4/28/2023  9:53 AM EDT -----  Regarding: Resources  Yuki Adams and his wife are asking for any available resources or places for him to go during the day (gym, activities, clubs etc ) that are free/covered by insurance or reasonably priced  He has early onset dementia but it still highly functional and his wife feels it would be good to get him out of the house for a few hours a week to interact with other people  They are limited financially as they pay out of pocket cobra for their insurance now that he is out of work      HERBERT Ramirez

## 2023-04-28 NOTE — PROGRESS NOTES
Patient ID: Olegario Mccord is a 60 y o  male  Assessment/Plan:    Early onset Alzheimer's dementia without behavioral disturbance (HCC)  Olegario Mccord is a 60year old male with early onset Alzheimer's disease   He is maintained on Namenda 10 mg twice daily and Aricept 10 mg daily, as well as Lexapro 10 mg daily   Since his last visit 10/25/23 he has been stable with both good and bad days in terms of memory but remains very independent  His mood can be quite liable  His wife reports an increase in his verbal outbursts and agitation  Today he continues to decline to participate in MoCA testing or memory questioning becoming very agitated when asked about his memory  In the past he has also declined neuropsychologic evaluation  Whit Mckeon denies any neurologic complaints today  Plan as outlined below  Plan:    -Continue Donepezil 10 mg daily and Namenda 10 mg twice daily    - Increase Lexapro from 10 mg daily to 20 mg daily given his recent increase in agitation and verbal outbursts  The patient himself was agreeable to this change, in addition to his wife  -Patient was encouraged to increase mind stimulating activities such as reading, crosswords, word searches, puzzles, Soduku, solitaire, coloring and other brain games  We also discussed the importance of staying physically active and eating a health diet such as the [address] or MIND diet        -Discussed air tags, walkie talkie, lanyard/tags as safety precautions while walking dogs or running     -Consider referral in the future to psychology/talk therapy or psychiatry    -Social workers to call with any available resources for day programs, gyms, etc      -Discussed some basic Dos when it comes to communication with someone with dementia:   Give short, one sentence explanations   Allow plenty of time for comprehension, and then triple it   Repeat instructions or sentences exactly the same way   Avoid insistence   Try again later    Agree with them or distract them to a different subject or activity   Accept the blame when somethings wrong (even if its fantasy)   Leave the room, if necessary, to avoid confrontations   Respond to the feelings rather than the words   Be patient and cheerful and reassuring  Do go with the flow   Practice 100% forgiveness  Memory loss progresses daily      -Here are some Donts:   Dont reason   Dont argue   Dont confront   Dont remind them they forget   Dont question recent memory   Dont take it personally  Diagnoses and all orders for this visit:    Early onset Alzheimer's dementia without behavioral disturbance ([facility] [address] )  -     escitalopram (LEXAPRO) 20 mg tablet; Take 0 5 tablets (10 mg total) by mouth daily    I have spent a total time of 40 minutes on 04/28/23 in caring for this patient including Risks and benefits of tx options, Instructions for management, Patient and family education, Impressions, Counseling / Coordination of care, Documenting in the medical record, Reviewing / ordering tests, medicine, procedures   and Obtaining or reviewing history    Subjective:    ALEAH Delisa Pineda is a 60year old male known to the practice for cognitive dysfunction felt to be secondary to early onset of Alzheimer's disease beginning almost 5 years ago  Martell Miller is maintained on Namenda 10 mg twice daily and Aricept 10 mg daily, as well as Lexapro 10 mg daily   In the past he has completed a MRI 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  Since his last visit 10/25/23 he has been stable with both good and bad days in terms of memory but remains very independent  He is remaining physically active, participating in reading and remains independent of all ADLs  His mood can be quite liable  His wife reports an increase in his verbal outbursts and agitation  He has never become physically aggressive towards her   He continues to be under "constant supervision (usually by his wife) although she is requesting for information about day programs or other resources as she feels sometimes they both require breaks from each other  He continues to deny hallucinations  He enjoys spending time with his 3year old grandson and playing and caring for their 2 dogs  He is still sleeping well without difficulty and has an adequate appetite  Unfortunately since he was last seen he continues to deal with prostate cancer, although now he is currently just being monitored per his wife and he also recently had a bowel obstruction and underwent diverting colostomy procedure last month  Today he continues to decline to participate in MoCA testing or memory questioning  In the past he has also declined neuropsychologic evaluation  Martell Miller denies any neurologic complaints today  The following portions of the patient's history were reviewed and updated as appropriate: allergies, current medications, past family history, past medical history, past social history and past surgical history  Objective:    Blood pressure 132/65, pulse 67, temperature (!) 97 4 °F (36 3 °C), temperature source Temporal, height 5' 9\" (1 753 m), weight 67 5 kg (148 lb 14 4 oz)  Neurological Exam  On neurological examination patient is alert, awake, oriented to self and place and in no distress  Speech is fluent without dysarthria or aphasia  He refuses to complete a [place] Cognitive Assessment today and became very frustrated when asked to participate in any form of memory questioning or evaluation  Cranial nerves 2-12 were symmetrically intact bilaterally  No evidence of any focal weakness or sensory loss in the upper or lower extremities  Motor testing reveals 5/5 strength of the bilateral upper and lower extremities  There was no pronator drift   No fasciculations present  No abnormal involuntary movements   Finger- to-nose reveals no tremor or ataxia and intact proprioceptive " function, no dysmetria was noted  Sensation was intact to vibration, light touch, and temperature in bilateral upper and lower extremities  Deep tendon reflexes were 2+ and symmetric in the bilateral upper and lower extremities  He is able to rise easily without assistance from a seated position  Casual gait is normal including stance, stride, and arm swing  He has difficulty following multi-step commands and there is left-right confusion  ROS:    Review of Systems   Constitutional: Negative  Negative for appetite change and fever  HENT: Negative  Negative for hearing loss, tinnitus, trouble swallowing and voice change  Eyes: Negative  Negative for photophobia, pain and visual disturbance  Respiratory: Negative  Negative for shortness of breath  Cardiovascular: Negative  Negative for palpitations  Gastrointestinal: Negative  Negative for nausea and vomiting  Endocrine: Negative  Negative for cold intolerance  Genitourinary: Negative  Negative for dysuria, frequency and urgency  Musculoskeletal: Negative  Negative for gait problem, myalgias and neck pain  Skin: Negative  Negative for rash  Allergic/Immunologic: Negative  Neurological: Negative  Negative for dizziness, tremors, seizures, syncope, facial asymmetry, speech difficulty, weakness, light-headedness, numbness and headaches  Hematological: Negative  Does not bruise/bleed easily  Psychiatric/Behavioral: Negative  Negative for confusion, hallucinations and sleep disturbance  Reviewed ROS as entered by medical assistant

## 2023-05-01 NOTE — TELEPHONE ENCOUNTER
SW called and left message to patient and spouse at [phone]  Provided contact information and requested c/b  SW waiting return call

## 2023-05-01 NOTE — ASSESSMENT & PLAN NOTE
Prasanth Gutierrez is a 60year old male with early onset Alzheimer's disease   He is maintained on Namenda 10 mg twice daily and Aricept 10 mg daily, as well as Lexapro 10 mg daily   Since his last visit 10/25/23 he has been stable with both good and bad days in terms of memory but remains very independent  His mood can be quite liable  His wife reports an increase in his verbal outbursts and agitation  Today he continues to decline to participate in MoCA testing or memory questioning becoming very agitated when asked about his memory  In the past he has also declined neuropsychologic evaluation  Cassy Todd denies any neurologic complaints today  Plan as outlined below  Plan:    -Continue Donepezil 10 mg daily and Namenda 10 mg twice daily    - Increase Lexapro from 10 mg daily to 20 mg daily given his recent increase in agitation and verbal outbursts  The patient himself was agreeable to this change, in addition to his wife  -Patient was encouraged to increase mind stimulating activities such as reading, crosswords, word searches, puzzles, Soduku, solitaire, coloring and other brain games  We also discussed the importance of staying physically active and eating a health diet such as the [address] or MIND diet        -Discussed air tags, walkie talkie, lanyard/tags as safety precautions while walking dogs or running     -Consider referral in the future to psychology/talk therapy or psychiatry    -Social workers to call with any available resources for day programs, gyms, etc      -Discussed some basic Dos when it comes to communication with someone with dementia:   Give short, one sentence explanations   Allow plenty of time for comprehension, and then triple it   Repeat instructions or sentences exactly the same way   Avoid insistence  Try again later   Agree with them or distract them to a different subject or activity     Accept the blame when somethings wrong (even if its fantasy)   Leave the room, if necessary, to avoid confrontations   Respond to the feelings rather than the words   Be patient and cheerful and reassuring  Do go with the flow   Practice 100% forgiveness  Memory loss progresses daily      -Here are some Donts:   Dont reason   Dont argue   Dont confront   Dont remind them they forget   Dont question recent memory   Dont take it personally

## 2023-05-02 NOTE — TELEPHONE ENCOUNTER
"SW called and spoke with patient's spouse, Juanita London at [phone]  Juanita London is interested in patient going to some type of day program at least 2x/week  Spouse looking for some \"down time\", where patient can be without spouse  Spouse is able to transport patient for programs that may be available and local   Spouse found one place but was $85/day and hoping for something cheaper  Spouse did say they have long term care insurance  Spouse requested information be sent to her by mail  Spouse going to contact long term care insurance for potential coverage of programs that may be available  This writer also provided patient with [facility] information as well as [facility] and contact information  Resources were found through [place] go[place] and local [employer]'s web site  Resources placed in to be scanned bin  SW remains available       "

## 2023-05-10 ENCOUNTER — TELEPHONE (OUTPATIENT)
Dept: UROLOGY | Facility: MEDICAL CENTER | Age: 63
End: 2023-05-10

## 2023-05-10 NOTE — TELEPHONE ENCOUNTER
CBC Plan 361 (verified) - Lupron 45 mg - NO prior auth required  Office stock okay to use    [facility] 5/10/23

## 2023-06-05 ENCOUNTER — APPOINTMENT (OUTPATIENT)
Dept: LAB | Facility: CLINIC | Age: 63
End: 2023-06-05
Payer: COMMERCIAL

## 2023-06-05 ENCOUNTER — TELEPHONE (OUTPATIENT)
Dept: UROLOGY | Facility: CLINIC | Age: 63
End: 2023-06-05

## 2023-06-05 DIAGNOSIS — C61 PROSTATE CANCER (HCC): ICD-10-CM

## 2023-06-05 LAB — PSA SERPL-MCNC: 0.01 NG/ML (ref 0–4)

## 2023-06-05 PROCEDURE — 36415 COLL VENOUS BLD VENIPUNCTURE: CPT

## 2023-06-05 PROCEDURE — 84153 ASSAY OF PSA TOTAL: CPT

## 2023-06-05 NOTE — TELEPHONE ENCOUNTER
Patient was scheduled to see Nicole Bernabe today for PSA check and Lupron    See if patient will come tomorrow 6/6/23 @ 3500 to see SHERLYN Resendez

## 2023-06-06 ENCOUNTER — OFFICE VISIT (OUTPATIENT)
Dept: UROLOGY | Facility: CLINIC | Age: 63
End: 2023-06-06
Payer: COMMERCIAL

## 2023-06-06 ENCOUNTER — TELEPHONE (OUTPATIENT)
Dept: UROLOGY | Facility: CLINIC | Age: 63
End: 2023-06-06

## 2023-06-06 VITALS
WEIGHT: 152 LBS | BODY MASS INDEX: 22.51 KG/M2 | SYSTOLIC BLOOD PRESSURE: 124 MMHG | HEIGHT: 69 IN | DIASTOLIC BLOOD PRESSURE: 72 MMHG

## 2023-06-06 DIAGNOSIS — C61 PROSTATE CANCER (HCC): Primary | ICD-10-CM

## 2023-06-06 PROCEDURE — 96402 CHEMO HORMON ANTINEOPL SQ/IM: CPT

## 2023-06-06 PROCEDURE — 99213 OFFICE O/P EST LOW 20 MIN: CPT | Performed by: PHYSICIAN ASSISTANT

## 2023-06-06 NOTE — PROGRESS NOTES
UROLOGY PROGRESS NOTE   Patient Identifiers: Hany Chavira (MRN [id number])  Date of Service: 6/6/2023    Subjective:   58-year-old man with history of Soheila 7 prostate cancer  He had a robotic prostatectomy in July 2021 followed by salvage radiation in May 2022  A rising PSA prompted a PET/CT which showed oligo aj disease in the left pelvis and Lupron was initiated October 2022  PSA is 0 01  Lupron 45 mg given today      Reason for visit: Prostate cancer follow-up    Objective:     VITALS:    Vitals:    06/06/23 1139   BP: 124/72           LABS:  Lab Results   Component Value Date    HCT 40 7 03/27/2023    HGB 13 5 03/27/2023     03/27/2023    WBC 4 00 (L) 03/27/2023   ]    Lab Results   Component Value Date    BUN 12 03/27/2023    CALCIUM 9 2 03/27/2023     03/27/2023    CO2 30 03/27/2023    CREATININE 0 87 03/27/2023    GLUCOSE 132 06/24/2014    K 3 8 03/27/2023     12/13/2017   ]        INPATIENT MEDS:    Current Outpatient Medications:   •  Alphagan P 0 1 %, INSTILL 1 DROP IN BOTH EYES TWICE DAILY, Disp: , Rfl:   •  donepezil (ARICEPT) 10 mg tablet, Take 1 tablet (10 mg total) by mouth daily, Disp: 90 tablet, Rfl: 1  •  escitalopram (LEXAPRO) 20 mg tablet, Take 0 5 tablets (10 mg total) by mouth daily, Disp: 30 tablet, Rfl: 5  •  LUMIGAN 0 01 % ophthalmic drops, Administer 1 drop to both eyes daily, Disp: , Rfl:   •  memantine (NAMENDA) 10 mg tablet, Take 1 tablet (10 mg total) by mouth 2 (two) times a day, Disp: 180 tablet, Rfl: 1  •  ondansetron (ZOFRAN) 4 mg tablet, Take 1 tablet (4 mg total) by mouth every 8 (eight) hours as needed for nausea or vomiting, Disp: 20 tablet, Rfl: 0  •  Suprep Bowel Prep Kit 17 5-3 13-1 6 GM/177ML SOLN, Scheduled for may 2023, Disp: , Rfl:   •  traMADol (ULTRAM) 50 mg tablet, , Disp: , Rfl:   •  aluminum-magnesium hydroxide-simethicone (MAALOX) 012560-33 MG/5ML SUSP, Take 30 mL by mouth 4 (four) times a day (before meals and at bedtime) (Patient "not taking: Reported on 4/28/2023), Disp: 710 mL, Rfl: 0  •  Combigan 0 2-0 5 %, INSTILL 1 DROP IN BOTH EYES TWICE DAILY (Patient not taking: Reported on 4/28/2023), Disp: , Rfl:     Current Facility-Administered Medications:   •  leuprolide (LUPRON DEPOT 6 MONTH KIT) IM injection kit 45 mg, 45 mg, Intramuscular, Once, Chari Soto PA-C      Physical Exam:   /72 (BP Location: Left arm, Patient Position: Sitting, Cuff Size: Adult)   Ht 5' 9\" (1 753 m)   Wt 68 9 kg (152 lb)   BMI 22 45 kg/m²   GEN: no acute distress    RESP: breathing comfortably with no accessory muscle use    ABD: soft, non-tender, non-distended   INCISION:    EXT: no significant peripheral edema     RADIOLOGY:   None    Assessment:   1    Prostate cancer    Plan:   -Lupron 45 mg given today  -Follow-up in 6 months with PSA prior to visit for his next injection  -  -          "

## 2023-06-06 NOTE — TELEPHONE ENCOUNTER
Patient is scheduled on 12/14 for Lupron  Provider note:      Return in about 6 months (around 12/6/2023) for Lupron, psa prior to visit

## 2023-07-19 DIAGNOSIS — G30.0 EARLY ONSET ALZHEIMER'S DEMENTIA WITHOUT BEHAVIORAL DISTURBANCE (HCC): ICD-10-CM

## 2023-07-19 DIAGNOSIS — F02.80 EARLY ONSET ALZHEIMER'S DEMENTIA WITHOUT BEHAVIORAL DISTURBANCE (HCC): ICD-10-CM

## 2023-07-19 RX ORDER — DONEPEZIL HYDROCHLORIDE 10 MG/1
10 TABLET, FILM COATED ORAL DAILY
Qty: 90 TABLET | Refills: 3 | Status: SHIPPED | OUTPATIENT
Start: 2023-07-19

## 2023-08-15 ENCOUNTER — TELEPHONE (OUTPATIENT)
Dept: FAMILY MEDICINE CLINIC | Facility: OTHER | Age: 63
End: 2023-08-15

## 2023-08-15 NOTE — TELEPHONE ENCOUNTER
[facility] - [facility]   [address] Anette Polio, [address] [facility] [address] [phone]   Farshad Rodriguez MD    [address] Rhonda Hamilton, [address] [place] [address] [address] [phone] Eric Ibrahim   [phone] (Fax)         Please remove PCP. Patient now being seen at [facility]. Thank you.

## 2023-08-24 ENCOUNTER — IOP CHECK (OUTPATIENT)
Dept: URBAN - METROPOLITAN AREA CLINIC 6 | Facility: CLINIC | Age: 63
End: 2023-08-24

## 2023-08-24 DIAGNOSIS — H40.1131: ICD-10-CM

## 2023-08-24 PROCEDURE — 92133 CPTRZD OPH DX IMG PST SGM ON: CPT

## 2023-08-24 PROCEDURE — 92014 COMPRE OPH EXAM EST PT 1/>: CPT

## 2023-08-24 PROCEDURE — 92202 OPSCPY EXTND ON/MAC DRAW: CPT

## 2023-08-24 ASSESSMENT — TONOMETRY
OD_IOP_MMHG: 12
OS_IOP_MMHG: 11

## 2023-08-24 ASSESSMENT — VISUAL ACUITY
OS_CC: 20/40
OD_CC: 20/40

## 2023-08-25 ENCOUNTER — TELEPHONE (OUTPATIENT)
Dept: NEUROLOGY | Facility: CLINIC | Age: 63
End: 2023-08-25

## 2023-08-25 NOTE — TELEPHONE ENCOUNTER
Voicemail received from unidentified caller requesting callback regarding patient. No further details provided. Call returned to [phone]. Spoke to [facility] (wife). All questions answered - needed provider address.

## 2023-10-30 ENCOUNTER — OFFICE VISIT (OUTPATIENT)
Dept: NEUROLOGY | Facility: CLINIC | Age: 63
End: 2023-10-30
Payer: COMMERCIAL

## 2023-10-30 VITALS
DIASTOLIC BLOOD PRESSURE: 68 MMHG | BODY MASS INDEX: 22.81 KG/M2 | SYSTOLIC BLOOD PRESSURE: 112 MMHG | HEART RATE: 89 BPM | TEMPERATURE: 98.1 F | HEIGHT: 69 IN | OXYGEN SATURATION: 99 % | WEIGHT: 154 LBS

## 2023-10-30 DIAGNOSIS — F02.80 EARLY ONSET ALZHEIMER'S DEMENTIA WITHOUT BEHAVIORAL DISTURBANCE (HCC): Primary | ICD-10-CM

## 2023-10-30 DIAGNOSIS — G30.0 EARLY ONSET ALZHEIMER'S DEMENTIA WITHOUT BEHAVIORAL DISTURBANCE (HCC): Primary | ICD-10-CM

## 2023-10-30 PROCEDURE — 99215 OFFICE O/P EST HI 40 MIN: CPT

## 2023-10-30 RX ORDER — ESCITALOPRAM OXALATE 20 MG/1
20 TABLET ORAL DAILY
Qty: 30 TABLET | Refills: 5 | Status: SHIPPED | OUTPATIENT
Start: 2023-10-30

## 2023-10-30 NOTE — PROGRESS NOTES
Review of Systems   Constitutional:  Positive for fatigue. Negative for appetite change and fever. HENT: Negative. Negative for hearing loss, tinnitus, trouble swallowing and voice change. Eyes: Negative. Negative for photophobia, pain and visual disturbance. Respiratory: Negative. Negative for shortness of breath. Cardiovascular: Negative. Negative for palpitations. Gastrointestinal: Negative. Negative for nausea and vomiting. Endocrine: Negative. Negative for cold intolerance. Genitourinary: Negative. Negative for dysuria, frequency and urgency. Musculoskeletal:  Negative for back pain, gait problem, myalgias and neck pain. Skin: Negative. Negative for rash. Allergic/Immunologic: Negative. Neurological: Negative. Negative for dizziness, tremors, seizures, syncope, facial asymmetry, speech difficulty, weakness, light-headedness, numbness and headaches. Hematological: Negative. Does not bruise/bleed easily. Psychiatric/Behavioral: Negative. Negative for confusion, hallucinations and sleep disturbance. All other systems reviewed and are negative.

## 2023-10-30 NOTE — PROGRESS NOTES
Patient ID: Maureen Caceres is a 58 y.o. male. Assessment/Plan:    Early onset Alzheimer's dementia without behavioral disturbance (HCC)  Maureen Caceres is a 58year old male with early onset Alzheimer's disease. He is maintained on Namenda 10 mg twice daily and Aricept 10 mg daily, as well as Lexapro 10 mg daily. At his last visit there was confusion and his Lexapro was only initially increased to 20 mg and then inadvertently 10 mg was resumed when refills were obtained from the pharmacy. I would recommend increasing Lexapro back to 20 mg given the report of emotional liability and agitation. He continues to be very independent. He denies any neurologic complaints today. Plan as outlined below. Plan:    -Continue Donepezil 10 mg daily and Namenda 10 mg twice daily    - Increase Lexapro from 10 mg daily to 20 mg daily given his recent increase in agitation and verbal outbursts. The patient himself was agreeable to this change, in addition to his wife. -Patient was encouraged to increase mind stimulating activities such as reading, crosswords, word searches, puzzles, Soduku, solitaire, coloring and other brain games. We also discussed the importance of staying physically active and eating a health diet such as the Mediterranean or MIND diet.       -Discussed air tags, walkie talkie, lanyard/tags as safety precautions while walking dogs or running     -Consider referral in the future to psychology/talk therapy or psychiatry    - Consider day programs, gyms, etc.     -Discussed some basic Do’s when it comes to communication with someone with dementia:  Give short, one sentence explanations. Allow plenty of time for comprehension, and then triple it. Repeat instructions or sentences exactly the same way. Avoid insistence. Try again later. Agree with them or distract them to a different subject or activity. Accept the blame when something’s wrong (even if it’s fantasy).   Leave the room, if necessary, to avoid confrontations. Respond to the feelings rather than the words. Be patient and cheerful and reassuring. Do go with the flow. Practice 100% forgiveness. Memory loss progresses daily.     -Discussed Don’ts:  Don’t reason. Don’t argue. Don’t confront. Don’t remind them they forget. Don’t question recent memory. Don’t take it personally. Diagnoses and all orders for this visit:    Early onset Alzheimer's dementia without behavioral disturbance ([address])  -     escitalopram (LEXAPRO) 20 mg tablet; Take 1 tablet (20 mg total) by mouth daily       I have spent a total time of 40 minutes on 10/30/23 in caring for this patient including Diagnostic results, Prognosis, Risks and benefits of tx options, Instructions for management, Patient and family education, Importance of tx compliance, Risk factor reductions, Impressions, Documenting in the medical record, Reviewing / ordering tests, medicine, procedures  , and Obtaining or reviewing history  . Subjective:    ALEAH Luis is a 58year old male known to the practice for cognitive dysfunction felt to be secondary to early onset of Alzheimer's disease beginning almost 7-8 years ago. He is maintained on Namenda 10 mg twice daily and Aricept 10 mg daily, as well as Lexapro 20 mg daily. 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 Since his last visit 4/28/23 he has been stable with both good and bad days in terms of memory but remains very independent. He is remaining physically active, participating in reading and remains independent of all ADLs. He is accompanied by his wife and daughter today. His mood continues to be quite liable.  His wife reports initially Lexapro was increased to 20 mg and he was doing well however she obtained a refill from the pharmacy for 10 mg and thought perhaps we instructed her to decrease back to 10 mg, so this is what he is currently taking. His wife reports an increase in his verbal outbursts and agitation. He has never become physically aggressive towards her. He continues to be under constant supervision (usually by his wife or daughter). Since his last visit they did look into some day program options for him but due to their income, he did not qualify for many and the others his wife did not like, although he seemed to like them. He continues to deny hallucinations. He enjoys spending time with his 1year old grandson and playing and caring for their 2 dogs. He is still sleeping well without difficulty and has an adequate appetite. Today he continues to decline to participate in MoCA testing but did participate in brief memory questioning before becoming frustrated. In the past he has also declined neuropsychologic evaluation. He denies any neurologic complaints today. Of note, he is currently in remission from prostate cancer and is being monitored closely. He is scheduled 12/21/23 to have a colostomy reversal and is looking forward to this. The following portions of the patient's history were reviewed and updated as appropriate: allergies, current medications, past family history, past medical history, past social history, past surgical history, and problem list.    Objective:    Blood pressure 112/68, pulse 89, temperature 98.1 °F (36.7 °C), temperature source Temporal, height 5' 9" (1.753 m), weight 69.9 kg (154 lb), SpO2 99 %. Neurological Exam  On neurological examination patient is alert, awake, oriented and in no distress. Speech is fluent without dysarthria or aphasia. Cranial nerves 2-12 were symmetrically intact bilaterally. No evidence of any focal weakness or sensory loss in the upper or lower extremities. Motor testing reveals 5/5 strength of the bilateral upper and lower extremities. There was no pronator drift. No fasciculations present. No abnormal involuntary movements.  He is able to rise easily without assistance from a seated position. Casual gait is normal including stance, stride, and arm swing. Romberg is absent. Memory Examination  There is right/left confusion. He is able to correctly state his name and date of birth. He cannot tell me the date or month. He does not know which holiday is coming up. He cannot name the current POTUS. He is able to read but has difficulty with repeating. He could not name a mirror but was able to name a cup. He did demonstrate how to use a mirror and what to do with a cup, but does not state "drink" only "gulp, gulp, gulp". He names 2/10 types of animals before becoming frustrated and terminating any further questioning. ROS:    Review of Systems   Constitutional:  Positive for fatigue. Negative for appetite change and fever. HENT: Negative. Negative for hearing loss, tinnitus, trouble swallowing and voice change. Eyes: Negative. Negative for photophobia, pain and visual disturbance. Respiratory: Negative. Negative for shortness of breath. Cardiovascular: Negative. Negative for palpitations. Gastrointestinal: Negative. Negative for nausea and vomiting. Endocrine: Negative. Negative for cold intolerance. Genitourinary: Negative. Negative for dysuria, frequency and urgency. Musculoskeletal:  Negative for back pain, gait problem, myalgias and neck pain. Skin: Negative. Negative for rash. Allergic/Immunologic: Negative. Neurological: Negative. Negative for dizziness, tremors, seizures, syncope, facial asymmetry, speech difficulty, weakness, light-headedness, numbness and headaches. Hematological: Negative. Does not bruise/bleed easily. Psychiatric/Behavioral: Negative. Negative for confusion, hallucinations and sleep disturbance. All other systems reviewed and are negative. Reviewed ROS as entered by medical assistant.

## 2023-10-30 NOTE — PATIENT INSTRUCTIONS
-Continue Donepezil 10 mg daily and Namenda 10 mg twice daily     - Increase Lexapro from 10 mg daily to 20 mg daily given his recent increase in agitation and verbal outbursts. The patient himself was agreeable to this change, in addition to his wife. -Patient was encouraged to increase mind stimulating activities such as reading, crosswords, word searches, puzzles, Soduku, solitaire, coloring and other brain games. We also discussed the importance of staying physically active and eating a health diet such as the [address] or MIND diet.       -Discussed air tags, walkie talkie, lanyard/tags as safety precautions while walking dogs or running     -Consider referral in the future to psychology/talk therapy or psychiatry     -Discussed some basic Do’s when it comes to communication with someone with dementia:  Give short, one sentence explanations. Allow plenty of time for comprehension, and then triple it. Repeat instructions or sentences exactly the same way. Avoid insistence. Try again later. Agree with them or distract them to a different subject or activity. Accept the blame when something’s wrong (even if it’s fantasy). Leave the room, if necessary, to avoid confrontations. Respond to the feelings rather than the words. Be patient and cheerful and reassuring. Do go with the flow. Practice 100% forgiveness. Memory loss progresses daily.     -Here are some Don’ts:  Don’t reason. Don’t argue. Don’t confront. Don’t remind them they forget. Don’t question recent memory. Don’t take it personally.

## 2023-10-30 NOTE — ASSESSMENT & PLAN NOTE
Traci Barboza is a 58year old male with early onset Alzheimer's disease. He is maintained on Namenda 10 mg twice daily and Aricept 10 mg daily, as well as Lexapro 10 mg daily. At his last visit there was confusion and his Lexapro was only initially increased to 20 mg and then inadvertently 10 mg was resumed when refills were obtained from the pharmacy. I would recommend increasing Lexapro back to 20 mg given the report of emotional liability and agitation. He continues to be very independent. He denies any neurologic complaints today. Plan as outlined below. Plan:    -Continue Donepezil 10 mg daily and Namenda 10 mg twice daily    - Increase Lexapro from 10 mg daily to 20 mg daily given his recent increase in agitation and verbal outbursts. The patient himself was agreeable to this change, in addition to his wife. -Patient was encouraged to increase mind stimulating activities such as reading, crosswords, word searches, puzzles, Soduku, solitaire, coloring and other brain games. We also discussed the importance of staying physically active and eating a health diet such as the Mediterranean or MIND diet.       -Discussed air tags, walkie talkie, lanyard/tags as safety precautions while walking dogs or running     -Consider referral in the future to psychology/talk therapy or psychiatry    - Consider day programs, gyms, etc.     -Discussed some basic Do’s when it comes to communication with someone with dementia:  Give short, one sentence explanations. Allow plenty of time for comprehension, and then triple it. Repeat instructions or sentences exactly the same way. Avoid insistence. Try again later. Agree with them or distract them to a different subject or activity. Accept the blame when something’s wrong (even if it’s fantasy). Leave the room, if necessary, to avoid confrontations. Respond to the feelings rather than the words. Be patient and cheerful and reassuring.  Do go with the flow.  Practice 100% forgiveness. Memory loss progresses daily.     -Discussed Don’ts:  Don’t reason. Don’t argue. Don’t confront. Don’t remind them they forget. Don’t question recent memory. Don’t take it personally.

## 2023-12-13 ENCOUNTER — TELEPHONE (OUTPATIENT)
Dept: NEUROLOGY | Facility: CLINIC | Age: 63
End: 2023-12-13

## 2023-12-13 ENCOUNTER — APPOINTMENT (OUTPATIENT)
Dept: LAB | Facility: CLINIC | Age: 63
End: 2023-12-13
Payer: COMMERCIAL

## 2023-12-13 DIAGNOSIS — C61 PROSTATE CANCER (HCC): ICD-10-CM

## 2023-12-13 DIAGNOSIS — F02.80 EARLY ONSET ALZHEIMER'S DEMENTIA WITHOUT BEHAVIORAL DISTURBANCE (HCC): ICD-10-CM

## 2023-12-13 DIAGNOSIS — G30.0 EARLY ONSET ALZHEIMER'S DEMENTIA WITHOUT BEHAVIORAL DISTURBANCE (HCC): ICD-10-CM

## 2023-12-13 LAB — PSA SERPL-MCNC: <0.01 NG/ML (ref 0–4)

## 2023-12-13 PROCEDURE — 84153 ASSAY OF PSA TOTAL: CPT

## 2023-12-13 PROCEDURE — 36415 COLL VENOUS BLD VENIPUNCTURE: CPT

## 2023-12-13 RX ORDER — MEMANTINE HYDROCHLORIDE 10 MG/1
10 TABLET ORAL 2 TIMES DAILY
Qty: 180 TABLET | Refills: 1 | Status: SHIPPED | OUTPATIENT
Start: 2023-12-13

## 2023-12-13 NOTE — TELEPHONE ENCOUNTER
While speaking with pt's wife regarding a message that was left on clinical VM on 12/12, she stated that pt needs a script for memantine sent to [address] [place] [address] Road. Last OV was 10/30/23. Routed refill request to provider to review.

## 2023-12-13 NOTE — TELEPHONE ENCOUNTER
12/12 at 3:10 pm:    This is Shekhar Carmona calling in reference to Jaswant Ledbetter. And I know he has an appointment for the 14th, but I was wondering about testing. I wasn't sure if I had it done because he's been having so many test done for surgery that is coming up. Can you please call me back at [phone]. Thank you.  ___________________________________________________    Quinton Israel back. This message was for urology, but she did say that pt needs a script for memantine sent to [place].  See separate refill request.

## 2023-12-14 ENCOUNTER — CLINICAL SUPPORT (OUTPATIENT)
Dept: UROLOGY | Facility: CLINIC | Age: 63
End: 2023-12-14
Payer: COMMERCIAL

## 2023-12-14 VITALS
OXYGEN SATURATION: 100 % | BODY MASS INDEX: 23.37 KG/M2 | HEIGHT: 69 IN | HEART RATE: 81 BPM | DIASTOLIC BLOOD PRESSURE: 78 MMHG | WEIGHT: 157.8 LBS | SYSTOLIC BLOOD PRESSURE: 116 MMHG

## 2023-12-14 DIAGNOSIS — C61 PROSTATE CANCER (HCC): Primary | ICD-10-CM

## 2023-12-14 PROCEDURE — 96402 CHEMO HORMON ANTINEOPL SQ/IM: CPT

## 2023-12-14 PROCEDURE — 99213 OFFICE O/P EST LOW 20 MIN: CPT | Performed by: PHYSICIAN ASSISTANT

## 2023-12-14 NOTE — TELEPHONE ENCOUNTER
received vm from 12/13 at 12:50pm- this is a for [facility], he needs a refill on his mementine, not sure of the dosage, so please call me back at [phone].  Thank you.  ----------------------------------------  Already addressed, refill sent yesterday

## 2023-12-14 NOTE — PROGRESS NOTES
UROLOGY PROGRESS NOTE   Patient Identifiers: Shane Gutierrez (MRN [id number])  Date of Service: 12/14/2023    Subjective:   79-year-old man history of Soheila 7 prostate cancer. It a robotic prostatectomy in July 2021 followed by salvage radiation. He had a rising PSA and his PET scan showed oligo aj disease in the left pelvis and ADT was initiated. 3.77 PSA remains<0.01. Lupron 45 mg given today. Reason for visit: Prostate cancer follow-up    Objective:     VITALS:    There were no vitals filed for this visit.         LABS:  Lab Results   Component Value Date    HGB 13.5 03/27/2023    HCT 40.7 03/27/2023    WBC 4.00 (L) 03/27/2023     03/27/2023   ]    Lab Results   Component Value Date     12/13/2017    K 3.8 03/27/2023     03/27/2023    CO2 30 03/27/2023    BUN 12 03/27/2023    CREATININE 0.87 03/27/2023    CALCIUM 9.2 03/27/2023    GLUCOSE 132 06/24/2014   ]        INPATIENT MEDS:    Current Outpatient Medications:     Alphagan P 0.1 %, INSTILL 1 DROP IN BOTH EYES TWICE DAILY, Disp: , Rfl:     aluminum-magnesium hydroxide-simethicone (MAALOX) 200-200-20 MG/5ML SUSP, Take 30 mL by mouth 4 (four) times a day (before meals and at bedtime) (Patient not taking: Reported on 4/28/2023), Disp: 710 mL, Rfl: 0    Combigan 0.2-0.5 %, , Disp: , Rfl:     donepezil (ARICEPT) 10 mg tablet, Take 1 tablet (10 mg total) by mouth daily, Disp: 90 tablet, Rfl: 3    escitalopram (LEXAPRO) 20 mg tablet, Take 1 tablet (20 mg total) by mouth daily, Disp: 30 tablet, Rfl: 5    LUMIGAN 0.01 % ophthalmic drops, Administer 1 drop to both eyes daily, Disp: , Rfl:     memantine (NAMENDA) 10 mg tablet, Take 1 tablet (10 mg total) by mouth 2 (two) times a day, Disp: 180 tablet, Rfl: 1    ondansetron (ZOFRAN) 4 mg tablet, Take 1 tablet (4 mg total) by mouth every 8 (eight) hours as needed for nausea or vomiting (Patient not taking: Reported on 10/30/2023), Disp: 20 tablet, Rfl: 0    Suprep Bowel Prep Kit 17.5-3.13-1.6 GM/177ML SOLN, Scheduled for may 2023 (Patient not taking: Reported on 10/30/2023), Disp: , Rfl:     traMADol (ULTRAM) 50 mg tablet, , Disp: , Rfl:       Physical Exam:   There were no vitals taken for this visit.   GEN: no acute distress    RESP: breathing comfortably with no accessory muscle use    ABD: soft, non-tender, non-distended   INCISION:    EXT: no significant peripheral edema     RADIOLOGY:   none     Assessment:   Prostate cancer    Plan:   -Lupron 45 mg given today  - follow up 6 months PSA prior for next Lupron  -  -

## 2023-12-15 ENCOUNTER — CLINICAL SUPPORT (OUTPATIENT)
Dept: RADIATION ONCOLOGY | Facility: HOSPITAL | Age: 63
End: 2023-12-15
Attending: STUDENT IN AN ORGANIZED HEALTH CARE EDUCATION/TRAINING PROGRAM
Payer: COMMERCIAL

## 2023-12-15 VITALS
DIASTOLIC BLOOD PRESSURE: 80 MMHG | RESPIRATION RATE: 18 BRPM | HEART RATE: 69 BPM | SYSTOLIC BLOOD PRESSURE: 138 MMHG | OXYGEN SATURATION: 99 % | TEMPERATURE: 98 F | WEIGHT: 158 LBS | BODY MASS INDEX: 23.33 KG/M2

## 2023-12-15 DIAGNOSIS — C61 PROSTATE CANCER (HCC): Primary | ICD-10-CM

## 2023-12-15 PROCEDURE — 99213 OFFICE O/P EST LOW 20 MIN: CPT | Performed by: STUDENT IN AN ORGANIZED HEALTH CARE EDUCATION/TRAINING PROGRAM

## 2023-12-15 PROCEDURE — 99211 OFF/OP EST MAY X REQ PHY/QHP: CPT | Performed by: STUDENT IN AN ORGANIZED HEALTH CARE EDUCATION/TRAINING PROGRAM

## 2023-12-15 NOTE — PROGRESS NOTES
Follow-up - Radiation Oncology   Brii Anne 1960 58 y.o. male [id number]      History of Present Illness   Cancer Staging   No matching staging information was found for the patient. Mr. Juan Luis is a 58year old man with PMHx early onset Alzheimer Disease with GS 4+3, pT3a prostate cancer s/p RALP. Previously with PSA recurrence to 0.2 s/p prostate fossa RT (6840cGy/38fx). Now with continued increase in PSA and PSMA PET showing pelvic aj disease. On 3/8/23 the patient completed a course of salvage reirradiation to the pelvis to a dose of 4500cGy in 25 fractions; the PSMA avid LNs underwent SIB to 6000cGy in 25 fractions. He returns today for telephone follow-up. Interval History:  The patient was last seen in clinic on 4/20/23 via telemedicine, at that time having undergone emergent colonoscopy and diverting colostomy at [facility] in the setting of bowel obstruction. Since then he has continued to follow with his other medical providers including urology. He continues on Lupron and received in his 3rd injection on 12/14/23. PSA is undetectable. He is doing well from a urinary standpoint with no complaints. He is scheduled for colostomy reversal later this month at [facility]. He is otherwise doing well.      PSA Trend   PSA, Total   Latest Ref Rng 0.00 - 4.00 ng/mL   3/7/2019 2.6    9/23/2019 4.2 (H)    1/6/2020 4.3 (H)    7/28/2020 3.9    12/7/2020 5.6 (H)    10/20/2021 <0.1    2/8/2022 0.2    2/24/2022 0.2    6/13/2022 0.6    8/31/2022 1.3    1/18/2023 <0.1    6/5/2023 0.01    12/13/2023 <0.01         Historical Information   Oncology History   Prostate cancer ([facility])   7/28/2021 Initial Diagnosis    Prostate cancer ([facility])     7/28/2021 Biopsy    [facility]  DIAGNOSIS     Prostate (biopsy, [id number], 7/28/2021):    - Histologic Type: Adenocarcinoma (conventional, NOS)    - Soheila Score, Dominant Nodule: 4+3=7 Grade group 3: 70% pattern 4 (Tertiary Pattern 5)    - Tumor Extent: Tumor dimension (max): 14mm    - Location, Dominant Nodule: Left, posterolateral, posterior    - Local Extent (8th Edition AJCC): Extraprostatic extension                                              Location and extent of extraprostatic extension: Left, posterolateral, base, nonfocal    - Margins (as per the report): Negative    - Seminal Vesicle Invasion (as per the report): None    - Lymphatic (small vessel Invasion (as per report): Absent    - Extent of Invasion (8th Edition AJCC) Primary Tumor: pT3: Extraprostatic extension or microscopic bladder neck invasion    - Regional Lymph Nodes: pNx: Cannot be assessed    - Summary Margins: Negative    - Additional Findings, Uninvolved Prostate:                                                High-grade prostatic intraepithelial neoplasia (PIN)                                               Prostatic intraductal adenocarcinoma  Worth Klinefelter, MD    Addendum   At the request of Dr. Diane Starkey, unstained slides from paraffin BLOCK A25 containing the patient's cancer cells were sent to [address] [place] [address] for  [facility]  testing. Upon completion of testing, the [employer] [facility] Laboratory report will be directly sent to the requesting physician as well as posted in the Media Tab of the patient's [facility] EMR by the [facility] Pathology Department. 3/14/2022 - 5/5/2022 Radiation      Treatments:  Course: C1    Plan ID Energy Fractions Dose per Fraction (cGy) Dose Correction (cGy) Total Dose Delivered (cGy) Elapsed Days   Prostate Bed 10X 38 / 38 180 0 6,840 52      Treatment Dates:  3/14/2022 - 5/5/2022.        1/30/2023 - 3/8/2023 Radiation    The patient saw @[facility] for radiation treatment.  This is the current list of radiation treatment:  Plan ID Energy Fractions Dose per Fraction (cGy) Dose Correction (cGy) Total Dose Delivered (cGy) Elapsed Days   Whole Pelvis 10X 25 / 25 240 0 6,000 37                Past Medical History:   Diagnosis Date Benign colon polyp     Diverticulitis, colon     Diverticulosis     Hyperlipidemia     Prediabetes     Prostate cancer ([address])     Renal calculi     Short-term memory loss     Vitamin D deficiency      Past Surgical History:   Procedure Laterality Date    ABDOMINAL ADHESION SURGERY N/A 2021    Procedure: LYSIS ADHESIONS, LAPAROSCOPIC;  Surgeon: Howard Canales MD;  Location: [facility];  Service: Urology    COLONOSCOPY      ESOPHAGOGASTRODUODENOSCOPY      HERNIA REPAIR      JOINT REPLACEMENT Right     [facility]    KNEE ARTHROSCOPY      KNEE SURGERY      IN LAPS SURG [id number] RPBIC RAD W/NRV SPARING ROBOT N/A 2021    Procedure: ROBOTIC [id number] [facility];  Surgeon: Howard Canales MD;  Location: [facility];  Service: Urology    PROSTATE BIOPSY      TOTAL HIP ARTHROPLASTY Right 2019    TOTAL HIP ARTHROPLASTY Left 2019       Social History   Social History     Substance and Sexual Activity   Alcohol Use Yes    Comment: occasional beer and wine     Social History     Substance and Sexual Activity   Drug Use Not Currently    Comment: years ago     Social History     Tobacco Use   Smoking Status Former    Current packs/day: 0.00    Types: Cigarettes    Quit date:     Years since quittin.9   Smokeless Tobacco Never   Tobacco Comments    patient states he doesnt remember how much or for how long         Meds/Allergies     Current Outpatient Medications:     Alphagan P 0.1 %, INSTILL 1 DROP IN BOTH EYES TWICE DAILY, Disp: , Rfl:     Combigan 0.2-0.5 %, , Disp: , Rfl:     donepezil (ARICEPT) 10 mg tablet, Take 1 tablet (10 mg total) by mouth daily, Disp: 90 tablet, Rfl: 3    escitalopram (LEXAPRO) 20 mg tablet, Take 1 tablet (20 mg total) by mouth daily, Disp: 30 tablet, Rfl: 5    memantine (NAMENDA) 10 mg tablet, Take 1 tablet (10 mg total) by mouth 2 (two) times a day, Disp: 180 tablet, Rfl: 1  Allergies   Allergen Reactions    Sulfa Antibiotics Other (See Comments) Mother told him as child not to take     Review of Systems   Constitutional:  Positive for fatigue and unexpected weight change (gaining some weight back since bowel surgery). Negative for activity change. HENT: Negative. Eyes: Negative. Wears glasses   Respiratory: Negative. Cardiovascular: Negative. Gastrointestinal:         Ileostomy intact, will be reversed next week at [facility]    Endocrine: Negative. Genitourinary:  Positive for frequency (nocturia x1). Musculoskeletal: Negative. Skin: Negative. Allergic/Immunologic: Negative. Neurological: Negative. Hematological: Negative. Psychiatric/Behavioral:  Positive for confusion (alzheimers) and decreased concentration. IPSS Questionnaire (AUA-7): Over the past month…     1)  How often have you had a sensation of not emptying your bladder completely after you finish urinating? 0 - Not at all   2)  How often have you had to urinate again less than two hours after you finished urinating? 0 - Not at all   3)  How often have you found you stopped and started again several times when you urinated? 0 - Not at all   4) How difficult have you found it to postpone urination? 0 - Not at all   5) How often have you had a weak urinary stream?  0 - Not at all   6) How often have you had to push or strain to begin urination? 0 - Not at all   7) How many times did you most typically get up to urinate from the time you went to bed until the time you got up in the morning? 1 - 1 time   Total Score:  1       OBJECTIVE:   /80 (BP Location: Left arm)   Pulse 69   Temp 98 °F (36.7 °C) (Temporal)   Resp 18   Wt 71.7 kg (158 lb)   SpO2 99%   BMI 23.33 kg/m²   Pain Assessment:  0  ECOG/Zubrod/WHO: 1 - Symptomatic but completely ambulatory    Physical Exam   Well appearing. NAD. Thinner appearing. No increased work of breathing. MALCOLM deferred.      RESULTS    Lab Results:   Recent Results (from the past 672 hour(s))   PSA Total, Diagnostic    Collection Time: 12/13/23  2:13 PM   Result Value Ref Range    PSA, Diagnostic <0.01 0.00 - 4.00 ng/mL       Imaging Studies:No results found. Assessment/Plan:  Orders Placed This Encounter   Procedures    PSA Total, Diagnostic      Approximately 9 months following his most recent course of RT the patient is doing well overall and remains biochemically without evidence of disease progression. He additionally has recovered well from RT without ongoing urinary side effects. He is tolerating ADT well overall. Given his LN positivity we discussed continuing RT for up to 2 years duration before discontinuing, though I would favor stopping ADT at that point. The patient will discuss this with urology when he sees them next. I will plan to see him back in clinic in 1 year and will remain available in the interim should any questions or concerns arise at any point. Hillary Elizabeth MD  12/15/2023,4:50 PM    Portions of the record may have been created with voice recognition software. Occasional wrong word or "sound a like" substitutions may have occurred due to the inherent limitations of voice recognition software. Read the chart carefully and recognize, using context, where substitutions have occurred.

## 2023-12-15 NOTE — PROGRESS NOTES
Eve Easton 1960 is a 58 y.o. male with PMHx early onset Alzheimer Disease with GS 4+3, pT3a prostate cancer s/p RALP. Previously with PSA recurrence to 0.2 s/p prostate fossa RT (6840cGy/38fx). Now with continued increase in PSA and PSMA PET showing pelvic aj disease. On 3/8/23 the patient completed a course of salvage reirradiation to the pelvis to a dose of 4500cGy in 25 fractions; the PSMA avid LNs underwent SIB to 6000cGy in 25 fractions. The patient tolerated RT well overall. He had intermittent diarrhea through treatment though otherwise felt well overall. He did receive ADT with therapy. The pt was last seen in radiation on 04/20/23 via telemedicine visit. He returns today for his follow up.    06/06/23 - Urology, Pypiuk  Lupron injection given in office  PSA is 0.01  Follow up in 6 months with PSA and next injection    06/12/23 - CTA abdomen pelvis w wo contrast - [facility]  IMPRESSION:   1. In the distal transverse colon is a mild wall thickened appearance which   could represent colitis. 2. In the sigmoid colon there is a nodular wall thickened appearance again seen   however overall appears decreased when compared to the prior study. 3. Cholelithiasis. 4. Similar hypodense nodularity of the right adrenal gland measuring 2.2 x 1.5 cm. CT examination performed with dose lowering protocol in accordance with NAIMA. PSA, Total   Latest Ref Rng 0.00 - 4.00 ng/mL   3/7/2019 2.6    9/23/2019 4.2 (H)    1/6/2020 4.3 (H)    7/28/2020 3.9    12/7/2020 5.6 (H)    10/20/2021 <0.1    2/8/2022 0.2    2/24/2022 0.2    6/13/2022 0.6    8/31/2022 1.3    1/18/2023 <0.1    6/5/2023 0.01    12/13/2023 <0.01         12/14/23 - Urology, Pypiuk   PSA remains<0.01. Lupron 45 mg given today.        Upcoming appts:  12/21/23 [facility] Gen surgery, loop ileostomy reversal   6/14/23 Urology         Oncology History   Prostate cancer [facility])   7/28/2021 Initial Diagnosis    Prostate cancer ([address])     7/28/2021 Biopsy [facility]  DIAGNOSIS     Prostate (biopsy, [id number], 7/28/2021):    - Histologic Type: Adenocarcinoma (conventional, NOS)    - Soheila Score, Dominant Nodule: 4+3=7 Grade group 3: 70% pattern 4 (Tertiary Pattern 5)    - Tumor Extent: Tumor dimension (max): 14mm    - Location, Dominant Nodule: Left, posterolateral, posterior    - Local Extent (8th Edition AJCC): Extraprostatic extension                                              Location and extent of extraprostatic extension: Left, posterolateral, base, nonfocal    - Margins (as per the report): Negative    - Seminal Vesicle Invasion (as per the report): None    - Lymphatic (small vessel Invasion (as per report): Absent    - Extent of Invasion (8th Edition AJCC) Primary Tumor: pT3: Extraprostatic extension or microscopic bladder neck invasion    - Regional Lymph Nodes: pNx: Cannot be assessed    - Summary Margins: Negative    - Additional Findings, Uninvolved Prostate:                                                High-grade prostatic intraepithelial neoplasia (PIN)                                               Prostatic intraductal adenocarcinoma  Steffanie Mccarthy MD    Addendum   At the request of Dr. Adal Veloz, unstained slides from paraffin BLOCK A25 containing the patient's cancer cells were sent to [address] [place] [address] for  Jenkins-Solorio  testing. Upon completion of testing, the [facility] 162 Laboratory report will be directly sent to the requesting physician as well as posted in the Media Tab of the patient's Epic EMR by the [facility] Pathology Department. 3/14/2022 - 5/5/2022 Radiation      Treatments:  Course: C1    Plan ID Energy Fractions Dose per Fraction (cGy) Dose Correction (cGy) Total Dose Delivered (cGy) Elapsed Days   Prostate Bed 10X 38 / 38 180 0 6,840 52      Treatment Dates:  3/14/2022 - 5/5/2022.        1/30/2023 - 3/8/2023 Radiation    The patient saw @[facility] for radiation treatment.  This is the current list of radiation treatment:  Plan ID Energy Fractions Dose per Fraction (cGy) Dose Correction (cGy) Total Dose Delivered (cGy) Elapsed Days   Whole Pelvis 10X 25 / 25 240 0 6,000 37                Review of Systems:  Review of Systems   Constitutional:  Positive for fatigue and unexpected weight change (gaining some weight back since bowel surgery). Negative for activity change. HENT: Negative. Eyes: Negative. Wears glasses   Respiratory: Negative. Cardiovascular: Negative. Gastrointestinal:         Ileostomy intact, will be reversed next week at [facility]    Endocrine: Negative. Genitourinary:  Positive for frequency (nocturia x1). Musculoskeletal: Negative. Skin: Negative. Allergic/Immunologic: Negative. Neurological: Negative. Hematological: Negative. Psychiatric/Behavioral:  Positive for confusion (alzheimers) and decreased concentration. Clinical Trial: no    IPSS Questionnaire (AUA-7): Over the past month…    1)  How often have you had a sensation of not emptying your bladder completely after you finish urinating? 0 - Not at all   2)  How often have you had to urinate again less than two hours after you finished urinating? 0 - Not at all   3)  How often have you found you stopped and started again several times when you urinated? 0 - Not at all   4) How difficult have you found it to postpone urination? 0 - Not at all   5) How often have you had a weak urinary stream?  0 - Not at all   6) How often have you had to push or strain to begin urination? 0 - Not at all   7) How many times did you most typically get up to urinate from the time you went to bed until the time you got up in the morning?   1 - 1 time   Total Score:  1     Health Maintenance   Topic Date Due    Pneumococcal Vaccine: Pediatrics (0 to 5 Years) and At-Risk Patients (6 to 59 Years) (1 - PCV) Never done    Annual Physical  10/16/2021    OT PLAN OF CARE  03/12/2022    COVID-19 Vaccine (7 - 2023-24 season) 09/01/2023    BMI: Adult  12/14/2024    Depression Screening  12/15/2024    Colorectal Cancer Screening  06/24/2026    DTaP,Tdap,and Td Vaccines (3 - Td or Tdap) 12/15/2030    HIV Screening  Completed    Hepatitis C Screening  Completed    Influenza Vaccine  Completed    HIB Vaccine  Aged Out    IPV Vaccine  Aged Out    Hepatitis A Vaccine  Aged Out    Meningococcal ACWY Vaccine  Aged Out    HPV Vaccine  Aged Out     Patient Active Problem List   Diagnosis    Allergic rhinitis    Glaucoma    Prediabetes    Sciatica    Memory loss, short term    Abnormal EEG    Increased prostate specific antigen (PSA) velocity    Right hip pain    Lumbar spondylosis    Osteoarthritis of right hip    Enlarged prostate    Screening for prostate cancer    Bilateral leg weakness    Left hip pain    Breast mass in male    Furuncle of chest wall    Cellulitis    Gastroesophageal reflux disease    S/P hip replacement, bilateral    Prostate cancer ([address])    Early onset Alzheimer's dementia without behavioral disturbance ([address])    Arthritis of left hip     Past Medical History:   Diagnosis Date    Benign colon polyp     Diverticulitis, colon     Diverticulosis     Hyperlipidemia     Prediabetes     Prostate cancer ([address])     Renal calculi     Short-term memory loss     Vitamin D deficiency      Past Surgical History:   Procedure Laterality Date    ABDOMINAL ADHESION SURGERY N/A 7/28/2021    Procedure: LYSIS ADHESIONS, LAPAROSCOPIC;  Surgeon: Nicolás Mariscal MD;  Location: [facility];  Service: Urology    COLONOSCOPY      ESOPHAGOGASTRODUODENOSCOPY      HERNIA REPAIR      JOINT REPLACEMENT Right     [facility]    KNEE ARTHROSCOPY      KNEE SURGERY      AL LAPS SURG ELRE0LLR RPBIC RAD W/NRV SPARING ROBOT N/A 7/28/2021    Procedure: ROBOTIC LAPAROSCOPIC RADICAL PROSTATECTOMY;  Surgeon: Nicolás Mariscal MD;  Location: [facility];  Service: Urology    PROSTATE BIOPSY      TOTAL HIP ARTHROPLASTY Right 05/17/2019    TOTAL HIP ARTHROPLASTY Left 2019     Family History   Problem Relation Age of Onset    Dementia Mother     Glaucoma Mother     Glaucoma Father     Prostate cancer Father     Pulmonary embolism Father     Hypertension Brother     Prostate cancer Brother     Colon cancer Maternal Grandfather      Social History     Socioeconomic History    Marital status: /Civil Union     Spouse name: Not on file    Number of children: Not on file    Years of education: Not on file    Highest education level: Not on file   Occupational History    Occupation:    Tobacco Use    Smoking status: Former     Current packs/day: 0.00     Types: Cigarettes     Quit date:      Years since quittin.9    Smokeless tobacco: Never    Tobacco comments:     patient states he doesnt remember how much or for how long   Vaping Use    Vaping status: Never Used   Substance and Sexual Activity    Alcohol use: Yes     Comment: occasional beer and wine    Drug use: Not Currently     Comment: years ago    Sexual activity: Not Currently   Other Topics Concern    Not on file   Social History Narrative    Daily caffeine consumption- coffee, 3-4 cups    Education level- some college     Social Determinants of Health     Financial Resource Strain: Low Risk  (2023)    Received from [address] Strain (CARDIA)     Difficulty of Paying Living Expenses: Not hard at all   Food Insecurity: No Food Insecurity (2023)    Received from [address]     Worried About [place] in the Last Year: Never true     [address] in the Last Year: Never true   Transportation Needs: No Transportation Needs (2023)    Received from [address] [facility] of Transportation (Medical): No     Lack of Transportation (Non-Medical):  No   Physical Activity: Insufficiently Active (2023)    Received from [facility] [email or web address]    Exercise Vital Sign     Days of Exercise per Week: 7 days     Minutes of Exercise per Session: 20 min   Stress: No Stress Concern Present (7/7/2023)    Received from [address] of [facility]     Feeling of Stress : Not at all   Social Connections: Unknown (7/7/2023)    Received from [facility] [facility] and Isolation Panel [NHANES]     Frequency of Communication with Friends and Family: Twice a week     Frequency of Social Gatherings with Friends and Family: More than three times a week     Attends [employer] Services: Patient refused     Active Member of Clubs or Organizations: Patient refused     Attends Club or Organization Meetings: Patient refused     Marital Status: Not on file   Intimate Partner Violence: Not At Risk (7/28/2023)    Received from [address], Afraid, Rape, and Kick questionnaire     Fear of Current or Ex-Partner: No     Emotionally Abused: No     Physically Abused: No     Sexually Abused: No   Housing Stability: Low Risk  (7/28/2023)    Received from [address] to Pay for Housing in the Last Year: No     Number of [address] in the Last Year: 1     Unstable Housing in the Last Year: No       Current Outpatient Medications:     Alphagan P 0.1 %, INSTILL 1 DROP IN BOTH EYES TWICE DAILY, Disp: , Rfl:     Combigan 0.2-0.5 %, , Disp: , Rfl:     donepezil (ARICEPT) 10 mg tablet, Take 1 tablet (10 mg total) by mouth daily, Disp: 90 tablet, Rfl: 3    escitalopram (LEXAPRO) 20 mg tablet, Take 1 tablet (20 mg total) by mouth daily, Disp: 30 tablet, Rfl: 5    memantine (NAMENDA) 10 mg tablet, Take 1 tablet (10 mg total) by mouth 2 (two) times a day, Disp: 180 tablet, Rfl: 1  No current facility-administered medications for this visit.   Allergies   Allergen Reactions    Sulfa Antibiotics Other (See Comments)     Mother told him as child not to take     Vitals:    12/15/23 0937   BP: 138/80   BP Location: Left arm   Pulse: 69   Resp: 18   Temp: 98 °F (36.7 °C)   TempSrc: Temporal   SpO2: 99%   Weight: 71.7 kg (158 lb)

## 2024-02-21 PROBLEM — Z12.5 SCREENING FOR PROSTATE CANCER: Status: RESOLVED | Noted: 2019-02-25 | Resolved: 2024-02-21

## 2024-02-27 ENCOUNTER — IOP CHECK (OUTPATIENT)
Dept: URBAN - METROPOLITAN AREA CLINIC 6 | Facility: CLINIC | Age: 64
End: 2024-02-27

## 2024-02-27 DIAGNOSIS — H40.1131: ICD-10-CM

## 2024-02-27 PROCEDURE — 92083 EXTENDED VISUAL FIELD XM: CPT

## 2024-02-27 PROCEDURE — 92012 INTRM OPH EXAM EST PATIENT: CPT

## 2024-02-27 ASSESSMENT — VISUAL ACUITY
OU_CC: J10
OD_CC: 20/50-2
OS_CC: 20/25-1

## 2024-02-27 ASSESSMENT — TONOMETRY
OS_IOP_MMHG: 17
OD_IOP_MMHG: 17

## 2024-04-22 ENCOUNTER — TELEPHONE (OUTPATIENT)
Dept: NEUROLOGY | Facility: CLINIC | Age: 64
End: 2024-04-22

## 2024-05-02 ENCOUNTER — OFFICE VISIT (OUTPATIENT)
Dept: NEUROLOGY | Facility: CLINIC | Age: 64
End: 2024-05-02
Payer: COMMERCIAL

## 2024-05-02 VITALS
DIASTOLIC BLOOD PRESSURE: 70 MMHG | WEIGHT: 168.2 LBS | HEART RATE: 93 BPM | SYSTOLIC BLOOD PRESSURE: 116 MMHG | TEMPERATURE: 98 F | BODY MASS INDEX: 24.91 KG/M2 | OXYGEN SATURATION: 100 % | HEIGHT: 69 IN

## 2024-05-02 DIAGNOSIS — G30.0 EARLY ONSET ALZHEIMER'S DEMENTIA WITHOUT BEHAVIORAL DISTURBANCE (HCC): Primary | ICD-10-CM

## 2024-05-02 DIAGNOSIS — F02.80 EARLY ONSET ALZHEIMER'S DEMENTIA WITHOUT BEHAVIORAL DISTURBANCE (HCC): Primary | ICD-10-CM

## 2024-05-02 PROCEDURE — 99215 OFFICE O/P EST HI 40 MIN: CPT

## 2024-05-02 NOTE — PATIENT INSTRUCTIONS
-Continue Donepezil 10 mg daily and Namenda 10 mg twice daily     - Continue Lexapro 20 mg daily      - Patient was encouraged to increase mind stimulating activities such as reading, crosswords, word searches, puzzles, Soduku, solitaire, coloring and other brain games.  We also discussed the importance of staying physically active and eating a health diet such as the Mediterranean or MIND diet.       - Discussed air tags, walkie talkie, lanyard/tags as safety precautions while walking dogs or running     - Consider referral in the future to psychology/talk therapy or psychiatry     - Discussed some basic Do’s when it comes to communication with someone with dementia:  Give short, one sentence explanations.  Allow plenty of time for comprehension, and then triple it.  Repeat instructions or sentences exactly the same way.  Avoid insistence. Try again later.  Agree with them or distract them to a different subject or activity.  Accept the blame when something’s wrong (even if it’s fantasy).  Leave the room, if necessary, to avoid confrontations.  Respond to the feelings rather than the words.  Be patient and cheerful and reassuring. Do go with the flow.  Practice 100% forgiveness. Memory loss progresses daily.     -Here are some Don’ts:  Don’t reason.  Don’t argue.  Don’t confront.     Don’t remind them they forget.     Don’t question recent memory.     Don’t take it personally.

## 2024-05-02 NOTE — PROGRESS NOTES
Patient ID: Nilson Montes is a 63 y.o. male.    Assessment/Plan:    Early onset Alzheimer's dementia without behavioral disturbance (HCC)  Nilson Montes is a 63 year old male with early onset Alzheimer's disease.  He is maintained on Namenda 10 mg twice daily and Aricept 10 mg daily, as well as Lexapro 20 mg daily.  Overall his daughter feels that his cognition has been stable since his last visit.  There have been no acute changes and he denies any neurologic complaints today.  There are no safety, sleep, appetite, or behavioral concerns today.  Will continue his current regimen and plan to follow-up in 6 months; sooner if needed      Plan:    -Continue Donepezil 10 mg daily and Namenda 10 mg twice daily    -Continue Lexapro 20 mg daily     -Patient was encouraged to increase mind stimulating activities such as reading, crosswords, word searches, puzzles, Soduku, solitaire, coloring and other brain games.  We also discussed the importance of staying physically active and eating a health diet such as the Mediterranean or MIND diet.       -Discussed air tags, walkie talkie, lanyard/tags as safety precautions while walking dogs or running     -Consider referral in the future to psychology/talk therapy or psychiatry    - Consider day programs, gyms, etc.     -Discussed some basic Do’s when it comes to communication with someone with dementia:  Give short, one sentence explanations.  Allow plenty of time for comprehension, and then triple it.  Repeat instructions or sentences exactly the same way.  Avoid insistence. Try again later.  Agree with them or distract them to a different subject or activity.  Accept the blame when something’s wrong (even if it’s fantasy).  Leave the room, if necessary, to avoid confrontations.  Respond to the feelings rather than the words.  Be patient and cheerful and reassuring. Do go with the flow.  Practice 100% forgiveness. Memory loss progresses daily.     -Discussed  Don’ts:  Don’t reason.  Don’t argue.  Don’t confront.  Don’t remind them they forget.  Don’t question recent memory.  Don’t take it personally.       Diagnoses and all orders for this visit:    Early onset Alzheimer's dementia without behavioral disturbance (HCC)         I have spent a total time of 40 minutes on 05/02/24 in caring for this patient including Prognosis, Instructions for management, Patient and family education, Risk factor reductions, Impressions, Documenting in the medical record, and Obtaining or reviewing history  .      Subjective:    HPI Nilson Montes is a 63 year old male known to the practice for cognitive dysfunction felt to be secondary to early onset of Alzheimer's disease beginning almost 7-8 years ago. He is maintained on Namenda 10 mg twice daily and Aricept 10 mg daily, as well as Lexapro 20 mg daily.  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 Since his last visit 10/30/2023 he has been stable with both good and bad days in terms of memory but remains very independent. He is remaining physically active, participating in reading and remains independent of all ADLs. He is accompanied by his daughter today.  His wife recently underwent foot surgery and is recovering at home.    He is recently moved to a [place] community with his wife and daughter.  His daughter reports that the community has amenities which she is looking forward to having him utilize.  Unfortunately due to the recent move he has not been able to walk the dogs independently, as he is not familiar with the neighborhood yet.  He does require assistance for showers, although he is dressing independently.  His daughter is cooking all meals.  He continues to toilet and brushes teeth independently.  He is unable to use household items such as the microwave.  He continues to enjoy fishing, reading, and listening to music.  He is not  "a fan of doing puzzles or other board games.  He continues to spend time with his grandson which brings him much [place].  He is under constant supervision. His mood continues to be stable; occasionally having outbursts although his daughter reports these are usually provoked and he is usually easily redirected.  He denies any hallucinations and his daughter denies any sundowning behaviors. He is still sleeping well without difficulty and has an adequate appetite. Today he continues to decline to participate in [facility] testing but did participate in brief memory questioning. In the past he has also declined neuropsychologic evaluation.  He denies any neurologic complaints today.       The following portions of the patient's history were reviewed and updated as appropriate: allergies, current medications, past family history, past medical history, past social history, past surgical history, and problem list.         Objective:    Blood pressure 116/70, pulse 93, temperature 98 °F (36.7 °C), temperature source Temporal, height 5' 9\" (1.753 m), weight 76.3 kg (168 lb 3.2 oz), SpO2 100%.    Neurological Exam    He is able to correctly tell me his name and date of birth  He does not know today's month, date, or year  He has difficulty with naming \"mirror\"  But is able to state that the color is green, although initially incorrectly stating it was yellow.  He has difficulty with recalling the name of his grandson  There is right/left confusion.   His speech is tangential          ROS:    Review of Systems   Constitutional:  Negative for appetite change, fatigue and fever.   HENT: Negative.  Negative for hearing loss, tinnitus, trouble swallowing and voice change.    Eyes: Negative.  Negative for photophobia, pain and visual disturbance.   Respiratory: Negative.  Negative for shortness of breath.    Cardiovascular: Negative.  Negative for palpitations.   Gastrointestinal: Negative.  Negative for nausea and vomiting.   Endocrine: " Negative.  Negative for cold intolerance.   Genitourinary: Negative.  Negative for dysuria, frequency and urgency.   Musculoskeletal:  Negative for back pain, gait problem, myalgias, neck pain and neck stiffness.   Skin: Negative.  Negative for rash.   Allergic/Immunologic: Negative.    Neurological: Negative.  Negative for dizziness, tremors, seizures, syncope, facial asymmetry, speech difficulty, weakness, light-headedness, numbness and headaches.   Hematological: Negative.  Does not bruise/bleed easily.   Psychiatric/Behavioral: Negative.  Negative for confusion, hallucinations and sleep disturbance.    All other systems reviewed and are negative.    Reviewed ROS as entered by medical assistant.

## 2024-05-08 NOTE — ASSESSMENT & PLAN NOTE
Nilson Montes is a 63 year old male with early onset Alzheimer's disease.  He is maintained on Namenda 10 mg twice daily and Aricept 10 mg daily, as well as Lexapro 20 mg daily.  Overall his daughter feels that his cognition has been stable since his last visit.  There have been no acute changes and he denies any neurologic complaints today.  There are no safety, sleep, appetite, or behavioral concerns today.  Will continue his current regimen and plan to follow-up in 6 months; sooner if needed      Plan:    -Continue Donepezil 10 mg daily and Namenda 10 mg twice daily    -Continue Lexapro 20 mg daily     -Patient was encouraged to increase mind stimulating activities such as reading, crosswords, word searches, puzzles, Soduku, solitaire, coloring and other brain games.  We also discussed the importance of staying physically active and eating a health diet such as the Mediterranean or MIND diet.       -Discussed air tags, walkie talkie, lanyard/tags as safety precautions while walking dogs or running     -Consider referral in the future to psychology/talk therapy or psychiatry    - Consider day programs, gyms, etc.     -Discussed some basic Do’s when it comes to communication with someone with dementia:  Give short, one sentence explanations.  Allow plenty of time for comprehension, and then triple it.  Repeat instructions or sentences exactly the same way.  Avoid insistence. Try again later.  Agree with them or distract them to a different subject or activity.  Accept the blame when something’s wrong (even if it’s fantasy).  Leave the room, if necessary, to avoid confrontations.  Respond to the feelings rather than the words.  Be patient and cheerful and reassuring. Do go with the flow.  Practice 100% forgiveness. Memory loss progresses daily.     -Discussed Don’ts:  Don’t reason.  Don’t argue.  Don’t confront.  Don’t remind them they forget.  Don’t question recent memory.  Don’t take it personally.

## 2024-06-10 ENCOUNTER — TELEPHONE (OUTPATIENT)
Dept: UROLOGY | Facility: CLINIC | Age: 64
End: 2024-06-10

## 2024-06-10 NOTE — TELEPHONE ENCOUNTER
Spoke with pt's wife reminding to have PSA done PTV on Friday. She was in agreeance and would tell spouse to get it done.

## 2024-06-12 ENCOUNTER — APPOINTMENT (OUTPATIENT)
Dept: LAB | Facility: AMBULARY SURGERY CENTER | Age: 64
End: 2024-06-12
Payer: COMMERCIAL

## 2024-06-12 DIAGNOSIS — C61 PROSTATE CANCER (HCC): ICD-10-CM

## 2024-06-12 LAB — PSA SERPL-MCNC: <0.008 NG/ML (ref 0–4)

## 2024-06-12 PROCEDURE — 84153 ASSAY OF PSA TOTAL: CPT

## 2024-06-12 PROCEDURE — 36415 COLL VENOUS BLD VENIPUNCTURE: CPT

## 2024-06-14 ENCOUNTER — OFFICE VISIT (OUTPATIENT)
Dept: UROLOGY | Facility: CLINIC | Age: 64
End: 2024-06-14
Payer: COMMERCIAL

## 2024-06-14 VITALS
WEIGHT: 165 LBS | HEIGHT: 69 IN | SYSTOLIC BLOOD PRESSURE: 120 MMHG | OXYGEN SATURATION: 95 % | BODY MASS INDEX: 24.44 KG/M2 | DIASTOLIC BLOOD PRESSURE: 80 MMHG | HEART RATE: 63 BPM

## 2024-06-14 DIAGNOSIS — C61 PROSTATE CANCER (HCC): Primary | ICD-10-CM

## 2024-06-14 PROCEDURE — 96402 CHEMO HORMON ANTINEOPL SQ/IM: CPT

## 2024-06-14 PROCEDURE — 99213 OFFICE O/P EST LOW 20 MIN: CPT | Performed by: PHYSICIAN ASSISTANT

## 2024-06-14 NOTE — PROGRESS NOTES
UROLOGY PROGRESS NOTE   Patient Identifiers: Nilson Montes (MRN [id number])  Date of Service: 6/14/2024    Subjective:   63-year-old man history of Soheila 7 prostate cancer.  He had a robotic prostatectomy in 2021 followed by salvage radiation.  He had a rising PSA and his PET scan showed oligo aj disease in the left pelvis and ADT was started in October 2022.  PSA 0.008.  Lupron 45 mg given today.  No voiding complaints.  No weight loss or bone pain.  He does have early onset Alzheimer's and needs assistance with some simple tasks.    Reason for visit: Metastatic prostate cancer follow-up    Objective:     VITALS:    There were no vitals filed for this visit.        LABS:  Lab Results   Component Value Date    HGB 13.5 03/27/2023    HCT 40.7 03/27/2023    WBC 4.00 (L) 03/27/2023     03/27/2023   ]    Lab Results   Component Value Date     12/13/2017    K 3.6 12/24/2023     12/24/2023    CO2 28 12/24/2023    BUN 14 12/24/2023    CREATININE 0.72 12/24/2023    CALCIUM 8.9 12/24/2023    GLUCOSE 132 06/24/2014   ]        INPATIENT MEDS:    Current Outpatient Medications:     Alphagan P 0.1 %, INSTILL 1 DROP IN BOTH EYES TWICE DAILY, Disp: , Rfl:     Combigan 0.2-0.5 %, , Disp: , Rfl:     donepezil (ARICEPT) 10 mg tablet, Take 1 tablet (10 mg total) by mouth daily, Disp: 90 tablet, Rfl: 3    escitalopram (LEXAPRO) 20 mg tablet, Take 1 tablet (20 mg total) by mouth daily, Disp: 30 tablet, Rfl: 5    memantine (NAMENDA) 10 mg tablet, Take 1 tablet (10 mg total) by mouth 2 (two) times a day, Disp: 180 tablet, Rfl: 1      Physical Exam:   There were no vitals taken for this visit.  GEN: no acute distress    RESP: breathing comfortably with no accessory muscle use    ABD: soft, non-tender, non-distended   INCISION:    EXT: no significant peripheral edema       RADIOLOGY:   None    Assessment:   #1.  Metastatic prostate cancer    Plan:   -Lupron 45 mg given today-this will complete 2 years of  [facility] Patient arrived 12/8 and left AMA 12/12- [facility]  Was due to End treatment 2/10   treatment  -Follow-up in 6 months with PSA and testosterone prior  -  -

## 2024-07-08 DIAGNOSIS — F02.80 EARLY ONSET ALZHEIMER'S DEMENTIA WITHOUT BEHAVIORAL DISTURBANCE (HCC): ICD-10-CM

## 2024-07-08 DIAGNOSIS — G30.0 EARLY ONSET ALZHEIMER'S DEMENTIA WITHOUT BEHAVIORAL DISTURBANCE (HCC): ICD-10-CM

## 2024-07-08 RX ORDER — DONEPEZIL HYDROCHLORIDE 10 MG/1
10 TABLET, FILM COATED ORAL DAILY
Qty: 90 TABLET | Refills: 3 | Status: SHIPPED | OUTPATIENT
Start: 2024-07-08

## 2024-07-08 RX ORDER — ESCITALOPRAM OXALATE 20 MG/1
20 TABLET ORAL DAILY
Qty: 90 TABLET | Refills: 3 | Status: SHIPPED | OUTPATIENT
Start: 2024-07-08

## 2024-07-08 NOTE — TELEPHONE ENCOUNTER
Patient's wife, calling for a refill of donepezil and escitalopram, 90 day supply to [facility] [place] [place]    Last visit 5/2 Nona Monteiro    Please sign scripts if in agreement, thank you..

## 2024-07-25 PROBLEM — M25.552 LEFT HIP PAIN: Status: RESOLVED | Noted: 2019-08-12 | Resolved: 2024-07-25

## 2024-07-25 PROBLEM — M25.551 RIGHT HIP PAIN: Status: RESOLVED | Noted: 2019-01-09 | Resolved: 2024-07-25

## 2024-08-05 DIAGNOSIS — F02.80 EARLY ONSET ALZHEIMER'S DEMENTIA WITHOUT BEHAVIORAL DISTURBANCE (HCC): ICD-10-CM

## 2024-08-05 DIAGNOSIS — G30.0 EARLY ONSET ALZHEIMER'S DEMENTIA WITHOUT BEHAVIORAL DISTURBANCE (HCC): ICD-10-CM

## 2024-08-05 NOTE — TELEPHONE ENCOUNTER
Medication: memantine (NAMENDA) 10 mg tablet     Dose/Frequency: Take 1 tablet (10 mg total) by mouth 2 (two) times a day     Quantity: Dispense: 180 tablet  Refills: 1 ordered    Pharmacy: [id number] - [place] - [id number] [[id number]]     Office:   [] PCP/Provider -   [x] Speciality/Provider -     Does the patient have enough for 3 days?   [] Yes   [x] No - Send as HP to POD

## 2024-08-06 RX ORDER — MEMANTINE HYDROCHLORIDE 10 MG/1
10 TABLET ORAL 2 TIMES DAILY
Qty: 200 TABLET | Refills: 1 | Status: SHIPPED | OUTPATIENT
Start: 2024-08-06

## 2024-08-27 ENCOUNTER — ESTABLISHED COMPREHENSIVE EXAM (OUTPATIENT)
Dept: URBAN - METROPOLITAN AREA CLINIC 6 | Facility: CLINIC | Age: 64
End: 2024-08-27

## 2024-08-27 DIAGNOSIS — H40.1131: ICD-10-CM

## 2024-08-27 DIAGNOSIS — H25.813: ICD-10-CM

## 2024-08-27 PROCEDURE — 92014 COMPRE OPH EXAM EST PT 1/>: CPT

## 2024-08-27 PROCEDURE — 92020 GONIOSCOPY: CPT

## 2024-08-27 PROCEDURE — 92133 CPTRZD OPH DX IMG PST SGM ON: CPT

## 2024-08-27 PROCEDURE — 92202 OPSCPY EXTND ON/MAC DRAW: CPT

## 2024-08-27 ASSESSMENT — TONOMETRY
OD_IOP_MMHG: 17
OS_IOP_MMHG: 15

## 2024-08-27 ASSESSMENT — VISUAL ACUITY
OS_CC: 20/40-2
OD_CC: 20/40-1

## 2024-10-03 ENCOUNTER — NURSE TRIAGE (OUTPATIENT)
Age: 64
End: 2024-10-03

## 2024-10-03 NOTE — TELEPHONE ENCOUNTER
Agree could be related to alzheimer's disease progression however other etiologies need to be ruled out first before determining that. He should be evaluated by PCP and/or GI.

## 2024-10-03 NOTE — TELEPHONE ENCOUNTER
Outbound call placed to [facility], no answer. LMOM okay per communication form.     Reviewed message below.Provided office telephone number to call back with any questions.

## 2024-10-03 NOTE — TELEPHONE ENCOUNTER
" Inbound call received from Patient wife with an inquiry. Patient wife states the Ирниа started being incontinent of bowel only, a week ago, and she wanted to know if this was normal.     When asked about fever Trishat wife asked if she should speak to the colorectal doctor to see if there is anything they can advise. Patient wife referred to PCP and colorectal provider if he has one to rule out possible causes. It was explained all potential causes should be ruled out.    Nona GODINEZ please advise, thank you!    Patient wife confirmed the best call back number is [phone].    Reason for Disposition   Nursing judgment    Answer Assessment - Initial Assessment Questions  1. REASON FOR CALL: \"What is your main concern right now?\"      He is starting to be incontinent of bowel only and patient wife wanted to know if this is part of the normal process. He does not feel it when it is happening.     2. ONSET: \"When did the  Incontinence start?\"      Incontinence started a week ago.     3. FEVER: \"Do you have a fever?\"      Patient wife denies he has had fever or infection. No diet or medication changes at this point. No numbness or tingling, no breathing difficulty.     4. OTHER SYMPTOMS: \"Do you have any other new symptoms?\"     Ирина seems to be getting more agitated and crying because of it.    Protocols used: No Protocol Available - Sick Adult-ADULT-[place]    "

## 2024-10-04 NOTE — TELEPHONE ENCOUNTER
Outbound call placed to [facility].     Treasure stated she called pt's GI doctor and pt has an 10/14/24.   [facility] aware to call PCP as well to discuss current concerns.    Gaby JOHNSON

## 2024-10-09 ENCOUNTER — TELEPHONE (OUTPATIENT)
Dept: NEUROLOGY | Facility: CLINIC | Age: 64
End: 2024-10-09

## 2024-10-09 NOTE — TELEPHONE ENCOUNTER
Lft msg on machine.  Need to reschedule patient appt 11/5/24 due to him being overbooked with [facility].  Please offer next available appt.  Thank you!

## 2024-10-09 NOTE — TELEPHONE ENCOUNTER
Correction to previous telephone note:  Need to reschedule 10/25/24 appointment.  The 10/25/24 appt is overbooked.  Thank you!

## 2024-10-09 NOTE — TELEPHONE ENCOUNTER
Lft message on answer machine for patient to reschedule November appt because it was overbooked.  Got my request to call the patient from [facility].

## 2024-10-25 ENCOUNTER — TELEPHONE (OUTPATIENT)
Dept: NEUROLOGY | Facility: CLINIC | Age: 64
End: 2024-10-25

## 2024-10-25 NOTE — TELEPHONE ENCOUNTER
Patient no longer has BC, didn't have new cards.  Wanted to cancel appt and will call to reschedule.  Please verify new insurance.

## 2024-12-13 ENCOUNTER — TELEPHONE (OUTPATIENT)
Dept: RADIATION ONCOLOGY | Facility: HOSPITAL | Age: 64
End: 2024-12-13

## 2024-12-13 ENCOUNTER — TRANSCRIBE ORDERS (OUTPATIENT)
Dept: RADIATION ONCOLOGY | Facility: HOSPITAL | Age: 64
End: 2024-12-13

## 2024-12-13 ENCOUNTER — APPOINTMENT (OUTPATIENT)
Dept: LAB | Facility: AMBULARY SURGERY CENTER | Age: 64
End: 2024-12-13
Payer: MEDICARE

## 2024-12-13 DIAGNOSIS — C61 PROSTATE CANCER (HCC): ICD-10-CM

## 2024-12-13 DIAGNOSIS — C61 PROSTATE CANCER (HCC): Primary | ICD-10-CM

## 2024-12-13 LAB — PSA SERPL-MCNC: <0.008 NG/ML (ref 0–4)

## 2024-12-13 PROCEDURE — 84153 ASSAY OF PSA TOTAL: CPT

## 2024-12-13 PROCEDURE — 36415 COLL VENOUS BLD VENIPUNCTURE: CPT

## 2024-12-13 NOTE — TELEPHONE ENCOUNTER
Called pt and LM asking that he please get his PSA drawn prior to his appt on Monday 12/16. There is an order in the system for him to get it done at any [facility]'s lab.

## 2024-12-16 ENCOUNTER — OFFICE VISIT (OUTPATIENT)
Dept: RADIATION ONCOLOGY | Facility: HOSPITAL | Age: 64
End: 2024-12-16
Attending: STUDENT IN AN ORGANIZED HEALTH CARE EDUCATION/TRAINING PROGRAM
Payer: MEDICARE

## 2024-12-16 ENCOUNTER — TELEPHONE (OUTPATIENT)
Dept: UROLOGY | Facility: CLINIC | Age: 64
End: 2024-12-16

## 2024-12-16 VITALS
BODY MASS INDEX: 24.44 KG/M2 | WEIGHT: 165 LBS | HEART RATE: 82 BPM | HEIGHT: 69 IN | RESPIRATION RATE: 18 BRPM | SYSTOLIC BLOOD PRESSURE: 126 MMHG | TEMPERATURE: 97 F | OXYGEN SATURATION: 98 % | DIASTOLIC BLOOD PRESSURE: 82 MMHG

## 2024-12-16 DIAGNOSIS — C61 PROSTATE CANCER (HCC): Primary | ICD-10-CM

## 2024-12-16 PROCEDURE — G2211 COMPLEX E/M VISIT ADD ON: HCPCS | Performed by: STUDENT IN AN ORGANIZED HEALTH CARE EDUCATION/TRAINING PROGRAM

## 2024-12-16 PROCEDURE — 99213 OFFICE O/P EST LOW 20 MIN: CPT | Performed by: STUDENT IN AN ORGANIZED HEALTH CARE EDUCATION/TRAINING PROGRAM

## 2024-12-16 NOTE — PROGRESS NOTES
Follow-up Visit   Name: Nilson Montes      : 1960      MRN: [id number]  Encounter Provider: Jose Izquierdo MD  Encounter Date: 2024   Encounter department: [facility] RADIATION ONCOLOGY  :  Assessment & Plan  Prostate cancer (HCC)  Mr. Nilson Montes is a 63 year old man with PMHx early onset Alzheimer Disease with GS 4+3, pT3a prostate cancer s/p RALP. Previously with PSA recurrence to 0.2 s/p prostate fossa RT (6840cGy/38fx). Now with continued increase in PSA and PSMA PET showing pelvic aj disease. On 3/8/23 the patient completed a course of salvage reirradiation to the pelvis to a dose of 4500cGy in 25 fractions; the PSMA avid LNs underwent SIB to 6000cGy in 25 fractions. He returns today for follow-up.     Approximately 1 year, 9 months following completion of salvage reirradiation, the patient is doing well overall and remains clinically and biochemically without evidence of disease recurrence.  PSA remains undetectable at this time following completion of ADT in 2024.  We reviewed that he should undergo repeat PSA every 6 months going forward. He will see urology in 2025; I will plan to see him back in 1 year with repeat PSA at that time.     Orders:    PSA Total, Diagnostic; Future        History of Present Illness   Chief Complaint   Patient presents with    Follow-up     Radiation oncology    Pertinent Medical History   The patient was last seen in clinic on 12/15/2023.  At that time he was doing well overall from a urinary standpoint, though had undergone emergent diverting colostomy in the setting of bowel obstruction and was pending colostomy reversal.  Since his last follow-up, he has undergone successful colostomy reversal; unfortunately he has developed fecal incontinence.  From a urinary standpoint he remains without issue.  PSA remains well-controlled and is undetectable.     PSA   Latest Ref Rng 0.000 - 4.000 ng/mL   2020 4.3 (H)     7/28/2020 3.9    12/7/2020 5.6 (H)    10/20/2021 <0.1    2/8/2022 0.2    2/24/2022 0.2    6/13/2022 0.6    8/31/2022 1.3    1/18/2023 <0.1    6/5/2023 0.01    12/13/2023 <0.01    6/12/2024 <0.008    12/13/2024 <0.008        Oncology History   Oncology History   Prostate cancer (HCC)   7/28/2021 Initial Diagnosis    Prostate cancer (HCC)     7/28/2021 Biopsy    [facility] RE[facility]ATOR[facility]  DIAGNOSIS     Prostate (biopsy, [id number], 7/28/2021):    - Histologic Type: Adenocarcinoma (conventional, NOS)    - Soheila Score, Dominant Nodule: 4+3=7 Grade group 3: 70% pattern 4 (Tertiary Pattern 5)    - Tumor Extent: Tumor dimension (max): 14mm    - Location, Dominant Nodule: Left, posterolateral, posterior    - Local Extent (8th Edition AJCC): Extraprostatic extension                                              Location and extent of extraprostatic extension: Left, posterolateral, base, nonfocal    - Margins (as per the report): Negative    - Seminal Vesicle Invasion (as per the report): None    - Lymphatic (small vessel Invasion (as per report): Absent    - Extent of Invasion (8th Edition AJCC) Primary Tumor: pT3: Extraprostatic extension or microscopic bladder neck invasion    - Regional Lymph Nodes: pNx: Cannot be assessed    - Summary Margins: Negative    - Additional Findings, Uninvolved Prostate:                                                High-grade prostatic intraepithelial neoplasia (PIN)                                               Prostatic intraductal adenocarcinoma  Scooter Lopez MD    Addendum   At the request of Dr. Lux, unstained slides from paraffin BLOCK A25 containing the patient's cancer cells were sent to [facility] for  Prolaris  testing. Upon completion of testing, the [facility] report will be directly sent to the requesting physician as well as posted in the Media Tab of the patient's [facility] EMR by the [facility] Pathology Department.          "  3/14/2022 - 5/5/2022 Radiation      Treatments:  Course: C1    Plan ID Energy Fractions Dose per Fraction (cGy) Dose Correction (cGy) Total Dose Delivered (cGy) Elapsed Days   Prostate Bed 10X 38 / 38 180 0 6,840 52      Treatment Dates:  3/14/2022 - 5/5/2022.        1/30/2023 - 3/8/2023 Radiation    The patient saw @[facility] for radiation treatment. This is the current list of radiation treatment:  Plan ID Energy Fractions Dose per Fraction (cGy) Dose Correction (cGy) Total Dose Delivered (cGy) Elapsed Days   Whole Pelvis 10X 25 / 25 240 0 6,000 37               Review of Systems Refer to nursing note.          Objective   /82 (BP Location: Left arm, Patient Position: Sitting, Cuff Size: Standard)   Pulse 82   Temp (!) 97 °F (36.1 °C) (Temporal)   Resp 18   Ht 5' 9\" (1.753 m)   Wt 74.8 kg (165 lb)   SpO2 98%   BMI 24.37 kg/m²     Pain Screening:  Pain Score: 0-No pain  ECOG ECOG Performance Status: 2 - Ambulatory and capable of all selfcare but unable to carry out any work activities.  Up and about more than 50% of waking hours  Physical Exam   Well-appearing.  NAD.  No increased work of breathing.  Extremities warm well-perfused.  MALCOLM deferred.    Administrative Statements   I have spent a total time of 25 minutes in caring for this patient on the day of the visit/encounter including Diagnostic results, Documenting in the medical record, Reviewing / ordering tests, medicine, procedures  , Obtaining or reviewing history  , and Communicating with other healthcare professionals .   Portions of the record may have been created with voice recognition software.  Occasional wrong word or \"sound a like\" substitutions may have occurred due to the inherent limitations of voice recognition software.  Read the chart carefully and recognize, using context, where substitutions have occurred.  "

## 2024-12-16 NOTE — TELEPHONE ENCOUNTER
----- Message from Wu Soto PA-C sent at 12/16/2024  2:40 PM EST -----  Please reschedule his appointment with me for June with PSA and testosterone prior.  His January appointment can be canceled.

## 2024-12-16 NOTE — PROGRESS NOTES
Nilson Montes 1960 is a 64 y.o. male  with PMHx early onset Alzheimer Disease with GS 4+3, pT3a prostate cancer s/p RALP. Previously with PSA recurrence to 0.2 s/p prostate fossa RT (6840cGy/38fx). Now with continued increase in PSA and PSMA PET showing pelvic aj disease. On 3/8/23 the patient completed a course of salvage reirradiation to the pelvis to a dose of 4500cGy in 25 fractions; the PSMA avid LNs underwent SIB to 6000cGy in 25 fractions. The patient tolerated RT well overall. He had intermittent diarrhea through treatment though otherwise felt well overall. He did receive ADT with therapy. The pt was last seen in radiation on 12/15/23. He returns today for follow up.    24 - Urology, [place]  Pt received Lupron injection - this will complete 2 years of treatment  No urinary issues  Pt does have early onset of Alzheimer's - needs assistance with some simple tasks          PSA   Latest Ref Rng 0.000 - 4.000 ng/mL   2023 0.01    2023 <0.01    2024 <0.008    2024 <0.008                 [facility] - Urology, [place]      Follow up visit     Oncology History   Prostate cancer (HCC)   2021 Initial Diagnosis    Prostate cancer (HCC)     2021 Biopsy    [facility] REFERENCE LABORATORIES  DIAGNOSIS     Prostate (biopsy, [id number], 2021):    - Histologic Type: Adenocarcinoma (conventional, NOS)    - [place] Score, Dominant Nodule: 4+3=7 Grade group 3: 70% pattern 4 (Tertiary Pattern 5)    - Tumor Extent: Tumor dimension (max): 14mm    - Location, Dominant Nodule: Left, posterolateral, posterior    - Local Extent (8th Edition AJCC): Extraprostatic extension                                              Location and extent of extraprostatic extension: Left, posterolateral, base, nonfocal    - Margins (as per the report): Negative    - Seminal Vesicle Invasion (as per the report): None    - Lymphatic (small vessel Invasion (as per report): Absent    - Extent  of Invasion (8th Edition AJCC) Primary Tumor: pT3: Extraprostatic extension or microscopic bladder neck invasion    - Regional Lymph Nodes: pNx: Cannot be assessed    - Summary Margins: Negative    - Additional Findings, Uninvolved Prostate:                                                High-grade prostatic intraepithelial neoplasia (PIN)                                               Prostatic intraductal adenocarcinoma  Scooter Lopez MD    Addendum   At the request of Dr. Lux, unstained slides from paraffin BLOCK A25 containing the patient's cancer cells were sent to [facility] for  Prolaris  testing. Upon completion of testing, the [facility] report will be directly sent to the requesting physician as well as posted in the Media Tab of the patient's [facility] EMR by the [facility] Pathology Department.           3/14/2022 - 5/5/2022 Radiation      Treatments:  Course: C1    Plan ID Energy Fractions Dose per Fraction (cGy) Dose Correction (cGy) Total Dose Delivered (cGy) Elapsed Days   Prostate Bed 10X 38 / 38 180 0 6,840 52      Treatment Dates:  3/14/2022 - 5/5/2022.        1/30/2023 - 3/8/2023 Radiation    The patient saw @[facility] for radiation treatment. This is the current list of radiation treatment:  Plan ID Energy Fractions Dose per Fraction (cGy) Dose Correction (cGy) Total Dose Delivered (cGy) Elapsed Days   Whole Pelvis 10X 25 / 25 240 0 6,000 37                Review of Systems:  Review of Systems   Constitutional: Negative.    HENT: Negative.     Eyes: Negative.         Wears glasses    Respiratory: Negative.     Cardiovascular: Negative.    Gastrointestinal: Negative.    Endocrine: Negative.    Genitourinary: Negative.    Musculoskeletal: Negative.    Skin: Negative.    Allergic/Immunologic: Negative.    Neurological: Negative.    Hematological: Negative.    Psychiatric/Behavioral: Negative.             IPSS Questionnaire (AUA-7):  Over the past month…    1)  How  often have you had a sensation of not emptying your bladder completely after you finish urinating?  0 - Not at all   2)  How often have you had to urinate again less than two hours after you finished urinating? 0 - Not at all   3)  How often have you found you stopped and started again several times when you urinated?  0 - Not at all   4) How difficult have you found it to postpone urination?  0 - Not at all   5) How often have you had a weak urinary stream?  0 - Not at all   6) How often have you had to push or strain to begin urination?  0 - Not at all   7) How many times did you most typically get up to urinate from the time you went to bed until the time you got up in the morning?  0 - None   Total Score:  0           Health Maintenance   Topic Date Due    Medicare Annual Wellness Visit (AWV)  Never done    RSV Vaccine Age 60+ Years (1 - Risk 60-74 years 1-dose series) Never done    OT PLAN OF CARE  03/12/2022    Fall Risk  07/02/2024    COVID-19 Vaccine (7 - 2024-25 season) 09/01/2024    Depression Screening  12/15/2024    BMI: Adult  06/14/2025    Colorectal Cancer Screening  06/24/2026    HIV Screening  Completed    Hepatitis C Screening  Completed    Zoster Vaccine  Completed    Pneumococcal Vaccine: 65+ Years  Completed    Influenza Vaccine  Completed    RSV Vaccine age 0-20 Months  Aged Out    HIB Vaccine  Aged Out    IPV Vaccine  Aged Out    Hepatitis A Vaccine  Aged Out    Meningococcal ACWY Vaccine  Aged Out    HPV Vaccine  Aged Out     Patient Active Problem List   Diagnosis    Allergic rhinitis    Glaucoma    Prediabetes    Sciatica    Memory loss, short term    Increased prostate specific antigen (PSA) velocity    Lumbar spondylosis    Osteoarthritis of right hip    Enlarged prostate    Bilateral leg weakness    Breast mass in male    Furuncle of chest wall    Cellulitis    Gastroesophageal reflux disease    S/P hip replacement, bilateral    Prostate cancer (HCC)    Early onset Alzheimer's dementia  without behavioral disturbance (HCC)    Arthritis of left hip     Past Medical History:   Diagnosis Date    Abnormal EEG     Benign colon polyp     Diverticulitis, colon     Diverticulosis     Hyperlipidemia     Left hip pain 2019    Prediabetes     Prostate cancer (HCC)     Renal calculi     Right hip pain 2019    Short-term memory loss     Vitamin D deficiency      Past Surgical History:   Procedure Laterality Date    ABDOMINAL ADHESION SURGERY N/A 2021    Procedure: LYSIS ADHESIONS, LAPAROSCOPIC;  Surgeon: Rogelio Lux MD;  Location: [facility];  Service: Urology    COLONOSCOPY      ESOPHAGOGASTRODUODENOSCOPY      HERNIA REPAIR      JOINT REPLACEMENT Right     RTH    KNEE ARTHROSCOPY      KNEE SURGERY      ID LAPS SURG [id number] RPBIC RAD W/NRV SPARING ROBOT N/A 2021    Procedure: ROBOTIC LAPAROSCOPIC RADICAL PROSTATECTOMY;  Surgeon: Rogelio Lux MD;  Location: [facility];  Service: Urology    PROSTATE BIOPSY      TOTAL HIP ARTHROPLASTY Right 2019    TOTAL HIP ARTHROPLASTY Left 2019     Family History   Problem Relation Age of Onset    Dementia Mother     Glaucoma Mother     Glaucoma Father     Prostate cancer Father     Pulmonary embolism Father     Hypertension Brother     Prostate cancer Brother     Colon cancer Maternal Grandfather      Social History     Socioeconomic History    Marital status: /Civil Union     Spouse name: Not on file    Number of children: Not on file    Years of education: Not on file    Highest education level: Not on file   Occupational History    Occupation:    Tobacco Use    Smoking status: Former     Current packs/day: 0.00     Types: Cigarettes     Quit date:      Years since quittin.9    Smokeless tobacco: Never    Tobacco comments:     patient states he doesnt remember how much or for how long   Vaping Use    Vaping status: Never Used   Substance and Sexual Activity    Alcohol use: Yes     Comment:  occasional beer and wine    Drug use: Not Currently     Comment: years ago    Sexual activity: Not Currently   Other Topics Concern    Not on file   Social History Narrative    Daily caffeine consumption- coffee, 3-4 cups    Education level- some college     Social Drivers of Health     Financial Resource Strain: Low Risk  (12/22/2023)    [facility], [facility]    Overall Financial Resource Strain (CARDIA)     Difficulty of Paying Living Expenses: Not hard at all   Food Insecurity: No Food Insecurity (12/22/2023)    Received from [facility], [facility]    Hunger Vital Sign     Worried About Running Out of Food in the Last Year: Never true     Ran Out of Food in the Last Year: Never true   Transportation Needs: No Transportation Needs (12/22/2023)    Received from [facility], [facility]    PRAPARE - Transportation     Lack of Transportation (Medical): No     Lack of Transportation (Non-Medical): No   Physical Activity: Insufficiently Active (7/7/2023)    Received from [facility]    Exercise Vital Sign     Days of Exercise per Week: 7 days     Minutes of Exercise per Session: 20 min   Stress: No Stress Concern Present (7/7/2023)    Received from [facility], [facility]    [employer] [employer] - Occupational Stress Questionnaire     Feeling of Stress : Not at all   Social Connections: Unknown (7/7/2023)    Received from [facility], [facility]    Social Connection and Isolation Panel [NHANES]     Frequency of Communication with Friends and Family: Twice a week     Frequency of Social Gatherings with Friends and Family: More than three times a week     Attends Religion Services: Patient declined     Active Member of Clubs or Organizations: Patient declined     Attends Club or Organization  Meetings: Patient declined     Marital Status: Not on file   Intimate Partner Violence: Not At Risk (12/22/2023)    Received from [facility], [facility]    Humiliation, Afraid, Rape, and Kick questionnaire     Fear of Current or Ex-Partner: No     Emotionally Abused: No     Physically Abused: No     Sexually Abused: No   Housing Stability: Low Risk  (12/22/2023)    Received from [facility], [facility]    Housing Stability Vital Sign     Unable to Pay for Housing in the Last Year: No     Number of Places Lived in the Last Year: 1     Unstable Housing in the Last Year: No       Current Outpatient Medications:     Alphagan P 0.1 %, INSTILL 1 DROP IN BOTH EYES TWICE DAILY, Disp: , Rfl:     Combigan 0.2-0.5 %, , Disp: , Rfl:     donepezil (ARICEPT) 10 mg tablet, Take 1 tablet (10 mg total) by mouth daily, Disp: 90 tablet, Rfl: 3    escitalopram (LEXAPRO) 20 mg tablet, Take 1 tablet (20 mg total) by mouth daily, Disp: 90 tablet, Rfl: 3    memantine (NAMENDA) 10 mg tablet, Take 1 tablet (10 mg total) by mouth 2 (two) times a day, Disp: 200 tablet, Rfl: 1  Allergies   Allergen Reactions    Sulfa Antibiotics Other (See Comments)     Mother told him as child not to take     There were no vitals filed for this visit.

## 2025-01-16 ENCOUNTER — TELEPHONE (OUTPATIENT)
Dept: NEUROLOGY | Facility: CLINIC | Age: 65
End: 2025-01-16

## 2025-01-16 NOTE — TELEPHONE ENCOUNTER
Spoke with patient to confirm patient's appointment on  1/23 with [facility].  Reminded pt to arrive 15 min prior to allow enough time for check in.

## 2025-01-23 ENCOUNTER — OFFICE VISIT (OUTPATIENT)
Dept: NEUROLOGY | Facility: CLINIC | Age: 65
End: 2025-01-23
Payer: MEDICARE

## 2025-01-23 VITALS
HEIGHT: 69 IN | HEART RATE: 65 BPM | SYSTOLIC BLOOD PRESSURE: 130 MMHG | OXYGEN SATURATION: 98 % | TEMPERATURE: 99.8 F | WEIGHT: 167.2 LBS | DIASTOLIC BLOOD PRESSURE: 86 MMHG | BODY MASS INDEX: 24.76 KG/M2

## 2025-01-23 DIAGNOSIS — F02.80 EARLY ONSET ALZHEIMER'S DEMENTIA WITHOUT BEHAVIORAL DISTURBANCE (HCC): Primary | ICD-10-CM

## 2025-01-23 DIAGNOSIS — G30.0 EARLY ONSET ALZHEIMER'S DEMENTIA WITHOUT BEHAVIORAL DISTURBANCE (HCC): Primary | ICD-10-CM

## 2025-01-23 PROCEDURE — 99215 OFFICE O/P EST HI 40 MIN: CPT

## 2025-01-23 RX ORDER — MEMANTINE HYDROCHLORIDE 10 MG/1
10 TABLET ORAL 2 TIMES DAILY
Qty: 180 TABLET | Refills: 3 | Status: SHIPPED | OUTPATIENT
Start: 2025-01-23

## 2025-01-23 NOTE — PROGRESS NOTES
Name: Nilson Montes      : 1960      MRN: [id number]  Encounter Provider: HERBERT Ervin  Encounter Date: 2025   Encounter department: [facility] [place]  :  Assessment & Plan  Early onset Alzheimer's dementia without behavioral disturbance (HCC)  Nilson Montes is a 64 year old male with early onset Alzheimer's disease.  He is maintained on Namenda 10 mg twice daily and Aricept 10 mg daily, as well as Lexapro 20 mg daily.  We discussed he is not currently a candidate for Leqembi due to hx of prostate cancer within the last 3 years. Overall his wife feels that his cognition has been stable since his last visit with the exception that he requires more assistance/reminders for ADLs. He is now attending an adult day care program 3 days a week which he is really enjoying. I recommend that he continue to attend and remain as socially engaged as possible.  He denies any neurologic complaints today.  There are no safety, sleep, appetite, or behavioral concerns today. We will continue his current regimen unchanged and plan to follow-up in 6 months; sooner if needed      Orders:    memantine (NAMENDA) 10 mg tablet; Take 1 tablet (10 mg total) by mouth 2 (two) times a day      Patient Instructions   - Continue Donepezil 10 mg daily and Namenda 10 mg twice daily     - Continue Lexapro 20 mg daily    - Not currently a candidate for Leqembi due to prostate cancer within the last 3 years     -Patient was encouraged to increase mind stimulating activities such as reading, crosswords, word searches, puzzles, Soduku, solitaire, coloring and other brain games.  We also discussed the importance of staying physically active and eating a health diet such as the Mediterranean or MIND diet.       -Discussed air tags, walkie talkie, lanyard/tags as safety precautions while walking dogs or running     -Consider referral in the future to psychology/talk therapy or psychiatry     -  Consider day programs, gyms, etc.     -Discussed some basic Do’s when it comes to communication with someone with dementia:  Give short, one sentence explanations.  Allow plenty of time for comprehension, and then triple it.  Repeat instructions or sentences exactly the same way.  Avoid insistence. Try again later.  Agree with them or distract them to a different subject or activity.  Accept the blame when something’s wrong (even if it’s fantasy).  Leave the room, if necessary, to avoid confrontations.  Respond to the feelings rather than the words.  Be patient and cheerful and reassuring. Do go with the flow.  Practice 100% forgiveness. Memory loss progresses daily.     -Discussed Don’ts:  Don’t reason.  Don’t argue.  Don’t confront.  Don’t remind them they forget.  Don’t question recent memory.  Don’t take it personally.        History of Present Illness     HPI Nilson Montes is a 64 year old male known to the practice for cognitive dysfunction felt to be secondary to early onset of Alzheimer's disease beginning almost 8-9 years ago. He is maintained on Namenda 10 mg twice daily and Aricept 10 mg daily, as well as Lexapro 20 mg daily.  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 He was last seen 5/2/2024, at that time had been stable with both good and bad days in terms of memory but remained independent. He was remaining physically active, participating in reading and remained independent of all ADLs.     He is accompanied by his wife today. He moved to a 55[place] community with his wife and daughter about 1 year ago.   He is walking the dogs with supervision now (can no longer do this independently due to getting turned around and lost)  He does require assistance for showers, and dressing with assistance.  His daughter is cooking all meals.    He continues to toilet independently for urine  He is incontinent of bowels  occasionally; he is using depends. He did see GI after this began.  He requires assistance with brushing teeth (can no longer recall the steps)  He is unable to use household items such as the microwave.   He is attending adult  twice a week; he is really enjoying this.   He attends from 9 a-5 p  He is not reading any longer; he is listening to music still   He is not a fan of doing puzzles or other board games.   He continues to spend time with his grandson which brings him much [place].    He is under constant supervision.   His mood continues to be stable; occasionally having outbursts although his wife reports these are usually provoked and he is usually easily redirected.  He denies any hallucinations   He does seem to be more agitated in the evening but is not confused    Cries a little more at times  He cannot use silverware to cut things (his wife is helping with this)  He is still sleeping well without difficulty and has an adequate appetite. BMI 24.69  Today he continues to decline to participate in MoCA testing but did participate in brief memory questioning.   In the past he has also declined neuropsychologic evaluation.    He denies any neurologic complaints today.        Review of Systems   Constitutional:  Negative for appetite change, fatigue and fever.   HENT: Negative.  Negative for hearing loss, tinnitus, trouble swallowing and voice change.    Eyes: Negative.  Negative for photophobia, pain and visual disturbance.   Respiratory: Negative.  Negative for shortness of breath.    Cardiovascular: Negative.  Negative for palpitations.   Gastrointestinal: Negative.  Negative for nausea and vomiting.   Endocrine: Negative.  Negative for cold intolerance.   Genitourinary: Negative.  Negative for dysuria, frequency and urgency.   Musculoskeletal:  Negative for back pain, gait problem, myalgias, neck pain and neck stiffness.   Skin: Negative.  Negative for rash.   Allergic/Immunologic: Negative.   "  Neurological: Negative.  Negative for dizziness, tremors, seizures, syncope, facial asymmetry, speech difficulty, weakness, light-headedness, numbness and headaches.   Hematological: Negative.  Does not bruise/bleed easily.   Psychiatric/Behavioral: Negative.  Negative for confusion, hallucinations and sleep disturbance.    All other systems reviewed and are negative.  I have personally reviewed the MA's review of systems and made changes as necessary.    Current Outpatient Medications on File Prior to Visit   Medication Sig Dispense Refill    donepezil (ARICEPT) 10 mg tablet Take 1 tablet (10 mg total) by mouth daily 90 tablet 3    escitalopram (LEXAPRO) 20 mg tablet Take 1 tablet (20 mg total) by mouth daily 90 tablet 3    Alphagan P 0.1 % INSTILL 1 DROP IN BOTH EYES TWICE DAILY      Combigan 0.2-0.5 %        No current facility-administered medications on file prior to visit.         Objective   /86 (BP Location: Right arm, Patient Position: Sitting, Cuff Size: Standard)   Pulse 65   Temp 99.8 °F (37.7 °C) (Temporal)   Ht 5' 9\" (1.753 m)   Wt 75.8 kg (167 lb 3.2 oz)   SpO2 98%   BMI 24.69 kg/m²       Neurological Exam  He is able to correctly tell me his name and date of birth  He does not know today's month, date, or year  He does not know the season  He cannot name keys or scissors and does not know their purpose   He cannot name the colors red, blue or pink when shown.   He can recall his wife's name: Treasure  There is no right/left confusion and is able to follow multi step commands  His speech is tangential     Administrative Statements   I have spent a total time of 40 minutes in caring for this patient on the day of the visit/encounter including Diagnostic results, Prognosis, Risks and benefits of tx options, Instructions for management, Patient and family education, Importance of tx compliance, Risk factor reductions, Impressions, Counseling / Coordination of care, Documenting in the medical " record, Reviewing / ordering tests, medicine, procedures  , and Obtaining or reviewing history  .

## 2025-01-23 NOTE — ASSESSMENT & PLAN NOTE
Orders:    memantine (NAMENDA) 10 mg tablet; Take 1 tablet (10 mg total) by mouth 2 (two) times a day

## 2025-01-23 NOTE — PROGRESS NOTES
Name: Nilson Montes      : 1960      MRN: [id number]  Encounter Provider: HERBERT Ervin  Encounter Date: 2025   Encounter department: [facility] [place]  :  Assessment & Plan      {Ambulatory Patient Instructions (Optional):64331}    History of Present Illness {?Quick Links Encounters * My Last Note * Last Note in Specialty * Snapshot * Since Last Visit * History :64285}  HPI  Review of Systems   Constitutional:  Negative for appetite change, fatigue and fever.   HENT: Negative.  Negative for hearing loss, tinnitus, trouble swallowing and voice change.    Eyes: Negative.  Negative for photophobia, pain and visual disturbance.   Respiratory: Negative.  Negative for shortness of breath.    Cardiovascular: Negative.  Negative for palpitations.   Gastrointestinal: Negative.  Negative for nausea and vomiting.   Endocrine: Negative.  Negative for cold intolerance.   Genitourinary: Negative.  Negative for dysuria, frequency and urgency.   Musculoskeletal:  Negative for back pain, gait problem, myalgias, neck pain and neck stiffness.   Skin: Negative.  Negative for rash.   Allergic/Immunologic: Negative.    Neurological: Negative.  Negative for dizziness, tremors, seizures, syncope, facial asymmetry, speech difficulty, weakness, light-headedness, numbness and headaches.   Hematological: Negative.  Does not bruise/bleed easily.   Psychiatric/Behavioral: Negative.  Negative for confusion, hallucinations and sleep disturbance.    All other systems reviewed and are negative.   I have personally reviewed the MA's review of systems and made changes as necessary.    {Select to insert medical history sections (Optional):99618}     Objective {?Quick Links Trend Vitals * Enter New Vitals * Results Review * Timeline (Adult) * Labs * Imaging * Cardiology * Procedures * Lung Cancer Screening * Surgical eConsent :84922}  There were no vitals taken for this visit.    Physical  Exam  Neurological Exam    {Radiology Results Review (Optional):77974}    {Administrative / Billing Section (Optional):65378}

## 2025-01-23 NOTE — PATIENT INSTRUCTIONS
- Continue Donepezil 10 mg daily and Namenda 10 mg twice daily     - Continue Lexapro 20 mg daily    - Not currently a candidate for Leqembi due to prostate cancer within the last 3 years     -Patient was encouraged to increase mind stimulating activities such as reading, crosswords, word searches, puzzles, Soduku, solitaire, coloring and other brain games.  We also discussed the importance of staying physically active and eating a health diet such as the Mediterranean or MIND diet.       -Discussed air tags, walkie talkie, lanyard/tags as safety precautions while walking dogs or running     -Consider referral in the future to psychology/talk therapy or psychiatry     - Consider day programs, gyms, etc.     -Discussed some basic Do’s when it comes to communication with someone with dementia:  Give short, one sentence explanations.  Allow plenty of time for comprehension, and then triple it.  Repeat instructions or sentences exactly the same way.  Avoid insistence. Try again later.  Agree with them or distract them to a different subject or activity.  Accept the blame when something’s wrong (even if it’s fantasy).  Leave the room, if necessary, to avoid confrontations.  Respond to the feelings rather than the words.  Be patient and cheerful and reassuring. Do go with the flow.  Practice 100% forgiveness. Memory loss progresses daily.     -Discussed Don’ts:  Don’t reason.  Don’t argue.  Don’t confront.  Don’t remind them they forget.  Don’t question recent memory.  Don’t take it personally.

## 2025-01-26 PROBLEM — R41.3 MEMORY LOSS, SHORT TERM: Status: RESOLVED | Noted: 2017-11-28 | Resolved: 2025-01-26

## 2025-01-26 NOTE — ASSESSMENT & PLAN NOTE
Nilson Montes is a 64 year old male with early onset Alzheimer's disease.  He is maintained on Namenda 10 mg twice daily and Aricept 10 mg daily, as well as Lexapro 20 mg daily.  We discussed he is not currently a candidate for Leqembi due to hx of prostate cancer within the last 3 years. Overall his wife feels that his cognition has been stable since his last visit with the exception that he requires more assistance/reminders for ADLs. He is now attending an adult day care program 3 days a week which he is really enjoying. I recommend that he continue to attend and remain as socially engaged as possible.  He denies any neurologic complaints today.  There are no safety, sleep, appetite, or behavioral concerns today. We will continue his current regimen unchanged and plan to follow-up in 6 months; sooner if needed      Orders:    memantine (NAMENDA) 10 mg tablet; Take 1 tablet (10 mg total) by mouth 2 (two) times a day

## 2025-06-11 ENCOUNTER — TELEPHONE (OUTPATIENT)
Dept: UROLOGY | Facility: CLINIC | Age: 65
End: 2025-06-11

## 2025-06-11 NOTE — TELEPHONE ENCOUNTER
Pt wife returning call to office. Relayed message that pt needs PSA and testosterone testing completed. Provided instructions for testing and pt will have completed. No further questions or concerns at this time.

## 2025-06-11 NOTE — TELEPHONE ENCOUNTER
jail not completed. Left generic voicemail for the patient requesting a call back. If patient calls back please remind him to complete ordered PSA and testosterone testing. Needs to refrain from sexual activity for three days before blood draw. Blood draw needs to be completed between 7-9am as well.

## 2025-06-16 ENCOUNTER — APPOINTMENT (OUTPATIENT)
Dept: LAB | Facility: AMBULARY SURGERY CENTER | Age: 65
End: 2025-06-16
Attending: PHYSICIAN ASSISTANT
Payer: MEDICARE

## 2025-06-16 DIAGNOSIS — C61 PROSTATE CANCER (HCC): ICD-10-CM

## 2025-06-16 LAB — PSA SERPL-MCNC: 0.03 NG/ML (ref 0–4)

## 2025-06-16 PROCEDURE — 84153 ASSAY OF PSA TOTAL: CPT

## 2025-06-16 PROCEDURE — 84402 ASSAY OF FREE TESTOSTERONE: CPT

## 2025-06-16 PROCEDURE — 84403 ASSAY OF TOTAL TESTOSTERONE: CPT

## 2025-06-17 ENCOUNTER — OFFICE VISIT (OUTPATIENT)
Dept: UROLOGY | Facility: CLINIC | Age: 65
End: 2025-06-17
Payer: MEDICARE

## 2025-06-17 VITALS
SYSTOLIC BLOOD PRESSURE: 132 MMHG | OXYGEN SATURATION: 96 % | WEIGHT: 171 LBS | DIASTOLIC BLOOD PRESSURE: 84 MMHG | HEIGHT: 69 IN | HEART RATE: 66 BPM | BODY MASS INDEX: 25.33 KG/M2

## 2025-06-17 DIAGNOSIS — C61 PROSTATE CANCER (HCC): Primary | ICD-10-CM

## 2025-06-17 LAB
TESTOST FREE SERPL-MCNC: 6.8 PG/ML (ref 6.6–18.1)
TESTOST SERPL-MCNC: 430 NG/DL (ref 264–916)

## 2025-06-17 PROCEDURE — 99213 OFFICE O/P EST LOW 20 MIN: CPT | Performed by: PHYSICIAN ASSISTANT

## 2025-06-17 NOTE — ASSESSMENT & PLAN NOTE
Orders:    PSA Total, Diagnostic; Future    PSA Total, Diagnostic; Future  Follow-up in 1 year with PSA in 6 months and prior to visit  He sees radiation oncology in December.

## 2025-06-17 NOTE — PROGRESS NOTES
"Name: Nilson Montes      : 1960      MRN: [id number]  Encounter Provider: Wu Soto PA-C  Encounter Date: 2025   Encounter department: [facility]  :  Assessment & Plan  Prostate cancer (HCC)    Orders:    PSA Total, Diagnostic; Future    PSA Total, Diagnostic; Future  Follow-up in 1 year with PSA in 6 months and prior to visit  He sees radiation oncology in December.      History of Present Illness   Nilson Montes is a 64 y.o. male who presents history of [place] 7  prostate cancer.  He had a robotic prostatectomy in  followed by salvage radiation.  He had a rising PSA and his PET scan showed a follicle aj metastatic disease and completed 2 years of ADT.  Denies any weight loss or bone pain.  He has some early onset Alzheimer's and is accompanied by his wife.  PSA 0.025.  Testosterone 450.  Good urinary control.  He does have some fecal incontinence.    Review of Systems       Objective   /84 (BP Location: Left arm, Patient Position: Sitting, Cuff Size: Adult)   Pulse 66   Ht 5' 9\" (1.753 m)   Wt 77.6 kg (171 lb)   SpO2 96%   BMI 25.25 kg/m²     Physical Exam GEN: no acute distress    RESP: breathing comfortably with no accessory muscle use    ABD: soft, non-tender, non-distended   INCISION:    EXT: no significant peripheral edema       RADIOLOGY:   none        Results   Lab Results   Component Value Date    PSA 0.025 2025    PSA <0.008 2024    PSA <0.008 2024     Lab Results   Component Value Date    GLUCOSE 132 2014    CALCIUM 9.4 10/01/2024     2017    K 4.4 10/01/2024    CO2 27 10/01/2024     10/01/2024    BUN 21 10/01/2024    CREATININE 1.16 10/01/2024     Lab Results   Component Value Date    WBC 4.00 (L) 2023    HGB 13.5 2023    HCT 40.7 2023    MCV 94 2023     2023       Office Urine Dip  No results found for this or any previous visit (from the past " hour).

## 2025-07-25 ENCOUNTER — OFFICE VISIT (OUTPATIENT)
Dept: NEUROLOGY | Facility: CLINIC | Age: 65
End: 2025-07-25
Payer: MEDICARE

## 2025-07-25 VITALS
TEMPERATURE: 98.5 F | OXYGEN SATURATION: 97 % | WEIGHT: 173.3 LBS | DIASTOLIC BLOOD PRESSURE: 82 MMHG | SYSTOLIC BLOOD PRESSURE: 132 MMHG | HEIGHT: 69 IN | BODY MASS INDEX: 25.67 KG/M2 | HEART RATE: 83 BPM

## 2025-07-25 DIAGNOSIS — F02.80 EARLY ONSET ALZHEIMER'S DEMENTIA WITHOUT BEHAVIORAL DISTURBANCE (HCC): Primary | ICD-10-CM

## 2025-07-25 DIAGNOSIS — G30.0 EARLY ONSET ALZHEIMER'S DEMENTIA WITHOUT BEHAVIORAL DISTURBANCE (HCC): Primary | ICD-10-CM

## 2025-07-25 PROCEDURE — 99214 OFFICE O/P EST MOD 30 MIN: CPT

## 2025-07-25 RX ORDER — MEMANTINE HYDROCHLORIDE 10 MG/1
10 TABLET ORAL 2 TIMES DAILY
Qty: 180 TABLET | Refills: 3 | Status: SHIPPED | OUTPATIENT
Start: 2025-07-25

## 2025-07-25 RX ORDER — ESCITALOPRAM OXALATE 20 MG/1
20 TABLET ORAL DAILY
Qty: 90 TABLET | Refills: 3 | Status: SHIPPED | OUTPATIENT
Start: 2025-07-25

## 2025-07-25 RX ORDER — DONEPEZIL HYDROCHLORIDE 10 MG/1
10 TABLET, FILM COATED ORAL DAILY
Qty: 90 TABLET | Refills: 3 | Status: SHIPPED | OUTPATIENT
Start: 2025-07-25

## (undated) DEVICE — SYRINGE 10ML LL

## (undated) DEVICE — COLUMN DRAPE

## (undated) DEVICE — GLOVE SRG BIOGEL ECLIPSE 7

## (undated) DEVICE — ASTOUND STANDARD SURGICAL GOWN, XL: Brand: CONVERTORS

## (undated) DEVICE — BLADELESS OBTURATOR: Brand: WECK VISTA

## (undated) DEVICE — CHLORAPREP HI-LITE 26ML ORANGE

## (undated) DEVICE — CATH FOLEY COUNCIL 20FR 5ML 2 WAY LUBRICATH

## (undated) DEVICE — ANTIBACTERIAL VIOLET BRAIDED (POLYGLACTIN 910), SYNTHETIC ABSORBABLE SUTURE: Brand: COATED VICRYL

## (undated) DEVICE — KIT, BETHLEHEM ROBOTIC PROST: Brand: CARDINAL HEALTH

## (undated) DEVICE — MONOPOLAR CURVED SCISSORS: Brand: ENDOWRIST

## (undated) DEVICE — PROGRASP FORCEPS: Brand: ENDOWRIST

## (undated) DEVICE — TROCAR: Brand: KII FIOS FIRST ENTRY

## (undated) DEVICE — CATH FOLEY 18FR 5ML 2 WAY SILICONE ELASTIMER

## (undated) DEVICE — KERLIX BANDAGE ROLL: Brand: KERLIX

## (undated) DEVICE — GLOVE INDICATOR PI UNDERGLOVE SZ 7 BLUE

## (undated) DEVICE — NEEDLE 25G X 1 1/2

## (undated) DEVICE — INTENDED FOR TISSUE SEPARATION, AND OTHER PROCEDURES THAT REQUIRE A SHARP SURGICAL BLADE TO PUNCTURE OR CUT.: Brand: BARD-PARKER SAFETY BLADES SIZE 15, STERILE

## (undated) DEVICE — LARGE NEEDLE DRIVER: Brand: ENDOWRIST

## (undated) DEVICE — VISUALIZATION SYSTEM: Brand: CLEARIFY

## (undated) DEVICE — INTENDED FOR TISSUE SEPARATION, AND OTHER PROCEDURES THAT REQUIRE A SHARP SURGICAL BLADE TO PUNCTURE OR CUT.: Brand: BARD-PARKER SAFETY BLADES SIZE 11, STERILE

## (undated) DEVICE — ADHESIVE SKIN HIGH VISCOSITY EXOFIN 1ML

## (undated) DEVICE — SUT VICRYL 0 CT-1 27 IN J260H

## (undated) DEVICE — LUBRICANT SURGILUBE TUBE 4 OZ  FLIP TOP

## (undated) DEVICE — ENDOPATH PNEUMONEEDLE INSUFFLATION NEEDLES WITH LUER LOCK CONNECTORS 120MM: Brand: ENDOPATH

## (undated) DEVICE — ARM DRAPE

## (undated) DEVICE — SUT VLOC 90 3-0 CV-23 9 IN VLOCM0844

## (undated) DEVICE — HEM-O-LOK CLIP CARTRIDGE LARGE DA VINCI SI/XI

## (undated) DEVICE — SUT MONOCRYL 4-0 PS-2 27 IN Y426H

## (undated) DEVICE — JP PERF DRN SIL FLT 10MM FULL: Brand: CARDINAL HEALTH

## (undated) DEVICE — TISSUE RETRIEVAL SYSTEM: Brand: INZII RETRIEVAL SYSTEM

## (undated) DEVICE — SUT PDS II 0 CT-1 27 IN Z340H

## (undated) DEVICE — SUT VLOC 90 3-0 90 CV-23 L9 IN VLOCM1944

## (undated) DEVICE — UNDYED BRAIDED (POLYGLACTIN 910), SYNTHETIC ABSORBABLE SUTURE: Brand: COATED VICRYL

## (undated) DEVICE — MARYLAND BIPOLAR FORCEPS: Brand: ENDOWRIST

## (undated) DEVICE — SUT ETHILON 3-0 FS-1 18 IN 663G

## (undated) DEVICE — TROCAR PORT ACCESS 12 X120MM W/BLDLS OPTICAL TIP AIRSEAL

## (undated) DEVICE — AIRSEAL TUBE SMOKE EVAC LUMENX3 FILTERED

## (undated) DEVICE — JACKSON-PRATT 100CC BULB RESERVOIR: Brand: CARDINAL HEALTH

## (undated) DEVICE — CANNULA SEAL

## (undated) DEVICE — LUBRICANT INST ELECTROLUBE ANTISTK WO PAD

## (undated) DEVICE — URIMETER 2500ML

## (undated) DEVICE — TIP COVER ACCESSORY

## (undated) RX ORDER — BRIMONIDINE TARTRATE, TIMOLOL MALEATE 2; 5 MG/ML; MG/ML
1 SOLUTION/ DROPS OPHTHALMIC TWICE A DAY
Start: 2022-12-13